# Patient Record
Sex: MALE | NOT HISPANIC OR LATINO | RURAL
[De-identification: names, ages, dates, MRNs, and addresses within clinical notes are randomized per-mention and may not be internally consistent; named-entity substitution may affect disease eponyms.]

---

## 2022-01-01 ENCOUNTER — APPOINTMENT (OUTPATIENT)
Dept: RADIOLOGY | Facility: HOSPITAL | Age: 77
End: 2022-01-01
Payer: MEDICARE

## 2022-01-01 ENCOUNTER — APPOINTMENT (OUTPATIENT)
Dept: RADIOLOGY | Facility: HOSPITAL | Age: 77
End: 2022-01-01
Attending: INTERNAL MEDICINE
Payer: MEDICARE

## 2022-01-01 ENCOUNTER — HOSPITAL ENCOUNTER (INPATIENT)
Facility: HOSPITAL | Age: 77
LOS: 33 days | DRG: 207 | End: 2022-09-25
Attending: INTERNAL MEDICINE | Admitting: INTERNAL MEDICINE
Payer: MEDICARE

## 2022-01-01 ENCOUNTER — APPOINTMENT (OUTPATIENT)
Dept: RADIOLOGY | Facility: HOSPITAL | Age: 77
End: 2022-01-01
Attending: SURGERY
Payer: MEDICARE

## 2022-01-01 VITALS
OXYGEN SATURATION: 79 % | BODY MASS INDEX: 28.69 KG/M2 | HEIGHT: 74 IN | TEMPERATURE: 97 F | SYSTOLIC BLOOD PRESSURE: 47 MMHG | DIASTOLIC BLOOD PRESSURE: 30 MMHG | WEIGHT: 223.56 LBS | HEART RATE: 118 BPM

## 2022-01-01 DIAGNOSIS — S31.109A: ICD-10-CM

## 2022-01-01 DIAGNOSIS — J96.01 ACUTE HYPOXEMIC RESPIRATORY FAILURE: Primary | ICD-10-CM

## 2022-01-01 DIAGNOSIS — T81.30XA WOUND DEHISCENCE: ICD-10-CM

## 2022-01-01 DIAGNOSIS — R13.10 DYSPHAGIA, UNSPECIFIED TYPE: ICD-10-CM

## 2022-01-01 DIAGNOSIS — L89.890: ICD-10-CM

## 2022-01-01 DIAGNOSIS — L89.150 PRESSURE INJURY OF SACRAL REGION, UNSTAGEABLE: ICD-10-CM

## 2022-01-01 DIAGNOSIS — I48.91 A-FIB: ICD-10-CM

## 2022-01-01 DIAGNOSIS — I47.20 V-TACH: ICD-10-CM

## 2022-01-01 LAB
ABO + RH BLD: NORMAL
ABORH RETYPE: NORMAL
ALBUMIN SERPL BCP-MCNC: 1.4 G/DL (ref 3.5–5)
ANION GAP SERPL CALCULATED.3IONS-SCNC: 10 MMOL/L (ref 7–16)
ANION GAP SERPL CALCULATED.3IONS-SCNC: 12 MMOL/L (ref 7–16)
ANION GAP SERPL CALCULATED.3IONS-SCNC: 13 MMOL/L (ref 7–16)
ANION GAP SERPL CALCULATED.3IONS-SCNC: 15 MMOL/L (ref 7–16)
ANION GAP SERPL CALCULATED.3IONS-SCNC: 15 MMOL/L (ref 7–16)
ANION GAP SERPL CALCULATED.3IONS-SCNC: 16 MMOL/L (ref 7–16)
ANISOCYTOSIS BLD QL SMEAR: ABNORMAL
BASOPHILS # BLD AUTO: 0 K/UL (ref 0–0.2)
BASOPHILS # BLD AUTO: 0.01 K/UL (ref 0–0.2)
BASOPHILS # BLD AUTO: 0.03 K/UL (ref 0–0.2)
BASOPHILS # BLD AUTO: 0.03 K/UL (ref 0–0.2)
BASOPHILS # BLD AUTO: 0.04 K/UL (ref 0–0.2)
BASOPHILS # BLD AUTO: 0.05 K/UL (ref 0–0.2)
BASOPHILS # BLD AUTO: 0.06 K/UL (ref 0–0.2)
BASOPHILS # BLD AUTO: 0.08 K/UL (ref 0–0.2)
BASOPHILS # BLD AUTO: 0.08 K/UL (ref 0–0.2)
BASOPHILS # BLD AUTO: 0.09 K/UL (ref 0–0.2)
BASOPHILS # BLD AUTO: 0.15 K/UL (ref 0–0.2)
BASOPHILS NFR BLD AUTO: 0 % (ref 0–1)
BASOPHILS NFR BLD AUTO: 0 % (ref 0–1)
BASOPHILS NFR BLD AUTO: 0.2 % (ref 0–1)
BASOPHILS NFR BLD AUTO: 0.3 % (ref 0–1)
BASOPHILS NFR BLD AUTO: 0.3 % (ref 0–1)
BASOPHILS NFR BLD AUTO: 0.4 % (ref 0–1)
BASOPHILS NFR BLD AUTO: 0.4 % (ref 0–1)
BASOPHILS NFR BLD AUTO: 0.5 % (ref 0–1)
BASOPHILS NFR BLD AUTO: 0.5 % (ref 0–1)
BASOPHILS NFR BLD AUTO: 0.7 % (ref 0–1)
BASOPHILS NFR BLD AUTO: 0.7 % (ref 0–1)
BLD PROD TYP BPU: NORMAL
BLOOD UNIT EXPIRATION DATE: NORMAL
BLOOD UNIT TYPE CODE: 5100
BLOOD UNIT TYPE CODE: 9500
BUN SERPL-MCNC: 61 MG/DL (ref 7–18)
BUN SERPL-MCNC: 68 MG/DL (ref 7–18)
BUN SERPL-MCNC: 72 MG/DL (ref 7–18)
BUN SERPL-MCNC: 72 MG/DL (ref 7–18)
BUN SERPL-MCNC: 76 MG/DL (ref 7–18)
BUN SERPL-MCNC: 79 MG/DL (ref 7–18)
BUN SERPL-MCNC: 81 MG/DL (ref 7–18)
BUN SERPL-MCNC: 81 MG/DL (ref 7–18)
BUN SERPL-MCNC: 83 MG/DL (ref 7–18)
BUN SERPL-MCNC: 98 MG/DL (ref 7–18)
BUN/CREAT SERPL: 19 (ref 6–20)
BUN/CREAT SERPL: 22 (ref 6–20)
BUN/CREAT SERPL: 23 (ref 6–20)
BUN/CREAT SERPL: 23 (ref 6–20)
BUN/CREAT SERPL: 24 (ref 6–20)
BUN/CREAT SERPL: 26 (ref 6–20)
BUN/CREAT SERPL: 30 (ref 6–20)
CALCIUM SERPL-MCNC: 7.5 MG/DL (ref 8.5–10.1)
CALCIUM SERPL-MCNC: 7.6 MG/DL (ref 8.5–10.1)
CALCIUM SERPL-MCNC: 7.8 MG/DL (ref 8.5–10.1)
CALCIUM SERPL-MCNC: 8 MG/DL (ref 8.5–10.1)
CALCIUM SERPL-MCNC: 8 MG/DL (ref 8.5–10.1)
CALCIUM SERPL-MCNC: 8.1 MG/DL (ref 8.5–10.1)
CALCIUM SERPL-MCNC: 8.2 MG/DL (ref 8.5–10.1)
CALCIUM SERPL-MCNC: 8.8 MG/DL (ref 8.5–10.1)
CHLORIDE SERPL-SCNC: 100 MMOL/L (ref 98–107)
CHLORIDE SERPL-SCNC: 102 MMOL/L (ref 98–107)
CHLORIDE SERPL-SCNC: 90 MMOL/L (ref 98–107)
CHLORIDE SERPL-SCNC: 94 MMOL/L (ref 98–107)
CHLORIDE SERPL-SCNC: 96 MMOL/L (ref 98–107)
CHLORIDE SERPL-SCNC: 98 MMOL/L (ref 98–107)
CHLORIDE SERPL-SCNC: 98 MMOL/L (ref 98–107)
CHLORIDE SERPL-SCNC: 99 MMOL/L (ref 98–107)
CO2 SERPL-SCNC: 23 MMOL/L (ref 21–32)
CO2 SERPL-SCNC: 24 MMOL/L (ref 21–32)
CO2 SERPL-SCNC: 24 MMOL/L (ref 21–32)
CO2 SERPL-SCNC: 25 MMOL/L (ref 21–32)
CO2 SERPL-SCNC: 26 MMOL/L (ref 21–32)
CO2 SERPL-SCNC: 27 MMOL/L (ref 21–32)
CO2 SERPL-SCNC: 28 MMOL/L (ref 21–32)
CO2 SERPL-SCNC: 28 MMOL/L (ref 21–32)
CREAT SERPL-MCNC: 2.67 MG/DL (ref 0.7–1.3)
CREAT SERPL-MCNC: 3.05 MG/DL (ref 0.7–1.3)
CREAT SERPL-MCNC: 3.09 MG/DL (ref 0.7–1.3)
CREAT SERPL-MCNC: 3.19 MG/DL (ref 0.7–1.3)
CREAT SERPL-MCNC: 3.25 MG/DL (ref 0.7–1.3)
CREAT SERPL-MCNC: 3.34 MG/DL (ref 0.7–1.3)
CREAT SERPL-MCNC: 3.41 MG/DL (ref 0.7–1.3)
CREAT SERPL-MCNC: 3.45 MG/DL (ref 0.7–1.3)
CREAT SERPL-MCNC: 3.52 MG/DL (ref 0.7–1.3)
CREAT SERPL-MCNC: 4.5 MG/DL (ref 0.7–1.3)
CRENATED CELLS: ABNORMAL
CROSSMATCH INTERPRETATION: NORMAL
CULTURE, LOWER RESPIRATORY: ABNORMAL
DIFFERENTIAL METHOD BLD: ABNORMAL
DISPENSE STATUS: NORMAL
EGFR (NO RACE VARIABLE) (RUSH/TITUS): 13 ML/MIN/1.73M²
EGFR (NO RACE VARIABLE) (RUSH/TITUS): 17 ML/MIN/1.73M²
EGFR (NO RACE VARIABLE) (RUSH/TITUS): 17 ML/MIN/1.73M²
EGFR (NO RACE VARIABLE) (RUSH/TITUS): 18 ML/MIN/1.73M²
EGFR (NO RACE VARIABLE) (RUSH/TITUS): 18 ML/MIN/1.73M²
EGFR (NO RACE VARIABLE) (RUSH/TITUS): 19 ML/MIN/1.73M²
EGFR (NO RACE VARIABLE) (RUSH/TITUS): 19 ML/MIN/1.73M²
EGFR (NO RACE VARIABLE) (RUSH/TITUS): 20 ML/MIN/1.73M²
EGFR (NO RACE VARIABLE) (RUSH/TITUS): 20 ML/MIN/1.73M²
EGFR (NO RACE VARIABLE) (RUSH/TITUS): 24 ML/MIN/1.73M²
EOSINOPHIL # BLD AUTO: 0 K/UL (ref 0–0.5)
EOSINOPHIL # BLD AUTO: 0.01 K/UL (ref 0–0.5)
EOSINOPHIL # BLD AUTO: 0.01 K/UL (ref 0–0.5)
EOSINOPHIL # BLD AUTO: 0.03 K/UL (ref 0–0.5)
EOSINOPHIL # BLD AUTO: 0.07 K/UL (ref 0–0.5)
EOSINOPHIL # BLD AUTO: 0.09 K/UL (ref 0–0.5)
EOSINOPHIL # BLD AUTO: 0.09 K/UL (ref 0–0.5)
EOSINOPHIL # BLD AUTO: 0.1 K/UL (ref 0–0.5)
EOSINOPHIL # BLD AUTO: 0.19 K/UL (ref 0–0.5)
EOSINOPHIL # BLD AUTO: 0.21 K/UL (ref 0–0.5)
EOSINOPHIL # BLD AUTO: 0.21 K/UL (ref 0–0.5)
EOSINOPHIL # BLD AUTO: 0.22 K/UL (ref 0–0.5)
EOSINOPHIL # BLD AUTO: 0.31 K/UL (ref 0–0.5)
EOSINOPHIL NFR BLD AUTO: 0 % (ref 1–4)
EOSINOPHIL NFR BLD AUTO: 0 % (ref 1–4)
EOSINOPHIL NFR BLD AUTO: 0.1 % (ref 1–4)
EOSINOPHIL NFR BLD AUTO: 0.4 % (ref 1–4)
EOSINOPHIL NFR BLD AUTO: 1 % (ref 1–4)
EOSINOPHIL NFR BLD AUTO: 1.1 % (ref 1–4)
EOSINOPHIL NFR BLD AUTO: 1.3 % (ref 1–4)
EOSINOPHIL NFR BLD AUTO: 1.3 % (ref 1–4)
EOSINOPHIL NFR BLD AUTO: 1.5 % (ref 1–4)
EOSINOPHIL NFR BLD AUTO: 2.4 % (ref 1–4)
EOSINOPHIL NFR BLD MANUAL: 1 % (ref 1–4)
EOSINOPHIL NFR BLD MANUAL: 2 % (ref 1–4)
EOSINOPHIL NFR BLD MANUAL: 4 % (ref 1–4)
ERYTHROCYTE [DISTWIDTH] IN BLOOD BY AUTOMATED COUNT: 14.1 % (ref 11.5–14.5)
ERYTHROCYTE [DISTWIDTH] IN BLOOD BY AUTOMATED COUNT: 14.5 % (ref 11.5–14.5)
ERYTHROCYTE [DISTWIDTH] IN BLOOD BY AUTOMATED COUNT: 14.6 % (ref 11.5–14.5)
ERYTHROCYTE [DISTWIDTH] IN BLOOD BY AUTOMATED COUNT: 14.7 % (ref 11.5–14.5)
ERYTHROCYTE [DISTWIDTH] IN BLOOD BY AUTOMATED COUNT: 14.9 % (ref 11.5–14.5)
ERYTHROCYTE [DISTWIDTH] IN BLOOD BY AUTOMATED COUNT: 15.7 % (ref 11.5–14.5)
ERYTHROCYTE [DISTWIDTH] IN BLOOD BY AUTOMATED COUNT: 15.9 % (ref 11.5–14.5)
ERYTHROCYTE [DISTWIDTH] IN BLOOD BY AUTOMATED COUNT: 16 % (ref 11.5–14.5)
ERYTHROCYTE [DISTWIDTH] IN BLOOD BY AUTOMATED COUNT: 16.2 % (ref 11.5–14.5)
ERYTHROCYTE [DISTWIDTH] IN BLOOD BY AUTOMATED COUNT: 16.7 % (ref 11.5–14.5)
EST. AVERAGE GLUCOSE BLD GHB EST-MCNC: 90 MG/DL
FERRITIN SERPL-MCNC: 1197 NG/ML (ref 26–388)
FOLATE SERPL-MCNC: 12 NG/ML (ref 3.1–17.5)
GLUCOSE SERPL-MCNC: 100 MG/DL (ref 74–106)
GLUCOSE SERPL-MCNC: 101 MG/DL (ref 70–105)
GLUCOSE SERPL-MCNC: 102 MG/DL (ref 70–105)
GLUCOSE SERPL-MCNC: 103 MG/DL (ref 70–105)
GLUCOSE SERPL-MCNC: 103 MG/DL (ref 70–105)
GLUCOSE SERPL-MCNC: 104 MG/DL (ref 70–105)
GLUCOSE SERPL-MCNC: 105 MG/DL (ref 70–105)
GLUCOSE SERPL-MCNC: 105 MG/DL (ref 74–106)
GLUCOSE SERPL-MCNC: 106 MG/DL (ref 70–105)
GLUCOSE SERPL-MCNC: 106 MG/DL (ref 70–105)
GLUCOSE SERPL-MCNC: 106 MG/DL (ref 74–106)
GLUCOSE SERPL-MCNC: 107 MG/DL (ref 70–105)
GLUCOSE SERPL-MCNC: 108 MG/DL (ref 70–105)
GLUCOSE SERPL-MCNC: 108 MG/DL (ref 70–105)
GLUCOSE SERPL-MCNC: 109 MG/DL (ref 70–105)
GLUCOSE SERPL-MCNC: 109 MG/DL (ref 74–106)
GLUCOSE SERPL-MCNC: 110 MG/DL (ref 74–106)
GLUCOSE SERPL-MCNC: 111 MG/DL (ref 74–106)
GLUCOSE SERPL-MCNC: 113 MG/DL (ref 70–105)
GLUCOSE SERPL-MCNC: 113 MG/DL (ref 70–105)
GLUCOSE SERPL-MCNC: 114 MG/DL (ref 70–105)
GLUCOSE SERPL-MCNC: 116 MG/DL (ref 70–105)
GLUCOSE SERPL-MCNC: 117 MG/DL (ref 70–105)
GLUCOSE SERPL-MCNC: 117 MG/DL (ref 70–105)
GLUCOSE SERPL-MCNC: 118 MG/DL (ref 70–105)
GLUCOSE SERPL-MCNC: 119 MG/DL (ref 70–105)
GLUCOSE SERPL-MCNC: 120 MG/DL (ref 70–105)
GLUCOSE SERPL-MCNC: 121 MG/DL (ref 70–105)
GLUCOSE SERPL-MCNC: 122 MG/DL (ref 70–105)
GLUCOSE SERPL-MCNC: 122 MG/DL (ref 70–105)
GLUCOSE SERPL-MCNC: 124 MG/DL (ref 70–105)
GLUCOSE SERPL-MCNC: 124 MG/DL (ref 70–105)
GLUCOSE SERPL-MCNC: 125 MG/DL (ref 70–105)
GLUCOSE SERPL-MCNC: 126 MG/DL (ref 70–105)
GLUCOSE SERPL-MCNC: 127 MG/DL (ref 70–105)
GLUCOSE SERPL-MCNC: 128 MG/DL (ref 70–105)
GLUCOSE SERPL-MCNC: 130 MG/DL (ref 70–105)
GLUCOSE SERPL-MCNC: 130 MG/DL (ref 70–105)
GLUCOSE SERPL-MCNC: 131 MG/DL (ref 70–105)
GLUCOSE SERPL-MCNC: 132 MG/DL (ref 70–105)
GLUCOSE SERPL-MCNC: 133 MG/DL (ref 70–105)
GLUCOSE SERPL-MCNC: 135 MG/DL (ref 70–105)
GLUCOSE SERPL-MCNC: 136 MG/DL (ref 70–105)
GLUCOSE SERPL-MCNC: 136 MG/DL (ref 70–105)
GLUCOSE SERPL-MCNC: 136 MG/DL (ref 74–106)
GLUCOSE SERPL-MCNC: 137 MG/DL (ref 70–105)
GLUCOSE SERPL-MCNC: 138 MG/DL (ref 70–105)
GLUCOSE SERPL-MCNC: 139 MG/DL (ref 70–105)
GLUCOSE SERPL-MCNC: 140 MG/DL (ref 70–105)
GLUCOSE SERPL-MCNC: 142 MG/DL (ref 70–105)
GLUCOSE SERPL-MCNC: 143 MG/DL (ref 70–105)
GLUCOSE SERPL-MCNC: 144 MG/DL (ref 70–105)
GLUCOSE SERPL-MCNC: 145 MG/DL (ref 70–105)
GLUCOSE SERPL-MCNC: 146 MG/DL (ref 70–105)
GLUCOSE SERPL-MCNC: 147 MG/DL (ref 70–105)
GLUCOSE SERPL-MCNC: 148 MG/DL (ref 70–105)
GLUCOSE SERPL-MCNC: 150 MG/DL (ref 70–105)
GLUCOSE SERPL-MCNC: 152 MG/DL (ref 74–106)
GLUCOSE SERPL-MCNC: 153 MG/DL (ref 70–105)
GLUCOSE SERPL-MCNC: 154 MG/DL (ref 70–105)
GLUCOSE SERPL-MCNC: 156 MG/DL (ref 70–105)
GLUCOSE SERPL-MCNC: 161 MG/DL (ref 70–105)
GLUCOSE SERPL-MCNC: 162 MG/DL (ref 70–105)
GLUCOSE SERPL-MCNC: 163 MG/DL (ref 70–105)
GLUCOSE SERPL-MCNC: 168 MG/DL (ref 70–105)
GLUCOSE SERPL-MCNC: 168 MG/DL (ref 74–106)
GLUCOSE SERPL-MCNC: 170 MG/DL (ref 70–105)
GLUCOSE SERPL-MCNC: 172 MG/DL (ref 70–105)
GLUCOSE SERPL-MCNC: 173 MG/DL (ref 70–105)
GLUCOSE SERPL-MCNC: 178 MG/DL (ref 70–105)
GLUCOSE SERPL-MCNC: 179 MG/DL (ref 70–105)
GLUCOSE SERPL-MCNC: 179 MG/DL (ref 70–105)
GLUCOSE SERPL-MCNC: 181 MG/DL (ref 70–105)
GLUCOSE SERPL-MCNC: 190 MG/DL (ref 70–105)
GLUCOSE SERPL-MCNC: 192 MG/DL (ref 70–105)
GLUCOSE SERPL-MCNC: 198 MG/DL (ref 70–105)
GLUCOSE SERPL-MCNC: 220 MG/DL (ref 70–105)
GLUCOSE SERPL-MCNC: 223 MG/DL (ref 70–105)
GLUCOSE SERPL-MCNC: 230 MG/DL (ref 70–105)
GLUCOSE SERPL-MCNC: 234 MG/DL (ref 70–105)
GLUCOSE SERPL-MCNC: 244 MG/DL (ref 70–105)
GLUCOSE SERPL-MCNC: 249 MG/DL (ref 70–105)
GLUCOSE SERPL-MCNC: 54 MG/DL (ref 74–106)
GLUCOSE SERPL-MCNC: 60 MG/DL (ref 70–105)
GLUCOSE SERPL-MCNC: 61 MG/DL (ref 70–105)
GLUCOSE SERPL-MCNC: 72 MG/DL (ref 70–105)
GLUCOSE SERPL-MCNC: 72 MG/DL (ref 70–105)
GLUCOSE SERPL-MCNC: 73 MG/DL (ref 70–105)
GLUCOSE SERPL-MCNC: 76 MG/DL (ref 70–105)
GLUCOSE SERPL-MCNC: 77 MG/DL (ref 70–105)
GLUCOSE SERPL-MCNC: 77 MG/DL (ref 70–105)
GLUCOSE SERPL-MCNC: 78 MG/DL (ref 70–105)
GLUCOSE SERPL-MCNC: 80 MG/DL (ref 70–105)
GLUCOSE SERPL-MCNC: 81 MG/DL (ref 70–105)
GLUCOSE SERPL-MCNC: 84 MG/DL (ref 70–105)
GLUCOSE SERPL-MCNC: 88 MG/DL (ref 70–105)
GLUCOSE SERPL-MCNC: 90 MG/DL (ref 70–105)
GLUCOSE SERPL-MCNC: 90 MG/DL (ref 70–105)
GLUCOSE SERPL-MCNC: 92 MG/DL (ref 70–105)
GLUCOSE SERPL-MCNC: 93 MG/DL (ref 70–105)
GLUCOSE SERPL-MCNC: 93 MG/DL (ref 70–105)
GLUCOSE SERPL-MCNC: 95 MG/DL (ref 70–105)
GLUCOSE SERPL-MCNC: 95 MG/DL (ref 70–105)
GLUCOSE SERPL-MCNC: 96 MG/DL (ref 70–105)
GLUCOSE SERPL-MCNC: 96 MG/DL (ref 70–105)
GLUCOSE SERPL-MCNC: 97 MG/DL (ref 70–105)
GLUCOSE SERPL-MCNC: 99 MG/DL (ref 70–105)
GLUCOSE SERPL-MCNC: 99 MG/DL (ref 70–105)
GRAM STN SPEC: ABNORMAL
HAV IGM SER QL: NORMAL
HAV IGM SER QL: NORMAL
HBA1C MFR BLD HPLC: 5.3 % (ref 4.5–6.6)
HBV CORE IGM SER QL: NORMAL
HBV CORE IGM SER QL: NORMAL
HBV SURFACE AG SERPL QL IA: NORMAL
HBV SURFACE AG SERPL QL IA: NORMAL
HCO3 UR-SCNC: 27.8 MMOL/L (ref 21–28)
HCT VFR BLD AUTO: 18.8 % (ref 40–54)
HCT VFR BLD AUTO: 19.8 % (ref 40–54)
HCT VFR BLD AUTO: 21.5 % (ref 40–54)
HCT VFR BLD AUTO: 21.6 % (ref 40–54)
HCT VFR BLD AUTO: 21.7 % (ref 40–54)
HCT VFR BLD AUTO: 21.8 % (ref 40–54)
HCT VFR BLD AUTO: 22.1 % (ref 40–54)
HCT VFR BLD AUTO: 22.8 % (ref 40–54)
HCT VFR BLD AUTO: 22.9 % (ref 40–54)
HCT VFR BLD AUTO: 23 % (ref 40–54)
HCT VFR BLD AUTO: 23.2 % (ref 40–54)
HCT VFR BLD AUTO: 24 % (ref 40–54)
HCT VFR BLD AUTO: 27 % (ref 40–54)
HCV AB SER QL: NORMAL
HCV AB SER QL: NORMAL
HGB BLD-MCNC: 6 G/DL (ref 13.5–18)
HGB BLD-MCNC: 6.4 G/DL (ref 13.5–18)
HGB BLD-MCNC: 6.7 G/DL (ref 13.5–18)
HGB BLD-MCNC: 6.8 G/DL (ref 13.5–18)
HGB BLD-MCNC: 6.9 G/DL (ref 13.5–18)
HGB BLD-MCNC: 7 G/DL (ref 13.5–18)
HGB BLD-MCNC: 7 G/DL (ref 13.5–18)
HGB BLD-MCNC: 7.2 G/DL (ref 13.5–18)
HGB BLD-MCNC: 7.2 G/DL (ref 13.5–18)
HGB BLD-MCNC: 7.3 G/DL (ref 13.5–18)
HGB BLD-MCNC: 7.5 G/DL (ref 13.5–18)
HGB BLD-MCNC: 7.6 G/DL (ref 13.5–18)
HGB BLD-MCNC: 8.5 G/DL (ref 13.5–18)
HYPOCHROMIA BLD QL SMEAR: ABNORMAL
IMM GRANULOCYTES # BLD AUTO: 0.15 K/UL (ref 0–0.04)
IMM GRANULOCYTES # BLD AUTO: 0.46 K/UL (ref 0–0.04)
IMM GRANULOCYTES # BLD AUTO: 0.47 K/UL (ref 0–0.04)
IMM GRANULOCYTES # BLD AUTO: 0.52 K/UL (ref 0–0.04)
IMM GRANULOCYTES # BLD AUTO: 0.54 K/UL (ref 0–0.04)
IMM GRANULOCYTES # BLD AUTO: 0.55 K/UL (ref 0–0.04)
IMM GRANULOCYTES # BLD AUTO: 0.85 K/UL (ref 0–0.04)
IMM GRANULOCYTES # BLD AUTO: 0.86 K/UL (ref 0–0.04)
IMM GRANULOCYTES # BLD AUTO: 1.01 K/UL (ref 0–0.04)
IMM GRANULOCYTES # BLD AUTO: 1.45 K/UL (ref 0–0.04)
IMM GRANULOCYTES # BLD AUTO: 1.89 K/UL (ref 0–0.04)
IMM GRANULOCYTES # BLD AUTO: 2.22 K/UL (ref 0–0.04)
IMM GRANULOCYTES # BLD AUTO: 2.9 K/UL (ref 0–0.04)
IMM GRANULOCYTES NFR BLD: 1 % (ref 0–0.4)
IMM GRANULOCYTES NFR BLD: 10 % (ref 0–0.4)
IMM GRANULOCYTES NFR BLD: 11.7 % (ref 0–0.4)
IMM GRANULOCYTES NFR BLD: 12.5 % (ref 0–0.4)
IMM GRANULOCYTES NFR BLD: 3.1 % (ref 0–0.4)
IMM GRANULOCYTES NFR BLD: 3.4 % (ref 0–0.4)
IMM GRANULOCYTES NFR BLD: 3.4 % (ref 0–0.4)
IMM GRANULOCYTES NFR BLD: 3.7 % (ref 0–0.4)
IMM GRANULOCYTES NFR BLD: 4.8 % (ref 0–0.4)
IMM GRANULOCYTES NFR BLD: 4.9 % (ref 0–0.4)
IMM GRANULOCYTES NFR BLD: 6 % (ref 0–0.4)
IMM GRANULOCYTES NFR BLD: 7.8 % (ref 0–0.4)
IMM GRANULOCYTES NFR BLD: 8.3 % (ref 0–0.4)
INDIRECT COOMBS: NORMAL
IRON SATN MFR SERPL: 18 % (ref 14–50)
IRON SERPL-MCNC: 19 ΜG/DL (ref 65–175)
LYMPHOCYTES # BLD AUTO: 0.19 K/UL (ref 1–4.8)
LYMPHOCYTES # BLD AUTO: 0.26 K/UL (ref 1–4.8)
LYMPHOCYTES # BLD AUTO: 0.32 K/UL (ref 1–4.8)
LYMPHOCYTES # BLD AUTO: 0.36 K/UL (ref 1–4.8)
LYMPHOCYTES # BLD AUTO: 0.37 K/UL (ref 1–4.8)
LYMPHOCYTES # BLD AUTO: 0.39 K/UL (ref 1–4.8)
LYMPHOCYTES # BLD AUTO: 0.48 K/UL (ref 1–4.8)
LYMPHOCYTES # BLD AUTO: 0.51 K/UL (ref 1–4.8)
LYMPHOCYTES # BLD AUTO: 0.53 K/UL (ref 1–4.8)
LYMPHOCYTES # BLD AUTO: 0.53 K/UL (ref 1–4.8)
LYMPHOCYTES # BLD AUTO: 0.55 K/UL (ref 1–4.8)
LYMPHOCYTES # BLD AUTO: 0.57 K/UL (ref 1–4.8)
LYMPHOCYTES # BLD AUTO: 0.61 K/UL (ref 1–4.8)
LYMPHOCYTES NFR BLD AUTO: 1.3 % (ref 27–41)
LYMPHOCYTES NFR BLD AUTO: 1.7 % (ref 27–41)
LYMPHOCYTES NFR BLD AUTO: 2 % (ref 27–41)
LYMPHOCYTES NFR BLD AUTO: 2.1 % (ref 27–41)
LYMPHOCYTES NFR BLD AUTO: 2.6 % (ref 27–41)
LYMPHOCYTES NFR BLD AUTO: 2.7 % (ref 27–41)
LYMPHOCYTES NFR BLD AUTO: 2.8 % (ref 27–41)
LYMPHOCYTES NFR BLD AUTO: 2.8 % (ref 27–41)
LYMPHOCYTES NFR BLD AUTO: 2.9 % (ref 27–41)
LYMPHOCYTES NFR BLD AUTO: 3.2 % (ref 27–41)
LYMPHOCYTES NFR BLD AUTO: 3.6 % (ref 27–41)
LYMPHOCYTES NFR BLD AUTO: 3.6 % (ref 27–41)
LYMPHOCYTES NFR BLD AUTO: 4.8 % (ref 27–41)
LYMPHOCYTES NFR BLD MANUAL: 1 % (ref 27–41)
LYMPHOCYTES NFR BLD MANUAL: 1 % (ref 27–41)
LYMPHOCYTES NFR BLD MANUAL: 2 % (ref 27–41)
LYMPHOCYTES NFR BLD MANUAL: 3 % (ref 27–41)
LYMPHOCYTES NFR BLD MANUAL: 4 % (ref 27–41)
LYMPHOCYTES NFR BLD MANUAL: 5 % (ref 27–41)
LYMPHOCYTES NFR BLD MANUAL: 5 % (ref 27–41)
LYMPHOCYTES NFR BLD MANUAL: 7 % (ref 27–41)
LYMPHOCYTES NFR BLD MANUAL: 8 % (ref 27–41)
MACROCYTES BLD QL SMEAR: ABNORMAL
MCH RBC QN AUTO: 29.6 PG (ref 27–31)
MCH RBC QN AUTO: 29.6 PG (ref 27–31)
MCH RBC QN AUTO: 29.9 PG (ref 27–31)
MCH RBC QN AUTO: 30 PG (ref 27–31)
MCH RBC QN AUTO: 30 PG (ref 27–31)
MCH RBC QN AUTO: 30.1 PG (ref 27–31)
MCH RBC QN AUTO: 30.1 PG (ref 27–31)
MCH RBC QN AUTO: 30.2 PG (ref 27–31)
MCH RBC QN AUTO: 30.2 PG (ref 27–31)
MCH RBC QN AUTO: 30.3 PG (ref 27–31)
MCH RBC QN AUTO: 30.5 PG (ref 27–31)
MCHC RBC AUTO-ENTMCNC: 29.5 G/DL (ref 32–36)
MCHC RBC AUTO-ENTMCNC: 30.4 G/DL (ref 32–36)
MCHC RBC AUTO-ENTMCNC: 31.2 G/DL (ref 32–36)
MCHC RBC AUTO-ENTMCNC: 31.5 G/DL (ref 32–36)
MCHC RBC AUTO-ENTMCNC: 31.5 G/DL (ref 32–36)
MCHC RBC AUTO-ENTMCNC: 31.6 G/DL (ref 32–36)
MCHC RBC AUTO-ENTMCNC: 31.7 G/DL (ref 32–36)
MCHC RBC AUTO-ENTMCNC: 31.9 G/DL (ref 32–36)
MCHC RBC AUTO-ENTMCNC: 32.1 G/DL (ref 32–36)
MCHC RBC AUTO-ENTMCNC: 32.4 G/DL (ref 32–36)
MCHC RBC AUTO-ENTMCNC: 32.6 G/DL (ref 32–36)
MCHC RBC AUTO-ENTMCNC: 32.8 G/DL (ref 32–36)
MCHC RBC AUTO-ENTMCNC: 33.8 G/DL (ref 32–36)
MCV RBC AUTO: 101.4 FL (ref 80–96)
MCV RBC AUTO: 88.8 FL (ref 80–96)
MCV RBC AUTO: 90.5 FL (ref 80–96)
MCV RBC AUTO: 90.9 FL (ref 80–96)
MCV RBC AUTO: 93.1 FL (ref 80–96)
MCV RBC AUTO: 93.5 FL (ref 80–96)
MCV RBC AUTO: 93.9 FL (ref 80–96)
MCV RBC AUTO: 94.5 FL (ref 80–96)
MCV RBC AUTO: 95 FL (ref 80–96)
MCV RBC AUTO: 96.3 FL (ref 80–96)
MCV RBC AUTO: 96.5 FL (ref 80–96)
MCV RBC AUTO: 96.8 FL (ref 80–96)
MCV RBC AUTO: 99.1 FL (ref 80–96)
METAMYELOCYTES NFR BLD MANUAL: 1 %
METAMYELOCYTES NFR BLD MANUAL: 1 %
METAMYELOCYTES NFR BLD MANUAL: 10 %
METAMYELOCYTES NFR BLD MANUAL: 2 %
METAMYELOCYTES NFR BLD MANUAL: 3 %
METAMYELOCYTES NFR BLD MANUAL: 5 %
METAMYELOCYTES NFR BLD MANUAL: 6 %
MONOCYTES # BLD AUTO: 0.15 K/UL (ref 0–0.8)
MONOCYTES # BLD AUTO: 0.2 K/UL (ref 0–0.8)
MONOCYTES # BLD AUTO: 0.31 K/UL (ref 0–0.8)
MONOCYTES # BLD AUTO: 0.65 K/UL (ref 0–0.8)
MONOCYTES # BLD AUTO: 0.78 K/UL (ref 0–0.8)
MONOCYTES # BLD AUTO: 1.03 K/UL (ref 0–0.8)
MONOCYTES # BLD AUTO: 1.1 K/UL (ref 0–0.8)
MONOCYTES # BLD AUTO: 1.1 K/UL (ref 0–0.8)
MONOCYTES # BLD AUTO: 1.13 K/UL (ref 0–0.8)
MONOCYTES # BLD AUTO: 1.16 K/UL (ref 0–0.8)
MONOCYTES # BLD AUTO: 1.16 K/UL (ref 0–0.8)
MONOCYTES # BLD AUTO: 1.22 K/UL (ref 0–0.8)
MONOCYTES # BLD AUTO: 1.33 K/UL (ref 0–0.8)
MONOCYTES NFR BLD AUTO: 1.6 % (ref 2–6)
MONOCYTES NFR BLD AUTO: 1.8 % (ref 2–6)
MONOCYTES NFR BLD AUTO: 2.9 % (ref 2–6)
MONOCYTES NFR BLD AUTO: 2.9 % (ref 2–6)
MONOCYTES NFR BLD AUTO: 3.6 % (ref 2–6)
MONOCYTES NFR BLD AUTO: 5.2 % (ref 2–6)
MONOCYTES NFR BLD AUTO: 5.9 % (ref 2–6)
MONOCYTES NFR BLD AUTO: 6.1 % (ref 2–6)
MONOCYTES NFR BLD AUTO: 6.4 % (ref 2–6)
MONOCYTES NFR BLD AUTO: 7.3 % (ref 2–6)
MONOCYTES NFR BLD AUTO: 7.4 % (ref 2–6)
MONOCYTES NFR BLD AUTO: 7.7 % (ref 2–6)
MONOCYTES NFR BLD AUTO: 9.1 % (ref 2–6)
MONOCYTES NFR BLD MANUAL: 1 % (ref 2–6)
MONOCYTES NFR BLD MANUAL: 1 % (ref 2–6)
MONOCYTES NFR BLD MANUAL: 2 % (ref 2–6)
MONOCYTES NFR BLD MANUAL: 3 % (ref 2–6)
MONOCYTES NFR BLD MANUAL: 4 % (ref 2–6)
MONOCYTES NFR BLD MANUAL: 5 % (ref 2–6)
MONOCYTES NFR BLD MANUAL: 5 % (ref 2–6)
MONOCYTES NFR BLD MANUAL: 6 % (ref 2–6)
MONOCYTES NFR BLD MANUAL: 8 % (ref 2–6)
MPC BLD CALC-MCNC: 10 FL (ref 9.4–12.4)
MPC BLD CALC-MCNC: 10.1 FL (ref 9.4–12.4)
MPC BLD CALC-MCNC: 10.1 FL (ref 9.4–12.4)
MPC BLD CALC-MCNC: 10.2 FL (ref 9.4–12.4)
MPC BLD CALC-MCNC: 10.3 FL (ref 9.4–12.4)
MPC BLD CALC-MCNC: 10.3 FL (ref 9.4–12.4)
MPC BLD CALC-MCNC: 10.5 FL (ref 9.4–12.4)
MPC BLD CALC-MCNC: 10.6 FL (ref 9.4–12.4)
MPC BLD CALC-MCNC: 10.8 FL (ref 9.4–12.4)
MPC BLD CALC-MCNC: 11.4 FL (ref 9.4–12.4)
MPC BLD CALC-MCNC: 12.7 FL (ref 9.4–12.4)
MPC BLD CALC-MCNC: 9.5 FL (ref 9.4–12.4)
MPC BLD CALC-MCNC: 9.8 FL (ref 9.4–12.4)
MYELOCYTES NFR BLD MANUAL: 1 %
MYELOCYTES NFR BLD MANUAL: 3 %
MYELOCYTES NFR BLD MANUAL: 3 %
MYELOCYTES NFR BLD MANUAL: 4 %
NEUTROPHILS # BLD AUTO: 10.15 K/UL (ref 1.8–7.7)
NEUTROPHILS # BLD AUTO: 12.73 K/UL (ref 1.8–7.7)
NEUTROPHILS # BLD AUTO: 12.77 K/UL (ref 1.8–7.7)
NEUTROPHILS # BLD AUTO: 13.28 K/UL (ref 1.8–7.7)
NEUTROPHILS # BLD AUTO: 13.64 K/UL (ref 1.8–7.7)
NEUTROPHILS # BLD AUTO: 14.87 K/UL (ref 1.8–7.7)
NEUTROPHILS # BLD AUTO: 15.22 K/UL (ref 1.8–7.7)
NEUTROPHILS # BLD AUTO: 16 K/UL (ref 1.8–7.7)
NEUTROPHILS # BLD AUTO: 17.64 K/UL (ref 1.8–7.7)
NEUTROPHILS # BLD AUTO: 19.55 K/UL (ref 1.8–7.7)
NEUTROPHILS # BLD AUTO: 24.67 K/UL (ref 1.8–7.7)
NEUTROPHILS # BLD AUTO: 5.55 K/UL (ref 1.8–7.7)
NEUTROPHILS # BLD AUTO: 8.1 K/UL (ref 1.8–7.7)
NEUTROPHILS NFR BLD AUTO: 78.7 % (ref 53–65)
NEUTROPHILS NFR BLD AUTO: 79.5 % (ref 53–65)
NEUTROPHILS NFR BLD AUTO: 80.7 % (ref 53–65)
NEUTROPHILS NFR BLD AUTO: 81.9 % (ref 53–65)
NEUTROPHILS NFR BLD AUTO: 83.7 % (ref 53–65)
NEUTROPHILS NFR BLD AUTO: 84.1 % (ref 53–65)
NEUTROPHILS NFR BLD AUTO: 84.6 % (ref 53–65)
NEUTROPHILS NFR BLD AUTO: 85.1 % (ref 53–65)
NEUTROPHILS NFR BLD AUTO: 85.5 % (ref 53–65)
NEUTROPHILS NFR BLD AUTO: 86 % (ref 53–65)
NEUTROPHILS NFR BLD AUTO: 88.6 % (ref 53–65)
NEUTROPHILS NFR BLD AUTO: 90.9 % (ref 53–65)
NEUTROPHILS NFR BLD AUTO: 91.4 % (ref 53–65)
NEUTS BAND NFR BLD MANUAL: 1 % (ref 1–5)
NEUTS BAND NFR BLD MANUAL: 13 % (ref 1–5)
NEUTS BAND NFR BLD MANUAL: 16 % (ref 1–5)
NEUTS BAND NFR BLD MANUAL: 2 % (ref 1–5)
NEUTS BAND NFR BLD MANUAL: 3 % (ref 1–5)
NEUTS BAND NFR BLD MANUAL: 3 % (ref 1–5)
NEUTS BAND NFR BLD MANUAL: 34 % (ref 1–5)
NEUTS BAND NFR BLD MANUAL: 4 % (ref 1–5)
NEUTS BAND NFR BLD MANUAL: 5 % (ref 1–5)
NEUTS BAND NFR BLD MANUAL: 58 % (ref 1–5)
NEUTS BAND NFR BLD MANUAL: 6 % (ref 1–5)
NEUTS BAND NFR BLD MANUAL: 62 % (ref 1–5)
NEUTS SEG NFR BLD MANUAL: 24 % (ref 50–62)
NEUTS SEG NFR BLD MANUAL: 28 % (ref 50–62)
NEUTS SEG NFR BLD MANUAL: 52 % (ref 50–62)
NEUTS SEG NFR BLD MANUAL: 69 % (ref 50–62)
NEUTS SEG NFR BLD MANUAL: 79 % (ref 50–62)
NEUTS SEG NFR BLD MANUAL: 81 % (ref 50–62)
NEUTS SEG NFR BLD MANUAL: 82 % (ref 50–62)
NEUTS SEG NFR BLD MANUAL: 87 % (ref 50–62)
NEUTS SEG NFR BLD MANUAL: 87 % (ref 50–62)
NEUTS SEG NFR BLD MANUAL: 88 % (ref 50–62)
NEUTS SEG NFR BLD MANUAL: 89 % (ref 50–62)
NEUTS SEG NFR BLD MANUAL: 90 % (ref 50–62)
NEUTS SEG NFR BLD MANUAL: 90 % (ref 50–62)
NRBC # BLD AUTO: 0 X10E3/UL
NRBC # BLD AUTO: 0.02 X10E3/UL
NRBC # BLD AUTO: 0.03 X10E3/UL
NRBC # BLD AUTO: 0.09 X10E3/UL
NRBC # BLD AUTO: 0.13 X10E3/UL
NRBC # BLD AUTO: 0.13 X10E3/UL
NRBC BLD MANUAL-RTO: 1 /100 WBC
NRBC, AUTO (.00): 0 %
NRBC, AUTO (.00): 0.3 %
NRBC, AUTO (.00): 0.3 %
NRBC, AUTO (.00): 0.4 %
NRBC, AUTO (.00): 0.5 %
NRBC, AUTO (.00): 0.6 %
OVALOCYTES BLD QL SMEAR: ABNORMAL
PCO2 BLDA: 40 MMHG (ref 35–48)
PH SMN: 7.45 [PH] (ref 7.35–7.45)
PLATELET # BLD AUTO: 103 K/UL (ref 150–400)
PLATELET # BLD AUTO: 148 K/UL (ref 150–400)
PLATELET # BLD AUTO: 175 K/UL (ref 150–400)
PLATELET # BLD AUTO: 210 K/UL (ref 150–400)
PLATELET # BLD AUTO: 227 K/UL (ref 150–400)
PLATELET # BLD AUTO: 229 K/UL (ref 150–400)
PLATELET # BLD AUTO: 254 K/UL (ref 150–400)
PLATELET # BLD AUTO: 257 K/UL (ref 150–400)
PLATELET # BLD AUTO: 267 K/UL (ref 150–400)
PLATELET # BLD AUTO: 314 K/UL (ref 150–400)
PLATELET # BLD AUTO: 349 K/UL (ref 150–400)
PLATELET # BLD AUTO: 373 K/UL (ref 150–400)
PLATELET # BLD AUTO: 69 K/UL (ref 150–400)
PLATELET MORPHOLOGY: ABNORMAL
PLATELET MORPHOLOGY: NORMAL
PO2 BLDA: 139 MMHG (ref 83–108)
POC BASE EXCESS: 3.5 MMOL/L (ref -2–3)
POC SATURATED O2: 99 %
POLYCHROMASIA BLD QL SMEAR: ABNORMAL
POTASSIUM SERPL-SCNC: 3.6 MMOL/L (ref 3.5–5.1)
POTASSIUM SERPL-SCNC: 3.7 MMOL/L (ref 3.5–5.1)
POTASSIUM SERPL-SCNC: 4.2 MMOL/L (ref 3.5–5.1)
POTASSIUM SERPL-SCNC: 4.2 MMOL/L (ref 3.5–5.1)
POTASSIUM SERPL-SCNC: 4.4 MMOL/L (ref 3.5–5.1)
POTASSIUM SERPL-SCNC: 4.4 MMOL/L (ref 3.5–5.1)
POTASSIUM SERPL-SCNC: 4.5 MMOL/L (ref 3.5–5.1)
POTASSIUM SERPL-SCNC: 4.6 MMOL/L (ref 3.5–5.1)
POTASSIUM SERPL-SCNC: 4.9 MMOL/L (ref 3.5–5.1)
POTASSIUM SERPL-SCNC: 6 MMOL/L (ref 3.5–5.1)
RBC # BLD AUTO: 2.01 M/UL (ref 4.6–6.2)
RBC # BLD AUTO: 2.14 M/UL (ref 4.6–6.2)
RBC # BLD AUTO: 2.23 M/UL (ref 4.6–6.2)
RBC # BLD AUTO: 2.26 M/UL (ref 4.6–6.2)
RBC # BLD AUTO: 2.29 M/UL (ref 4.6–6.2)
RBC # BLD AUTO: 2.32 M/UL (ref 4.6–6.2)
RBC # BLD AUTO: 2.32 M/UL (ref 4.6–6.2)
RBC # BLD AUTO: 2.4 M/UL (ref 4.6–6.2)
RBC # BLD AUTO: 2.41 M/UL (ref 4.6–6.2)
RBC # BLD AUTO: 2.43 M/UL (ref 4.6–6.2)
RBC # BLD AUTO: 2.53 M/UL (ref 4.6–6.2)
RBC # BLD AUTO: 2.54 M/UL (ref 4.6–6.2)
RBC # BLD AUTO: 2.79 M/UL (ref 4.6–6.2)
RBC MORPH BLD: NORMAL
RH BLD: NORMAL
SODIUM SERPL-SCNC: 124 MMOL/L (ref 136–145)
SODIUM SERPL-SCNC: 129 MMOL/L (ref 136–145)
SODIUM SERPL-SCNC: 130 MMOL/L (ref 136–145)
SODIUM SERPL-SCNC: 131 MMOL/L (ref 136–145)
SODIUM SERPL-SCNC: 132 MMOL/L (ref 136–145)
SODIUM SERPL-SCNC: 132 MMOL/L (ref 136–145)
SODIUM SERPL-SCNC: 133 MMOL/L (ref 136–145)
SODIUM SERPL-SCNC: 136 MMOL/L (ref 136–145)
SODIUM SERPL-SCNC: 137 MMOL/L (ref 136–145)
SODIUM SERPL-SCNC: 138 MMOL/L (ref 136–145)
TIBC SERPL-MCNC: 108 ΜG/DL (ref 250–450)
TOXIC GRANULES BLD QL SMEAR: ABNORMAL
TOXIC GRANULES BLD QL SMEAR: ABNORMAL
UNIT NUMBER: NORMAL
VIT B12 SERPL-MCNC: 1031 PG/ML (ref 193–986)
WBC # BLD AUTO: 12.76 K/UL (ref 4.5–11)
WBC # BLD AUTO: 14.93 K/UL (ref 4.5–11)
WBC # BLD AUTO: 15.03 K/UL (ref 4.5–11)
WBC # BLD AUTO: 15.19 K/UL (ref 4.5–11)
WBC # BLD AUTO: 15.87 K/UL (ref 4.5–11)
WBC # BLD AUTO: 17.61 K/UL (ref 4.5–11)
WBC # BLD AUTO: 18.59 K/UL (ref 4.5–11)
WBC # BLD AUTO: 18.91 K/UL (ref 4.5–11)
WBC # BLD AUTO: 20.65 K/UL (ref 4.5–11)
WBC # BLD AUTO: 22.73 K/UL (ref 4.5–11)
WBC # BLD AUTO: 28.99 K/UL (ref 4.5–11)
WBC # BLD AUTO: 6.88 K/UL (ref 4.5–11)
WBC # BLD AUTO: 9.15 K/UL (ref 4.5–11)
WBC TOXIC VACUOLES BLD QL SMEAR: ABNORMAL

## 2022-01-01 PROCEDURE — 25000003 PHARM REV CODE 250: Performed by: INTERNAL MEDICINE

## 2022-01-01 PROCEDURE — 27000221 HC OXYGEN, UP TO 24 HOURS

## 2022-01-01 PROCEDURE — 25000242 PHARM REV CODE 250 ALT 637 W/ HCPCS: Performed by: INTERNAL MEDICINE

## 2022-01-01 PROCEDURE — 99233 PR SUBSEQUENT HOSPITAL CARE,LEVL III: ICD-10-PCS | Mod: ,,, | Performed by: INTERNAL MEDICINE

## 2022-01-01 PROCEDURE — 94640 AIRWAY INHALATION TREATMENT: CPT

## 2022-01-01 PROCEDURE — 99900035 HC TECH TIME PER 15 MIN (STAT)

## 2022-01-01 PROCEDURE — 97110 THERAPEUTIC EXERCISES: CPT

## 2022-01-01 PROCEDURE — 25000003 PHARM REV CODE 250: Performed by: NURSE PRACTITIONER

## 2022-01-01 PROCEDURE — 63600175 PHARM REV CODE 636 W HCPCS: Performed by: INTERNAL MEDICINE

## 2022-01-01 PROCEDURE — 94761 N-INVAS EAR/PLS OXIMETRY MLT: CPT

## 2022-01-01 PROCEDURE — 74018 RADEX ABDOMEN 1 VIEW: CPT

## 2022-01-01 PROCEDURE — 71045 X-RAY EXAM CHEST 1 VIEW: CPT | Mod: TC

## 2022-01-01 PROCEDURE — 99233 SBSQ HOSP IP/OBS HIGH 50: CPT | Mod: ,,, | Performed by: NURSE PRACTITIONER

## 2022-01-01 PROCEDURE — P9047 ALBUMIN (HUMAN), 25%, 50ML: HCPCS | Performed by: INTERNAL MEDICINE

## 2022-01-01 PROCEDURE — 27200966 HC CLOSED SUCTION SYSTEM

## 2022-01-01 PROCEDURE — 99223 1ST HOSP IP/OBS HIGH 75: CPT | Mod: AI,,, | Performed by: INTERNAL MEDICINE

## 2022-01-01 PROCEDURE — 82962 GLUCOSE BLOOD TEST: CPT

## 2022-01-01 PROCEDURE — 11000008

## 2022-01-01 PROCEDURE — 99233 SBSQ HOSP IP/OBS HIGH 50: CPT | Mod: ,,, | Performed by: INTERNAL MEDICINE

## 2022-01-01 PROCEDURE — 99233 PR SUBSEQUENT HOSPITAL CARE,LEVL III: ICD-10-PCS | Mod: ,,, | Performed by: NURSE PRACTITIONER

## 2022-01-01 PROCEDURE — 99223 PR INITIAL HOSPITAL CARE,LEVL III: ICD-10-PCS | Mod: ,,, | Performed by: SURGERY

## 2022-01-01 PROCEDURE — 80100014 HC HEMODIALYSIS 1:1

## 2022-01-01 PROCEDURE — 94003 VENT MGMT INPAT SUBQ DAY: CPT

## 2022-01-01 PROCEDURE — 85025 COMPLETE CBC W/AUTO DIFF WBC: CPT | Performed by: NURSE PRACTITIONER

## 2022-01-01 PROCEDURE — 97110 THERAPEUTIC EXERCISES: CPT | Mod: CQ

## 2022-01-01 PROCEDURE — 25000242 PHARM REV CODE 250 ALT 637 W/ HCPCS: Performed by: NURSE PRACTITIONER

## 2022-01-01 PROCEDURE — 99291 PR CRITICAL CARE, E/M 30-74 MINUTES: ICD-10-PCS | Mod: ,,, | Performed by: NURSE PRACTITIONER

## 2022-01-01 PROCEDURE — 99291 CRITICAL CARE FIRST HOUR: CPT | Mod: 25,,, | Performed by: NURSE PRACTITIONER

## 2022-01-01 PROCEDURE — 97530 THERAPEUTIC ACTIVITIES: CPT | Mod: CQ

## 2022-01-01 PROCEDURE — 36415 COLL VENOUS BLD VENIPUNCTURE: CPT | Performed by: NURSE PRACTITIONER

## 2022-01-01 PROCEDURE — 99900026 HC AIRWAY MAINTENANCE (STAT)

## 2022-01-01 PROCEDURE — 99233 SBSQ HOSP IP/OBS HIGH 50: CPT | Mod: ,,, | Performed by: SURGERY

## 2022-01-01 PROCEDURE — 99233 PR SUBSEQUENT HOSPITAL CARE,LEVL III: ICD-10-PCS | Mod: ,,, | Performed by: SURGERY

## 2022-01-01 PROCEDURE — 97530 THERAPEUTIC ACTIVITIES: CPT

## 2022-01-01 PROCEDURE — 94002 VENT MGMT INPAT INIT DAY: CPT

## 2022-01-01 PROCEDURE — 83540 ASSAY OF IRON: CPT | Performed by: NURSE PRACTITIONER

## 2022-01-01 PROCEDURE — 97167 OT EVAL HIGH COMPLEX 60 MIN: CPT

## 2022-01-01 PROCEDURE — 85025 COMPLETE CBC W/AUTO DIFF WBC: CPT | Performed by: INTERNAL MEDICINE

## 2022-01-01 PROCEDURE — 80048 BASIC METABOLIC PNL TOTAL CA: CPT | Performed by: NURSE PRACTITIONER

## 2022-01-01 PROCEDURE — 86901 BLOOD TYPING SEROLOGIC RH(D): CPT | Performed by: INTERNAL MEDICINE

## 2022-01-01 PROCEDURE — 99291 PR CRITICAL CARE, E/M 30-74 MINUTES: ICD-10-PCS | Mod: ,,, | Performed by: INTERNAL MEDICINE

## 2022-01-01 PROCEDURE — 63600175 PHARM REV CODE 636 W HCPCS: Performed by: HOSPITALIST

## 2022-01-01 PROCEDURE — 27000716 HC OXISENSOR PROBE, ANY SIZE

## 2022-01-01 PROCEDURE — 63600175 PHARM REV CODE 636 W HCPCS: Performed by: NURSE PRACTITIONER

## 2022-01-01 PROCEDURE — 36415 COLL VENOUS BLD VENIPUNCTURE: CPT | Performed by: INTERNAL MEDICINE

## 2022-01-01 PROCEDURE — 86850 RBC ANTIBODY SCREEN: CPT

## 2022-01-01 PROCEDURE — 86923 COMPATIBILITY TEST ELECTRIC: CPT | Performed by: INTERNAL MEDICINE

## 2022-01-01 PROCEDURE — 86923 COMPATIBILITY TEST ELECTRIC: CPT | Performed by: NURSE PRACTITIONER

## 2022-01-01 PROCEDURE — 71045 XR CHEST 1 VIEW: ICD-10-PCS | Mod: 26,,, | Performed by: RADIOLOGY

## 2022-01-01 PROCEDURE — 97163 PT EVAL HIGH COMPLEX 45 MIN: CPT

## 2022-01-01 PROCEDURE — P9016 RBC LEUKOCYTES REDUCED: HCPCS | Performed by: NURSE PRACTITIONER

## 2022-01-01 PROCEDURE — 71045 X-RAY EXAM CHEST 1 VIEW: CPT | Mod: 26,,, | Performed by: RADIOLOGY

## 2022-01-01 PROCEDURE — 99291 CRITICAL CARE FIRST HOUR: CPT | Mod: ,,, | Performed by: INTERNAL MEDICINE

## 2022-01-01 PROCEDURE — 74018 RADEX ABDOMEN 1 VIEW: CPT | Mod: 26,,, | Performed by: RADIOLOGY

## 2022-01-01 PROCEDURE — 86901 BLOOD TYPING SEROLOGIC RH(D): CPT

## 2022-01-01 PROCEDURE — 74018 RADEX ABDOMEN 1 VIEW: CPT | Mod: 26,,, | Performed by: STUDENT IN AN ORGANIZED HEALTH CARE EDUCATION/TRAINING PROGRAM

## 2022-01-01 PROCEDURE — 74018 XR KUB: ICD-10-PCS | Mod: 26,,, | Performed by: STUDENT IN AN ORGANIZED HEALTH CARE EDUCATION/TRAINING PROGRAM

## 2022-01-01 PROCEDURE — 99291 PR CRITICAL CARE, E/M 30-74 MINUTES: ICD-10-PCS | Mod: 25,,, | Performed by: NURSE PRACTITIONER

## 2022-01-01 PROCEDURE — 74018 XR NON-RADIOLOGIST PERFORMED NG/GASTRIC TUBE CHECK: ICD-10-PCS | Mod: 26,,, | Performed by: RADIOLOGY

## 2022-01-01 PROCEDURE — 82728 ASSAY OF FERRITIN: CPT | Performed by: NURSE PRACTITIONER

## 2022-01-01 PROCEDURE — 83036 HEMOGLOBIN GLYCOSYLATED A1C: CPT | Performed by: NURSE PRACTITIONER

## 2022-01-01 PROCEDURE — P9016 RBC LEUKOCYTES REDUCED: HCPCS | Performed by: INTERNAL MEDICINE

## 2022-01-01 PROCEDURE — 25500020 PHARM REV CODE 255: Performed by: INTERNAL MEDICINE

## 2022-01-01 PROCEDURE — 71045 X-RAY EXAM CHEST 1 VIEW: CPT

## 2022-01-01 PROCEDURE — 94760 N-INVAS EAR/PLS OXIMETRY 1: CPT

## 2022-01-01 PROCEDURE — 99232 PR SUBSEQUENT HOSPITAL CARE,LEVL II: ICD-10-PCS | Mod: ,,, | Performed by: NURSE PRACTITIONER

## 2022-01-01 PROCEDURE — C9113 INJ PANTOPRAZOLE SODIUM, VIA: HCPCS | Performed by: NURSE PRACTITIONER

## 2022-01-01 PROCEDURE — 99291 CRITICAL CARE FIRST HOUR: CPT | Mod: ,,, | Performed by: NURSE PRACTITIONER

## 2022-01-01 PROCEDURE — P9016 RBC LEUKOCYTES REDUCED: HCPCS

## 2022-01-01 PROCEDURE — 74018 RADEX ABDOMEN 1 VIEW: CPT | Mod: TC

## 2022-01-01 PROCEDURE — 80074 ACUTE HEPATITIS PANEL: CPT | Performed by: INTERNAL MEDICINE

## 2022-01-01 PROCEDURE — 86850 RBC ANTIBODY SCREEN: CPT | Performed by: NURSE PRACTITIONER

## 2022-01-01 PROCEDURE — 27202728 HC CATH, DWELLING, EXT

## 2022-01-01 PROCEDURE — 74176 CT ABD & PELVIS W/O CONTRAST: CPT

## 2022-01-01 PROCEDURE — 93005 ELECTROCARDIOGRAM TRACING: CPT

## 2022-01-01 PROCEDURE — 86923 COMPATIBILITY TEST ELECTRIC: CPT

## 2022-01-01 PROCEDURE — 31502 CHANGE OF WINDPIPE AIRWAY: CPT | Mod: ,,, | Performed by: NURSE PRACTITIONER

## 2022-01-01 PROCEDURE — 97530 THERAPEUTIC ACTIVITIES: CPT | Mod: CO

## 2022-01-01 PROCEDURE — 86900 BLOOD TYPING SEROLOGIC ABO: CPT

## 2022-01-01 PROCEDURE — 99223 1ST HOSP IP/OBS HIGH 75: CPT | Mod: ,,, | Performed by: SURGERY

## 2022-01-01 PROCEDURE — 87186 SC STD MICRODIL/AGAR DIL: CPT | Performed by: INTERNAL MEDICINE

## 2022-01-01 PROCEDURE — 86850 RBC ANTIBODY SCREEN: CPT | Performed by: INTERNAL MEDICINE

## 2022-01-01 PROCEDURE — 99232 SBSQ HOSP IP/OBS MODERATE 35: CPT | Mod: ,,, | Performed by: NURSE PRACTITIONER

## 2022-01-01 PROCEDURE — 82040 ASSAY OF SERUM ALBUMIN: CPT | Performed by: INTERNAL MEDICINE

## 2022-01-01 PROCEDURE — 25500020 PHARM REV CODE 255

## 2022-01-01 PROCEDURE — 93010 EKG 12-LEAD: ICD-10-PCS | Mod: ,,, | Performed by: INTERNAL MEDICINE

## 2022-01-01 PROCEDURE — 93010 ELECTROCARDIOGRAM REPORT: CPT | Mod: ,,, | Performed by: INTERNAL MEDICINE

## 2022-01-01 PROCEDURE — 36430 TRANSFUSION BLD/BLD COMPNT: CPT

## 2022-01-01 PROCEDURE — 99223 PR INITIAL HOSPITAL CARE,LEVL III: ICD-10-PCS | Mod: AI,,, | Performed by: INTERNAL MEDICINE

## 2022-01-01 PROCEDURE — 31502: ICD-10-PCS | Mod: ,,, | Performed by: NURSE PRACTITIONER

## 2022-01-01 PROCEDURE — 94644 CONT INHLJ TX 1ST HOUR: CPT

## 2022-01-01 PROCEDURE — 87070 CULTURE OTHR SPECIMN AEROBIC: CPT | Performed by: INTERNAL MEDICINE

## 2022-01-01 PROCEDURE — 82746 ASSAY OF FOLIC ACID SERUM: CPT | Performed by: NURSE PRACTITIONER

## 2022-01-01 PROCEDURE — 82803 BLOOD GASES ANY COMBINATION: CPT

## 2022-01-01 RX ORDER — CLINDAMYCIN PHOSPHATE 600 MG/50ML
600 INJECTION, SOLUTION INTRAVENOUS
Status: DISCONTINUED | OUTPATIENT
Start: 2022-01-01 | End: 2022-01-01

## 2022-01-01 RX ORDER — DONEPEZIL HYDROCHLORIDE 10 MG/1
10 TABLET, FILM COATED ORAL DAILY
COMMUNITY
End: 2022-09-27

## 2022-01-01 RX ORDER — MIDODRINE HYDROCHLORIDE 5 MG/1
5 TABLET ORAL 2 TIMES DAILY WITH MEALS
Status: DISCONTINUED | OUTPATIENT
Start: 2022-01-01 | End: 2022-01-01

## 2022-01-01 RX ORDER — SODIUM CHLORIDE 9 MG/ML
INJECTION, SOLUTION INTRAVENOUS
Status: CANCELLED | OUTPATIENT
Start: 2022-01-01

## 2022-01-01 RX ORDER — AMOXICILLIN 250 MG
1 CAPSULE ORAL DAILY PRN
Status: DISCONTINUED | OUTPATIENT
Start: 2022-01-01 | End: 2022-01-01

## 2022-01-01 RX ORDER — HYDROCODONE BITARTRATE AND ACETAMINOPHEN 500; 5 MG/1; MG/1
TABLET ORAL
Status: DISCONTINUED | OUTPATIENT
Start: 2022-01-01 | End: 2022-01-01

## 2022-01-01 RX ORDER — SODIUM CHLORIDE 9 MG/ML
INJECTION, SOLUTION INTRAVENOUS
Status: DISCONTINUED | OUTPATIENT
Start: 2022-01-01 | End: 2022-01-01

## 2022-01-01 RX ORDER — CARVEDILOL 3.12 MG/1
3.12 TABLET ORAL 2 TIMES DAILY
Status: DISCONTINUED | OUTPATIENT
Start: 2022-01-01 | End: 2022-01-01

## 2022-01-01 RX ORDER — MORPHINE SULFATE 4 MG/ML
4 INJECTION, SOLUTION INTRAMUSCULAR; INTRAVENOUS
Status: DISCONTINUED | OUTPATIENT
Start: 2022-01-01 | End: 2022-01-01 | Stop reason: HOSPADM

## 2022-01-01 RX ORDER — CARVEDILOL 25 MG/1
25 TABLET ORAL 2 TIMES DAILY
Status: DISCONTINUED | OUTPATIENT
Start: 2022-01-01 | End: 2022-01-01

## 2022-01-01 RX ORDER — GLUCAGON 1 MG
1 KIT INJECTION
Status: DISCONTINUED | OUTPATIENT
Start: 2022-01-01 | End: 2022-01-01

## 2022-01-01 RX ORDER — HEPARIN SODIUM 1000 [USP'U]/ML
4000 INJECTION, SOLUTION INTRAVENOUS; SUBCUTANEOUS
Status: DISCONTINUED | OUTPATIENT
Start: 2022-01-01 | End: 2022-01-01

## 2022-01-01 RX ORDER — PANTOPRAZOLE SODIUM 40 MG/1
40 FOR SUSPENSION ORAL DAILY
Status: DISCONTINUED | OUTPATIENT
Start: 2022-01-01 | End: 2022-01-01

## 2022-01-01 RX ORDER — MIDODRINE HYDROCHLORIDE 2.5 MG/1
2.5 TABLET ORAL 2 TIMES DAILY WITH MEALS
Status: DISCONTINUED | OUTPATIENT
Start: 2022-01-01 | End: 2022-01-01

## 2022-01-01 RX ORDER — LANOLIN ALCOHOL/MO/W.PET/CERES
1 CREAM (GRAM) TOPICAL DAILY
Status: DISCONTINUED | OUTPATIENT
Start: 2022-01-01 | End: 2022-01-01

## 2022-01-01 RX ORDER — ALBUMIN HUMAN 250 G/1000ML
12.5 SOLUTION INTRAVENOUS ONCE
Status: COMPLETED | OUTPATIENT
Start: 2022-01-01 | End: 2022-01-01

## 2022-01-01 RX ORDER — IPRATROPIUM BROMIDE AND ALBUTEROL SULFATE 2.5; .5 MG/3ML; MG/3ML
3 SOLUTION RESPIRATORY (INHALATION) EVERY 6 HOURS
Status: DISCONTINUED | OUTPATIENT
Start: 2022-01-01 | End: 2022-01-01

## 2022-01-01 RX ORDER — SODIUM CHLORIDE 9 MG/ML
INJECTION, SOLUTION INTRAVENOUS ONCE
Status: CANCELLED | OUTPATIENT
Start: 2022-01-01 | End: 2022-01-01

## 2022-01-01 RX ORDER — VENLAFAXINE 37.5 MG/1
37.5 TABLET ORAL 2 TIMES DAILY
Status: DISCONTINUED | OUTPATIENT
Start: 2022-01-01 | End: 2022-01-01

## 2022-01-01 RX ORDER — NAPROXEN SODIUM 220 MG/1
81 TABLET, FILM COATED ORAL DAILY
Status: DISCONTINUED | OUTPATIENT
Start: 2022-01-01 | End: 2022-01-01

## 2022-01-01 RX ORDER — DOXEPIN HYDROCHLORIDE 50 MG/1
50 CAPSULE ORAL NIGHTLY
COMMUNITY
End: 2022-09-27

## 2022-01-01 RX ORDER — DOXEPIN HYDROCHLORIDE 25 MG/1
25 CAPSULE ORAL NIGHTLY
Status: DISCONTINUED | OUTPATIENT
Start: 2022-01-01 | End: 2022-01-01

## 2022-01-01 RX ORDER — GUAIFENESIN/DEXTROMETHORPHAN 100-10MG/5
10 SYRUP ORAL EVERY 6 HOURS PRN
Status: DISCONTINUED | OUTPATIENT
Start: 2022-01-01 | End: 2022-01-01

## 2022-01-01 RX ORDER — POLYETHYLENE GLYCOL 3350 17 G/17G
17 POWDER, FOR SOLUTION ORAL DAILY
Status: DISCONTINUED | OUTPATIENT
Start: 2022-01-01 | End: 2022-01-01

## 2022-01-01 RX ORDER — VENLAFAXINE HYDROCHLORIDE 75 MG/1
75 CAPSULE, EXTENDED RELEASE ORAL DAILY
COMMUNITY
End: 2022-09-27

## 2022-01-01 RX ORDER — MICONAZOLE NITRATE 2 %
POWDER (GRAM) TOPICAL 2 TIMES DAILY
Status: DISCONTINUED | OUTPATIENT
Start: 2022-01-01 | End: 2022-01-01

## 2022-01-01 RX ORDER — ATORVASTATIN CALCIUM 40 MG/1
40 TABLET, FILM COATED ORAL NIGHTLY
Status: DISCONTINUED | OUTPATIENT
Start: 2022-01-01 | End: 2022-01-01

## 2022-01-01 RX ORDER — SODIUM CHLORIDE, SODIUM LACTATE, POTASSIUM CHLORIDE, CALCIUM CHLORIDE 600; 310; 30; 20 MG/100ML; MG/100ML; MG/100ML; MG/100ML
INJECTION, SOLUTION INTRAVENOUS CONTINUOUS
Status: DISCONTINUED | OUTPATIENT
Start: 2022-01-01 | End: 2022-01-01

## 2022-01-01 RX ORDER — MIDODRINE HYDROCHLORIDE 5 MG/1
10 TABLET ORAL
Status: DISCONTINUED | OUTPATIENT
Start: 2022-01-01 | End: 2022-01-01

## 2022-01-01 RX ORDER — METHYLPREDNISOLONE SOD SUCC 125 MG
40 VIAL (EA) INJECTION
Status: DISCONTINUED | OUTPATIENT
Start: 2022-01-01 | End: 2022-01-01

## 2022-01-01 RX ORDER — CARVEDILOL 6.25 MG/1
6.25 TABLET ORAL 2 TIMES DAILY
Status: DISCONTINUED | OUTPATIENT
Start: 2022-01-01 | End: 2022-01-01

## 2022-01-01 RX ORDER — ONDANSETRON 2 MG/ML
8 INJECTION INTRAMUSCULAR; INTRAVENOUS EVERY 6 HOURS PRN
Status: DISCONTINUED | OUTPATIENT
Start: 2022-01-01 | End: 2022-01-01

## 2022-01-01 RX ORDER — ACETAMINOPHEN 325 MG/1
650 TABLET ORAL EVERY 6 HOURS PRN
Status: DISCONTINUED | OUTPATIENT
Start: 2022-01-01 | End: 2022-01-01

## 2022-01-01 RX ORDER — LORAZEPAM 2 MG/ML
2 INJECTION INTRAMUSCULAR
Status: DISCONTINUED | OUTPATIENT
Start: 2022-01-01 | End: 2022-01-01 | Stop reason: HOSPADM

## 2022-01-01 RX ORDER — MUPIROCIN 20 MG/G
OINTMENT TOPICAL 2 TIMES DAILY
Status: COMPLETED | OUTPATIENT
Start: 2022-01-01 | End: 2022-01-01

## 2022-01-01 RX ORDER — POLYETHYLENE GLYCOL 3350 17 G/17G
17 POWDER, FOR SOLUTION ORAL DAILY PRN
Status: DISCONTINUED | OUTPATIENT
Start: 2022-01-01 | End: 2022-01-01

## 2022-01-01 RX ORDER — INSULIN ASPART 100 [IU]/ML
1-10 INJECTION, SOLUTION INTRAVENOUS; SUBCUTANEOUS
Status: DISCONTINUED | OUTPATIENT
Start: 2022-01-01 | End: 2022-01-01

## 2022-01-01 RX ORDER — IPRATROPIUM BROMIDE AND ALBUTEROL SULFATE 2.5; .5 MG/3ML; MG/3ML
3 SOLUTION RESPIRATORY (INHALATION) EVERY 12 HOURS
Status: DISCONTINUED | OUTPATIENT
Start: 2022-01-01 | End: 2022-01-01

## 2022-01-01 RX ORDER — PANTOPRAZOLE SODIUM 40 MG/10ML
40 INJECTION, POWDER, LYOPHILIZED, FOR SOLUTION INTRAVENOUS DAILY
Status: DISCONTINUED | OUTPATIENT
Start: 2022-01-01 | End: 2022-01-01

## 2022-01-01 RX ADMIN — NOREPINEPHRINE BITARTRATE 0.1 MCG/KG/MIN: 1 INJECTION, SOLUTION, CONCENTRATE INTRAVENOUS at 12:09

## 2022-01-01 RX ADMIN — CLINDAMYCIN IN 5 PERCENT DEXTROSE 600 MG: 12 INJECTION, SOLUTION INTRAVENOUS at 05:09

## 2022-01-01 RX ADMIN — MICONAZOLE NITRATE: 20 POWDER TOPICAL at 09:09

## 2022-01-01 RX ADMIN — PANTOPRAZOLE SODIUM 40 MG: 40 GRANULE, DELAYED RELEASE ORAL at 08:08

## 2022-01-01 RX ADMIN — METHYLPREDNISOLONE SODIUM SUCCINATE 20 MG: 40 INJECTION, POWDER, FOR SOLUTION INTRAMUSCULAR; INTRAVENOUS at 12:09

## 2022-01-01 RX ADMIN — IPRATROPIUM BROMIDE AND ALBUTEROL SULFATE 3 ML: 2.5; .5 SOLUTION RESPIRATORY (INHALATION) at 07:09

## 2022-01-01 RX ADMIN — DOXEPIN HYDROCHLORIDE 25 MG: 25 CAPSULE ORAL at 08:09

## 2022-01-01 RX ADMIN — IPRATROPIUM BROMIDE AND ALBUTEROL SULFATE 3 ML: 2.5; .5 SOLUTION RESPIRATORY (INHALATION) at 01:09

## 2022-01-01 RX ADMIN — IPRATROPIUM BROMIDE AND ALBUTEROL SULFATE 3 ML: 2.5; .5 SOLUTION RESPIRATORY (INHALATION) at 07:08

## 2022-01-01 RX ADMIN — ASPIRIN 81 MG CHEWABLE TABLET 81 MG: 81 TABLET CHEWABLE at 08:09

## 2022-01-01 RX ADMIN — IPRATROPIUM BROMIDE AND ALBUTEROL SULFATE 3 ML: 2.5; .5 SOLUTION RESPIRATORY (INHALATION) at 12:09

## 2022-01-01 RX ADMIN — APIXABAN 2.5 MG: 2.5 TABLET, FILM COATED ORAL at 08:09

## 2022-01-01 RX ADMIN — FERROUS SULFATE TAB 325 MG (65 MG ELEMENTAL FE) 1 EACH: 325 (65 FE) TAB at 08:08

## 2022-01-01 RX ADMIN — METHYLPREDNISOLONE SODIUM SUCCINATE 40 MG: 125 INJECTION, POWDER, FOR SOLUTION INTRAMUSCULAR; INTRAVENOUS at 02:09

## 2022-01-01 RX ADMIN — PANTOPRAZOLE SODIUM 40 MG: 40 GRANULE, DELAYED RELEASE ORAL at 08:09

## 2022-01-01 RX ADMIN — FERROUS SULFATE TAB 325 MG (65 MG ELEMENTAL FE) 1 EACH: 325 (65 FE) TAB at 09:09

## 2022-01-01 RX ADMIN — APIXABAN 2.5 MG: 2.5 TABLET, FILM COATED ORAL at 09:09

## 2022-01-01 RX ADMIN — NOREPINEPHRINE BITARTRATE 0.12 MCG/KG/MIN: 1 SOLUTION INTRAVENOUS at 09:09

## 2022-01-01 RX ADMIN — ATORVASTATIN CALCIUM 40 MG: 40 TABLET, FILM COATED ORAL at 08:09

## 2022-01-01 RX ADMIN — VENLAFAXINE 37.5 MG: 37.5 TABLET ORAL at 08:08

## 2022-01-01 RX ADMIN — INSULIN ASPART 4 UNITS: 100 INJECTION, SOLUTION INTRAVENOUS; SUBCUTANEOUS at 06:09

## 2022-01-01 RX ADMIN — CARVEDILOL 3.12 MG: 3.12 TABLET, FILM COATED ORAL at 08:09

## 2022-01-01 RX ADMIN — CLINDAMYCIN PHOSPHATE 600 MG: 600 INJECTION, SOLUTION INTRAVENOUS at 06:09

## 2022-01-01 RX ADMIN — VENLAFAXINE 37.5 MG: 37.5 TABLET ORAL at 09:08

## 2022-01-01 RX ADMIN — DOXEPIN HYDROCHLORIDE 25 MG: 25 CAPSULE ORAL at 08:08

## 2022-01-01 RX ADMIN — MICONAZOLE NITRATE: 20 POWDER TOPICAL at 08:09

## 2022-01-01 RX ADMIN — ATORVASTATIN CALCIUM 40 MG: 40 TABLET, FILM COATED ORAL at 09:08

## 2022-01-01 RX ADMIN — CARVEDILOL 25 MG: 25 TABLET, FILM COATED ORAL at 08:08

## 2022-01-01 RX ADMIN — COLLAGENASE SANTYL: 250 OINTMENT TOPICAL at 05:09

## 2022-01-01 RX ADMIN — APIXABAN 2.5 MG: 2.5 TABLET, FILM COATED ORAL at 08:08

## 2022-01-01 RX ADMIN — CEFEPIME 1 G: 1 INJECTION, POWDER, FOR SOLUTION INTRAMUSCULAR; INTRAVENOUS at 05:09

## 2022-01-01 RX ADMIN — NOREPINEPHRINE BITARTRATE 0.1 MCG/KG/MIN: 1 INJECTION, SOLUTION, CONCENTRATE INTRAVENOUS at 09:09

## 2022-01-01 RX ADMIN — CLINDAMYCIN IN 5 PERCENT DEXTROSE 600 MG: 12 INJECTION, SOLUTION INTRAVENOUS at 02:09

## 2022-01-01 RX ADMIN — DOXEPIN HYDROCHLORIDE 25 MG: 25 CAPSULE ORAL at 09:09

## 2022-01-01 RX ADMIN — SODIUM CHLORIDE, POTASSIUM CHLORIDE, SODIUM LACTATE AND CALCIUM CHLORIDE: 600; 310; 30; 20 INJECTION, SOLUTION INTRAVENOUS at 06:09

## 2022-01-01 RX ADMIN — MIDODRINE HYDROCHLORIDE 10 MG: 5 TABLET ORAL at 05:09

## 2022-01-01 RX ADMIN — FERROUS SULFATE TAB 325 MG (65 MG ELEMENTAL FE) 1 EACH: 325 (65 FE) TAB at 09:08

## 2022-01-01 RX ADMIN — CLINDAMYCIN PHOSPHATE 600 MG: 600 INJECTION, SOLUTION INTRAVENOUS at 02:09

## 2022-01-01 RX ADMIN — ATORVASTATIN CALCIUM 40 MG: 40 TABLET, FILM COATED ORAL at 08:08

## 2022-01-01 RX ADMIN — HEPARIN SODIUM 4000 UNITS: 1000 INJECTION INTRAVENOUS; SUBCUTANEOUS at 10:09

## 2022-01-01 RX ADMIN — NOREPINEPHRINE BITARTRATE 0.2 MCG/KG/MIN: 1 SOLUTION INTRAVENOUS at 04:09

## 2022-01-01 RX ADMIN — SODIUM CHLORIDE: 9 INJECTION, SOLUTION INTRAVENOUS at 02:09

## 2022-01-01 RX ADMIN — VENLAFAXINE 37.5 MG: 37.5 TABLET ORAL at 09:09

## 2022-01-01 RX ADMIN — COLLAGENASE SANTYL: 250 OINTMENT TOPICAL at 11:09

## 2022-01-01 RX ADMIN — CARVEDILOL 25 MG: 25 TABLET, FILM COATED ORAL at 09:09

## 2022-01-01 RX ADMIN — HEPARIN SODIUM 4000 UNITS: 1000 INJECTION INTRAVENOUS; SUBCUTANEOUS at 03:08

## 2022-01-01 RX ADMIN — FERROUS SULFATE TAB 325 MG (65 MG ELEMENTAL FE) 1 EACH: 325 (65 FE) TAB at 08:09

## 2022-01-01 RX ADMIN — NOREPINEPHRINE BITARTRATE 0.1 MCG/KG/MIN: 1 INJECTION, SOLUTION, CONCENTRATE INTRAVENOUS at 08:09

## 2022-01-01 RX ADMIN — CEFEPIME 1 G: 1 INJECTION, POWDER, FOR SOLUTION INTRAMUSCULAR; INTRAVENOUS at 06:09

## 2022-01-01 RX ADMIN — METHYLPREDNISOLONE SODIUM SUCCINATE 20 MG: 40 INJECTION, POWDER, FOR SOLUTION INTRAMUSCULAR; INTRAVENOUS at 11:09

## 2022-01-01 RX ADMIN — MICONAZOLE NITRATE: 20 POWDER TOPICAL at 11:09

## 2022-01-01 RX ADMIN — ASPIRIN 81 MG CHEWABLE TABLET 81 MG: 81 TABLET CHEWABLE at 08:08

## 2022-01-01 RX ADMIN — IPRATROPIUM BROMIDE AND ALBUTEROL SULFATE 3 ML: 2.5; .5 SOLUTION RESPIRATORY (INHALATION) at 08:08

## 2022-01-01 RX ADMIN — NOREPINEPHRINE BITARTRATE 0.1 MCG/KG/MIN: 1 SOLUTION INTRAVENOUS at 06:09

## 2022-01-01 RX ADMIN — CEFEPIME 1 G: 1 INJECTION, POWDER, FOR SOLUTION INTRAMUSCULAR; INTRAVENOUS at 01:09

## 2022-01-01 RX ADMIN — ATORVASTATIN CALCIUM 40 MG: 40 TABLET, FILM COATED ORAL at 09:09

## 2022-01-01 RX ADMIN — APIXABAN 2.5 MG: 2.5 TABLET, FILM COATED ORAL at 09:08

## 2022-01-01 RX ADMIN — MICONAZOLE NITRATE: 20 POWDER TOPICAL at 04:09

## 2022-01-01 RX ADMIN — CARVEDILOL 25 MG: 25 TABLET, FILM COATED ORAL at 09:08

## 2022-01-01 RX ADMIN — VENLAFAXINE 37.5 MG: 37.5 TABLET ORAL at 08:09

## 2022-01-01 RX ADMIN — MIDODRINE HYDROCHLORIDE 2.5 MG: 2.5 TABLET ORAL at 07:09

## 2022-01-01 RX ADMIN — METHYLPREDNISOLONE SODIUM SUCCINATE 40 MG: 40 INJECTION, POWDER, FOR SOLUTION INTRAMUSCULAR; INTRAVENOUS at 12:09

## 2022-01-01 RX ADMIN — CARVEDILOL 25 MG: 25 TABLET, FILM COATED ORAL at 08:09

## 2022-01-01 RX ADMIN — COLLAGENASE SANTYL: 250 OINTMENT TOPICAL at 06:09

## 2022-01-01 RX ADMIN — DOXEPIN HYDROCHLORIDE 25 MG: 25 CAPSULE ORAL at 09:08

## 2022-01-01 RX ADMIN — MUPIROCIN: 20 OINTMENT TOPICAL at 08:08

## 2022-01-01 RX ADMIN — NOREPINEPHRINE BITARTRATE 0.25 MCG/KG/MIN: 1 SOLUTION INTRAVENOUS at 11:09

## 2022-01-01 RX ADMIN — CLINDAMYCIN IN 5 PERCENT DEXTROSE 600 MG: 12 INJECTION, SOLUTION INTRAVENOUS at 06:09

## 2022-01-01 RX ADMIN — METHYLPREDNISOLONE SODIUM SUCCINATE 20 MG: 40 INJECTION, POWDER, FOR SOLUTION INTRAMUSCULAR; INTRAVENOUS at 08:09

## 2022-01-01 RX ADMIN — NOREPINEPHRINE BITARTRATE 0.25 MCG/KG/MIN: 1 SOLUTION INTRAVENOUS at 06:09

## 2022-01-01 RX ADMIN — IPRATROPIUM BROMIDE AND ALBUTEROL SULFATE 3 ML: 2.5; .5 SOLUTION RESPIRATORY (INHALATION) at 08:09

## 2022-01-01 RX ADMIN — POLYETHYLENE GLYCOL 3350 17 G: 17 POWDER, FOR SOLUTION ORAL at 04:08

## 2022-01-01 RX ADMIN — MIDODRINE HYDROCHLORIDE 2.5 MG: 2.5 TABLET ORAL at 05:09

## 2022-01-01 RX ADMIN — CLINDAMYCIN IN 5 PERCENT DEXTROSE 600 MG: 12 INJECTION, SOLUTION INTRAVENOUS at 09:09

## 2022-01-01 RX ADMIN — SODIUM CHLORIDE: 9 INJECTION, SOLUTION INTRAVENOUS at 11:09

## 2022-01-01 RX ADMIN — SODIUM CHLORIDE, POTASSIUM CHLORIDE, SODIUM LACTATE AND CALCIUM CHLORIDE: 600; 310; 30; 20 INJECTION, SOLUTION INTRAVENOUS at 05:09

## 2022-01-01 RX ADMIN — PANTOPRAZOLE SODIUM 40 MG: 40 GRANULE, DELAYED RELEASE ORAL at 09:09

## 2022-01-01 RX ADMIN — CARVEDILOL 3.12 MG: 3.12 TABLET, FILM COATED ORAL at 09:09

## 2022-01-01 RX ADMIN — MIDODRINE HYDROCHLORIDE 2.5 MG: 2.5 TABLET ORAL at 06:09

## 2022-01-01 RX ADMIN — MIDODRINE HYDROCHLORIDE 5 MG: 5 TABLET ORAL at 08:09

## 2022-01-01 RX ADMIN — METHYLPREDNISOLONE SODIUM SUCCINATE 40 MG: 125 INJECTION, POWDER, FOR SOLUTION INTRAMUSCULAR; INTRAVENOUS at 12:09

## 2022-01-01 RX ADMIN — MICONAZOLE NITRATE: 20 POWDER TOPICAL at 09:08

## 2022-01-01 RX ADMIN — PANTOPRAZOLE SODIUM 40 MG: 40 GRANULE, DELAYED RELEASE ORAL at 09:08

## 2022-01-01 RX ADMIN — NOREPINEPHRINE BITARTRATE 0.1 MCG/KG/MIN: 1 SOLUTION INTRAVENOUS at 02:09

## 2022-01-01 RX ADMIN — ASPIRIN 81 MG CHEWABLE TABLET 81 MG: 81 TABLET CHEWABLE at 04:09

## 2022-01-01 RX ADMIN — HEPARIN SODIUM 4000 UNITS: 1000 INJECTION INTRAVENOUS; SUBCUTANEOUS at 02:08

## 2022-01-01 RX ADMIN — SODIUM CHLORIDE: 9 INJECTION, SOLUTION INTRAVENOUS at 09:09

## 2022-01-01 RX ADMIN — IPRATROPIUM BROMIDE AND ALBUTEROL SULFATE 3 ML: 2.5; .5 SOLUTION RESPIRATORY (INHALATION) at 09:09

## 2022-01-01 RX ADMIN — ONDANSETRON 8 MG: 2 INJECTION INTRAMUSCULAR; INTRAVENOUS at 05:09

## 2022-01-01 RX ADMIN — ALBUMIN (HUMAN) 12.5 G: 12.5 SOLUTION INTRAVENOUS at 01:09

## 2022-01-01 RX ADMIN — SODIUM CHLORIDE, POTASSIUM CHLORIDE, SODIUM LACTATE AND CALCIUM CHLORIDE 500 ML: 600; 310; 30; 20 INJECTION, SOLUTION INTRAVENOUS at 12:09

## 2022-01-01 RX ADMIN — HEPARIN SODIUM 4000 UNITS: 1000 INJECTION INTRAVENOUS; SUBCUTANEOUS at 01:09

## 2022-01-01 RX ADMIN — APIXABAN 2.5 MG: 2.5 TABLET, FILM COATED ORAL at 11:09

## 2022-01-01 RX ADMIN — PANTOPRAZOLE SODIUM 40 MG: 40 GRANULE, DELAYED RELEASE ORAL at 11:09

## 2022-01-01 RX ADMIN — POLYETHYLENE GLYCOL 3350 17 G: 17 POWDER, FOR SOLUTION ORAL at 09:08

## 2022-01-01 RX ADMIN — MICONAZOLE NITRATE: 20 POWDER TOPICAL at 10:09

## 2022-01-01 RX ADMIN — MIDODRINE HYDROCHLORIDE 2.5 MG: 2.5 TABLET ORAL at 08:09

## 2022-01-01 RX ADMIN — CARVEDILOL 25 MG: 25 TABLET, FILM COATED ORAL at 11:09

## 2022-01-01 RX ADMIN — MIDODRINE HYDROCHLORIDE 10 MG: 5 TABLET ORAL at 01:09

## 2022-01-01 RX ADMIN — MIDODRINE HYDROCHLORIDE 10 MG: 5 TABLET ORAL at 12:09

## 2022-01-01 RX ADMIN — INSULIN ASPART 1 UNITS: 100 INJECTION, SOLUTION INTRAVENOUS; SUBCUTANEOUS at 12:09

## 2022-01-01 RX ADMIN — MIDODRINE HYDROCHLORIDE 2.5 MG: 2.5 TABLET ORAL at 09:09

## 2022-01-01 RX ADMIN — SODIUM CHLORIDE 2000 ML: 9 INJECTION, SOLUTION INTRAVENOUS at 01:09

## 2022-01-01 RX ADMIN — ASPIRIN 81 MG CHEWABLE TABLET 81 MG: 81 TABLET CHEWABLE at 09:08

## 2022-01-01 RX ADMIN — NOREPINEPHRINE BITARTRATE 0.1 MCG/KG/MIN: 1 INJECTION, SOLUTION, CONCENTRATE INTRAVENOUS at 06:09

## 2022-01-01 RX ADMIN — INSULIN ASPART 2 UNITS: 100 INJECTION, SOLUTION INTRAVENOUS; SUBCUTANEOUS at 04:09

## 2022-01-01 RX ADMIN — CLINDAMYCIN IN 5 PERCENT DEXTROSE 600 MG: 12 INJECTION, SOLUTION INTRAVENOUS at 01:09

## 2022-01-01 RX ADMIN — SODIUM CHLORIDE, POTASSIUM CHLORIDE, SODIUM LACTATE AND CALCIUM CHLORIDE: 600; 310; 30; 20 INJECTION, SOLUTION INTRAVENOUS at 04:09

## 2022-01-01 RX ADMIN — ASPIRIN 81 MG CHEWABLE TABLET 81 MG: 81 TABLET CHEWABLE at 09:09

## 2022-01-01 RX ADMIN — NOREPINEPHRINE BITARTRATE 0.1 MCG/KG/MIN: 1 SOLUTION INTRAVENOUS at 07:09

## 2022-01-01 RX ADMIN — CLINDAMYCIN PHOSPHATE 600 MG: 600 INJECTION, SOLUTION INTRAVENOUS at 03:09

## 2022-01-01 RX ADMIN — CLINDAMYCIN IN 5 PERCENT DEXTROSE 600 MG: 12 INJECTION, SOLUTION INTRAVENOUS at 10:09

## 2022-01-01 RX ADMIN — SODIUM CHLORIDE 200 ML: 9 INJECTION, SOLUTION INTRAVENOUS at 01:09

## 2022-01-01 RX ADMIN — NOREPINEPHRINE BITARTRATE 0.25 MCG/KG/MIN: 1 SOLUTION INTRAVENOUS at 02:09

## 2022-01-01 RX ADMIN — COLLAGENASE SANTYL: 250 OINTMENT TOPICAL at 11:08

## 2022-01-01 RX ADMIN — POLYETHYLENE GLYCOL 3350 17 G: 17 POWDER, FOR SOLUTION ORAL at 08:08

## 2022-01-01 RX ADMIN — METHYLPREDNISOLONE SODIUM SUCCINATE 40 MG: 40 INJECTION, POWDER, FOR SOLUTION INTRAMUSCULAR; INTRAVENOUS at 11:09

## 2022-01-01 RX ADMIN — SODIUM CHLORIDE: 9 INJECTION, SOLUTION INTRAVENOUS at 03:08

## 2022-01-01 RX ADMIN — INSULIN ASPART 2 UNITS: 100 INJECTION, SOLUTION INTRAVENOUS; SUBCUTANEOUS at 05:09

## 2022-01-01 RX ADMIN — DIATRIZOATE MEGLUMINE AND DIATRIZOATE SODIUM 30 ML: 660; 100 LIQUID ORAL; RECTAL at 10:09

## 2022-01-01 RX ADMIN — PANTOPRAZOLE SODIUM 40 MG: 40 GRANULE, DELAYED RELEASE ORAL at 04:09

## 2022-01-01 RX ADMIN — PANTOPRAZOLE SODIUM 40 MG: 40 GRANULE, DELAYED RELEASE ORAL at 10:08

## 2022-01-01 RX ADMIN — PANTOPRAZOLE SODIUM 40 MG: 40 INJECTION, POWDER, FOR SOLUTION INTRAVENOUS at 08:09

## 2022-01-01 RX ADMIN — CLINDAMYCIN PHOSPHATE 600 MG: 600 INJECTION, SOLUTION INTRAVENOUS at 01:09

## 2022-01-01 RX ADMIN — NOREPINEPHRINE BITARTRATE 0.2 MCG/KG/MIN: 1 INJECTION, SOLUTION, CONCENTRATE INTRAVENOUS at 08:09

## 2022-01-01 RX ADMIN — NOREPINEPHRINE BITARTRATE 0 MCG/KG/MIN: 1 SOLUTION INTRAVENOUS at 06:09

## 2022-01-01 RX ADMIN — NOREPINEPHRINE BITARTRATE 0.2 MCG/KG/MIN: 1 INJECTION, SOLUTION, CONCENTRATE INTRAVENOUS at 02:09

## 2022-01-01 RX ADMIN — SODIUM CHLORIDE: 9 INJECTION, SOLUTION INTRAVENOUS at 01:09

## 2022-01-01 RX ADMIN — IPRATROPIUM BROMIDE AND ALBUTEROL SULFATE 3 ML: 2.5; .5 SOLUTION RESPIRATORY (INHALATION) at 09:08

## 2022-01-01 RX ADMIN — CLINDAMYCIN PHOSPHATE 600 MG: 600 INJECTION, SOLUTION INTRAVENOUS at 09:09

## 2022-01-01 RX ADMIN — VENLAFAXINE 37.5 MG: 37.5 TABLET ORAL at 11:09

## 2022-01-01 RX ADMIN — MIDODRINE HYDROCHLORIDE 10 MG: 5 TABLET ORAL at 09:09

## 2022-01-01 RX ADMIN — ASPIRIN 81 MG CHEWABLE TABLET 81 MG: 81 TABLET CHEWABLE at 10:08

## 2022-01-01 RX ADMIN — MUPIROCIN: 20 OINTMENT TOPICAL at 09:08

## 2022-01-01 RX ADMIN — PANTOPRAZOLE SODIUM 40 MG: 40 INJECTION, POWDER, FOR SOLUTION INTRAVENOUS at 09:09

## 2022-01-01 RX ADMIN — SODIUM CHLORIDE, POTASSIUM CHLORIDE, SODIUM LACTATE AND CALCIUM CHLORIDE 250 ML: 600; 310; 30; 20 INJECTION, SOLUTION INTRAVENOUS at 10:09

## 2022-01-01 RX ADMIN — HEPARIN SODIUM 4000 UNITS: 1000 INJECTION INTRAVENOUS; SUBCUTANEOUS at 04:09

## 2022-01-01 RX ADMIN — METHYLPREDNISOLONE SODIUM SUCCINATE 40 MG: 125 INJECTION, POWDER, FOR SOLUTION INTRAMUSCULAR; INTRAVENOUS at 01:09

## 2022-01-01 RX ADMIN — ALBUMIN (HUMAN) 12.5 G: 12.5 SOLUTION INTRAVENOUS at 02:09

## 2022-01-01 RX ADMIN — MICONAZOLE NITRATE: 20 POWDER TOPICAL at 08:08

## 2022-01-01 RX ADMIN — MIDODRINE HYDROCHLORIDE 10 MG: 5 TABLET ORAL at 07:09

## 2022-01-01 RX ADMIN — NOREPINEPHRINE BITARTRATE 0.1 MCG/KG/MIN: 1 SOLUTION INTRAVENOUS at 11:09

## 2022-01-01 RX ADMIN — HEPARIN SODIUM 4000 UNITS: 1000 INJECTION INTRAVENOUS; SUBCUTANEOUS at 02:09

## 2022-01-01 RX ADMIN — ASPIRIN 81 MG CHEWABLE TABLET 81 MG: 81 TABLET CHEWABLE at 11:09

## 2022-01-01 RX ADMIN — DEXTROSE MONOHYDRATE 150 MG: 50 INJECTION, SOLUTION INTRAVENOUS at 03:09

## 2022-01-01 RX ADMIN — SODIUM CHLORIDE 200 ML: 9 INJECTION, SOLUTION INTRAVENOUS at 04:09

## 2022-01-01 RX ADMIN — CARVEDILOL 25 MG: 25 TABLET, FILM COATED ORAL at 10:08

## 2022-01-01 RX ADMIN — COLLAGENASE SANTYL: 250 OINTMENT TOPICAL at 04:08

## 2022-01-01 RX ADMIN — DEXTROSE MONOHYDRATE 12.5 G: 25 INJECTION, SOLUTION INTRAVENOUS at 06:09

## 2022-01-01 RX ADMIN — NOREPINEPHRINE BITARTRATE 0.2 MCG/KG/MIN: 1 INJECTION, SOLUTION, CONCENTRATE INTRAVENOUS at 11:09

## 2022-01-01 RX ADMIN — COLLAGENASE SANTYL: 250 OINTMENT TOPICAL at 05:08

## 2022-01-01 RX ADMIN — SODIUM CHLORIDE, POTASSIUM CHLORIDE, SODIUM LACTATE AND CALCIUM CHLORIDE 250 ML: 600; 310; 30; 20 INJECTION, SOLUTION INTRAVENOUS at 03:09

## 2022-01-01 RX ADMIN — NOREPINEPHRINE BITARTRATE 0.2 MCG/KG/MIN: 1 INJECTION, SOLUTION, CONCENTRATE INTRAVENOUS at 05:09

## 2022-01-01 RX ADMIN — INSULIN ASPART 1 UNITS: 100 INJECTION, SOLUTION INTRAVENOUS; SUBCUTANEOUS at 11:09

## 2022-01-01 RX ADMIN — FERROUS SULFATE TAB 325 MG (65 MG ELEMENTAL FE) 1 EACH: 325 (65 FE) TAB at 04:09

## 2022-01-01 RX ADMIN — HEPARIN SODIUM 4000 UNITS: 1000 INJECTION INTRAVENOUS; SUBCUTANEOUS at 12:09

## 2022-01-01 RX ADMIN — SODIUM CHLORIDE: 9 INJECTION, SOLUTION INTRAVENOUS at 10:09

## 2022-01-01 RX ADMIN — SODIUM CHLORIDE, POTASSIUM CHLORIDE, SODIUM LACTATE AND CALCIUM CHLORIDE 500 ML: 600; 310; 30; 20 INJECTION, SOLUTION INTRAVENOUS at 02:09

## 2022-01-01 RX ADMIN — CARVEDILOL 6.25 MG: 6.25 TABLET, FILM COATED ORAL at 08:09

## 2022-01-01 RX ADMIN — NOREPINEPHRINE BITARTRATE 0.1 MCG/KG/MIN: 1 INJECTION, SOLUTION, CONCENTRATE INTRAVENOUS at 05:09

## 2022-01-01 RX ADMIN — MORPHINE SULFATE 4 MG: 4 INJECTION, SOLUTION INTRAMUSCULAR; INTRAVENOUS at 04:09

## 2022-01-01 RX ADMIN — FERROUS SULFATE TAB 325 MG (65 MG ELEMENTAL FE) 1 EACH: 325 (65 FE) TAB at 11:09

## 2022-01-01 RX ADMIN — HEPARIN SODIUM 4000 UNITS: 1000 INJECTION INTRAVENOUS; SUBCUTANEOUS at 05:08

## 2022-01-01 RX ADMIN — NOREPINEPHRINE BITARTRATE 0.1 MCG/KG/MIN: 1 INJECTION, SOLUTION, CONCENTRATE INTRAVENOUS at 04:09

## 2022-01-01 RX ADMIN — MIDODRINE HYDROCHLORIDE 10 MG: 5 TABLET ORAL at 08:09

## 2022-01-01 RX ADMIN — MIDODRINE HYDROCHLORIDE 5 MG: 5 TABLET ORAL at 05:09

## 2022-01-01 RX ADMIN — FERROUS SULFATE TAB 325 MG (65 MG ELEMENTAL FE) 1 EACH: 325 (65 FE) TAB at 10:08

## 2022-01-01 RX ADMIN — NOREPINEPHRINE BITARTRATE 0.1 MCG/KG/MIN: 1 SOLUTION INTRAVENOUS at 04:09

## 2022-01-01 RX ADMIN — VENLAFAXINE 37.5 MG: 37.5 TABLET ORAL at 10:08

## 2022-01-01 RX ADMIN — CLINDAMYCIN IN 5 PERCENT DEXTROSE 600 MG: 12 INJECTION, SOLUTION INTRAVENOUS at 03:09

## 2022-01-01 RX ADMIN — ACETAMINOPHEN 650 MG: 325 TABLET, FILM COATED ORAL at 12:08

## 2022-01-01 RX ADMIN — APIXABAN 2.5 MG: 2.5 TABLET, FILM COATED ORAL at 10:08

## 2022-01-01 RX ADMIN — NOREPINEPHRINE BITARTRATE 0.25 MCG/KG/MIN: 1 SOLUTION INTRAVENOUS at 08:09

## 2022-01-01 RX ADMIN — NOREPINEPHRINE BITARTRATE 0.1 MCG/KG/MIN: 1 INJECTION, SOLUTION, CONCENTRATE INTRAVENOUS at 03:09

## 2022-01-01 RX ADMIN — INSULIN ASPART 2 UNITS: 100 INJECTION, SOLUTION INTRAVENOUS; SUBCUTANEOUS at 01:09

## 2022-08-23 PROBLEM — I50.22 CHRONIC SYSTOLIC HEART FAILURE: Status: ACTIVE | Noted: 2022-01-01

## 2022-08-23 PROBLEM — I25.10 CAD (CORONARY ARTERY DISEASE): Status: ACTIVE | Noted: 2022-01-01

## 2022-08-23 PROBLEM — S52.92XA LEFT RADIAL FRACTURE: Status: ACTIVE | Noted: 2022-01-01

## 2022-08-23 PROBLEM — R63.8 ALTERATION IN NUTRITION: Status: ACTIVE | Noted: 2022-01-01

## 2022-08-23 PROBLEM — I48.91 A-FIB: Status: ACTIVE | Noted: 2022-01-01

## 2022-08-23 PROBLEM — E11.9 DM (DIABETES MELLITUS): Status: ACTIVE | Noted: 2022-01-01

## 2022-08-23 PROBLEM — I10 HTN (HYPERTENSION): Status: ACTIVE | Noted: 2022-01-01

## 2022-08-23 PROBLEM — J96.01 ACUTE HYPOXEMIC RESPIRATORY FAILURE: Status: ACTIVE | Noted: 2022-01-01

## 2022-08-23 PROBLEM — N17.9 AKI (ACUTE KIDNEY INJURY): Status: ACTIVE | Noted: 2022-01-01

## 2022-08-23 PROBLEM — D64.9 ANEMIA: Status: ACTIVE | Noted: 2022-01-01

## 2022-08-23 PROBLEM — E78.5 HLD (HYPERLIPIDEMIA): Status: ACTIVE | Noted: 2022-01-01

## 2022-08-23 PROBLEM — R53.1 WEAKNESS ACQUIRED IN ICU: Status: ACTIVE | Noted: 2022-01-01

## 2022-08-23 NOTE — CONSULTS
Full consult to follow.    This patient with multiple medical problems that include CAD, CKD with progressive renal failure, respiratory failure and debility.  He has been transferred from the [facility] for further management.    CV:RR  LUNGS: Clear anterior  AB: soft, positive bowel sounds  EXT: 2 plus edema      1.  ESRD  2.  CAD  3. Respiratory failure  4.  Debility  Continue with dialysis on MWF schedule.

## 2022-08-23 NOTE — ASSESSMENT & PLAN NOTE
Cr 0.9 March 2022   - was on CCRT post CABG -- transitioned to HD M/W/F  - tunneled HD cath in place   - consult Nephrology -- did not come with porter- unsure if he was making any urine at OSH

## 2022-08-23 NOTE — ASSESSMENT & PLAN NOTE
No history documented prior to CABG - was on Lovenox at OSH   - resume medications once placed in computer

## 2022-08-23 NOTE — SUBJECTIVE & OBJECTIVE
Past Medical History:   Diagnosis Date    Anticoagulant long-term use     Cancer     CHF (congestive heart failure)     Coronary artery disease     Diabetes mellitus     Hypertension     Stroke        Past Surgical History:   Procedure Laterality Date    CORONARY ARTERY BYPASS GRAFT         Review of patient's allergies indicates:  Not on File  Current Facility-Administered Medications   Medication Frequency    albuterol-ipratropium 2.5 mg-0.5 mg/3 mL nebulizer solution 3 mL Q12H    mupirocin 2 % ointment BID     Family History    None       Tobacco Use    Smoking status: Not on file    Smokeless tobacco: Not on file   Substance and Sexual Activity    Alcohol use: Not on file    Drug use: Not on file    Sexual activity: Not on file     Review of Systems   All other systems reviewed and are negative.  Objective:     Vital Signs (Most Recent):    Vital Signs (24h Range):           There is no height or weight on file to calculate BMI.  There is no height or weight on file to calculate BSA.    No intake/output data recorded.    Physical Exam  Vitals reviewed.   Constitutional:       General: He is not in acute distress.  HENT:      Head: Normocephalic and atraumatic.      Comments: Tracheostomy     Mouth/Throat:      Mouth: Mucous membranes are moist.   Cardiovascular:      Rate and Rhythm: Regular rhythm.   Pulmonary:      Effort: Pulmonary effort is normal.      Comments: ventilated  Abdominal:      Palpations: Abdomen is soft.   Musculoskeletal:      Right lower leg: Edema present.      Left lower leg: Edema present.   Neurological:      Mental Status: He is alert.       Significant Labs:  BMP: No results for input(s): GLU, NA, K, CL, CO2, BUN, CREATININE, CALCIUM, MG in the last 168 hours.  CBC: No results for input(s): WBC, RBC, HGB, HCT, PLT, MCV, MCH, MCHC in the last 168 hours.    Significant Imaging:  Labs: Reviewed

## 2022-08-23 NOTE — HPI
This gentleman with history of CAD, CVA and renal failure.  The patient transferred from the [facility] for ventilator management and debility.  He has a history of CKD which progressed to ESRD after a CABG.  The patient has been on dialysis for about 3 weeks.  He continues to have diffuse edema.  His last dialysis session was on yesterday.  His family is at the bedside to answer questions.  Nephrology has been consulted for renal issues.

## 2022-08-23 NOTE — H&P
"[facility]  Pulmonology  H&P    Patient Name: Gregory Stuart  MRN: [id number]  Admission Date: 8/23/2022  Code Status: No Order  Primary Care Provider: Primary Doctor No   Principal Problem: Acute hypoxemic respiratory failure    Subjective:     HPI:  77-year-old male who was transferred from [facility] in [place], [place] for continued ventilator weaning.  History obtained from medical records and family.  Patient was admitted on 7/12/22 for syncopal spell that resulted in a left radial fracture.  Workup revealed three-vessel CAD and patient underwent a CABG on 07/18.   Patient failed extubation and underwent a trach and PEG placement.  During this admission patient also developed acute kidney injury it was started on hemodialysis..  Baseline creatinine was  0.9 in March of this year.  He also developed atrial fibrillation and was placed on weight based Lovenox.  Past medical history of CVA "years ago" with no residual deficits according to family, hypertension, systolic heart failure, diabetes mellitus, anemia, hyperlipidemia and bladder cancer (2011).  He has required a thoracentesis in the past due to his heart failure.   On exam patient is very edematous.  He will open his eyes and denies having any pain.  He has not really following commands with his upper and lower extremities.  This may be secondary to his edema and the fact that he has been in the bed for over a month.   Family denies him having any history of smoking. States he was able to care for himself prior to this hospitalization.       Past Medical History:   Diagnosis Date    Anticoagulant long-term use     Cancer     CHF (congestive heart failure)     Coronary artery disease     Diabetes mellitus     Hypertension     Stroke        Past Surgical History:   Procedure Laterality Date    CORONARY ARTERY BYPASS GRAFT         Review of patient's allergies indicates:  Not on File    Family " History    None       Tobacco Use    Smoking status: Not on file    Smokeless tobacco: Not on file   Substance and Sexual Activity    Alcohol use: Not on file    Drug use: Not on file    Sexual activity: Not on file         Review of Systems   Unable to perform ROS: Acuity of condition   Objective:     Vital Signs (Most Recent):    Vital Signs (24h Range):           There is no height or weight on file to calculate BMI.    No intake or output data in the 24 hours ending 08/23/22 1322    Physical Exam  Vitals reviewed.   Constitutional:       General: He is not in acute distress.     Appearance: He is ill-appearing.   HENT:      Right Ear: External ear normal.      Left Ear: External ear normal.      Mouth/Throat:      Mouth: Mucous membranes are moist.   Eyes:      Extraocular Movements: Extraocular movements intact.      Conjunctiva/sclera: Conjunctivae normal.   Cardiovascular:      Rate and Rhythm: Tachycardia present.      Pulses: Normal pulses.   Abdominal:      General: Bowel sounds are normal.      Palpations: Abdomen is soft.   Musculoskeletal:         General: Swelling present.      Right lower leg: Edema present.      Left lower leg: Edema present.      Comments: Edema to upper and lower ext. Worse in left arm than right -Hx radial fx; family states cast was removed due to edema    Skin:     General: Skin is warm and dry.   Neurological:      Mental Status: He is alert.       Vents:  Oxygen Concentration (%): 40 (08/23/22 1030)    Lines/Drains/Airways       Central Venous Catheter Line  Duration                  Hemodialysis Catheter right subclavian -- days                    Significant Labs:    CBC/Anemia Profile:  No results for input(s): WBC, HGB, HCT, PLT, MCV, RDW, IRON, FERRITIN, RETIC, FOLATE, [id number], OCCULTBLOOD in the last 48 hours.     Chemistries:  No results for input(s): NA, K, CL, CO2, BUN, CREATININE, CALCIUM, ALBUMIN, PROT, BILITOT, ALKPHOS, ALT, AST, GLUCOSE, MG, PHOS in the  last 48 hours.    All pertinent labs within the past 24 hours have been reviewed.    Significant Imaging:   I have reviewed all pertinent imaging results/findings within the past 24 hours.    Assessment/Plan:     * Acute hypoxemic respiratory failure  Failed extubated post CABG (7/18)  Now with trach and peg   Will rest today and start PSV trails tomorrow   AC , RR12, peep 5, fio2 40% - abgs in AM   Chest xray today       HTN (hypertension)  Resume home medications     HLD (hyperlipidemia)  Resume home medications     Alteration in nutrition  S/p peg tube placement   Resume tube feeding    Weakness acquired in ICU  PT/OT     Left radial fracture  7/12/22--- per family he did not require surgical repair - he did have a split/cast in place but this was removed due to edema     DM (diabetes mellitus)  accuchecks and Sliding scale insulin     Anemia  Likely due to critical illness   Most recent H/H 8.8/27   - repeat labs Monday and fridays     JUVENTINO (acute kidney injury)  Cr 0.9 March 2022   - was on CCRT post CABG -- transitioned to HD M/W/F  - tunneled HD cath in place   - consult Nephrology -- did not come with porter- unsure if he was making any urine at OSH     CAD (coronary artery disease)  S/p CABG on 07/18        Chronic systolic heart failure  EF 30-35% per Echo 07/2022  Daily wts/ I/Os     A-fib  No history documented prior to CABG - was on Lovenox at OSH   - resume medications once placed in computer            ADRI Azul-ACNP  Pulmonology  [facility]

## 2022-08-23 NOTE — ASSESSMENT & PLAN NOTE
7/12/22--- per family he did not require surgical repair - he did have a split/cast in place but this was removed due to edema

## 2022-08-23 NOTE — ASSESSMENT & PLAN NOTE
Failed extubated post CABG (7/18)  Now with trach and peg   Will rest today and start PSV trails tomorrow   AC , RR12, peep 5, fio2 40% - abgs in AM   Chest xray today

## 2022-08-23 NOTE — CONSULTS
[facility]  Adult Nutrition  Consult Note         Reason for Assessment  Reason For Assessment: consult /PEG feedings  Nutrition Risk Screen: tube feeding or parenteral nutrition  Skin Integrity  Donnie Risk Assessment  Sensory Perception: 2-->very limited  Moisture: 3-->occasionally moist  Activity: 1-->bedfast  Mobility: 1-->completely immobile  Nutrition: 2-->probably inadequate  Friction and Shear: 1-->problem  Donnie Score: 10  Comments on skin integrity: edematous  Nutrition Diagnosis  Increased protein Needs   related to Diabetes Mellitus, Renal dysfunction and Wound healing as evidenced by ESRD and on dialysis, wounds and elevated glucose    Nutrition Diagnosis Status: Chronic/ continues        Nutrition Risk  Level of Risk/Frequency of Follow-up: high  Comments on nutrition risk: following labs  Recent Labs   Lab 08/23/22  1438   POCGLU 113*     Comments on Glucose: dx of diabetes  Nutrition Prescription / Recommendations  Recommendation/Intervention: Recommend Glucerna 1.5@ 55ml/h with 20ml flush every hour with Sarthak BID. RD will follow up with labs tomorow when available and make adjustments as needed.  Goals: Tolerance to TF, weight loss as edema resolves  Nutrition Goal Status: new  Current Diet Order: Recommend Glucerna 1.5@ 55ml/h with 20ml flush q 2 hours  Current Nutrition Support Formula Ordered: Glucerna 1.5  Current Nutrition Support Rate Ordered: 55 (ml)  Current Nutrition Support Frequency Ordered: hourly  Oral Nutrition Supplement: Sarthak  Chewing or Swallowing Difficulty?: No Chewing or swallowing difficulty  Recommended Diet: Enteral Nutrition  Recommended Oral Supplement: Sarthak [90 kcals, 2.5g Protein, 10g Carbs(3g Sugar), 7g L-Arginine, 7g L-Glutamine, Vitamin C 300mg, 9.5mg Zinc] twice daily  Is Nutrition Support Recommended: Yes   Needs Calculated  Energy Need Method: Kcal/kg Energy Calorie Requirements (kcal): 5228-1115 (25-30cals/kg)  Protein  Requirements: 112-140g/d  Enteral Nutrition   Enteral Nutrition Formula Provides:  1980 kcals Propofol Rate: No  109 g Protein  1002 ml Fluid without Flush    480 ml Fluid by flush   1482 ml Total Fluid  Enteral Nutrition Recommended Order:  Tube feeding via PEG/ Gastrostomy  Tube feeding formula: Glucerna 1.5 Continuous 55 ml/h  Free Water Flush: 20 ml every 2 hours  Modular Supplements:No Modular Supplements needed and Sarthak 2 times a day  Enteral Nutrition meets needs?: yes  Enteral Nutrition Status: New Order    Is Education Recommended: No  Monitor and Evaluation  % current Intake: New Enteral Nutrition Order with no documentation   % intake to meet estimated needs: Enteral Nutrition   Food and Nutrient Intake: enteral nutrition intake  Food and Nutrient Adminstration: enteral and parenteral nutrition administration  Anthropometric Measurements: weight, weight change, body mass index  Biochemical Data, Medical Tests and Procedures: electrolyte and renal panel, glucose/endocrine profile, lipid profile, gastrointestinal profile, inflammatory profile (RD to follow up as labs become available due to recent admit)  Enteral Calories (kcal): 1980  Enteral Protein (gm): 109  Enteral (Free Water) Fluid (mL): 1002  Free Water Flush Fluid (mL): 480  % Kcal Needs: 100  % Protein Needs: 100 (with Sarthak added)  Current Medical Diagnosis and Past Medical History  Diagnosis: cardiac disease, pulmonary disease, renal disease, diabetes diagnosis/complications  Past Medical History:   Diagnosis Date    Anticoagulant long-term use     Cancer     CHF (congestive heart failure)     Coronary artery disease     Diabetes mellitus     Hypertension     Stroke      Nutrition/Diet History  Food Allergies: NKFA  Factors Affecting Nutritional Intake: None identified at this time  Lab/Procedures/Meds  No results for input(s): NA, K, BUN, CREATININE, CALCIUM, ALBUMIN, CL, ALT, AST, PHOS in the last 72 hours.  Last A1c: No results found  "for: HGBA1C  No results found for: RBC, HGB, HCT, MCV, MCH, MCHC, TIBC  Pertinent Labs Reviewed: reviewed  Pertinent Labs Comments: Glucose 113 (only lab available)  Pertinent Medications Reviewed: reviewed  Pertinent Medications Comments: albuterol  Anthropometrics  Height: 6' 2" (188 cm)  Height (inches): 74 in  Weight: 93.7 kg (206 lb 9.1 oz)  Weight (lb): 206.57 lb  Ideal Body Weight (IBW), Male: 190 lb  % Ideal Body Weight, Male (lb): 108.72 %  BMI (Calculated): 26.5  BMI Grade: 25 - 29.9 - overweight     Estimated/Assessed Needs        Weight Used For Calorie Calculations: 93.7 kg (206 lb 9.1 oz)   Energy Need Method: Kcal/kg Energy Calorie Requirements (kcal): 2158-3807 (25-30cals/kg)  Weight Used For Protein Calculations: 93.7 kg (206 lb 9.1 oz)  Protein Requirements: 112-140g/d  Estimated Fluid Requirement Method: RDA Method    RDA Method (mL): 2342     Nutrition by Nursing  Diet/Nutrition Received: tube feeding     Diet/Feeding Assistance: total feed     Last Bowel Movement: 08/23/22              Nutrition Follow-Up  RD Follow-up?: Yes    "

## 2022-08-23 NOTE — PLAN OF CARE
Problem: Adult Inpatient Plan of Care  Goal: Plan of Care Review  Outcome: Ongoing, Progressing  Goal: Patient-Specific Goal (Individualized)  Outcome: Ongoing, Progressing  Goal: Absence of Hospital-Acquired Illness or Injury  Outcome: Ongoing, Progressing  Goal: Optimal Comfort and Wellbeing  Outcome: Ongoing, Progressing  Goal: Readiness for Transition of Care  Outcome: Ongoing, Progressing     Problem: Fall Injury Risk  Goal: Absence of Fall and Fall-Related Injury  Outcome: Ongoing, Progressing     Problem: Skin Injury Risk Increased  Goal: Skin Health and Integrity  Outcome: Ongoing, Progressing     Problem: Infection  Goal: Absence of Infection Signs and Symptoms  Outcome: Ongoing, Progressing     Problem: Diabetes Comorbidity  Goal: Blood Glucose Level Within Targeted Range  Outcome: Ongoing, Progressing     Problem: Fluid and Electrolyte Imbalance (Acute Kidney Injury/Impairment)  Goal: Fluid and Electrolyte Balance  Outcome: Ongoing, Progressing     Problem: Oral Intake Inadequate (Acute Kidney Injury/Impairment)  Goal: Optimal Nutrition Intake  Outcome: Ongoing, Progressing     Problem: Renal Function Impairment (Acute Kidney Injury/Impairment)  Goal: Effective Renal Function  Outcome: Ongoing, Progressing     Problem: Impaired Wound Healing  Goal: Optimal Wound Healing  Outcome: Ongoing, Progressing

## 2022-08-23 NOTE — HPI
"77-year-old male who was transferred from [facility] in [place], [place] for continued ventilator weaning.  History obtained from medical records and family.  Patient was admitted on 7/12/22 for syncopal spell that resulted in a left radial fracture.  Workup revealed three-vessel CAD and patient underwent a CABG on 07/18.   Patient failed extubation and underwent a trach and PEG placement.  During this admission patient also developed acute kidney injury it was started on hemodialysis..  Baseline creatinine was  0.9 in March of this year.  He also developed atrial fibrillation and was placed on weight based Lovenox.  Past medical history of CVA "years ago" with no residual deficits according to family, hypertension, systolic heart failure, diabetes mellitus, anemia, hyperlipidemia and bladder cancer (2011).  He has required a thoracentesis in the past due to his heart failure.   On exam patient is very edematous.  He will open his eyes and denies having any pain.  He has not really following commands with his upper and lower extremities.  This may be secondary to his edema and the fact that he has been in the bed for over a month.   Family denies him having any history of smoking. States he was able to care for himself prior to this hospitalization.   "

## 2022-08-23 NOTE — SUBJECTIVE & OBJECTIVE
Past Medical History:   Diagnosis Date    Anticoagulant long-term use     Cancer     CHF (congestive heart failure)     Coronary artery disease     Diabetes mellitus     Hypertension     Stroke        Past Surgical History:   Procedure Laterality Date    CORONARY ARTERY BYPASS GRAFT         Review of patient's allergies indicates:  Not on File    Family History    None       Tobacco Use    Smoking status: Not on file    Smokeless tobacco: Not on file   Substance and Sexual Activity    Alcohol use: Not on file    Drug use: Not on file    Sexual activity: Not on file         Review of Systems   Unable to perform ROS: Acuity of condition   Objective:     Vital Signs (Most Recent):    Vital Signs (24h Range):           There is no height or weight on file to calculate BMI.    No intake or output data in the 24 hours ending 08/23/22 1322    Physical Exam  Vitals reviewed.   Constitutional:       General: He is not in acute distress.     Appearance: He is ill-appearing.   HENT:      Right Ear: External ear normal.      Left Ear: External ear normal.      Mouth/Throat:      Mouth: Mucous membranes are moist.   Eyes:      Extraocular Movements: Extraocular movements intact.      Conjunctiva/sclera: Conjunctivae normal.   Cardiovascular:      Rate and Rhythm: Tachycardia present.      Pulses: Normal pulses.   Abdominal:      General: Bowel sounds are normal.      Palpations: Abdomen is soft.   Musculoskeletal:         General: Swelling present.      Right lower leg: Edema present.      Left lower leg: Edema present.      Comments: Edema to upper and lower ext. Worse in left arm than right -Hx radial fx; family states cast was removed due to edema    Skin:     General: Skin is warm and dry.   Neurological:      Mental Status: He is alert.       Vents:  Oxygen Concentration (%): 40 (08/23/22 1030)    Lines/Drains/Airways       Central Venous Catheter Line  Duration                  Hemodialysis Catheter right subclavian --  days                    Significant Labs:    CBC/Anemia Profile:  No results for input(s): WBC, HGB, HCT, PLT, MCV, RDW, IRON, FERRITIN, RETIC, FOLATE, [id number], OCCULTBLOOD in the last 48 hours.     Chemistries:  No results for input(s): NA, K, CL, CO2, BUN, CREATININE, CALCIUM, ALBUMIN, PROT, BILITOT, ALKPHOS, ALT, AST, GLUCOSE, MG, PHOS in the last 48 hours.    All pertinent labs within the past 24 hours have been reviewed.    Significant Imaging:   I have reviewed all pertinent imaging results/findings within the past 24 hours.

## 2022-08-24 NOTE — PT/OT/SLP EVAL
Physical Therapy Evaluation    Patient Name:  Gregory Stuart   MRN:  [id number]    Recommendations:     Discharge Recommendations:  rehabilitation facility, nursing facility, skilled, intermediate care facility/nursing home   Discharge Equipment Recommendations: other (see comments) (to be determined)   Barriers to discharge: ongoing medical treatment; vent weaning    Assessment:     Gregory Stuart is a 77 y.o. male admitted with a medical diagnosis of Acute hypoxemic respiratory failure; radius fracture 7/12; CABG 7/18  He presents with the following impairments/functional limitations:  weakness, impaired endurance, impaired self care skills, impaired functional mobility, impaired balance, impaired cognition, decreased upper extremity function, decreased lower extremity function, decreased safety awareness, pain, decreased ROM, impaired coordination, impaired skin, edema, impaired cardiopulmonary response to activity .    Patient demonstrates very limited functional mobility and delayed processing of commands/formulating responses to questions. PT will initiate a trial and continue to monitor response to treatment and evaluate rehab potential. Patient will require swing bed once medically stable to leave LTACH.      Rehab Prognosis: Fair; patient would benefit from acute skilled PT services to address these deficits and reach maximum level of function.    Recent Surgery: * No surgery found *      Plan:     During this hospitalization, patient to be seen 5 x/week to address the identified rehab impairments via gait training, therapeutic activities, therapeutic exercises, neuromuscular re-education and progress toward the following goals:    · Plan of Care Expires:  09/24/22    Subjective     Chief Complaint: resp failure post CABG  Patient/Family Comments/goals: Patient slow to nod yes/no to questions and only attempted to answer occasionally. Unsure of pt comprehension.  Pain/Comfort:  · Pain Rating 1: 0/10 (up to 5/10  Gibran Borden with UE/LE ROM)  · Pain Rating Post-Intervention 1: 0/10    Patients cultural, spiritual, Druze conflicts given the current situation: no    Living Environment:  RN reports pt lives with his spouse. Home set up unknown  Prior to admission, patients level of function is not clearly known. H+P suggested pt was ambulatory prior to syncope/fall/admit but pt unable to comment.  Equipment used at home:  (unclear).  DME owned (not currently used): unknown.  Upon discharge, patient will have assistance from swing bed staff.    Objective:     Communicated with Africa Bautista RN prior to session.  Patient found supine with PEG Tube, Tracheostomy, CPAP, ventilator, peripheral IV, blood pressure cuff, telemetry, pulse ox (continuous) (dialysis port)  upon PT entry to room.    General Precautions: Standard, fall, respiratory   Orthopedic Precautions: (no restriction on chart but history of left radius fracture 7/12/2022- no cast or brace)   Braces: N/A  Respiratory Status: CPAP via vent/trach    Exams:  · Cognitive Exam:  Patient opens eyes and visually tracks intermittently. Delayed processing with attempts to elicit info from patient using head nods. At times pt just looks past therapist  · Sensation:    · -       Impaired  unable to perform selective testing. patient demonstrated facial grimacing during UE/LE ROM  · Skin Integrity/Edema:      · -       Skin integrity: healing LE wound from vein harvest  · -       Edema: Moderate global edema  · RLE ROM: Deficits: hip/knee to 90/90 in sitting- stiffness/resistance to ROM; ankle WFL  · RLE Strength: Deficits: pt unable to follow commands for MMT  · LLE ROM: Deficits: as per right  · LLE Strength: Deficits: as per right    Functional Mobility:  · Bed Mobility:     · Rolling Left:  dependence  · Rolling Right: dependence  · Supine to Sit: dependence  · Sit to Supine: dependence  · Transfers:     · Sit to Stand:  unable.    Therapeutic Activities and  Exercises:   POC discussed with RN Africa Bautista and patient. Unsure of patient understanding.    AM-PAC 6 CLICK MOBILITY  Total Score:8     Patient left HOB elevated with all lines intact, call button in reach and Africa Bautista, MIKEY notified.    GOALS:   Multidisciplinary Problems     Physical Therapy Goals        Problem: Physical Therapy    Goal Priority Disciplines Outcome Goal Variances Interventions   Physical Therapy Goal     PT, PT/OT Ongoing, Progressing     Description: Short Term Goals to be met by 9/6/2022    Patient will increase functional independence and safety with mobility by performing    1.   Rolling right Moderate Assist; roll left Moderate Assist  2.   Supine to sit Moderate Assist  3.   Sitting balance edge of the bed x 15 minutes Minimal Assist  4.   Sit to stand Maximal Assist  using manual assistance with gait belt  5.   Bed to chair Maximal Assist using manual assistance with gait belt  6.   Lower extremity exercises x 30 reps with assist as necessary and no rest breaks      Long Term Goals to be met by 10/24/2022    Patient to require less than or equal to Minimal Assist for functional mobility to ease caregiver burden                    History:     Past Medical History:   Diagnosis Date    Anticoagulant long-term use     Cancer     CHF (congestive heart failure)     Coronary artery disease     Diabetes mellitus     Hypertension     Stroke        Past Surgical History:   Procedure Laterality Date    CORONARY ARTERY BYPASS GRAFT         Time Tracking:     PT Received On: 08/24/22  PT Start Time: 0816     PT Stop Time: 0838  PT Total Time (min): 22 min     Billable Minutes: Evaluation high complexity      08/24/2022

## 2022-08-24 NOTE — PROGRESS NOTES
[facility]  Pulmonology  Progress Note    Patient Name: Gregory Stuart  MRN: [id number]  Admission Date: 8/23/2022  Hospital Length of Stay: 1 days  Code Status: No Order  Attending Provider: Ham Sanchez DO  Primary Care Provider: Primary Doctor No   Principal Problem: Acute hypoxemic respiratory failure    Subjective:     Interval History: No acute events overnight. The patient is currently resting comfortably. He remains on the ventilator this am. He is afebrile and vital signs are stable.    Objective:     Vital Signs (Most Recent):  Temp: 97.7 °F (36.5 °C) (08/24/22 0400)  Pulse: 104 (08/23/22 2008)  Resp: 15 (08/23/22 2008)  BP: (!) 120/54 (08/23/22 2130)  SpO2: 98 % (08/23/22 2008)   Vital Signs (24h Range):  Temp:  [97.4 °F (36.3 °C)-99.1 °F (37.3 °C)] 97.7 °F (36.5 °C)  Pulse:  [] 104  Resp:  [11-25] 15  SpO2:  [86 %-100 %] 98 %  BP: ()/(41-55) 120/54     Weight: 97.4 kg (214 lb 11.7 oz)  Body mass index is 27.57 kg/m².      Intake/Output Summary (Last 24 hours) at 8/24/2022 0745  Last data filed at 8/24/2022 0600  Gross per 24 hour   Intake 680 ml   Output 1 ml   Net 679 ml       Physical Exam  Vitals reviewed.   Constitutional:       General: He is not in acute distress.     Appearance: He is ill-appearing.   HENT:      Head: Normocephalic and atraumatic.      Right Ear: External ear normal.      Left Ear: External ear normal.      Mouth/Throat:      Mouth: Mucous membranes are moist.   Eyes:      Extraocular Movements: Extraocular movements intact.      Conjunctiva/sclera: Conjunctivae normal.   Neck:      Comments: Trach in place with thick secretions  Cardiovascular:      Rate and Rhythm: Tachycardia present. Rhythm irregular.      Pulses: Normal pulses.   Pulmonary:      Breath sounds: Rhonchi present. No wheezing or rales.   Abdominal:      General: Bowel sounds are normal.      Palpations: Abdomen is soft.   Musculoskeletal:         General:  Swelling present.      Right lower leg: Edema present.      Left lower leg: Edema present.      Comments: Edema to upper and lower ext. Worse in left arm than right -Hx radial fx; family states cast was removed due to edema    Skin:     General: Skin is warm and dry.   Neurological:      Mental Status: He is alert.       Vents:  Vent Mode: A/C (22)  Ventilator Initiated: Yes (22 1024)  Set Rate: 14 BPM (22)  Vt Set: 550 mL (22)  Pressure Support: 10 cmH20 (22)  PEEP/CPAP: 5 cmH20 (22)  Oxygen Concentration (%): 40 (22)  Peak Airway Pressure: 35 cmH2O (22)  Total Ve: 6.4 mL (22)  F/VT Ratio<105 (RSBI): (!) 20.58 (22)    Lines/Drains/Airways       Central Venous Catheter Line  Duration                  Hemodialysis Catheter right subclavian -- days              Drain  Duration                  Gastrostomy/Enterostomy 22 1030 LUQ <1 day              Peripheral Intravenous Line  Duration                  Peripheral IV - Single Lumen 22 1422 20 G Anterior;Right Forearm <1 day                    Significant Labs:    CBC/Anemia Profile:  Recent Labs   Lab 22  1429   WBC 18.91*   HGB 8.5*   HCT 27.0*      MCV 96.8*   RDW 14.6*        Chemistries:  Recent Labs   Lab 22  1428      K 3.6      CO2 27   BUN 61*   CREATININE 3.19*   CALCIUM 7.8*       All pertinent labs within the past 24 hours have been reviewed.    Significant Imaging:  I have reviewed all pertinent imaging results/findings within the past 24 hours.    Assessment/Plan:     * Acute hypoxemic respiratory failure  Failed extubated post CABG ()  Now with trach and peg   Will rest today and start PSV trails tomorrow   AC , RR12, peep 5, fio2 40% - abgs in AM   Chest xray today ---reviewed---left pleural effusion  AB.45/40/139  Plan for some PSV/CPAP today. Will try 1 hour tid        HTN  (hypertension)  Resumed home medications     HLD (hyperlipidemia)  Resumed home medications     Alteration in nutrition  S/p peg tube placement   Resume tube feeding    Weakness acquired in ICU  PT/OT     Left radial fracture  7/12/22--- per family he did not require surgical repair - he did have a split/cast in place but this was removed due to edema     DM (diabetes mellitus)  accuchecks and Sliding scale insulin   A1C 5.3%  Currently on sliding scale    Anemia  Likely due to critical illness   Most recent H/H 8.5/27   - repeat labs Monday and fridays     JUVENTINO (acute kidney injury)  Cr 0.9 March 2022   - was on CCRT post CABG -- transitioned to HD M/W/F  - tunneled HD cath in place   - consult Nephrology -- did not come with porter- unsure if he was making any urine at OSH   Nephrology note reviewed and appreciated  Plan to continued MWF dialysis    CAD (coronary artery disease)  S/p CABG on 07/18    He is on aspirin, statin, beta blocker      Chronic systolic heart failure  EF 30-35% per Echo 07/2022  Daily wts/ I/Os     A-fib  No history documented prior to CABG - was on Lovenox at OSH   - resume medications once placed in computer   - irregular on telemetry--frequent pvc's  - he is on carvediolol, eliquis  - HR is currently adequate                 Ham Sanchez DO  Pulmonology  [facility]

## 2022-08-24 NOTE — PLAN OF CARE
Problem: Occupational Therapy  Goal: Occupational Therapy Goal  Description: ST. Pt will follow simple one step command  2.Pt will perform grooming with mod a  3. Pt will sit EOB x 10 min with mod a  4. Pt will andre gown with mod a  5. Pt will t/f bed/chair with mod a x 2  6. Pt will tolerate 15 min of tx      LTG: Restore to Max I with self care and mobility.    Outcome: Ongoing, Progressing

## 2022-08-24 NOTE — PROGRESS NOTES
[facility]  Wound Care  Progress Note    Patient Name: Gregory Stuart  MRN: [id number]  Admission Date: 8/23/2022  Attending Physician: Ham Sanhcez DO    Past Medical History:   Diagnosis Date    Anticoagulant long-term use     Cancer     CHF (congestive heart failure)     Coronary artery disease     Diabetes mellitus     Hypertension     Stroke         Subjective:     HPI:  Gregory Stuart is a 76 yo male with sacral, abdomen, left arm, and left leg. Wounds are covered in adherent slough. He has surgical incision to left leg from CABG in July. He was transferred from [facility] for continued ventilator weaning. He has trach and PEG in place. He had been on dialysis for for about 3 weeks.He continues to have edema. He also developed atrial fibrillation and was placed on weight based Lovenox. Significant past medical history of CVA, hypertension, CHF, diabetes mellitus, anemia, hyperlipidemia and bladder cancer (2011). Wound healing is complicated by hypoperfusion, respiratory failure, ESRD requiring dialysis, anemia, immobility, and diffuse edema.        Review of Systems   Unable to perform ROS: Intubated   Objective:     Vital Signs (Most Recent):  Temp: 98.5 °F (36.9 °C) (08/24/22 0715)  Pulse: 81 (08/24/22 0800)  Resp: (!) 26 (08/24/22 0800)  BP: (!) 101/45 (08/24/22 0919)  SpO2: 100 % (08/24/22 0800) Vital Signs (24h Range):  Temp:  [97.6 °F (36.4 °C)-99.1 °F (37.3 °C)] 98.5 °F (36.9 °C)  Pulse:  [] 81  Resp:  [11-26] 26  SpO2:  [86 %-100 %] 100 %  BP: ()/(41-55) 101/45     Weight: 97.4 kg (214 lb 11.7 oz)  Body mass index is 27.57 kg/m².  No data recorded    Physical Exam  Vitals reviewed.   Constitutional:       Appearance: He is ill-appearing.      Comments: Chronically ill   HENT:      Head: Normocephalic.   Cardiovascular:      Rate and Rhythm: Normal rate. Rhythm irregular.   Pulmonary:      Comments: On ventilator   Musculoskeletal:         General: Swelling  present.      Right lower leg: Edema present.      Left lower leg: Edema present.   Skin:     Findings: Bruising and erythema present.      Comments: Wound, see LDA for photos/measurements   Neurological:      Mental Status: He is alert. Mental status is at baseline.      Cranial Nerves: Cranial nerve deficit present.      Motor: Weakness present.      Gait: Gait abnormal.             Altered Skin Integrity Left anterior;lower;proximal;posterior Arm Abrasion(s) (Active)       Altered Skin Integrity Present on Admission:    Side: Left   Orientation: anterior;lower;proximal;posterior   Location: Arm   Wound Number:    Is this injury device related?:    Primary Wound Type: Abrasion(s)   Description of Altered Skin Integrity:    Ankle-Brachial Index:    Pulses:    Removal Indication and Assessment:    Wound Outcome:    (Retired) Wound Length (cm):    (Retired) Wound Width (cm):    (Retired) Depth (cm):    Wound Description (Comments):    Removal Indications:    Dressing Appearance Open to air 08/27/22 0730   Drainage Amount Scant 08/24/22 0705   Appearance Purple;[place] 08/25/22 0700   Care Cleansed with:;Soap and water 08/29/22 1120   Number of days:             Altered Skin Integrity 08/23/22 1030 Right medial Heel Intact skin with non-blanchable redness of localized area (Active)   08/23/22 1030   Altered Skin Integrity Present on Admission: yes   Side: Right   Orientation: medial   Location: Heel   Wound Number:    Is this injury device related?: No   Primary Wound Type:    Description of Altered Skin Integrity: Intact skin with non-blanchable redness of localized area   Ankle-Brachial Index:    Pulses:    Removal Indication and Assessment:    Wound Outcome:    (Retired) Wound Length (cm):    (Retired) Wound Width (cm):    (Retired) Depth (cm):    Wound Description (Comments):    Removal Indications:    Wound Image    08/24/22 1030   Description of Altered Skin Integrity Intact skin with non-blanchable redness of  localized area 08/25/22 1157   Dressing Appearance Open to air 08/28/22 0720   Drainage Amount None 08/25/22 0700   Appearance Pink;Dry 08/25/22 1157   Tissue loss description Not applicable 08/25/22 1157   Periwound Area Dry 08/25/22 1157   Wound Edges Defined 08/25/22 1157   Care Cleansed with:;Soap and water 08/25/22 1157   Number of days: 7            Altered Skin Integrity 08/23/22 1030 Left posterior Thigh Contusion (Active)   08/23/22 1030   Altered Skin Integrity Present on Admission:    Side: Left   Orientation: posterior   Location: Thigh   Wound Number:    Is this injury device related?:    Primary Wound Type: Contusion   Description of Altered Skin Integrity:    Ankle-Brachial Index:    Pulses:    Removal Indication and Assessment:    Wound Outcome:    (Retired) Wound Length (cm):    (Retired) Wound Width (cm):    (Retired) Depth (cm):    Wound Description (Comments):    Removal Indications:    Dressing Appearance Open to air 08/25/22 0700   Drainage Amount None 08/25/22 0700   Appearance Purple;Other (see comments) 08/25/22 0700   Tissue loss description Not applicable 08/24/22 1030   Periwound Area Dry 08/25/22 1157   Wound Edges Undefined 08/24/22 1030   Care Cleansed with:;Soap and water 08/24/22 1030   Number of days: 7            Altered Skin Integrity 08/23/22 1030 Left upper;posterior Arm Ulceration (Active)   08/23/22 1030   Altered Skin Integrity Present on Admission:    Side: Left   Orientation: upper;posterior   Location: Arm   Wound Number:    Is this injury device related?:    Primary Wound Type: Ulceration   Description of Altered Skin Integrity:    Ankle-Brachial Index:    Pulses:    Removal Indication and Assessment:    Wound Outcome:    (Retired) Wound Length (cm):    (Retired) Wound Width (cm):    (Retired) Depth (cm):    Wound Description (Comments):    Removal Indications:    Wound Image    08/24/22 1030   Description of Altered Skin Integrity Full thickness tissue loss. Base is  covered by slough and/or eschar in the wound bed 08/24/22 1030   Dressing Appearance Dried drainage 08/26/22 1115   Drainage Amount Scant 08/26/22 1115   Drainage Characteristics/Odor Serous;Tan;No odor 08/26/22 1115   Appearance Dressing in place, unable to visualize 08/28/22 0720   Tissue loss description Not applicable 08/26/22 1115   Black (%), Wound Tissue Color 70 % 08/24/22 1030   Red (%), Wound Tissue Color 0 % 08/24/22 1030   Yellow (%), Wound Tissue Color 30 % 08/24/22 1030   Periwound Area Moist 08/26/22 1115   Wound Edges Defined;Open 08/26/22 1115   Wound Length (cm) 1 cm 08/24/22 1030   Wound Width (cm) 6 cm 08/24/22 1030   Wound Depth (cm) 0 cm 08/24/22 1030   Wound Volume (cm^3) 0 cm^3 08/24/22 1030   Wound Surface Area (cm^2) 6 cm^2 08/24/22 1030   Care Cleansed with:;Soap and water;Wound cleanser;Applied:;Removed:;Skin Barrier 08/26/22 1115   Dressing Removed;Applied;Changed;Hydrocolloid 08/26/22 1115   Dressing Change Due 08/29/22 08/26/22 1115   Number of days: 7            Altered Skin Integrity 08/23/22 1030 Left anterior Thigh Other (comment) (Active)   08/23/22 1030   Altered Skin Integrity Present on Admission:    Side: Left   Orientation: anterior   Location: Thigh   Wound Number:    Is this injury device related?:    Primary Wound Type: Other   Description of Altered Skin Integrity:    Ankle-Brachial Index:    Pulses:    Removal Indication and Assessment:    Wound Outcome:    (Retired) Wound Length (cm):    (Retired) Wound Width (cm):    (Retired) Depth (cm):    Wound Description (Comments):    Removal Indications:    Wound Image    08/24/22 1030   Dressing Appearance Open to air 08/28/22 0720   Drainage Amount None 08/25/22 1157   Drainage Characteristics/Odor Serous;Other (see comments) 08/24/22 0705   Appearance Dry 08/25/22 1157   Tissue loss description Not applicable 08/25/22 1157   Periwound Area Dry 08/25/22 1157   Wound Edges Undefined 08/24/22 1030   Care Cleansed with:;Soap and  water 08/25/22 1157   Number of days: 7            Altered Skin Integrity 08/23/22 1030 Left anterior;lower Leg Other (comment) (Active)   08/23/22 1030   Altered Skin Integrity Present on Admission:    Side: Left   Orientation: anterior;lower   Location: Leg   Wound Number:    Is this injury device related?:    Primary Wound Type: Other   Description of Altered Skin Integrity:    Ankle-Brachial Index:    Pulses:    Removal Indication and Assessment:    Wound Outcome:    (Retired) Wound Length (cm):    (Retired) Wound Width (cm):    (Retired) Depth (cm):    Wound Description (Comments):    Removal Indications:    Wound Image    08/24/22 1030   Dressing Appearance Open to air 08/28/22 0720   Drainage Amount None 08/25/22 0700   Appearance Pink;Red 08/25/22 0700   Tissue loss description Not applicable 08/24/22 1030   Wound Edges Undefined 08/24/22 1030   Care Cleansed with:;Soap and water 08/24/22 1030   Number of days: 7            Altered Skin Integrity 08/23/22 1030 Right lateral Upper quadrant Puncture (Active)   08/23/22 1030   Altered Skin Integrity Present on Admission:    Side: Right   Orientation: lateral   Location: Upper quadrant   Wound Number:    Is this injury device related?:    Primary Wound Type: Puncture   Description of Altered Skin Integrity:    Ankle-Brachial Index:    Pulses:    Removal Indication and Assessment:    Wound Outcome:    (Retired) Wound Length (cm):    (Retired) Wound Width (cm):    (Retired) Depth (cm):    Wound Description (Comments):    Removal Indications:    Wound Image    08/24/22 1030   Description of Altered Skin Integrity Full thickness tissue loss. Base is covered by slough and/or eschar in the wound bed 08/24/22 1030   Dressing Appearance Dry 08/25/22 1157   Drainage Amount None 08/25/22 1157   Appearance Dressing in place, unable to visualize 08/28/22 0720   Tissue loss description Not applicable 08/25/22 1157   Black (%), Wound Tissue Color 100 % 08/24/22 1030   Red  (%), Wound Tissue Color 0 % 08/24/22 1030   Yellow (%), Wound Tissue Color 0 % 08/24/22 1030   Periwound Area Dry 08/25/22 1157   Wound Edges Defined 08/25/22 1157   Wound Length (cm) 1 cm 08/24/22 1030   Wound Width (cm) 1 cm 08/24/22 1030   Wound Depth (cm) 0 cm 08/24/22 1030   Wound Volume (cm^3) 0 cm^3 08/24/22 1030   Wound Surface Area (cm^2) 1 cm^2 08/24/22 1030   Care Cleansed with:;Soap and water;Applied:;Skin Barrier 08/25/22 1157   Dressing Hydrocolloid 08/26/22 1115   Dressing Change Due 08/29/22 08/26/22 1115   Number of days: 7            Altered Skin Integrity 08/23/22 1030 Left lateral Upper quadrant Abrasion(s) (Active)   08/23/22 1030   Altered Skin Integrity Present on Admission:    Side: Left   Orientation: lateral   Location: Upper quadrant   Wound Number:    Is this injury device related?:    Primary Wound Type: Abrasion(s)   Description of Altered Skin Integrity:    Ankle-Brachial Index:    Pulses:    Removal Indication and Assessment:    Wound Outcome:    (Retired) Wound Length (cm):    (Retired) Wound Width (cm):    (Retired) Depth (cm):    Wound Description (Comments):    Removal Indications:    Wound Image    08/24/22 1030   Description of Altered Skin Integrity Full thickness tissue loss. Base is covered by slough and/or eschar in the wound bed 08/25/22 1157   Dressing Appearance Dry 08/26/22 1115   Drainage Amount None 08/25/22 1157   Appearance Black 08/25/22 1157   Tissue loss description Not applicable 08/25/22 1157   Black (%), Wound Tissue Color 100 % 08/24/22 1030   Red (%), Wound Tissue Color 0 % 08/24/22 1030   Yellow (%), Wound Tissue Color 0 % 08/24/22 1030   Periwound Area Dry 08/25/22 1157   Wound Edges Defined 08/25/22 1157   Wound Length (cm) 1 cm 08/24/22 1030   Wound Width (cm) 1 cm 08/24/22 1030   Wound Depth (cm) 0 cm 08/24/22 1030   Wound Volume (cm^3) 0 cm^3 08/24/22 1030   Wound Surface Area (cm^2) 1 cm^2 08/24/22 1030   Care Cleansed with:;Soap and water;Wound  cleanser;Applied:;Removed:;Skin Barrier 08/25/22 1157   Dressing Changed 08/29/22 1120   Dressing Change Due 08/29/22 08/26/22 1115   Number of days: 7            Altered Skin Integrity 08/23/22 1030 midline;upper Sternal (Active)   08/23/22 1030   Altered Skin Integrity Present on Admission:    Side:    Orientation: midline;upper   Location: Sternal   Wound Number:    Is this injury device related?:    Primary Wound Type:    Description of Altered Skin Integrity:    Ankle-Brachial Index:    Pulses:    Removal Indication and Assessment:    Wound Outcome:    (Retired) Wound Length (cm):    (Retired) Wound Width (cm):    (Retired) Depth (cm):    Wound Description (Comments):    Removal Indications:    Wound Image    08/24/22 1030   Dressing Appearance Intact;Dry 08/25/22 0700   Drainage Amount None 08/25/22 0700   Appearance Dressing in place, unable to visualize 08/28/22 0720   Tissue loss description Not applicable 08/24/22 1030   Black (%), Wound Tissue Color 0 % 08/24/22 1030   Red (%), Wound Tissue Color 0 % 08/24/22 1030   Yellow (%), Wound Tissue Color 0 % 08/24/22 1030   Periwound Area Dry 08/24/22 1030   Wound Edges Defined 08/24/22 1030   Care Other (see comments) 08/24/22 0705   Dressing Other (comment);Silver;Foam 08/25/22 1157   Number of days: 7            Altered Skin Integrity 08/23/22 1030 Right anterior other (see comments) Other (comment) (Active)   08/23/22 1030   Altered Skin Integrity Present on Admission:    Side: Right   Orientation: anterior   Location: other (see comments)   Wound Number:    Is this injury device related?:    Primary Wound Type: Other   Description of Altered Skin Integrity:    Ankle-Brachial Index:    Pulses:    Removal Indication and Assessment:    Wound Outcome:    (Retired) Wound Length (cm):    (Retired) Wound Width (cm):    (Retired) Depth (cm):    Wound Description (Comments):    Removal Indications:    Number of days: 7            Altered Skin Integrity 08/24/22 1030  Right anterior Ear Purple or maroon localized area of discolored intact skin or non-intact skin or a blood-filled blister. (Active)   08/24/22 1030   Altered Skin Integrity Present on Admission: yes   Side: Right   Orientation: anterior   Location: Ear   Wound Number:    Is this injury device related?:    Primary Wound Type:    Description of Altered Skin Integrity: Purple or maroon localized area of discolored intact skin or non-intact skin or a blood-filled blister.   Ankle-Brachial Index:    Pulses:    Removal Indication and Assessment:    Wound Outcome:    (Retired) Wound Length (cm):    (Retired) Wound Width (cm):    (Retired) Depth (cm):    Wound Description (Comments):    Removal Indications:    Wound Image    08/24/22 1030   Description of Altered Skin Integrity Purple or maroon localized area of discolored intact skin or non-intact skin or a blood-filled blister. 08/25/22 0700   Dressing Appearance Open to air 08/28/22 0720   Drainage Amount None 08/24/22 1030   Appearance Purple 08/24/22 1030   Tissue loss description Not applicable 08/24/22 1030   Periwound Area Dry 08/24/22 1030   Wound Edges Undefined 08/24/22 1030   Care Cleansed with:;Soap and water 08/26/22 0715   Number of days: 6            Altered Skin Integrity 08/24/22 1030 midline Lower quadrant Ulceration (Active)   08/24/22 1030   Altered Skin Integrity Present on Admission:    Side:    Orientation: midline   Location: Lower quadrant   Wound Number:    Is this injury device related?:    Primary Wound Type: Ulceration   Description of Altered Skin Integrity:    Ankle-Brachial Index:    Pulses:    Removal Indication and Assessment:    Wound Outcome:    (Retired) Wound Length (cm):    (Retired) Wound Width (cm):    (Retired) Depth (cm):    Wound Description (Comments):    Removal Indications:    Wound Image    08/24/22 1030   Dressing Appearance Moist drainage 08/26/22 1115   Drainage Amount Scant 08/26/22 1115   Drainage Characteristics/Odor No  odor;Serous;Tan 08/26/22 1115   Appearance Frank;Slough 08/26/22 1115   Tissue loss description Not applicable 08/26/22 1115   Black (%), Wound Tissue Color 0 % 08/24/22 1030   Red (%), Wound Tissue Color 50 % 08/24/22 1030   Yellow (%), Wound Tissue Color 50 % 08/24/22 1030   Periwound Area Moist 08/26/22 1115   Wound Edges Undefined 08/26/22 1115   Wound Length (cm) 1 cm 08/24/22 1030   Wound Width (cm) 2 cm 08/24/22 1030   Wound Depth (cm) 0.3 cm 08/24/22 1030   Wound Volume (cm^3) 0.6 cm^3 08/24/22 1030   Wound Surface Area (cm^2) 2 cm^2 08/24/22 1030   Care Cleansed with:;Soap and water;Wound cleanser;Applied:;Removed:;Skin Barrier;Moisturizing agent 08/26/22 1115   Dressing Changed 08/29/22 1120   Periwound Care Moisture barrier applied;Skin barrier film applied 08/26/22 1115   Dressing Change Due 08/27/22 08/26/22 1115   Number of days: 6            Altered Skin Integrity 08/24/22 1030 Left anterior Upper quadrant Full thickness tissue loss. Base is covered by slough and/or eschar in the wound bed (Active)   08/24/22 1030   Altered Skin Integrity Present on Admission: yes   Side: Left   Orientation: anterior   Location: Upper quadrant   Wound Number:    Is this injury device related?:    Primary Wound Type:    Description of Altered Skin Integrity: Full thickness tissue loss. Base is covered by slough and/or eschar in the wound bed   Ankle-Brachial Index:    Pulses:    Removal Indication and Assessment:    Wound Outcome:    (Retired) Wound Length (cm):    (Retired) Wound Width (cm):    (Retired) Depth (cm):    Wound Description (Comments):    Removal Indications:    Wound Image    08/24/22 1030   Description of Altered Skin Integrity Full thickness tissue loss. Base is covered by slough and/or eschar in the wound bed 08/25/22 1157   Dressing Appearance Moist drainage 08/26/22 1115   Drainage Amount Scant 08/26/22 1115   Drainage Characteristics/Odor Frank;Serous;No odor 08/26/22 1115   Appearance Moist;Tan  08/26/22 1115   Tissue loss description Not applicable 08/26/22 1115   Black (%), Wound Tissue Color 100 % 08/24/22 1030   Periwound Area Moist 08/26/22 1115   Wound Edges Defined 08/26/22 1115   Wound Length (cm) 1.5 cm 08/24/22 1030   Wound Width (cm) 1.5 cm 08/24/22 1030   Wound Depth (cm) 0 cm 08/24/22 1030   Wound Volume (cm^3) 0 cm^3 08/24/22 1030   Wound Surface Area (cm^2) 2.25 cm^2 08/24/22 1030   Care Cleansed with:;Soap and water;Wound cleanser;Applied:;Removed:;Skin Barrier;Moisturizing agent 08/26/22 1115   Dressing Changed 08/29/22 1120   Periwound Care Moisture barrier applied;Skin barrier film applied 08/26/22 1115   Dressing Change Due 08/27/22 08/26/22 1115   Number of days: 6            Altered Skin Integrity 08/24/22 1030 Left anterior;lower Arm Contusion (Active)   08/24/22 1030   Altered Skin Integrity Present on Admission:    Side: Left   Orientation: anterior;lower   Location: Arm   Wound Number:    Is this injury device related?: No   Primary Wound Type: Contusion   Description of Altered Skin Integrity:    Ankle-Brachial Index:    Pulses:    Removal Indication and Assessment:    Wound Outcome:    (Retired) Wound Length (cm):    (Retired) Wound Width (cm):    (Retired) Depth (cm):    Wound Description (Comments):    Removal Indications:    Wound Image    08/24/22 1030   Dressing Appearance Open to air;Dry 08/25/22 1157   Drainage Amount None 08/25/22 0700   Appearance Purple 08/25/22 1157   Tissue loss description Not applicable 08/25/22 1157   Periwound Area Dry 08/25/22 1157   Wound Edges Undefined 08/25/22 1157   Care Cleansed with:;Soap and water 08/26/22 0715   Number of days: 6            Altered Skin Integrity 08/24/22 1030 Left posterior Calf Full thickness tissue loss. Subcutaneous fat may be visible but bone, tendon or muscle are not exposed (Active)   08/24/22 1030   Altered Skin Integrity Present on Admission: yes   Side: Left   Orientation: posterior   Location: Calf   Wound  Number:    Is this injury device related?:    Primary Wound Type:    Description of Altered Skin Integrity: Full thickness tissue loss. Subcutaneous fat may be visible but bone, tendon or muscle are not exposed   Ankle-Brachial Index:    Pulses:    Removal Indication and Assessment:    Wound Outcome:    (Retired) Wound Length (cm):    (Retired) Wound Width (cm):    (Retired) Depth (cm):    Wound Description (Comments):    Removal Indications:    Wound Image    08/24/22 1030   Description of Altered Skin Integrity Full thickness tissue loss. Subcutaneous fat may be visible but bone, tendon or muscle are not exposed 08/25/22 1157   Dressing Appearance Moist drainage 08/26/22 1115   Drainage Amount Scant 08/26/22 1115   Drainage Characteristics/Odor Serosanguineous;No odor 08/26/22 1115   Appearance Red;Tan;Moist;Slough 08/26/22 1115   Periwound Area Moist 08/26/22 1115   Wound Edges Defined;Open 08/26/22 1115   Wound Length (cm) 4 cm 08/24/22 1030   Wound Width (cm) 2 cm 08/24/22 1030   Wound Depth (cm) 0.1 cm 08/24/22 1030   Wound Volume (cm^3) 0.8 cm^3 08/24/22 1030   Wound Surface Area (cm^2) 8 cm^2 08/24/22 1030   Care Cleansed with:;Soap and water;Wound cleanser;Applied:;Removed:;Skin Barrier;Moisturizing agent 08/26/22 1115   Dressing Changed 08/29/22 1120   Periwound Care Moisture barrier applied;Skin barrier film applied 08/26/22 1115   Dressing Change Due 08/27/22 08/26/22 1115   Number of days: 6            Wound 08/23/22 1030 Ulceration posterior Sacral Spine (Active)   08/23/22 1030    Pre-existing:    Primary Wound Type: Ulceration   Side:    Orientation: posterior   Location: Sacral Spine   Wound Number:    Ankle-Brachial Index:    Pulses:    Removal Indication and Assessment:    Wound Outcome:    (Retired) Wound Type:    (Retired) Wound Length (cm):    (Retired) Wound Width (cm):    (Retired) Depth (cm):    Wound Description (Comments):    Removal Indications:    Wound Image    08/24/22 1030   Wound WDL  WDL 08/26/22 1115   Dressing Appearance Moist drainage 08/26/22 1115   Drainage Amount Scant 08/26/22 1115   Drainage Characteristics/Odor Serous;Tan;No odor 08/26/22 1115   Appearance Dressing in place, unable to visualize 08/30/22 0725   Tissue loss description Not applicable 08/26/22 1115   Black (%), Wound Tissue Color 95 % 08/24/22 1030   Red (%), Wound Tissue Color 0 % 08/24/22 1030   Yellow (%), Wound Tissue Color 5 % 08/24/22 1030   Periwound Area Moist;Redness 08/26/22 1115   Wound Edges Defined;Open 08/26/22 1115   Wound Length (cm) 3 cm 08/24/22 1030   Wound Width (cm) 2 cm 08/24/22 1030   Wound Depth (cm) 0 cm 08/24/22 1030   Wound Volume (cm^3) 0 cm^3 08/24/22 1030   Wound Surface Area (cm^2) 6 cm^2 08/24/22 1030   Care Cleansed with:;Soap and water;Wound cleanser;Applied:;Removed:;Skin Barrier;Moisturizing agent 08/26/22 1115   Dressing Changed 08/29/22 1120   Periwound Care Skin barrier film applied 08/26/22 1115   Off Loading Other (see comments) 08/24/22 0705   Dressing Change Due 08/29/22 08/26/22 1115   Number of days: 7         Assessment and Plan      Pressure injury of left leg, unstageable  Clean wound with vashe  Apply sensicare around wound  Apply santyl phillip thick to wound bed, cover with vashe/saline moistened 4x4  Cover and secure with 4x4s and paper tape  Change daily    Open wound of anterior abdominal wall without complication  Clean wound with vashe  Apply sensicare around wound  Apply santyl phillip thick to wound bed, cover with vashe/saline moistened 4x4  Cover and secure with 4x4s and paper tape  Change daily      Pressure injury of sacral region, unstageable  Clean with vashe  Apply duoderm to wound bed  Cover and secure with abd pad and paper tape  Change M, W, F  Donnie scale and skin assessment q shift-Nursing  Wound care to assess with dressing changes  Turn every 2 hours  TAP system  Low air loss mattress      Signature:  SOFIA Tyler  Wound Care    Date of  encounter: 08/24/2022

## 2022-08-24 NOTE — ASSESSMENT & PLAN NOTE
Cr 0.9 March 2022   - was on CCRT post CABG -- transitioned to HD M/W/F  - tunneled HD cath in place   - consult Nephrology -- did not come with porter- unsure if he was making any urine at OSH   Nephrology note reviewed and appreciated  Plan to continued MWF dialysis

## 2022-08-24 NOTE — ASSESSMENT & PLAN NOTE
Failed extubated post CABG ()  Now with trach and peg   Will rest today and start PSV trails tomorrow   AC , RR12, peep 5, fio2 40% - abgs in AM   Chest xray today ---reviewed---left pleural effusion  AB.45/40/139  Plan for some PSV/CPAP today. Will try 1 hour tid

## 2022-08-24 NOTE — SUBJECTIVE & OBJECTIVE
Interval History: No acute events overnight. The patient is currently resting comfortably. He remains on the ventilator this am. He is afebrile and vital signs are stable.    Objective:     Vital Signs (Most Recent):  Temp: 97.7 °F (36.5 °C) (08/24/22 0400)  Pulse: 104 (08/23/22 2008)  Resp: 15 (08/23/22 2008)  BP: (!) 120/54 (08/23/22 2130)  SpO2: 98 % (08/23/22 2008)   Vital Signs (24h Range):  Temp:  [97.4 °F (36.3 °C)-99.1 °F (37.3 °C)] 97.7 °F (36.5 °C)  Pulse:  [] 104  Resp:  [11-25] 15  SpO2:  [86 %-100 %] 98 %  BP: ()/(41-55) 120/54     Weight: 97.4 kg (214 lb 11.7 oz)  Body mass index is 27.57 kg/m².      Intake/Output Summary (Last 24 hours) at 8/24/2022 0745  Last data filed at 8/24/2022 0600  Gross per 24 hour   Intake 680 ml   Output 1 ml   Net 679 ml       Physical Exam  Vitals reviewed.   Constitutional:       General: He is not in acute distress.     Appearance: He is ill-appearing.   HENT:      Head: Normocephalic and atraumatic.      Right Ear: External ear normal.      Left Ear: External ear normal.      Mouth/Throat:      Mouth: Mucous membranes are moist.   Eyes:      Extraocular Movements: Extraocular movements intact.      Conjunctiva/sclera: Conjunctivae normal.   Neck:      Comments: Trach in place with thick secretions  Cardiovascular:      Rate and Rhythm: Tachycardia present. Rhythm irregular.      Pulses: Normal pulses.   Pulmonary:      Breath sounds: Rhonchi present. No wheezing or rales.   Abdominal:      General: Bowel sounds are normal.      Palpations: Abdomen is soft.   Musculoskeletal:         General: Swelling present.      Right lower leg: Edema present.      Left lower leg: Edema present.      Comments: Edema to upper and lower ext. Worse in left arm than right -Hx radial fx; family states cast was removed due to edema    Skin:     General: Skin is warm and dry.   Neurological:      Mental Status: He is alert.       Vents:  Vent Mode: A/C (08/24/22  0501)  Ventilator Initiated: Yes (08/23/22 1024)  Set Rate: 14 BPM (08/24/22 0501)  Vt Set: 550 mL (08/24/22 0501)  Pressure Support: 10 cmH20 (08/23/22 1024)  PEEP/CPAP: 5 cmH20 (08/24/22 0501)  Oxygen Concentration (%): 40 (08/23/22 2200)  Peak Airway Pressure: 35 cmH2O (08/24/22 0501)  Total Ve: 6.4 mL (08/24/22 0501)  F/VT Ratio<105 (RSBI): (!) 20.58 (08/23/22 2008)    Lines/Drains/Airways       Central Venous Catheter Line  Duration                  Hemodialysis Catheter right subclavian -- days              Drain  Duration                  Gastrostomy/Enterostomy 08/23/22 1030 LUQ <1 day              Peripheral Intravenous Line  Duration                  Peripheral IV - Single Lumen 08/23/22 1422 20 G Anterior;Right Forearm <1 day                    Significant Labs:    CBC/Anemia Profile:  Recent Labs   Lab 08/23/22  1429   WBC 18.91*   HGB 8.5*   HCT 27.0*      MCV 96.8*   RDW 14.6*        Chemistries:  Recent Labs   Lab 08/23/22  1428      K 3.6      CO2 27   BUN 61*   CREATININE 3.19*   CALCIUM 7.8*       All pertinent labs within the past 24 hours have been reviewed.    Significant Imaging:  I have reviewed all pertinent imaging results/findings within the past 24 hours.

## 2022-08-24 NOTE — PLAN OF CARE
[facility]  Initial Discharge Assessment       Primary Care Provider: Primary Doctor No    Admission Diagnosis: Acute hypoxemic respiratory failure [J96.01]    Admission Date: 8/23/2022  Expected Discharge Date:     Discharge Barriers Identified: DIalysis placement issues, Nursing Home rejection    Payor: MEDICARE / Plan: MEDICARE PART A & B / Product Type: Government /     Extended Emergency Contact Information  Primary Emergency Contact: TonyMicki  Address: [address] [place] [address] [place] [address] [facility]  Home Phone: [phone]  Relation: Other  Preferred language: English   needed? No    Discharge Plan A: Skilled Nursing Facility  Discharge Plan B: Skilled Nursing Facility    No Pharmacies Listed    Initial Assessment (most recent)     Adult Discharge Assessment - 08/24/22 6926        Discharge Assessment    Assessment Type Discharge Planning Assessment     Source of Information family     Communicated LALY with patient/caregiver Date not available/Unable to determine     Lives With spouse     Facility Arrived From: [facility]     Do you expect to return to your current living situation? No     Do you have help at home or someone to help you manage your care at home? Yes     Who are your caregiver(s) and their phone number(s)? Janet- wife [phone]     Walking or Climbing Stairs Difficulty ambulation difficulty, requires equipment     Mobility Management rollator     Home Accessibility wheelchair accessible     Home Layout Able to live on 1st floor     Equipment Currently Used at Home cane, straight;rollator;shower chair     Patient currently being followed by outpatient case management? No     Do you currently have service(s) that help you manage your care at home? Yes     Name and Contact number of agency [facility]     Is the pt/caregiver preference to resume services with current agency Yes     Do you take prescription  medications? Yes     Do you have prescription coverage? Yes     Do you have any problems affording any of your prescribed medications? No     Is the patient taking medications as prescribed? no     How do you get to doctors appointments? family or friend will provide     Are you on dialysis? --   newly started    Discharge Plan A Skilled Nursing Facility     Discharge Plan B Skilled Nursing Facility     DME Needed Upon Discharge  --   unsure    Discharge Plan discussed with: Spouse/sig other     Discharge Barriers Identified DIalysis placement issues;Nursing Home rejection        Relationship/Environment    Name(s) of Who Lives With Patient Debra                 SS spoke with pt's wife, debra. Pt lives was at home with wife and had [facility]. DME includes a rollator and a shower chair. Discharge plan I s to go to [facility]. SS will follow. Informed of IDT meeting.

## 2022-08-24 NOTE — CONSULTS
[facility]  Adult Nutrition  Consult Note         Reason for Assessment    RD follow up note to review labs due to unavailable during time of consult note. Recommend continuing with current TF order and flush.    Latest Reference Range & Units 08/23/22 14:28 08/23/22 14:29   Sodium 136 - 145 mmol/L 137    Potassium 3.5 - 5.1 mmol/L 3.6    Chloride 98 - 107 mmol/L 102    CO2 21 - 32 mmol/L 27    Anion Gap 7 - 16 mmol/L 12    BUN 7 - 18 mg/dL 61 (H)    Creatinine 0.70 - 1.30 mg/dL 3.19 (H)    BUN/CREAT RATIO 6 - 20  19    eGFR >=60 mL/min/1.73m² 19 (L)    Glucose 74 - 106 mg/dL 110 (H)    Calcium 8.5 - 10.1 mg/dL 7.8 (L)    Hemoglobin A1C External 4.5 - 6.6 %  5.3   Estimated Avg Glucose mg/dL  90   (H): Data is abnormally high  (L): Data is abnormally low  Glucose elevated due to dx of diabetes. Recent A1c 5.3 and WNL. BUN, Crea elevated and GFR low R/T ESRD with dialysis. NA, K, CL all WNL.   Skin Integrity  Donnie Risk Assessment  Sensory Perception: 2-->very limited  Moisture: 3-->occasionally moist  Activity: 1-->bedfast  Mobility: 1-->completely immobile  Nutrition: 2-->probably inadequate  Friction and Shear: 1-->problem  Donnie Score: 10  Comments on skin integrity: edematous  Nutrition Diagnosis  Increased protein Needs   related to Diabetes Mellitus, Renal dysfunction and Wound healing as evidenced by ESRD and on dialysis, wounds and elevated glucose    Nutrition Diagnosis Status: Chronic/ continues        Nutrition Risk  Level of Risk/Frequency of Follow-up: high  Comments on nutrition risk: following labs  Recent Labs   Lab 08/23/22  1428 08/23/22  1438 08/24/22  0514   *  --   --    POCGLU  --    < > 105    < > = values in this interval not displayed.     Comments on Glucose: dx of diabetes  Nutrition Prescription / Recommendations  Recommendation/Intervention: Recommend Glucerna 1.5@ 55ml/h with 20ml flush every hour with Sarthak BID. RD will follow up with labs  rozina when available and make adjustments as needed.  Goals: Tolerance to TF, weight loss as edema resolves  Nutrition Goal Status: new  Current Diet Order: Recommend Glucerna 1.5@ 55ml/h with 20ml flush q 2 hours  Current Nutrition Support Formula Ordered: Glucerna 1.5  Current Nutrition Support Rate Ordered: 55 (ml)  Current Nutrition Support Frequency Ordered: hourly  Oral Nutrition Supplement: Sarthak  Chewing or Swallowing Difficulty?: No Chewing or swallowing difficulty  Recommended Diet: Enteral Nutrition  Recommended Oral Supplement: Sarthak [90 kcals, 2.5g Protein, 10g Carbs(3g Sugar), 7g L-Arginine, 7g L-Glutamine, Vitamin C 300mg, 9.5mg Zinc] twice daily  Is Nutrition Support Recommended: Yes   Needs Calculated  Energy Need Method: Kcal/kg Energy Calorie Requirements (kcal): 7551-2971 (25-30cals/kg)  Protein Requirements: 112-140g/d  Enteral Nutrition   Enteral Nutrition Formula Provides:  1980 kcals Propofol Rate: No  109 g Protein  1002 ml Fluid without Flush    480 ml Fluid by flush   1482 ml Total Fluid  Enteral Nutrition Recommended Order:  Tube feeding via PEG/ Gastrostomy  Tube feeding formula: Glucerna 1.5 Continuous 55 ml/h  Free Water Flush: 20 ml every 2 hours  Modular Supplements:No Modular Supplements needed and Sarthak 2 times a day  Enteral Nutrition meets needs?: yes  Enteral Nutrition Status: New Order    Is Education Recommended: No  Monitor and Evaluation  % current Intake: New Enteral Nutrition Order with no documentation   % intake to meet estimated needs: Enteral Nutrition   Food and Nutrient Intake: enteral nutrition intake  Food and Nutrient Adminstration: enteral and parenteral nutrition administration  Anthropometric Measurements: weight, weight change, body mass index  Biochemical Data, Medical Tests and Procedures: electrolyte and renal panel, glucose/endocrine profile, lipid profile, gastrointestinal profile, inflammatory profile (RD to follow up as labs become available due to  "recent admit)  Enteral Calories (kcal): 1980  Enteral Protein (gm): 109  Enteral (Free Water) Fluid (mL): 1002  Free Water Flush Fluid (mL): 480  % Kcal Needs: 100  % Protein Needs: 100 (with Sarthak added)  Current Medical Diagnosis and Past Medical History  Diagnosis: cardiac disease, pulmonary disease, renal disease, diabetes diagnosis/complications  Past Medical History:   Diagnosis Date    Anticoagulant long-term use     Cancer     CHF (congestive heart failure)     Coronary artery disease     Diabetes mellitus     Hypertension     Stroke      Nutrition/Diet History  Food Allergies: NKFA  Factors Affecting Nutritional Intake: None identified at this time  Lab/Procedures/Meds  Recent Labs   Lab 08/23/22  1428      K 3.6   BUN 61*   CREATININE 3.19*   CALCIUM 7.8*        Last A1c:   Lab Results   Component Value Date    HGBA1C 5.3 08/23/2022     Lab Results   Component Value Date    RBC 2.79 (L) 08/23/2022    HGB 8.5 (L) 08/23/2022    HCT 27.0 (L) 08/23/2022    MCV 96.8 (H) 08/23/2022    MCH 30.5 08/23/2022    MCHC 31.5 (L) 08/23/2022     Pertinent Labs Reviewed: reviewed  Pertinent Labs Comments: Glucose 113 (only lab available)  Pertinent Medications Reviewed: reviewed  Pertinent Medications Comments: albuterol  Anthropometrics  Temp: 98.5 °F (36.9 °C)  Height: 6' 2" (188 cm)  Height (inches): 74 in  Weight Method: Bed Scale  Weight: 97.4 kg (214 lb 11.7 oz)  Weight (lb): 214.73 lb  Ideal Body Weight (IBW), Male: 190 lb  % Ideal Body Weight, Male (lb): 108.72 %  BMI (Calculated): 27.6  BMI Grade: 25 - 29.9 - overweight     Estimated/Assessed Needs  Total Ve: 10.1 mL Temp: 98.5 °F (36.9 °C)Axillary  Weight Used For Calorie Calculations: 93.7 kg (206 lb 9.1 oz)   Energy Need Method: Kcal/kg Energy Calorie Requirements (kcal): 5508-1780 (25-30cals/kg)  Weight Used For Protein Calculations: 93.7 kg (206 lb 9.1 oz)  Protein Requirements: 112-140g/d  Estimated Fluid Requirement Method: RDA Method  "   RDA Method (mL): 2342     Nutrition by Nursing  Diet/Nutrition Received: tube feeding     Diet/Feeding Assistance: total feed  Diet/Feeding Tolerance: other (see comments) (tolerated tf well)  Last Bowel Movement: 08/23/22              Nutrition Follow-Up  RD Follow-up?: Yes

## 2022-08-24 NOTE — PLAN OF CARE
SS attempted to contact Micki Jimenez at number in computer. [phone]. Says call is not able to be completed. Pt not able to answer questions at this time.

## 2022-08-24 NOTE — PLAN OF CARE
Problem: Adult Inpatient Plan of Care  Goal: Plan of Care Review  Outcome: Ongoing, Progressing  Goal: Patient-Specific Goal (Individualized)  Outcome: Ongoing, Progressing  Goal: Absence of Hospital-Acquired Illness or Injury  Outcome: Ongoing, Progressing  Goal: Optimal Comfort and Wellbeing  Outcome: Ongoing, Progressing  Goal: Readiness for Transition of Care  Outcome: Ongoing, Progressing     Problem: Fall Injury Risk  Goal: Absence of Fall and Fall-Related Injury  Outcome: Ongoing, Progressing     Problem: Skin Injury Risk Increased  Goal: Skin Health and Integrity  Outcome: Ongoing, Progressing     Problem: Infection  Goal: Absence of Infection Signs and Symptoms  Outcome: Ongoing, Progressing     Problem: Diabetes Comorbidity  Goal: Blood Glucose Level Within Targeted Range  Outcome: Ongoing, Progressing     Problem: Fluid and Electrolyte Imbalance (Acute Kidney Injury/Impairment)  Goal: Fluid and Electrolyte Balance  Outcome: Ongoing, Progressing     Problem: Oral Intake Inadequate (Acute Kidney Injury/Impairment)  Goal: Optimal Nutrition Intake  Outcome: Ongoing, Progressing     Problem: Renal Function Impairment (Acute Kidney Injury/Impairment)  Goal: Effective Renal Function  Outcome: Ongoing, Progressing     Problem: Impaired Wound Healing  Goal: Optimal Wound Healing  Outcome: Ongoing, Progressing     Problem: Communication Impairment (Mechanical Ventilation, Invasive)  Goal: Effective Communication  Outcome: Ongoing, Progressing     Problem: Device-Related Complication Risk (Mechanical Ventilation, Invasive)  Goal: Optimal Device Function  Outcome: Ongoing, Progressing     Problem: Inability to Wean (Mechanical Ventilation, Invasive)  Goal: Mechanical Ventilation Liberation  Outcome: Ongoing, Progressing     Problem: Nutrition Impairment (Mechanical Ventilation, Invasive)  Goal: Optimal Nutrition Delivery  Outcome: Ongoing, Progressing     Problem: Skin and Tissue Injury (Mechanical Ventilation,  Invasive)  Goal: Absence of Device-Related Skin and Tissue Injury  Outcome: Ongoing, Progressing     Problem: Ventilator-Induced Lung Injury (Mechanical Ventilation, Invasive)  Goal: Absence of Ventilator-Induced Lung Injury  Outcome: Ongoing, Progressing     Problem: Communication Impairment (Artificial Airway)  Goal: Effective Communication  Outcome: Ongoing, Progressing     Problem: Device-Related Complication Risk (Artificial Airway)  Goal: Optimal Device Function  Outcome: Ongoing, Progressing     Problem: Skin and Tissue Injury (Artificial Airway)  Goal: Absence of Device-Related Skin or Tissue Injury  Outcome: Ongoing, Progressing     Problem: Noninvasive Ventilation Acute  Goal: Effective Unassisted Ventilation and Oxygenation  Outcome: Ongoing, Progressing

## 2022-08-24 NOTE — HOSPITAL COURSE
8/24 The patient is resting comfortably this am. He is afebrile this am and oxygenating adequately on 40%. I placed him on CPAP during my exam and he is doing ok. Will plan for 1 hour tid today.  8/25- Did well with PSV trials yesterday. Will plan to increase to 3 hours tid  8/26- increased to 4 hr TID today   8/29- Patient is doing ok with trials. He remains extremely weak.   8/31- He is improving some with his ability to assist with therapy. He continues to do well with his breathing trials.  9/1- Continues to do well with 12 hours continuous with his CPAP/PSV  9/2- continue with 12hrs CPAP during the day and rest on vent overnight; plans for HD today   9/3- HD yesterday with 3L removed; rec'd 1u PRBCS- will receive another unit today; continue current vent settings   9/4- resting on CPAP; will increase to 16hrs daily; labs in AM; scheduled dialysis tomorrow   9/5- CPAP 16hrs and rest on vent overnight; H/H stable; plans for HD today; continue current tx- PT/OT to  tomorrow after holiday   9/7- continues to do well with 16 hours of cpap. He remains weak but strength is improving.  9/8- Plan to increase cpap to continuous as tolerated today  9/9- Doing well with continuous cpap.   9/12- Trach changed out Saturday to XLT size 6. Likely had some aspiration. Increased FiO2 requirements.  Chest xray has been reviewed. FiO2 down to 60% and peep is at 10.  9/13- Fio2 down to 50% and peep is now at 8.   9/14- HD today. FiO2 down to 40%. He is having some leaking from around peg tube site  9/15- BP has been on lower side. Also with increasing secretions with odor. He is currently afebrile. Oxygenating adequately.  9/16 Now on pressors and oxygenation requirements have increased. Copious amounts of thick secretions.  9/17- Patient remains on pressors. Continues to require high FiO2. Respiratory culture growing Klebsiella. Patient started on cefepime renally dosed.  9/18- Patient remains critically ill. He remains on  pressor. He is oxygenating adequately this am. He is on 60% and peep is down to 10  - FiO2 down to 50%. Remains on pressor. He is now DNR. Plans are to go to OR to replace peg tube  - Too sick to go to OR yesterday. He remains critically ill. Still on pressors. Currently afebrile and oxygenating adequately  - FiO2 down to 40% and peep remains at 10. Remains on a pressor.   - The patient continued to decline and family decided to withdraw care. This was done and the patient  at 1720.

## 2022-08-24 NOTE — PLAN OF CARE
Problem: Adult Inpatient Plan of Care  Goal: Absence of Hospital-Acquired Illness or Injury  Outcome: Ongoing, Progressing  Goal: Optimal Comfort and Wellbeing  Outcome: Ongoing, Progressing     Problem: Infection  Goal: Absence of Infection Signs and Symptoms  Outcome: Ongoing, Progressing     Problem: Diabetes Comorbidity  Goal: Blood Glucose Level Within Targeted Range  Outcome: Ongoing, Progressing     Problem: Oral Intake Inadequate (Acute Kidney Injury/Impairment)  Goal: Optimal Nutrition Intake  Outcome: Ongoing, Progressing     Problem: Communication Impairment (Mechanical Ventilation, Invasive)  Goal: Effective Communication  Outcome: Ongoing, Progressing     Problem: Device-Related Complication Risk (Mechanical Ventilation, Invasive)  Goal: Optimal Device Function  Outcome: Ongoing, Progressing     Problem: Inability to Wean (Mechanical Ventilation, Invasive)  Goal: Mechanical Ventilation Liberation  Outcome: Ongoing, Progressing     Problem: Nutrition Impairment (Mechanical Ventilation, Invasive)  Goal: Optimal Nutrition Delivery  Outcome: Ongoing, Progressing     Problem: Skin and Tissue Injury (Mechanical Ventilation, Invasive)  Goal: Absence of Device-Related Skin and Tissue Injury  Outcome: Ongoing, Progressing     Problem: Ventilator-Induced Lung Injury (Mechanical Ventilation, Invasive)  Goal: Absence of Ventilator-Induced Lung Injury  Outcome: Ongoing, Progressing     Problem: Communication Impairment (Artificial Airway)  Goal: Effective Communication  Outcome: Ongoing, Progressing     Problem: Device-Related Complication Risk (Artificial Airway)  Goal: Optimal Device Function  Outcome: Ongoing, Progressing     Problem: Device-Related Complication Risk (Hemodialysis)  Goal: Safe, Effective Therapy Delivery  Outcome: Ongoing, Progressing     Problem: Hemodynamic Instability (Hemodialysis)  Goal: Effective Tissue Perfusion  Outcome: Ongoing, Progressing     Problem: Infection (Hemodialysis)  Goal:  Absence of Infection Signs and Symptoms  Outcome: Ongoing, Progressing     Problem: Adult Inpatient Plan of Care  Goal: Plan of Care Review  Outcome: Ongoing, Not Progressing  Goal: Patient-Specific Goal (Individualized)  Outcome: Ongoing, Not Progressing  Goal: Readiness for Transition of Care  Outcome: Ongoing, Not Progressing     Problem: Fall Injury Risk  Goal: Absence of Fall and Fall-Related Injury  Outcome: Ongoing, Not Progressing     Problem: Skin Injury Risk Increased  Goal: Skin Health and Integrity  Outcome: Ongoing, Not Progressing     Problem: Fluid and Electrolyte Imbalance (Acute Kidney Injury/Impairment)  Goal: Fluid and Electrolyte Balance  Outcome: Ongoing, Not Progressing     Problem: Renal Function Impairment (Acute Kidney Injury/Impairment)  Goal: Effective Renal Function  Outcome: Ongoing, Not Progressing     Problem: Impaired Wound Healing  Goal: Optimal Wound Healing  Outcome: Ongoing, Not Progressing

## 2022-08-24 NOTE — PLAN OF CARE
Problem: Adult Inpatient Plan of Care  Goal: Plan of Care Review  Outcome: Ongoing, Progressing  Goal: Patient-Specific Goal (Individualized)  Outcome: Ongoing, Progressing  Goal: Absence of Hospital-Acquired Illness or Injury  Outcome: Ongoing, Progressing  Goal: Optimal Comfort and Wellbeing  Outcome: Ongoing, Progressing     Problem: Skin Injury Risk Increased  Goal: Skin Health and Integrity  Outcome: Ongoing, Progressing     Problem: Communication Impairment (Mechanical Ventilation, Invasive)  Goal: Effective Communication  Outcome: Ongoing, Progressing     Problem: Nutrition Impairment (Mechanical Ventilation, Invasive)  Goal: Optimal Nutrition Delivery  Outcome: Ongoing, Progressing     Problem: Skin and Tissue Injury (Mechanical Ventilation, Invasive)  Goal: Absence of Device-Related Skin and Tissue Injury  Outcome: Ongoing, Progressing

## 2022-08-24 NOTE — PT/OT/SLP EVAL
Occupational Therapy   Evaluation    Name: Gregory Stuart  MRN: [id number]  Admitting Diagnosis:  Acute hypoxemic respiratory failure  Recent Surgery: * No surgery found *      Recommendations:     Discharge Recommendations: intermediate care facility/nursing home, nursing facility, skilled  Discharge Equipment Recommendations:   (to be determined)  Barriers to discharge:  None    Assessment:     Gregory Stuart is a 77 y.o. male with a medical diagnosis of Acute hypoxemic respiratory failure.  He presents with decline in functional status.Pt unable to state prior level. Tx focused on OT evaluation. Performance deficits affecting function: weakness, impaired endurance, impaired self care skills, impaired functional mobility, impaired balance, decreased upper extremity function, decreased lower extremity function, decreased safety awareness, decreased ROM, impaired cardiopulmonary response to activity.      Rehab Prognosis: Good and for goals; patient would benefit from acute skilled OT services to address these deficits and reach maximum level of function.       Plan:     Patient to be seen 5 x/week (1 week trial) to address the above listed problems via self-care/home management, therapeutic activities, therapeutic exercises  · Plan of Care Expires:    · Plan of Care Reviewed with: patient    Subjective     Chief Complaint: Acute Hypoxmic Respiratory Failure  Patient/Family Comments/goals: None stated    Occupational Profile:  Living Environment: Pt lives with wife.   Previous level of function: Unknown  Roles and Routines: Unknown  Equipment Used at Home:  cane, straight, rollator, shower chair  Assistance upon Discharge: Wife    Pain/Comfort:  · Pain Rating 1: 5/10 (Leary Borden with movement)  · Pain Rating Post-Intervention 1: 0/10    Patients cultural, spiritual, Orthodoxy conflicts given the current situation: no    Objective:     Communicated with: MIKEY Bautista prior to session.  Patient found HOB elevated  with blood pressure cuff, PEG Tube, peripheral IV, pulse ox (continuous), telemetry, Tracheostomy upon OT entry to room.    General Precautions: Standard, fall, respiratory, aspiration   Orthopedic Precautions:N/A   Braces: N/A  Respiratory Status: vent CPAP    Occupational Performance:    Bed Mobility:    · Patient completed Rolling/Turning to Left with  dependent  · Patient completed Rolling/Turning to Right with dependent  · Patient completed Supine to Sit with dependent  · Patient completed Sit to Supine with dependent    Functional Mobility/Transfers:  · Pt sat EOB requiring total assistance to maintain balance  · Functional Mobility:     Activities of Daily Living:  · Upper Body Dressing: dependence .  · Lower Body Dressing: dependence .  · Toileting: dependence .    Cognitive/Visual Perceptual:  Cognitive/Psychosocial Skills:     -       Oriented to: Person   -       Follows Commands/attention:did not follow commands  -       Communication: pt nods head at times  Visual/Perceptual:      -appears wfl (unknown if pt wears  glasses or hearing aid)    Physical Exam:  Balance:    -       Dependent with EOB sitting.  Skin integrity: Visible skin intact  Edema:  Moderate (L) UE  Sensation:    -       Intact react to pain  Motor Planning:    -       unable  Dominant hand:    -       unknown  Upper Extremity Range of Motion:     -       Right Upper Extremity: PROM wfl ,but limited due to edema  -       Left Upper Extremity: PROM wfl but limited due to edema Radial Fracture unsure if healed.  Upper Extremity Strength:    -       Right Upper Extremity: unable to assess  -       Left Upper Extremity: unable to assess   Strength:    -       Right Upper Extremity: NT  -       Left Upper Extremity: NT    AMPAC 6 Click ADL:  AMPAC Total Score: 6    Treatment & Education:  OT evaluation completed. See eval for details. Pt will be placed on a 1 week trial to assess progress. Pt lived at home with wife prior and received  Home health  · Pt educated on OT role/POC.   · Importance of OOB activity with staff assistance.  · Importance of sitting up in the chair throughout the day as tolerated, especially for meals   · Safety during functional t/f and mobility with use of AD as needed  · Importance of assisting with self-care activities   · All questions/concerns answered within OT scope of practice    Education:    Patient left HOB elevated with all lines intact, call button in reach and nurse notified    GOALS:   Multidisciplinary Problems     Occupational Therapy Goals        Problem: Occupational Therapy    Goal Priority Disciplines Outcome Interventions   Occupational Therapy Goal     OT, PT/OT Ongoing, Progressing    Description: ST. Pt will follow simple one step command  2.Pt will perform grooming with mod a  3. Pt will sit EOB x 10 min with mod a  4. Pt will andre gown with mod a  5. Pt will t/f bed/chair with mod a x 2  6. Pt will tolerate 15 min of tx      LTG: Restore to Max I with self care and mobility.                     History:     Past Medical History:   Diagnosis Date    Anticoagulant long-term use     Cancer     CHF (congestive heart failure)     Coronary artery disease     Diabetes mellitus     Hypertension     Stroke        Past Surgical History:   Procedure Laterality Date    CORONARY ARTERY BYPASS GRAFT         Time Tracking:     OT Date of Treatment: 22  OT Start Time: 816  OT Stop Time: 839  OT Total Time (min): 23 min    Billable Minutes:Evaluation 2022

## 2022-08-24 NOTE — ASSESSMENT & PLAN NOTE
No history documented prior to CABG - was on Lovenox at OSH   - resume medications once placed in computer   - irregular on telemetry--frequent pvc's  - he is on carvediolol, eliquis  - HR is currently adequate

## 2022-08-24 NOTE — PLAN OF CARE
Problem: Physical Therapy  Goal: Physical Therapy Goal  Description: Short Term Goals to be met by 9/6/2022    Patient will increase functional independence and safety with mobility by performing    1.   Rolling right Moderate Assist; roll left Moderate Assist  2.   Supine to sit Moderate Assist  3.   Sitting balance edge of the bed x 15 minutes Minimal Assist  4.   Sit to stand Maximal Assist  using manual assistance with gait belt  5.   Bed to chair Maximal Assist using manual assistance with gait belt  6.   Lower extremity exercises x 30 reps with assist as necessary and no rest breaks      Long Term Goals to be met by 10/24/2022    Patient to require less than or equal to Minimal Assist for functional mobility to ease caregiver burden   Outcome: Ongoing, Progressing       Patient demonstrates very limited functional mobility and delayed processing of commands/formulating responses to questions. PT will initiate a trial and continue to monitor response to treatment and evaluate rehab potential. Patient will require swing bed once medically stable to leave LTACH.

## 2022-08-25 NOTE — ASSESSMENT & PLAN NOTE
Failed extubated post CABG ()  Now with trach and peg   Will rest today and start PSV trails tomorrow   AC , RR12, peep 5, fio2 40% - abgs in AM   Chest xray today ---reviewed---left pleural effusion  AB.45/40/139  Plan for some PSV/CPAP today. Will try 1 hour tid  - did well yesterday. Will increase to 3 hours tid

## 2022-08-25 NOTE — PROGRESS NOTES
[facility] \[facility]\""  Pulmonology  Progress Note    Patient Name: Gregory Stuart  MRN: [id number]  Admission Date: 8/23/2022  Hospital Length of Stay: 2 days  Code Status: No Order  Attending Provider: Ham Sanchez DO  Primary Care Provider: Primary Doctor No   Principal Problem: Acute hypoxemic respiratory failure    Subjective:     Interval History: No acute events overnight. The patient is currently resting comfortably. He remains on the ventilator this am. He is afebrile and vital signs are stable.    Objective:     Vital Signs (Most Recent):  Temp: 98.7 °F (37.1 °C) (08/25/22 0300)  Pulse: 87 (08/25/22 0600)  Resp: 12 (08/25/22 0600)  BP: (!) 95/42 (08/25/22 0600)  SpO2: 99 % (08/25/22 0600)   Vital Signs (24h Range):  Temp:  [97.3 °F (36.3 °C)-99.3 °F (37.4 °C)] 98.7 °F (37.1 °C)  Pulse:  [66-93] 87  Resp:  [12-26] 12  SpO2:  [72 %-100 %] 99 %  BP: ()/(33-54) 95/42     Weight: 93.2 kg (205 lb 7.5 oz)  Body mass index is 26.38 kg/m².      Intake/Output Summary (Last 24 hours) at 8/25/2022 0744  Last data filed at 8/25/2022 0500  Gross per 24 hour   Intake 2355 ml   Output 0 ml   Net 2355 ml         Physical Exam  Vitals reviewed.   Constitutional:       General: He is not in acute distress.     Appearance: He is ill-appearing.   HENT:      Head: Normocephalic and atraumatic.      Right Ear: External ear normal.      Left Ear: External ear normal.      Mouth/Throat:      Mouth: Mucous membranes are moist.   Eyes:      Extraocular Movements: Extraocular movements intact.      Conjunctiva/sclera: Conjunctivae normal.   Neck:      Comments: Trach in place with thick secretions  Cardiovascular:      Rate and Rhythm: Tachycardia present. Rhythm irregular.      Pulses: Normal pulses.   Pulmonary:      Breath sounds: Rhonchi present. No wheezing or rales.   Abdominal:      General: Bowel sounds are normal.      Palpations: Abdomen is soft.   Musculoskeletal:         General: Swelling  present.      Right lower leg: Edema present.      Left lower leg: Edema present.      Comments: Edema to upper and lower ext. Worse in left arm than right -Hx radial fx; family states cast was removed due to edema    Skin:     General: Skin is warm and dry.   Neurological:      Mental Status: He is alert.       Vents:  Vent Mode: A/C (22 023)  Ventilator Initiated: Yes (22 1024)  Set Rate: 12 BPM (220)  Vt Set: 550 mL (22)  Pressure Support: 12 cmH20 (225)  PEEP/CPAP: 5 cmH20 (22)  Oxygen Concentration (%): 40 (22 0400)  Peak Airway Pressure: 26 cmH2O (22)  Total Ve: 7.2 mL (22)  F/VT Ratio<105 (RSBI): (!) 43.9 (22)    Lines/Drains/Airways       Central Venous Catheter Line  Duration                  Hemodialysis Catheter right subclavian -- days              Drain  Duration                  Gastrostomy/Enterostomy 22 1030 LUQ 1 day              Airway  Duration                  Surgical Airway Shiley Cuffed -- days              Peripheral Intravenous Line  Duration                  Peripheral IV - Single Lumen 22 1422 20 G Anterior;Right Forearm 1 day                    Significant Labs:    CBC/Anemia Profile:  Recent Labs   Lab 22  1429   WBC 18.91*   HGB 8.5*   HCT 27.0*      MCV 96.8*   RDW 14.6*          Chemistries:  Recent Labs   Lab 22  1428      K 3.6      CO2 27   BUN 61*   CREATININE 3.19*   CALCIUM 7.8*         All pertinent labs within the past 24 hours have been reviewed.    Significant Imaging:  I have reviewed all pertinent imaging results/findings within the past 24 hours.    Assessment/Plan:     * Acute hypoxemic respiratory failure  Failed extubated post CABG ()  Now with trach and peg   Will rest today and start PSV trails tomorrow   AC , RR12, peep 5, fio2 40% - abgs in AM   Chest xray today ---reviewed---left pleural effusion  AB.45/40/139  Plan  for some PSV/CPAP today. Will try 1 hour tid  8/25- did well yesterday. Will increase to 3 hours tid         HTN (hypertension)  Resumed home medications     HLD (hyperlipidemia)  Resumed home medications     Alteration in nutrition  S/p peg tube placement   Resume tube feeding    Weakness acquired in ICU  PT/OT     Left radial fracture  7/12/22--- per family he did not require surgical repair - he did have a split/cast in place but this was removed due to edema     DM (diabetes mellitus)  accuchecks and Sliding scale insulin   A1C 5.3%  Currently on sliding scale  Blood sugar looks good    Anemia  Likely due to critical illness   Most recent H/H 8.5/27   - repeat labs Monday and fridays     JUVENTINO (acute kidney injury)  Cr 0.9 March 2022   - was on CCRT post CABG -- transitioned to HD M/W/F  - tunneled HD cath in place   - consult Nephrology -- did not come with porter- unsure if he was making any urine at OSH   Nephrology note reviewed and appreciated  Plan to continued MWF dialysis    CAD (coronary artery disease)  S/p CABG on 07/18    He is on aspirin, statin, beta blocker      Chronic systolic heart failure  EF 30-35% per Echo 07/2022  Daily wts/ I/Os     A-fib  No history documented prior to CABG - was on Lovenox at OSH   - resume medications once placed in computer   - irregular on telemetry--frequent pvc's  - he is on carvediolol, eliquis  - HR is currently adequate                 Ham Sanchez,   Pulmonology  [facility]

## 2022-08-25 NOTE — PLAN OF CARE
Problem: Adult Inpatient Plan of Care  Goal: Plan of Care Review  8/25/2022 0033 by Graciela Jennings RN  Outcome: Ongoing, Progressing  8/25/2022 0031 by Graciela Jennings RN  Outcome: Ongoing, Progressing  Goal: Patient-Specific Goal (Individualized)  8/25/2022 0033 by Graciela Jennings RN  Outcome: Ongoing, Progressing  8/25/2022 0031 by Graciela Jennings RN  Outcome: Ongoing, Progressing  Goal: Absence of Hospital-Acquired Illness or Injury  8/25/2022 0033 by Graciela Jennings RN  Outcome: Ongoing, Progressing  8/25/2022 0031 by Graciela Jennings RN  Outcome: Ongoing, Progressing  Goal: Optimal Comfort and Wellbeing  8/25/2022 0033 by Graciela Jennings RN  Outcome: Ongoing, Progressing  8/25/2022 0031 by Graciela Jennings RN  Outcome: Ongoing, Progressing  Goal: Readiness for Transition of Care  8/25/2022 0033 by Graciela Jennings RN  Outcome: Ongoing, Progressing  8/25/2022 0031 by Graciela Jennings RN  Outcome: Ongoing, Progressing     Problem: Fall Injury Risk  Goal: Absence of Fall and Fall-Related Injury  Outcome: Ongoing, Progressing     Problem: Skin Injury Risk Increased  Goal: Skin Health and Integrity  8/25/2022 0033 by Graciela Jennings RN  Outcome: Ongoing, Progressing  8/25/2022 0031 by Graciela Jennings RN  Outcome: Ongoing, Progressing     Problem: Infection  Goal: Absence of Infection Signs and Symptoms  8/25/2022 0033 by Graciela Jennings RN  Outcome: Ongoing, Progressing  8/25/2022 0031 by Graciela Jennings RN  Outcome: Ongoing, Progressing     Problem: Diabetes Comorbidity  Goal: Blood Glucose Level Within Targeted Range  8/25/2022 0033 by Graciela Jennings RN  Outcome: Ongoing, Progressing  8/25/2022 0031 by Graciela Jennings RN  Outcome: Ongoing, Progressing     Problem: Fluid and Electrolyte Imbalance (Acute Kidney Injury/Impairment)  Goal: Fluid and Electrolyte Balance  8/25/2022 0033 by Graciela Jennings RN  Outcome: Ongoing, Progressing  8/25/2022 0031 by Graciela Jennings RN  Outcome: Ongoing,  Progressing     Problem: Oral Intake Inadequate (Acute Kidney Injury/Impairment)  Goal: Optimal Nutrition Intake  Outcome: Ongoing, Progressing     Problem: Renal Function Impairment (Acute Kidney Injury/Impairment)  Goal: Effective Renal Function  Outcome: Ongoing, Progressing     Problem: Impaired Wound Healing  Goal: Optimal Wound Healing  8/25/2022 0033 by Graciela Jennings RN  Outcome: Ongoing, Progressing  8/25/2022 0031 by Graciela Jennings RN  Outcome: Ongoing, Progressing     Problem: Communication Impairment (Mechanical Ventilation, Invasive)  Goal: Effective Communication  8/25/2022 0033 by Graciela Jennings RN  Outcome: Ongoing, Progressing  8/25/2022 0031 by Graciela Jennings RN  Outcome: Ongoing, Progressing     Problem: Device-Related Complication Risk (Mechanical Ventilation, Invasive)  Goal: Optimal Device Function  Outcome: Ongoing, Progressing     Problem: Inability to Wean (Mechanical Ventilation, Invasive)  Goal: Mechanical Ventilation Liberation  Outcome: Ongoing, Progressing     Problem: Nutrition Impairment (Mechanical Ventilation, Invasive)  Goal: Optimal Nutrition Delivery  Outcome: Ongoing, Progressing     Problem: Skin and Tissue Injury (Mechanical Ventilation, Invasive)  Goal: Absence of Device-Related Skin and Tissue Injury  Outcome: Ongoing, Progressing     Problem: Ventilator-Induced Lung Injury (Mechanical Ventilation, Invasive)  Goal: Absence of Ventilator-Induced Lung Injury  Outcome: Ongoing, Progressing     Problem: Communication Impairment (Artificial Airway)  Goal: Effective Communication  Outcome: Ongoing, Progressing     Problem: Device-Related Complication Risk (Artificial Airway)  Goal: Optimal Device Function  Outcome: Ongoing, Progressing     Problem: Skin and Tissue Injury (Artificial Airway)  Goal: Absence of Device-Related Skin or Tissue Injury  Outcome: Ongoing, Progressing     Problem: Noninvasive Ventilation Acute  Goal: Effective Unassisted Ventilation and  Oxygenation  Outcome: Ongoing, Progressing     Problem: Device-Related Complication Risk (Hemodialysis)  Goal: Safe, Effective Therapy Delivery  Outcome: Ongoing, Progressing     Problem: Hemodynamic Instability (Hemodialysis)  Goal: Effective Tissue Perfusion  Outcome: Ongoing, Progressing     Problem: Infection (Hemodialysis)  Goal: Absence of Infection Signs and Symptoms  Outcome: Ongoing, Progressing

## 2022-08-25 NOTE — PLAN OF CARE
Problem: Adult Inpatient Plan of Care  Goal: Plan of Care Review  Outcome: Ongoing, Progressing  Goal: Patient-Specific Goal (Individualized)  Outcome: Ongoing, Progressing  Goal: Absence of Hospital-Acquired Illness or Injury  Outcome: Ongoing, Progressing  Goal: Optimal Comfort and Wellbeing  Outcome: Ongoing, Progressing     Problem: Skin Injury Risk Increased  Goal: Skin Health and Integrity  Outcome: Ongoing, Progressing     Problem: Communication Impairment (Mechanical Ventilation, Invasive)  Goal: Effective Communication  Outcome: Ongoing, Progressing     Problem: Device-Related Complication Risk (Mechanical Ventilation, Invasive)  Goal: Optimal Device Function  Outcome: Ongoing, Progressing     Problem: Inability to Wean (Mechanical Ventilation, Invasive)  Goal: Mechanical Ventilation Liberation  Outcome: Ongoing, Progressing     Problem: Nutrition Impairment (Mechanical Ventilation, Invasive)  Goal: Optimal Nutrition Delivery  Outcome: Ongoing, Progressing     Problem: Skin and Tissue Injury (Mechanical Ventilation, Invasive)  Goal: Absence of Device-Related Skin and Tissue Injury  Outcome: Ongoing, Progressing     Problem: Ventilator-Induced Lung Injury (Mechanical Ventilation, Invasive)  Goal: Absence of Ventilator-Induced Lung Injury  Outcome: Ongoing, Progressing     Problem: Device-Related Complication Risk (Artificial Airway)  Goal: Optimal Device Function  Outcome: Ongoing, Progressing     Problem: Skin and Tissue Injury (Artificial Airway)  Goal: Absence of Device-Related Skin or Tissue Injury  Outcome: Ongoing, Progressing     Problem: Noninvasive Ventilation Acute  Goal: Effective Unassisted Ventilation and Oxygenation  Outcome: Ongoing, Progressing

## 2022-08-25 NOTE — SUBJECTIVE & OBJECTIVE
Interval History: The patient is resting.  Tolerated dialysis today.    Review of patient's allergies indicates:  Not on File  Current Facility-Administered Medications   Medication Frequency    albuterol-ipratropium 2.5 mg-0.5 mg/3 mL nebulizer solution 3 mL Q12H    apixaban tablet 2.5 mg BID    aspirin chewable tablet 81 mg Daily    atorvastatin tablet 40 mg QHS    carvediloL tablet 25 mg BID    [START ON 8/25/2022] collagenase ointment Daily PRN    dextrose 50% injection 12.5 g PRN    doxepin capsule 25 mg QHS    ferrous sulfate tablet 1 each Daily    glucagon (human recombinant) injection 1 mg PRN    heparin (porcine) injection 4,000 Units PRN    insulin aspart U-100 injection 1-10 Units PRN    mupirocin 2 % ointment BID    pantoprazole suspension 40 mg Daily    polyethylene glycol packet 17 g Daily    senna-docusate 8.6-50 mg per tablet 1 tablet Daily PRN    venlafaxine tablet 37.5 mg BID       Objective:     Vital Signs (Most Recent):  Temp: 97.4 °F (36.3 °C) (08/24/22 1900)  Pulse: 93 (08/24/22 2100)  Resp: 16 (08/24/22 2100)  BP: (!) 110/42 (08/24/22 2100)  SpO2: 100 % (08/24/22 2100)  O2 Device (Oxygen Therapy): ventilator (08/24/22 1952)   Vital Signs (24h Range):  Temp:  [97.3 °F (36.3 °C)-98.5 °F (36.9 °C)] 97.4 °F (36.3 °C)  Pulse:  [66-93] 93  Resp:  [12-26] 16  SpO2:  [72 %-100 %] 100 %  BP: ()/(42-54) 110/42     Weight: 97.4 kg (214 lb 11.7 oz) (08/24/22 0600)  Body mass index is 27.57 kg/m².  Body surface area is 2.26 meters squared.    I/O last 3 completed shifts:  In: 2305 [P.O.:240; I.V.:20; NG/GT:2045]  Out: 1 [Stool:1]    Physical Exam  Vitals reviewed.   HENT:      Head: Normocephalic and atraumatic.   Cardiovascular:      Rate and Rhythm: Rhythm irregular.   Pulmonary:      Effort: Pulmonary effort is normal.      Comments: The patient is ventilated  Abdominal:      Palpations: Abdomen is soft.   Musculoskeletal:      Right lower leg: Edema present.      Left lower leg: Edema present.        Significant Labs:  BMP:   Recent Labs   Lab 08/23/22  1428   *      K 3.6      CO2 27   BUN 61*   CREATININE 3.19*   CALCIUM 7.8*     CBC:   Recent Labs   Lab 08/23/22  1429   WBC 18.91*   RBC 2.79*   HGB 8.5*   HCT 27.0*      MCV 96.8*   MCH 30.5   MCHC 31.5*        Significant Imaging:  Labs: Reviewed

## 2022-08-25 NOTE — SUBJECTIVE & OBJECTIVE
Interval History: No acute events overnight. The patient is currently resting comfortably. He remains on the ventilator this am. He is afebrile and vital signs are stable.    Objective:     Vital Signs (Most Recent):  Temp: 98.7 °F (37.1 °C) (08/25/22 0300)  Pulse: 87 (08/25/22 0600)  Resp: 12 (08/25/22 0600)  BP: (!) 95/42 (08/25/22 0600)  SpO2: 99 % (08/25/22 0600)   Vital Signs (24h Range):  Temp:  [97.3 °F (36.3 °C)-99.3 °F (37.4 °C)] 98.7 °F (37.1 °C)  Pulse:  [66-93] 87  Resp:  [12-26] 12  SpO2:  [72 %-100 %] 99 %  BP: ()/(33-54) 95/42     Weight: 93.2 kg (205 lb 7.5 oz)  Body mass index is 26.38 kg/m².      Intake/Output Summary (Last 24 hours) at 8/25/2022 0744  Last data filed at 8/25/2022 0500  Gross per 24 hour   Intake 2355 ml   Output 0 ml   Net 2355 ml         Physical Exam  Vitals reviewed.   Constitutional:       General: He is not in acute distress.     Appearance: He is ill-appearing.   HENT:      Head: Normocephalic and atraumatic.      Right Ear: External ear normal.      Left Ear: External ear normal.      Mouth/Throat:      Mouth: Mucous membranes are moist.   Eyes:      Extraocular Movements: Extraocular movements intact.      Conjunctiva/sclera: Conjunctivae normal.   Neck:      Comments: Trach in place with thick secretions  Cardiovascular:      Rate and Rhythm: Tachycardia present. Rhythm irregular.      Pulses: Normal pulses.   Pulmonary:      Breath sounds: Rhonchi present. No wheezing or rales.   Abdominal:      General: Bowel sounds are normal.      Palpations: Abdomen is soft.   Musculoskeletal:         General: Swelling present.      Right lower leg: Edema present.      Left lower leg: Edema present.      Comments: Edema to upper and lower ext. Worse in left arm than right -Hx radial fx; family states cast was removed due to edema    Skin:     General: Skin is warm and dry.   Neurological:      Mental Status: He is alert.       Vents:  Vent Mode: A/C (08/25/22 0230)  Ventilator  Initiated: Yes (08/23/22 1024)  Set Rate: 12 BPM (08/25/22 0230)  Vt Set: 550 mL (08/25/22 0230)  Pressure Support: 12 cmH20 (08/24/22 2225)  PEEP/CPAP: 5 cmH20 (08/25/22 0230)  Oxygen Concentration (%): 40 (08/25/22 0400)  Peak Airway Pressure: 26 cmH2O (08/25/22 0230)  Total Ve: 7.2 mL (08/25/22 0230)  F/VT Ratio<105 (RSBI): (!) 43.9 (08/25/22 0230)    Lines/Drains/Airways       Central Venous Catheter Line  Duration                  Hemodialysis Catheter right subclavian -- days              Drain  Duration                  Gastrostomy/Enterostomy 08/23/22 1030 LUQ 1 day              Airway  Duration                  Surgical Airway Shiley Cuffed -- days              Peripheral Intravenous Line  Duration                  Peripheral IV - Single Lumen 08/23/22 1422 20 G Anterior;Right Forearm 1 day                    Significant Labs:    CBC/Anemia Profile:  Recent Labs   Lab 08/23/22  1429   WBC 18.91*   HGB 8.5*   HCT 27.0*      MCV 96.8*   RDW 14.6*          Chemistries:  Recent Labs   Lab 08/23/22  1428      K 3.6      CO2 27   BUN 61*   CREATININE 3.19*   CALCIUM 7.8*         All pertinent labs within the past 24 hours have been reviewed.    Significant Imaging:  I have reviewed all pertinent imaging results/findings within the past 24 hours.

## 2022-08-25 NOTE — PROGRESS NOTES
Wound care note;  Patient skin was evaluated today  Patient in bed, eyes open. On ventilator at present.  Hodan Elise FNP assessed skin with wounds today.  Has multiple bruising to bilateral arms, purple discolored frail skin.  Bilateral lower legs with pink discolored skin  Incontinent of bowel and bladder , Receives dialysis.  Sacral, Left posterior arm with dark tan/black tissue noted.   Lower mid abdomen old wire site from CABG with area of tan firm slough noted   Underneath peg bumper with dark black soft necrotic tissue noted.   Left lateral/posterior lower leg wound with tan to pink tissue , edges moist.  Left upper and lower leg incision dry crusty brown scab noted , open to air  Right medial heel with pink tissue, slow blanching noted   Sternum incision dry with brown scab formation noted. Has silver Foam border in use  Bilateral old chest tube site with black soft eschar noted. Open to air today.  Orders written to start santly ointment to lower abdomen, Left posterior arm, around peg site and LLE. Daily   Cont turn protocol  Waffle boots  On low air loss mattress  Wound care team to follow.

## 2022-08-25 NOTE — PT/OT/SLP PROGRESS
Occupational Therapy   Treatment    Name: Gregory Stuart  MRN: [id number]  Admitting Diagnosis:  Acute hypoxemic respiratory failure       Recommendations:     Discharge Recommendations: nursing facility, skilled, rehabilitation facility, intermediate care facility/nursing home  Discharge Equipment Recommendations:   (to be determined)  Barriers to discharge:       Assessment:     Gregory Stuart is a 77 y.o. male with a medical diagnosis of Acute hypoxemic respiratory failure.  . Performance deficits affecting function are weakness, impaired endurance, impaired self care skills, impaired cardiopulmonary response to activity.     Rehab Prognosis:  Fair; patient would benefit from acute skilled OT services to address these deficits and reach maximum level of function.       Plan:     Patient to be seen 5 x/week (1 week trial) to address the above listed problems via self-care/home management, therapeutic activities, therapeutic exercises  · Plan of Care Expires:    · Plan of Care Reviewed with: patient    Subjective     Pain/Comfort:  · Pain Rating 1: 0/10    Objective:     Communicated with: MIKEY Bautista prior to session.  Patient found HOB elevated with blood pressure cuff, peripheral IV, pulse ox (continuous), telemetry, Tracheostomy upon OT entry to room.    General Precautions: Standard, fall, respiratory, aspiration   Orthopedic Precautions:N/A   Braces: N/A  Respiratory Status: vent     Occupational Performance:     Bed Mobility:    ·      Functional Mobility/Transfers:  ·   Functional Mobility:  Activities of Daily Living:  · dep to wash his face        AMPAC 6 Click ADL:      Treatment & Education:  PROM 10 reps x 2 B shld flex,abd/add,elbow flex/ext, finger flex/ext, thumb abd/add   Pt not following commands therefore no charges billed today for OT.    Patient left HOB elevated with all lines intactEducation:      GOALS:   Multidisciplinary Problems     Occupational Therapy Goals        Problem: Occupational  Therapy    Goal Priority Disciplines Outcome Interventions   Occupational Therapy Goal     OT, PT/OT Ongoing, Progressing    Description: ST. Pt will follow simple one step command  2.Pt will perform grooming with mod a  3. Pt will sit EOB x 10 min with mod a  4. Pt will andre gown with mod a  5. Pt will t/f bed/chair with mod a x 2  6. Pt will tolerate 15 min of tx      LTG: Restore to Max I with self care and mobility.                     Time Tracking:     OT Date of Treatment: 22  OT Start Time: 936  OT Stop Time: 956  OT Total Time (min): 20 min    Billable Minutes:No charges billed today secondary to pt not actively participate with OT.    OT/ABIGAIL: ABIGAIL          2022

## 2022-08-25 NOTE — PLAN OF CARE
Problem: Adult Inpatient Plan of Care  Goal: Plan of Care Review  Outcome: Ongoing, Progressing  Goal: Patient-Specific Goal (Individualized)  Outcome: Ongoing, Progressing  Goal: Absence of Hospital-Acquired Illness or Injury  Outcome: Ongoing, Progressing  Goal: Optimal Comfort and Wellbeing  Outcome: Ongoing, Progressing     Problem: Fall Injury Risk  Goal: Absence of Fall and Fall-Related Injury  Outcome: Ongoing, Progressing     Problem: Diabetes Comorbidity  Goal: Blood Glucose Level Within Targeted Range  Outcome: Ongoing, Progressing     Problem: Oral Intake Inadequate (Acute Kidney Injury/Impairment)  Goal: Optimal Nutrition Intake  Outcome: Ongoing, Progressing     Problem: Device-Related Complication Risk (Mechanical Ventilation, Invasive)  Goal: Optimal Device Function  Outcome: Ongoing, Progressing     Problem: Inability to Wean (Mechanical Ventilation, Invasive)  Goal: Mechanical Ventilation Liberation  Outcome: Ongoing, Progressing     Problem: Nutrition Impairment (Mechanical Ventilation, Invasive)  Goal: Optimal Nutrition Delivery  Outcome: Ongoing, Progressing     Problem: Communication Impairment (Artificial Airway)  Goal: Effective Communication  Outcome: Ongoing, Progressing     Problem: Device-Related Complication Risk (Artificial Airway)  Goal: Optimal Device Function  Outcome: Ongoing, Progressing     Problem: Skin and Tissue Injury (Artificial Airway)  Goal: Absence of Device-Related Skin or Tissue Injury  Outcome: Ongoing, Progressing     Problem: Device-Related Complication Risk (Hemodialysis)  Goal: Safe, Effective Therapy Delivery  Outcome: Ongoing, Progressing     Problem: Hemodynamic Instability (Hemodialysis)  Goal: Effective Tissue Perfusion  Outcome: Ongoing, Progressing     Problem: Infection (Hemodialysis)  Goal: Absence of Infection Signs and Symptoms  Outcome: Ongoing, Progressing     Problem: Adult Inpatient Plan of Care  Goal: Readiness for Transition of Care  Outcome:  Ongoing, Not Progressing     Problem: Skin Injury Risk Increased  Goal: Skin Health and Integrity  Outcome: Ongoing, Not Progressing     Problem: Infection  Goal: Absence of Infection Signs and Symptoms  Outcome: Ongoing, Not Progressing     Problem: Fluid and Electrolyte Imbalance (Acute Kidney Injury/Impairment)  Goal: Fluid and Electrolyte Balance  Outcome: Ongoing, Not Progressing     Problem: Renal Function Impairment (Acute Kidney Injury/Impairment)  Goal: Effective Renal Function  Outcome: Ongoing, Not Progressing     Problem: Impaired Wound Healing  Goal: Optimal Wound Healing  Outcome: Ongoing, Not Progressing     Problem: Communication Impairment (Mechanical Ventilation, Invasive)  Goal: Effective Communication  Outcome: Ongoing, Not Progressing     Problem: Skin and Tissue Injury (Mechanical Ventilation, Invasive)  Goal: Absence of Device-Related Skin and Tissue Injury  Outcome: Ongoing, Not Progressing

## 2022-08-25 NOTE — ASSESSMENT & PLAN NOTE
accuchecks and Sliding scale insulin   A1C 5.3%  Currently on sliding scale  Blood sugar looks good

## 2022-08-25 NOTE — PT/OT/SLP PROGRESS
Physical Therapy Treatment    Patient Name:  Gregory Stuart   MRN:  [id number]    Recommendations:     Discharge Recommendations:  rehabilitation facility, nursing facility, skilled, intermediate care facility/nursing home   Discharge Equipment Recommendations: other (see comments) (to be determined)   Barriers to discharge: ongoing medical treatment    Assessment:     Gregory Stuart is a 77 y.o. male admitted with a medical diagnosis of Acute hypoxemic respiratory failure.  He presents with the following impairments/functional limitations:  weakness, impaired functional mobility, impaired endurance, impaired self care skills, decreased lower extremity function, impaired skin, edema, impaired cardiopulmonary response to activity.    Pt with eyes opened this AM, requiring total assist with bed mobs, sitting balance and unable to assist with B LE range of motion.    PT POC discussed with Danielle Wells DPT    Rehab Prognosis: Poor; patient would benefit from acute skilled PT services to address these deficits and reach maximum level of function.    Recent Surgery: * No surgery found *      Plan:     During this hospitalization, patient to be seen 5 x/week to address the identified rehab impairments via gait training, therapeutic activities, therapeutic exercises and progress toward the following goals:    · Plan of Care Expires:  09/24/22    Subjective     Chief Complaint: resp failure  Patient/Family Comments/goals: pt with eyes opened this AM  Pain/Comfort:         Objective:     Communicated with Felicita Bautista RN prior to session.  Patient found HOB elevated with blood pressure cuff, ventilator, peripheral IV, Tracheostomy, telemetry, pulse ox (continuous) upon PT entry to room.     General Precautions: Standard, aspiration, fall   Orthopedic Precautions: (left radius fracture)   Braces:    Respiratory Status: vent     Functional Mobility:  · Bed Mobility:     · Rolling Left:  dependence and of 2 persons for  hygiene  · Rolling Right: dependence and of 2 persons  · Supine to Sit: dependence and of 2 persons  · Sit to Supine: dependence and of 2 persons      AM-PAC 6 CLICK MOBILITY  Turning over in bed (including adjusting bedclothes, sheets and blankets)?: 2  Sitting down on and standing up from a chair with arms (e.g., wheelchair, bedside commode, etc.): 1  Moving from lying on back to sitting on the side of the bed?: 2  Moving to and from a bed to a chair (including a wheelchair)?: 1  Need to walk in hospital room?: 1  Climbing 3-5 steps with a railing?: 1  Basic Mobility Total Score: 8       Therapeutic Activities and Exercises:   dependent sitting EOB x 12 minutes focusing on balance/midline positioning    Patient left HOB elevated with all lines intact and call button in reach..    GOALS:   Multidisciplinary Problems     Physical Therapy Goals        Problem: Physical Therapy    Goal Priority Disciplines Outcome Goal Variances Interventions   Physical Therapy Goal     PT, PT/OT Ongoing, Progressing     Description: Short Term Goals to be met by 9/6/2022    Patient will increase functional independence and safety with mobility by performing    1.   Rolling right Moderate Assist; roll left Moderate Assist  2.   Supine to sit Moderate Assist  3.   Sitting balance edge of the bed x 15 minutes Minimal Assist  4.   Sit to stand Maximal Assist  using manual assistance with gait belt  5.   Bed to chair Maximal Assist using manual assistance with gait belt  6.   Lower extremity exercises x 30 reps with assist as necessary and no rest breaks      Long Term Goals to be met by 10/24/2022    Patient to require less than or equal to Minimal Assist for functional mobility to ease caregiver burden                    Time Tracking:     PT Received On:    PT Start Time: 0807     PT Stop Time: 0840  PT Total Time (min): 33 min     Billable Minutes: Therapeutic Activity 18    Treatment Type: Treatment  PT/PTA: PTA     PTA Visit Number:  1     08/25/2022

## 2022-08-25 NOTE — CONSULTS
[facility]  Nephrology  Consult Note    Patient Name: Gregory Stuart  MRN: [id number]  Admission Date: 8/23/2022  Hospital Length of Stay: 1 days  Attending Provider: Ham Sanchez DO   Primary Care Physician: Primary Doctor No  Principal Problem:Acute hypoxemic respiratory failure    Consults  Subjective:     HPI: This gentleman with history of CAD, CVA and renal failure.  The patient transferred from the [facility] for ventilator management and debility.  He has a history of CKD which progressed to ESRD after a CABG.  The patient has been on dialysis for about 3 weeks.  He continues to have diffuse edema.  His last dialysis session was on yesterday.  His family is at the bedside to answer questions.  Nephrology has been consulted for renal issues.      Past Medical History:   Diagnosis Date    Anticoagulant long-term use     Cancer     CHF (congestive heart failure)     Coronary artery disease     Diabetes mellitus     Hypertension     Stroke        Past Surgical History:   Procedure Laterality Date    CORONARY ARTERY BYPASS GRAFT         Review of patient's allergies indicates:  Not on File  Current Facility-Administered Medications   Medication Frequency    albuterol-ipratropium 2.5 mg-0.5 mg/3 mL nebulizer solution 3 mL Q12H    mupirocin 2 % ointment BID     Family History    None       Tobacco Use    Smoking status: Not on file    Smokeless tobacco: Not on file   Substance and Sexual Activity    Alcohol use: Not on file    Drug use: Not on file    Sexual activity: Not on file     Review of Systems   All other systems reviewed and are negative.  Objective:     Vital Signs (Most Recent):    Vital Signs (24h Range):           There is no height or weight on file to calculate BMI.  There is no height or weight on file to calculate BSA.    No intake/output data recorded.    Physical Exam  Vitals reviewed.   Constitutional:       General: He is not in acute  distress.  HENT:      Head: Normocephalic and atraumatic.      Comments: Tracheostomy     Mouth/Throat:      Mouth: Mucous membranes are moist.   Cardiovascular:      Rate and Rhythm: Regular rhythm.   Pulmonary:      Effort: Pulmonary effort is normal.      Comments: ventilated  Abdominal:      Palpations: Abdomen is soft.   Musculoskeletal:      Right lower leg: Edema present.      Left lower leg: Edema present.   Neurological:      Mental Status: He is alert.       Significant Labs:  BMP: No results for input(s): GLU, NA, K, CL, CO2, BUN, CREATININE, CALCIUM, MG in the last 168 hours.  CBC: No results for input(s): WBC, RBC, HGB, HCT, PLT, MCV, MCH, MCHC in the last 168 hours.    Significant Imaging:  Labs: Reviewed    Assessment/Plan:     JUVENTINO (acute kidney injury)  Renal failure.  The patient continues to require dialysis support.    A-fib  Chronic condition        Thank you for your consult. I will follow-up with patient. Please contact us if you have any additional questions.    Andres Lino Jr, MD  Nephrology  [facility]

## 2022-08-25 NOTE — PROGRESS NOTES
[facility]  Nephrology  Progress Note    Patient Name: Gregory Stuart  MRN: [id number]  Admission Date: 8/23/2022  Hospital Length of Stay: 1 days  Attending Provider: Ham Sanchez DO   Primary Care Physician: Primary Doctor No  Principal Problem:Acute hypoxemic respiratory failure    Subjective:     HPI: This gentleman with history of CAD, CVA and renal failure.  The patient transferred from the [facility] for ventilator management and debility.  He has a history of CKD which progressed to ESRD after a CABG.  The patient has been on dialysis for about 3 weeks.  He continues to have diffuse edema.  His last dialysis session was on yesterday.  His family is at the bedside to answer questions.  Nephrology has been consulted for renal issues.      Interval History: The patient is resting.  Tolerated dialysis today.    Review of patient's allergies indicates:  Not on File  Current Facility-Administered Medications   Medication Frequency    albuterol-ipratropium 2.5 mg-0.5 mg/3 mL nebulizer solution 3 mL Q12H    apixaban tablet 2.5 mg BID    aspirin chewable tablet 81 mg Daily    atorvastatin tablet 40 mg QHS    carvediloL tablet 25 mg BID    [START ON 8/25/2022] collagenase ointment Daily PRN    dextrose 50% injection 12.5 g PRN    doxepin capsule 25 mg QHS    ferrous sulfate tablet 1 each Daily    glucagon (human recombinant) injection 1 mg PRN    heparin (porcine) injection 4,000 Units PRN    insulin aspart U-100 injection 1-10 Units PRN    mupirocin 2 % ointment BID    pantoprazole suspension 40 mg Daily    polyethylene glycol packet 17 g Daily    senna-docusate 8.6-50 mg per tablet 1 tablet Daily PRN    venlafaxine tablet 37.5 mg BID       Objective:     Vital Signs (Most Recent):  Temp: 97.4 °F (36.3 °C) (08/24/22 1900)  Pulse: 93 (08/24/22 2100)  Resp: 16 (08/24/22 2100)  BP: (!) 110/42 (08/24/22 2100)  SpO2: 100 % (08/24/22 2100)  O2 Device (Oxygen  Therapy): ventilator (08/24/22 1952)   Vital Signs (24h Range):  Temp:  [97.3 °F (36.3 °C)-98.5 °F (36.9 °C)] 97.4 °F (36.3 °C)  Pulse:  [66-93] 93  Resp:  [12-26] 16  SpO2:  [72 %-100 %] 100 %  BP: ()/(42-54) 110/42     Weight: 97.4 kg (214 lb 11.7 oz) (08/24/22 0600)  Body mass index is 27.57 kg/m².  Body surface area is 2.26 meters squared.    I/O last 3 completed shifts:  In: 2305 [P.O.:240; I.V.:20; NG/GT:2045]  Out: 1 [Stool:1]    Physical Exam  Vitals reviewed.   HENT:      Head: Normocephalic and atraumatic.   Cardiovascular:      Rate and Rhythm: Rhythm irregular.   Pulmonary:      Effort: Pulmonary effort is normal.      Comments: The patient is ventilated  Abdominal:      Palpations: Abdomen is soft.   Musculoskeletal:      Right lower leg: Edema present.      Left lower leg: Edema present.       Significant Labs:  BMP:   Recent Labs   Lab 08/23/22  1428   *      K 3.6      CO2 27   BUN 61*   CREATININE 3.19*   CALCIUM 7.8*     CBC:   Recent Labs   Lab 08/23/22  1429   WBC 18.91*   RBC 2.79*   HGB 8.5*   HCT 27.0*      MCV 96.8*   MCH 30.5   MCHC 31.5*        Significant Imaging:  Labs: Reviewed    Assessment/Plan:     DM (diabetes mellitus)  Chronic condition    ESRD,  Respiratory failure  Debility    Thank you for your consult. I will follow-up with patient. Please contact us if you have any additional questions.    Andres Lino Jr, MD  Nephrology  [facility]

## 2022-08-25 NOTE — PROGRESS NOTES
Placed patient back on the vent via ACV with previous settings for rest.  Patient did 1 hour of CPAP 5, PSV 12, FIO2 40% and did fine.  Heart rate 74, Respiratory rate 20, Sats 100, Blood Pressure 106/34.  Patient did not encounter any problems during his trial.

## 2022-08-25 NOTE — PROGRESS NOTES
Wound care note;  Started santly ointment to lower abdomen, Left posterior arm and LLE today.  Applied duoderm wafer to old chest tube sites.   Talked with spouse (Janet) today and explained wound care POC.. Explained about using santly to wounds. And FNP  to follow wounds. Voiced ok  Wound care team to follow

## 2022-08-26 NOTE — PROGRESS NOTES
[facility]  Adult Nutrition  Follow-up Note         Reason for Assessment  Reason For Assessment: RD follow-up   Nutrition Risk Screen: tube feeding or parenteral nutrition, large or nonhealing wound, burn or pressure injury     Assessment and Plan  Pt seen today for nutrition follow up. Pt continues on mechanical ventilation via trach. Tube feedings via PEG. Nephrology following and pt continues on HD. WOCN following.     Pt continues to received nutrition support via PEG tube. Tolerating Glucerna 1.5 @ 55 ml/hr with Free Water flush of 20 ml/hr + Sarthak BID(providing additional 180 kcal, 5 g PRO, and 16 g CHO/day). No changes recommended at this time. Labs not indicating need for renal specific formula at this time. Will continue to monitor lab trends and adjust tube feeding regimen as needed.     Nutrition Diagnosis  Increased protein Needs   related to Diabetes Mellitus, Renal dysfunction and Wound healing as evidenced by ESRD and on dialysis, wounds and elevated glucose     Nutrition Diagnosis Status: Chronic/ continues     Nutrition Risk  Level of Risk/Frequency of Follow-up: high     Recent Labs   Lab 08/26/22  0319 08/26/22  0548   *  --    POCGLU  --  135*     Comments on Glucose: GLU elevated r/t DM   Nutrition Prescription / Recommendations  Recommendation/Intervention: Continue Glucerna 1.5@ 55ml/h with 20ml flush every hour with Sarthak BID. RD will follow up with labs tomorow when available and make adjustments as needed.  Goals: Tolerance to TF, weight loss as edema resolves  Nutrition Goal Status: new  Current Diet Order: Recommend Glucerna 1.5@ 55ml/h with 20ml flush q 2 hours  Current Nutrition Support Formula Ordered: Glucerna 1.5  Current Nutrition Support Rate Ordered: 55 (ml)  Current Nutrition Support Frequency Ordered: hourly  Oral Nutrition Supplement: Sarthak    Recommended Diet: Enteral Nutrition  Recommended Oral Supplement: Sarthak [90 kcals, 2.5g  Protein, 10g Carbs(3g Sugar), 7g L-Arginine, 7g L-Glutamine, Vitamin C 300mg, 9.5mg Zinc] twice daily  Is Nutrition Support Recommended: Yes   Needs Calculated  Energy Need Method: Kcal/kg Energy Calorie Requirements (kcal): 8016-7991 (25-30cals/kg)  Protein Requirements: 112-140g/d  Enteral Nutrition   Enteral Nutrition Formula Provides:  1980 kcals Propofol Rate: No+ Sarthak 180 kcal-->total kcal  2160  109 g Protein + Sarthak 5 g PRO-->total 114 g PRO  175 g Carbohydrates + Sarthak 16 g CHO--> total 191 g CHO  99 g Fat Propofol Rate: No  933 ml Fluid without Flush    480 ml Fluid by flush   1413 ml Total Fluid  Enteral Nutrition Recommended Order:  Tube feeding via PEG/ Gastrostomy  Tube feeding formula: Glucerna 1.5 Continuous 55 ml/h  Free Water Flush: 20 ml hourly  Modular Supplements:No Modular Supplements needed and Sarthak 2 times a day  Enteral Nutrition meets needs?: yes  Enteral Nutrition Status: Continue Enteral Nutrition    Is Education Recommended: No  Monitor and Evaluation  % current Intake: Enteral Nutrition at goal  % intake to meet estimated needs: Enteral Nutrition   Food and Nutrient Intake: enteral nutrition intake  Food and Nutrient Adminstration: enteral and parenteral nutrition administration  Anthropometric Measurements: weight, weight change, body mass index  Biochemical Data, Medical Tests and Procedures: electrolyte and renal panel, glucose/endocrine profile, lipid profile, gastrointestinal profile, inflammatory profile (RD to follow up as labs become available due to recent admit)  Enteral Calories (kcal): 1980  Enteral Protein (gm): 109  Enteral (Free Water) Fluid (mL): 1002  Free Water Flush Fluid (mL): 480  % Kcal Needs: 100  % Protein Needs: 100 (with Sarthak added)  Current Medical Diagnosis and Past Medical History  Diagnosis: cardiac disease, pulmonary disease, renal disease, diabetes diagnosis/complications  Past Medical History:   Diagnosis Date    Anticoagulant long-term use      "Cancer     CHF (congestive heart failure)     Coronary artery disease     Diabetes mellitus     Hypertension     Stroke      Nutrition/Diet History  Spiritual, Cultural Beliefs, Mosque Practices, Values that Affect Care: no  Food Allergies: NKFA  Factors Affecting Nutritional Intake: None identified at this time  Lab/Procedures/Meds  Recent Labs   Lab 08/26/22  0319      K 4.4   BUN 76*   CREATININE 3.41*   CALCIUM 7.5*        Last A1c:   Lab Results   Component Value Date    HGBA1C 5.3 08/23/2022     Lab Results   Component Value Date    RBC 2.54 (L) 08/26/2022    HGB 7.6 (L) 08/26/2022    HCT 24.0 (L) 08/26/2022    MCV 94.5 08/26/2022    MCH 29.9 08/26/2022    MCHC 31.7 (L) 08/26/2022     Pertinent Labs Reviewed: reviewed  Pertinent Labs Comments: BUN 76(H), Creat 3.41(H), GFR 18(L), BUN/Creat 22(H)-all r/t JUVENTINO continuing to require HD, (H)-r/t DM, Ca 7.5(L)-likely r/t JUVENTINO on HD  Pertinent Medications Reviewed: reviewed  Pertinent Medications Comments: albuterol, apixiban, aspirin, carvedilol, doxepin, ferrous sulfate, pantoprazole, polyehtylene glycol, venlafaxine    Anthropometrics  Temp: 97.3 °F (36.3 °C)  Height: 6' 2" (188 cm)  Height (inches): 74 in  Weight Method: Bed Scale  Weight: 94.9 kg (209 lb 3.5 oz)  Weight (lb): 209.22 lb  Ideal Body Weight (IBW), Male: 190 lb  % Ideal Body Weight, Male (lb): 108.72 %  BMI (Calculated): 26.9  BMI Grade: 25 - 29.9 - overweight     Estimated/Assessed Needs  Weight Used For Calorie Calculations: 93.7 kg (206 lb 9.1 oz)   Energy Need Method: Kcal/kg Energy Calorie Requirements (kcal): 8342-5634 (25-30cals/kg)  Weight Used For Protein Calculations: 93.7 kg (206 lb 9.1 oz)  Protein Requirements: 112-140g/d  Estimated Fluid Requirement Method: RDA Method    RDA Method (mL): 2342        Nutrition by Nursing  Diet/Nutrition Received: tube feeding  Intake (%): other (see comments) (via peg)  Diet/Feeding Assistance: total feed  Diet/Feeding " Tolerance: good  Last Bowel Movement: 08/26/22       Gastrostomy/Enterostomy 08/23/22 1030 LUQ-Feeding Type: continuous, by pump       Gastrostomy/Enterostomy 08/23/22 1030 LUQ-Current Rate (mL/hr): 55 mL/hr       Gastrostomy/Enterostomy 08/23/22 1030 LUQ-Goal Rate (mL/hr): 55 mL/hr       Gastrostomy/Enterostomy 08/23/22 1030 LUQ-Formula Name: allison 1.5  Nutrition Follow-Up  RD Follow-up?: Yes

## 2022-08-26 NOTE — PROGRESS NOTES
[facility]  Nephrology  Progress Note    Patient Name: Gregory Stuart  MRN: [id number]  Admission Date: 8/23/2022  Hospital Length of Stay: 3 days  Attending Provider: Ham Sanchez DO   Primary Care Physician: Primary Doctor No  Principal Problem:Acute hypoxemic respiratory failure    Subjective:     HPI: This gentleman with history of CAD, CVA and renal failure.  The patient transferred from the [facility] for ventilator management and debility.  He has a history of CKD which progressed to ESRD after a CABG.  The patient has been on dialysis for about 3 weeks.  He continues to have diffuse edema.  His last dialysis session was on yesterday.  His family is at the bedside to answer questions.  Nephrology has been consulted for renal issues.      Interval History: The patient appears to be a little stronger today.  He is tolerating his weaning trials.  Slowly removing volume with dialysis.    Review of patient's allergies indicates:  Not on File  Current Facility-Administered Medications   Medication Frequency    0.9%  NaCl infusion PRN    0.9%  NaCl infusion PRN    acetaminophen tablet 650 mg Q6H PRN    albuterol-ipratropium 2.5 mg-0.5 mg/3 mL nebulizer solution 3 mL Q12H    apixaban tablet 2.5 mg BID    [START ON 8/27/2022] aspirin chewable tablet 81 mg Daily    atorvastatin tablet 40 mg QHS    carvediloL tablet 25 mg BID    collagenase ointment Daily PRN    dextrose 50% injection 12.5 g PRN    doxepin capsule 25 mg QHS    [START ON 8/27/2022] ferrous sulfate tablet 1 each Daily    glucagon (human recombinant) injection 1 mg PRN    heparin (porcine) injection 4,000 Units PRN    insulin aspart U-100 injection 1-10 Units PRN    mupirocin 2 % ointment BID    [START ON 8/27/2022] pantoprazole suspension 40 mg Daily    [START ON 8/27/2022] polyethylene glycol packet 17 g Daily    senna-docusate 8.6-50 mg per tablet 1 tablet Daily PRN    venlafaxine tablet  37.5 mg BID       Objective:     Vital Signs (Most Recent):  Temp: 97.4 °F (36.3 °C) (08/26/22 1500)  Pulse: 96 (08/26/22 1600)  Resp: 20 (08/26/22 1600)  BP: (!) 107/45 (08/26/22 1600)  SpO2: 99 % (08/26/22 1715)  O2 Device (Oxygen Therapy): ventilator (08/26/22 1715)   Vital Signs (24h Range):  Temp:  [97.3 °F (36.3 °C)-98.8 °F (37.1 °C)] 97.4 °F (36.3 °C)  Pulse:  [] 96  Resp:  [12-25] 20  SpO2:  [91 %-100 %] 99 %  BP: ()/(37-72) 107/45     Weight: 94.9 kg (209 lb 3.5 oz) (08/26/22 0500)  Body mass index is 26.86 kg/m².  Body surface area is 2.23 meters squared.    I/O last 3 completed shifts:  In: 3360 [I.V.:20; NG/GT:3340]  Out: 0     Physical Exam  Vitals reviewed.   HENT:      Head: Atraumatic.      Mouth/Throat:      Mouth: Mucous membranes are moist.   Cardiovascular:      Rate and Rhythm: Rhythm irregular.   Pulmonary:      Effort: Pulmonary effort is normal.      Comments: Trach collar  Abdominal:      Palpations: Abdomen is soft.   Neurological:      Mental Status: He is alert.       Significant Labs:  BMP:   Recent Labs   Lab 08/26/22  0319   *      K 4.4      CO2 26   BUN 76*   CREATININE 3.41*   CALCIUM 7.5*     CBC:   Recent Labs   Lab 08/26/22  0320   WBC 18.59*   RBC 2.54*   HGB 7.6*   HCT 24.0*      MCV 94.5   MCH 29.9   MCHC 31.7*        Significant Imaging:  Labs: Reviewed    Assessment/Plan:     JUVENTINO (acute kidney injury)  Renal failure.  The patient continues to require dialysis support.  8/25/2022  Continue with dialysis support for this patient.  8/26/2022.  Continue with dialysis    CAD (coronary artery disease)  Chronic condition    A-fib  Chronic condition        Thank you for your consult. I will follow-up with patient. Please contact us if you have any additional questions.    Andres Lino Jr, MD  Nephrology  [facility]

## 2022-08-26 NOTE — PT/OT/SLP PROGRESS
Physical Therapy Treatment    Patient Name:  Gregory Stuart   MRN:  [id number]    Recommendations:     Discharge Recommendations:  rehabilitation facility, nursing facility, skilled, intermediate care facility/nursing home   Discharge Equipment Recommendations: other (see comments) (to be determined)   Barriers to discharge: ongoing medical treatment    Assessment:     Gregory Stuart is a 77 y.o. male admitted with a medical diagnosis of Acute hypoxemic respiratory failure.  He presents with the following impairments/functional limitations:  weakness, impaired functional mobility, impaired endurance, impaired self care skills, decreased lower extremity function, impaired skin, edema, impaired cardiopulmonary response to activity.    Pt more alert and attempting to communicate, however, cont to require near total assist for bed mobs and to maintain balance sitting EOB    Rehab Prognosis: Fair and Poor; patient would benefit from acute skilled PT services to address these deficits and reach maximum level of function.    Recent Surgery: * No surgery found *      Plan:     During this hospitalization, patient to be seen 5 x/week to address the identified rehab impairments via gait training, therapeutic activities, therapeutic exercises and progress toward the following goals:    · Plan of Care Expires:  09/24/22    Subjective     Chief Complaint: resp failure  Patient/Family Comments/goals: pt more alert this AM  Pain/Comfort:         Objective:     Communicated with Shanell Hill RN prior to session.  Patient found HOB elevated with blood pressure cuff, ventilator, peripheral IV, Tracheostomy, telemetry, pulse ox (continuous) upon PT entry to room.     General Precautions: Standard, aspiration, fall   Orthopedic Precautions: (left radius fracture)   Braces:    Respiratory Status: vent     Functional Mobility:  · Bed Mobility:     · Rolling Left:  dependence and of 2 persons  · Rolling Right: dependence and of 2  persons  · Supine to Sit: dependence and of 2 persons  · Sit to Supine: dependence and of 2 persons      AM-PAC 6 CLICK MOBILITY  Turning over in bed (including adjusting bedclothes, sheets and blankets)?: 2  Sitting down on and standing up from a chair with arms (e.g., wheelchair, bedside commode, etc.): 1  Moving from lying on back to sitting on the side of the bed?: 2  Moving to and from a bed to a chair (including a wheelchair)?: 1  Need to walk in hospital room?: 1  Climbing 3-5 steps with a railing?: 1  Basic Mobility Total Score: 8       Therapeutic Activities and Exercises:   dependent sitting EOB x 15 minutes focusing on maintain balance/mideline positioning    PROM  B LE to maintain joint integrity     Patient left HOB elevated with all lines intact, call button in reach and SONYA Pearl present..    GOALS:   Multidisciplinary Problems     Physical Therapy Goals        Problem: Physical Therapy    Goal Priority Disciplines Outcome Goal Variances Interventions   Physical Therapy Goal     PT, PT/OT Ongoing, Progressing     Description: Short Term Goals to be met by 9/6/2022    Patient will increase functional independence and safety with mobility by performing    1.   Rolling right Moderate Assist; roll left Moderate Assist  2.   Supine to sit Moderate Assist  3.   Sitting balance edge of the bed x 15 minutes Minimal Assist  4.   Sit to stand Maximal Assist  using manual assistance with gait belt  5.   Bed to chair Maximal Assist using manual assistance with gait belt  6.   Lower extremity exercises x 30 reps with assist as necessary and no rest breaks      Long Term Goals to be met by 10/24/2022    Patient to require less than or equal to Minimal Assist for functional mobility to ease caregiver burden                    Time Tracking:     PT Received On: 08/26/22  PT Start Time: 0814     PT Stop Time: 0842  PT Total Time (min): 28 min     Billable Minutes: Therapeutic Activity 20    Treatment Type:  Treatment  PT/PTA: PTA     PTA Visit Number: 2     08/26/2022

## 2022-08-26 NOTE — PROGRESS NOTES
[facility]  Pulmonology  Progress Note    Patient Name: Gregory Stuart  MRN: [id number]  Admission Date: 8/23/2022  Hospital Length of Stay: 3 days  Code Status: No Order  Attending Provider: Ham Sanchez DO  Primary Care Provider: Primary Doctor No   Principal Problem: Acute hypoxemic respiratory failure    Subjective:     Interval History:  Tolerating PSV. Awake on exam     Objective:     Vital Signs (Most Recent):  Temp: 97.3 °F (36.3 °C) (08/26/22 0700)  Pulse: 79 (08/26/22 1210)  Resp: (!) 21 (08/26/22 1210)  BP: 113/72 (08/26/22 0900)  SpO2: 99 % (08/26/22 1210)   Vital Signs (24h Range):  Temp:  [97 °F (36.1 °C)-98.8 °F (37.1 °C)] 97.3 °F (36.3 °C)  Pulse:  [] 79  Resp:  [12-26] 21  SpO2:  [91 %-100 %] 99 %  BP: ()/() 113/72     Weight: 94.9 kg (209 lb 3.5 oz)  Body mass index is 26.86 kg/m².      Intake/Output Summary (Last 24 hours) at 8/26/2022 1214  Last data filed at 8/26/2022 0900  Gross per 24 hour   Intake 2330 ml   Output --   Net 2330 ml       Physical Exam  Vitals reviewed.   Constitutional:       General: He is not in acute distress.  HENT:      Head: Normocephalic and atraumatic.      Comments: 8.5 trach      Right Ear: External ear normal.      Left Ear: External ear normal.      Mouth/Throat:      Mouth: Mucous membranes are moist.   Eyes:      Extraocular Movements: Extraocular movements intact.      Conjunctiva/sclera: Conjunctivae normal.   Neck:      Comments: Trach in place with thick secretions  Cardiovascular:      Rate and Rhythm: Tachycardia present. Rhythm irregular.      Pulses: Normal pulses.   Pulmonary:      Effort: Pulmonary effort is normal.      Breath sounds: Normal breath sounds.   Abdominal:      General: Bowel sounds are normal.      Palpations: Abdomen is soft.   Musculoskeletal:         General: Swelling present.      Right lower leg: Edema present.      Left lower leg: Edema present.      Comments: Edema to upper  and lower ext. Worse in left arm than right -Hx radial fx; family states cast was removed due to edema    Skin:     General: Skin is warm and dry.   Neurological:      Mental Status: He is alert.   Psychiatric:         Mood and Affect: Mood normal.       Vents:  Vent Mode: A/C (08/26/22 0340)  Ventilator Initiated: Yes (08/23/22 1024)  Set Rate: 12 BPM (08/26/22 0340)  Vt Set: 550 mL (08/26/22 0340)  Pressure Support: 12 cmH20 (08/25/22 1938)  PEEP/CPAP: 5 cmH20 (08/26/22 0340)  Oxygen Concentration (%): 40 (08/26/22 0827)  Peak Airway Pressure: 30 cmH2O (08/26/22 0340)  Total Ve: 6.6 mL (08/26/22 0340)  F/VT Ratio<105 (RSBI): (!) 32.56 (08/26/22 0340)    Lines/Drains/Airways       Central Venous Catheter Line  Duration                  Hemodialysis Catheter right subclavian -- days              Drain  Duration                  Gastrostomy/Enterostomy 08/23/22 1030 LUQ 3 days              Airway  Duration                  Surgical Airway Shiley Cuffed -- days              Peripheral Intravenous Line  Duration                  Peripheral IV - Single Lumen 08/23/22 1422 20 G Anterior;Right Forearm 2 days                    Significant Labs:    CBC/Anemia Profile:  Recent Labs   Lab 08/26/22  0320   WBC 18.59*   HGB 7.6*   HCT 24.0*      MCV 94.5   RDW 14.6*        Chemistries:  Recent Labs   Lab 08/26/22  0319      K 4.4      CO2 26   BUN 76*   CREATININE 3.41*   CALCIUM 7.5*       All pertinent labs within the past 24 hours have been reviewed.    Significant Imaging:  I have reviewed all pertinent imaging results/findings within the past 24 hours.    Assessment/Plan:     * Acute hypoxemic respiratory failure  Failed extubated post CABG (7/18)  Now with trach and peg   Will rest today and start PSV trails tomorrow   AC , RR12, peep 5, fio2 40% - abgs in AM   Chest xray today ---reviewed---left pleural effusion    8/26- did well yesterday. Will increase to 4 hours tid  - plan to downsize trach  to 6.0 in next day or two         HTN (hypertension)  Resumed home medications     HLD (hyperlipidemia)  Resumed home medications     Alteration in nutrition  S/p peg tube placement   Resume tube feeding    Weakness acquired in ICU  PT/OT     Left radial fracture  7/12/22--- per family he did not require surgical repair - he did have a split/cast in place but this was removed due to edema     DM (diabetes mellitus)  accuchecks and Sliding scale insulin   A1C 5.3%  Currently on sliding scale  Glucose<180    Anemia  Likely due to critical illness   Most recent H/H 8.5/27   - repeat labs Monday and fridays     JUVENTINO (acute kidney injury)  Cr 0.9 March 2022   - was on CCRT post CABG -- transitioned to HD M/W/F  - tunneled HD cath in place   - consult Nephrology -- did not come with porter- unsure if he was making any urine at OSH   Nephrology note reviewed and appreciated  Plan to continued MWF dialysis    CAD (coronary artery disease)  S/p CABG on 07/18    He is on aspirin, statin, beta blocker      Chronic systolic heart failure  EF 30-35% per Echo 07/2022  Daily wts/ I/Os     A-fib  No history documented prior to CABG - was on Lovenox at OSH   - resume medications once placed in computer   - irregular on telemetry--frequent pvc's  - he is on carvediolol, eliquis  - HR is currently adequate                 ADRI Azul-ACNP  Pulmonology  [facility]

## 2022-08-26 NOTE — PROGRESS NOTES
Placed patient back on the vent via ACV with previous settings for rest.  Patient did 3 hours of CPAP 5, PSV 12, FIO2 40% and did fine.  Heart rate 74, Sats 98%, Respiratory rate 20.  Patient tolerated CPAP trial well.

## 2022-08-26 NOTE — SUBJECTIVE & OBJECTIVE
Interval History:      Objective:     Vital Signs (Most Recent):  Temp: 97.3 °F (36.3 °C) (08/26/22 0700)  Pulse: 79 (08/26/22 1210)  Resp: (!) 21 (08/26/22 1210)  BP: 113/72 (08/26/22 0900)  SpO2: 99 % (08/26/22 1210)   Vital Signs (24h Range):  Temp:  [97 °F (36.1 °C)-98.8 °F (37.1 °C)] 97.3 °F (36.3 °C)  Pulse:  [] 79  Resp:  [12-26] 21  SpO2:  [91 %-100 %] 99 %  BP: ()/() 113/72     Weight: 94.9 kg (209 lb 3.5 oz)  Body mass index is 26.86 kg/m².      Intake/Output Summary (Last 24 hours) at 8/26/2022 1214  Last data filed at 8/26/2022 0900  Gross per 24 hour   Intake 2330 ml   Output --   Net 2330 ml       Physical Exam  Vitals reviewed.   Constitutional:       General: He is not in acute distress.  HENT:      Head: Normocephalic and atraumatic.      Comments: 8.5 trach      Right Ear: External ear normal.      Left Ear: External ear normal.      Mouth/Throat:      Mouth: Mucous membranes are moist.   Eyes:      Extraocular Movements: Extraocular movements intact.      Conjunctiva/sclera: Conjunctivae normal.   Neck:      Comments: Trach in place with thick secretions  Cardiovascular:      Rate and Rhythm: Tachycardia present. Rhythm irregular.      Pulses: Normal pulses.   Pulmonary:      Effort: Pulmonary effort is normal.      Breath sounds: Normal breath sounds.   Abdominal:      General: Bowel sounds are normal.      Palpations: Abdomen is soft.   Musculoskeletal:         General: Swelling present.      Right lower leg: Edema present.      Left lower leg: Edema present.      Comments: Edema to upper and lower ext. Worse in left arm than right -Hx radial fx; family states cast was removed due to edema    Skin:     General: Skin is warm and dry.   Neurological:      Mental Status: He is alert.   Psychiatric:         Mood and Affect: Mood normal.       Vents:  Vent Mode: A/C (08/26/22 0340)  Ventilator Initiated: Yes (08/23/22 1024)  Set Rate: 12 BPM (08/26/22 0340)  Vt Set: 550 mL (08/26/22  0340)  Pressure Support: 12 cmH20 (08/25/22 1938)  PEEP/CPAP: 5 cmH20 (08/26/22 0340)  Oxygen Concentration (%): 40 (08/26/22 0827)  Peak Airway Pressure: 30 cmH2O (08/26/22 0340)  Total Ve: 6.6 mL (08/26/22 0340)  F/VT Ratio<105 (RSBI): (!) 32.56 (08/26/22 0340)    Lines/Drains/Airways       Central Venous Catheter Line  Duration                  Hemodialysis Catheter right subclavian -- days              Drain  Duration                  Gastrostomy/Enterostomy 08/23/22 1030 LUQ 3 days              Airway  Duration                  Surgical Airway Shiley Cuffed -- days              Peripheral Intravenous Line  Duration                  Peripheral IV - Single Lumen 08/23/22 1422 20 G Anterior;Right Forearm 2 days                    Significant Labs:    CBC/Anemia Profile:  Recent Labs   Lab 08/26/22  0320   WBC 18.59*   HGB 7.6*   HCT 24.0*      MCV 94.5   RDW 14.6*        Chemistries:  Recent Labs   Lab 08/26/22  0319      K 4.4      CO2 26   BUN 76*   CREATININE 3.41*   CALCIUM 7.5*       All pertinent labs within the past 24 hours have been reviewed.    Significant Imaging:  I have reviewed all pertinent imaging results/findings within the past 24 hours.

## 2022-08-26 NOTE — ASSESSMENT & PLAN NOTE
Failed extubated post CABG (7/18)  Now with trach and peg   Will rest today and start PSV trails tomorrow   AC , RR12, peep 5, fio2 40% - abgs in AM   Chest xray today ---reviewed---left pleural effusion    8/26- did well yesterday. Will increase to 4 hours tid  - plan to downsize trach to 6.0 in next day or two

## 2022-08-26 NOTE — PROGRESS NOTES
[facility]  Nephrology  Progress Note    Patient Name: Gregory Stuart  MRN: [id number]  Admission Date: 8/23/2022  Hospital Length of Stay: 2 days  Attending Provider: Ham Sanchez DO   Primary Care Physician: Primary Doctor No  Principal Problem:Acute hypoxemic respiratory failure    Subjective:     HPI: This gentleman with history of CAD, CVA and renal failure.  The patient transferred from the [facility] for ventilator management and debility.  He has a history of CKD which progressed to ESRD after a CABG.  The patient has been on dialysis for about 3 weeks.  He continues to have diffuse edema.  His last dialysis session was on yesterday.  His family is at the bedside to answer questions.  Nephrology has been consulted for renal issues.      Interval History: The patient is resting.  No acute changes.    Review of patient's allergies indicates:  Not on File  Current Facility-Administered Medications   Medication Frequency    acetaminophen tablet 650 mg Q6H PRN    albuterol-ipratropium 2.5 mg-0.5 mg/3 mL nebulizer solution 3 mL Q12H    apixaban tablet 2.5 mg BID    aspirin chewable tablet 81 mg Daily    atorvastatin tablet 40 mg QHS    carvediloL tablet 25 mg BID    collagenase ointment Daily PRN    dextrose 50% injection 12.5 g PRN    doxepin capsule 25 mg QHS    ferrous sulfate tablet 1 each Daily    glucagon (human recombinant) injection 1 mg PRN    heparin (porcine) injection 4,000 Units PRN    insulin aspart U-100 injection 1-10 Units PRN    mupirocin 2 % ointment BID    pantoprazole suspension 40 mg Daily    polyethylene glycol packet 17 g Daily    senna-docusate 8.6-50 mg per tablet 1 tablet Daily PRN    venlafaxine tablet 37.5 mg BID       Objective:     Vital Signs (Most Recent):  Temp: 98.8 °F (37.1 °C) (08/25/22 1900)  Pulse: 74 (08/25/22 2000)  Resp: 18 (08/25/22 2000)  BP: (!) 116/56 (08/25/22 2000)  SpO2: 99 % (08/25/22 2000)  O2 Device  (Oxygen Therapy): ventilator (08/25/22 1947)   Vital Signs (24h Range):  Temp:  [97 °F (36.1 °C)-99.3 °F (37.4 °C)] 98.8 °F (37.1 °C)  Pulse:  [68-93] 74  Resp:  [11-26] 18  SpO2:  [96 %-100 %] 99 %  BP: ()/() 116/56     Weight: 93.2 kg (205 lb 7.5 oz) (08/25/22 0500)  Body mass index is 26.38 kg/m².  Body surface area is 2.21 meters squared.    I/O last 3 completed shifts:  In: 3885 [P.O.:240; I.V.:40; NG/GT:3605]  Out: 0     Physical Exam  Vitals reviewed.   HENT:      Head: Atraumatic.      Mouth/Throat:      Mouth: Mucous membranes are moist.   Cardiovascular:      Rate and Rhythm: Rhythm irregular.   Pulmonary:      Effort: Pulmonary effort is normal.      Comments: The patient is on trach  Abdominal:      Palpations: Abdomen is soft.   Musculoskeletal:      Right lower leg: Edema present.      Left lower leg: Edema present.   Neurological:      Mental Status: He is alert.       Significant Labs:  BMP:   Recent Labs   Lab 08/23/22  1428   *      K 3.6      CO2 27   BUN 61*   CREATININE 3.19*   CALCIUM 7.8*     CBC:   Recent Labs   Lab 08/23/22  1429   WBC 18.91*   RBC 2.79*   HGB 8.5*   HCT 27.0*      MCV 96.8*   MCH 30.5   MCHC 31.5*        Significant Imaging:  Labs: Reviewed    Assessment/Plan:     JUVENTINO (acute kidney injury)  Renal failure.  The patient continues to require dialysis support.  8/25/2022  Continue with dialysis support for this patient.    CAD (coronary artery disease)  Chronic condition    A-fib  Chronic condition      Respiratory failure  Thank you for your consult. I will follow-up with patient. Please contact us if you have any additional questions.    Andres Lino Jr, MD  Nephrology  [facility]

## 2022-08-26 NOTE — ASSESSMENT & PLAN NOTE
Renal failure.  The patient continues to require dialysis support.  8/25/2022  Continue with dialysis support for this patient.

## 2022-08-26 NOTE — PT/OT/SLP PROGRESS
Occupational Therapy   Treatment    Name: Gregory Stuart  MRN: [id number]  Admitting Diagnosis:  Acute hypoxemic respiratory failure       Recommendations:     Discharge Recommendations: nursing facility, skilled, rehabilitation facility, intermediate care facility/nursing home  Discharge Equipment Recommendations:   (to be determined)  Barriers to discharge:       Assessment:     Gregory Stuart is a 77 y.o. male with a medical diagnosis of Acute hypoxemic respiratory failure.  Performance deficits affecting function are weakness, impaired endurance, impaired self care skills, impaired cardiopulmonary response to activity.     Rehab Prognosis:  Fair and Poor; patient would benefit from acute skilled OT services to address these deficits and reach maximum level of function.       Plan:     Patient to be seen 5 x/week to address the above listed problems via self-care/home management, therapeutic activities, therapeutic exercises  · Plan of Care Expires:    · Plan of Care Reviewed with: patient    Subjective     Pain/Comfort:  · Pain Rating 1: 0/10    Objective:     Communicated with: MIKEY Hill prior to session.  Patient found being positioned on the eob   with blood pressure cuff, telemetry, pulse ox (continuous), Tracheostomy, ventilator, peripheral IV, PEG Tube upon OT entry to room.    General Precautions: Standard, fall, respiratory, aspiration   Orthopedic Precautions:N/A   Braces: N/A  Respiratory Status: vent     Occupational Performance:     Bed Mobility:    · Rolling dep x 2  · Sit to supine dep x 2 ( pt not assisting with any bed mobs @ this time)  · Functional Mobility/Transfers:  ·   Functional Mobility:   Activities of Daily Living:  Pt dep  X 2 to sit eob 15 mins .  Dep to correct position  Dep to have his buttocks cleaned    AMPAC 6 Click ADL:      Treatment & Education:  PROM to b ue 15 reps x 2 B shld flex,abd/add,elbow flex/ext, finger flex/ext.. Pt not flollowing commands @ this time.  Patient  left HOB elevated with all lines intactEducation:      GOALS:   Multidisciplinary Problems     Occupational Therapy Goals        Problem: Occupational Therapy    Goal Priority Disciplines Outcome Interventions   Occupational Therapy Goal     OT, PT/OT Ongoing, Progressing    Description: ST. Pt will follow simple one step command  2.Pt will perform grooming with mod a  3. Pt will sit EOB x 10 min with mod a  4. Pt will andre gown with mod a  5. Pt will t/f bed/chair with mod a x 2  6. Pt will tolerate 15 min of tx      LTG: Restore to Max I with self care and mobility.                     Time Tracking:     OT Date of Treatment: 22  OT Start Time: 816  OT Stop Time: 850  OT Total Time (min): 34 min    Billable Minutes:Therapeutic Activity 20    OT/ABIGAIL: ABIGAIL          2022

## 2022-08-26 NOTE — SUBJECTIVE & OBJECTIVE
Interval History: The patient appears to be a little stronger today.  He is tolerating his weaning trials.  Slowly removing volume with dialysis.    Review of patient's allergies indicates:  Not on File  Current Facility-Administered Medications   Medication Frequency    0.9%  NaCl infusion PRN    0.9%  NaCl infusion PRN    acetaminophen tablet 650 mg Q6H PRN    albuterol-ipratropium 2.5 mg-0.5 mg/3 mL nebulizer solution 3 mL Q12H    apixaban tablet 2.5 mg BID    [START ON 8/27/2022] aspirin chewable tablet 81 mg Daily    atorvastatin tablet 40 mg QHS    carvediloL tablet 25 mg BID    collagenase ointment Daily PRN    dextrose 50% injection 12.5 g PRN    doxepin capsule 25 mg QHS    [START ON 8/27/2022] ferrous sulfate tablet 1 each Daily    glucagon (human recombinant) injection 1 mg PRN    heparin (porcine) injection 4,000 Units PRN    insulin aspart U-100 injection 1-10 Units PRN    mupirocin 2 % ointment BID    [START ON 8/27/2022] pantoprazole suspension 40 mg Daily    [START ON 8/27/2022] polyethylene glycol packet 17 g Daily    senna-docusate 8.6-50 mg per tablet 1 tablet Daily PRN    venlafaxine tablet 37.5 mg BID       Objective:     Vital Signs (Most Recent):  Temp: 97.4 °F (36.3 °C) (08/26/22 1500)  Pulse: 96 (08/26/22 1600)  Resp: 20 (08/26/22 1600)  BP: (!) 107/45 (08/26/22 1600)  SpO2: 99 % (08/26/22 1715)  O2 Device (Oxygen Therapy): ventilator (08/26/22 1715)   Vital Signs (24h Range):  Temp:  [97.3 °F (36.3 °C)-98.8 °F (37.1 °C)] 97.4 °F (36.3 °C)  Pulse:  [] 96  Resp:  [12-25] 20  SpO2:  [91 %-100 %] 99 %  BP: ()/(37-72) 107/45     Weight: 94.9 kg (209 lb 3.5 oz) (08/26/22 0500)  Body mass index is 26.86 kg/m².  Body surface area is 2.23 meters squared.    I/O last 3 completed shifts:  In: 3360 [I.V.:20; NG/GT:3340]  Out: 0     Physical Exam  Vitals reviewed.   HENT:      Head: Atraumatic.      Mouth/Throat:      Mouth: Mucous membranes are moist.   Cardiovascular:      Rate and Rhythm:  Rhythm irregular.   Pulmonary:      Effort: Pulmonary effort is normal.      Comments: Trach collar  Abdominal:      Palpations: Abdomen is soft.   Neurological:      Mental Status: He is alert.       Significant Labs:  BMP:   Recent Labs   Lab 08/26/22  0319   *      K 4.4      CO2 26   BUN 76*   CREATININE 3.41*   CALCIUM 7.5*     CBC:   Recent Labs   Lab 08/26/22  0320   WBC 18.59*   RBC 2.54*   HGB 7.6*   HCT 24.0*      MCV 94.5   MCH 29.9   MCHC 31.7*        Significant Imaging:  Labs: Reviewed

## 2022-08-26 NOTE — SUBJECTIVE & OBJECTIVE
Interval History: The patient is resting.  No acute changes.    Review of patient's allergies indicates:  Not on File  Current Facility-Administered Medications   Medication Frequency    acetaminophen tablet 650 mg Q6H PRN    albuterol-ipratropium 2.5 mg-0.5 mg/3 mL nebulizer solution 3 mL Q12H    apixaban tablet 2.5 mg BID    aspirin chewable tablet 81 mg Daily    atorvastatin tablet 40 mg QHS    carvediloL tablet 25 mg BID    collagenase ointment Daily PRN    dextrose 50% injection 12.5 g PRN    doxepin capsule 25 mg QHS    ferrous sulfate tablet 1 each Daily    glucagon (human recombinant) injection 1 mg PRN    heparin (porcine) injection 4,000 Units PRN    insulin aspart U-100 injection 1-10 Units PRN    mupirocin 2 % ointment BID    pantoprazole suspension 40 mg Daily    polyethylene glycol packet 17 g Daily    senna-docusate 8.6-50 mg per tablet 1 tablet Daily PRN    venlafaxine tablet 37.5 mg BID       Objective:     Vital Signs (Most Recent):  Temp: 98.8 °F (37.1 °C) (08/25/22 1900)  Pulse: 74 (08/25/22 2000)  Resp: 18 (08/25/22 2000)  BP: (!) 116/56 (08/25/22 2000)  SpO2: 99 % (08/25/22 2000)  O2 Device (Oxygen Therapy): ventilator (08/25/22 1947)   Vital Signs (24h Range):  Temp:  [97 °F (36.1 °C)-99.3 °F (37.4 °C)] 98.8 °F (37.1 °C)  Pulse:  [68-93] 74  Resp:  [11-26] 18  SpO2:  [96 %-100 %] 99 %  BP: ()/() 116/56     Weight: 93.2 kg (205 lb 7.5 oz) (08/25/22 0500)  Body mass index is 26.38 kg/m².  Body surface area is 2.21 meters squared.    I/O last 3 completed shifts:  In: 3885 [P.O.:240; I.V.:40; NG/GT:3605]  Out: 0     Physical Exam  Vitals reviewed.   HENT:      Head: Atraumatic.      Mouth/Throat:      Mouth: Mucous membranes are moist.   Cardiovascular:      Rate and Rhythm: Rhythm irregular.   Pulmonary:      Effort: Pulmonary effort is normal.      Comments: The patient is on trach  Abdominal:      Palpations: Abdomen is soft.   Musculoskeletal:      Right lower leg: Edema present.       Left lower leg: Edema present.   Neurological:      Mental Status: He is alert.       Significant Labs:  BMP:   Recent Labs   Lab 08/23/22  1428   *      K 3.6      CO2 27   BUN 61*   CREATININE 3.19*   CALCIUM 7.8*     CBC:   Recent Labs   Lab 08/23/22  1429   WBC 18.91*   RBC 2.79*   HGB 8.5*   HCT 27.0*      MCV 96.8*   MCH 30.5   MCHC 31.5*        Significant Imaging:  Labs: Reviewed

## 2022-08-26 NOTE — PLAN OF CARE
Problem: Adult Inpatient Plan of Care  Goal: Plan of Care Review  Outcome: Ongoing, Progressing  Goal: Patient-Specific Goal (Individualized)  Outcome: Ongoing, Progressing  Goal: Absence of Hospital-Acquired Illness or Injury  Outcome: Ongoing, Progressing  Goal: Optimal Comfort and Wellbeing  Outcome: Ongoing, Progressing  Goal: Readiness for Transition of Care  Outcome: Ongoing, Progressing     Problem: Fall Injury Risk  Goal: Absence of Fall and Fall-Related Injury  Outcome: Ongoing, Progressing     Problem: Skin Injury Risk Increased  Goal: Skin Health and Integrity  Outcome: Ongoing, Progressing     Problem: Infection  Goal: Absence of Infection Signs and Symptoms  Outcome: Ongoing, Progressing     Problem: Diabetes Comorbidity  Goal: Blood Glucose Level Within Targeted Range  Outcome: Ongoing, Progressing     Problem: Fluid and Electrolyte Imbalance (Acute Kidney Injury/Impairment)  Goal: Fluid and Electrolyte Balance  Outcome: Ongoing, Progressing     Problem: Oral Intake Inadequate (Acute Kidney Injury/Impairment)  Goal: Optimal Nutrition Intake  Outcome: Ongoing, Progressing     Problem: Renal Function Impairment (Acute Kidney Injury/Impairment)  Goal: Effective Renal Function  Outcome: Ongoing, Progressing     Problem: Impaired Wound Healing  Goal: Optimal Wound Healing  Outcome: Ongoing, Progressing     Problem: Communication Impairment (Mechanical Ventilation, Invasive)  Goal: Effective Communication  Outcome: Ongoing, Progressing     Problem: Device-Related Complication Risk (Mechanical Ventilation, Invasive)  Goal: Optimal Device Function  Outcome: Ongoing, Progressing     Problem: Inability to Wean (Mechanical Ventilation, Invasive)  Goal: Mechanical Ventilation Liberation  Outcome: Ongoing, Progressing     Problem: Nutrition Impairment (Mechanical Ventilation, Invasive)  Goal: Optimal Nutrition Delivery  Outcome: Ongoing, Progressing     Problem: Skin and Tissue Injury (Mechanical Ventilation,  Invasive)  Goal: Absence of Device-Related Skin and Tissue Injury  Outcome: Ongoing, Progressing     Problem: Ventilator-Induced Lung Injury (Mechanical Ventilation, Invasive)  Goal: Absence of Ventilator-Induced Lung Injury  Outcome: Ongoing, Progressing     Problem: Communication Impairment (Artificial Airway)  Goal: Effective Communication  Outcome: Ongoing, Progressing     Problem: Device-Related Complication Risk (Artificial Airway)  Goal: Optimal Device Function  Outcome: Ongoing, Progressing     Problem: Skin and Tissue Injury (Artificial Airway)  Goal: Absence of Device-Related Skin or Tissue Injury  Outcome: Ongoing, Progressing     Problem: Noninvasive Ventilation Acute  Goal: Effective Unassisted Ventilation and Oxygenation  Outcome: Ongoing, Progressing     Problem: Device-Related Complication Risk (Hemodialysis)  Goal: Safe, Effective Therapy Delivery  Outcome: Ongoing, Progressing     Problem: Hemodynamic Instability (Hemodialysis)  Goal: Effective Tissue Perfusion  Outcome: Ongoing, Progressing     Problem: Infection (Hemodialysis)  Goal: Absence of Infection Signs and Symptoms  Outcome: Ongoing, Progressing

## 2022-08-26 NOTE — ASSESSMENT & PLAN NOTE
Renal failure.  The patient continues to require dialysis support.  8/25/2022  Continue with dialysis support for this patient.  8/26/2022.  Continue with dialysis

## 2022-08-27 NOTE — PROGRESS NOTES
[facility]  Pulmonology  Progress Note    Patient Name: Gregory Stuart  MRN: [id number]  Admission Date: 8/23/2022  Hospital Length of Stay: 4 days  Code Status: No Order  Attending Provider: Ham Sanchez DO  Primary Care Provider: Primary Doctor No   Principal Problem: Acute hypoxemic respiratory failure    Subjective:     Interval History:  tolerating PSV -- increase to 6 hrs BID     Objective:     Vital Signs (Most Recent):  Temp: 98.3 °F (36.8 °C) (08/27/22 1500)  Pulse: 84 (08/27/22 1600)  Resp: (!) 21 (08/27/22 1600)  BP: (!) 95/33 (08/27/22 1600)  SpO2: 100 % (08/27/22 1600)   Vital Signs (24h Range):  Temp:  [97.1 °F (36.2 °C)-98.5 °F (36.9 °C)] 98.3 °F (36.8 °C)  Pulse:  [65-95] 84  Resp:  [10-99] 21  SpO2:  [25 %-100 %] 100 %  BP: ()/(33-52) 95/33     Weight: 94.5 kg (208 lb 5.4 oz)  Body mass index is 26.75 kg/m².      Intake/Output Summary (Last 24 hours) at 8/27/2022 1747  Last data filed at 8/27/2022 0800  Gross per 24 hour   Intake 1920 ml   Output --   Net 1920 ml       Physical Exam  Vitals reviewed.   Constitutional:       General: He is not in acute distress.  HENT:      Head: Normocephalic and atraumatic.      Comments: 8.5 trach      Right Ear: External ear normal.      Left Ear: External ear normal.      Mouth/Throat:      Mouth: Mucous membranes are moist.   Eyes:      Extraocular Movements: Extraocular movements intact.      Conjunctiva/sclera: Conjunctivae normal.   Neck:      Comments: Trach in place with thick secretions  Cardiovascular:      Rate and Rhythm: Tachycardia present. Rhythm irregular.      Pulses: Normal pulses.   Pulmonary:      Effort: Pulmonary effort is normal.      Breath sounds: Normal breath sounds.   Abdominal:      General: Bowel sounds are normal.      Palpations: Abdomen is soft.   Musculoskeletal:         General: Swelling present.      Right lower leg: Edema present.      Left lower leg: Edema present.      Comments:  Edema to upper and lower ext. Worse in left arm than right -Hx radial fx; family states cast was removed due to edema    Skin:     General: Skin is warm and dry.   Neurological:      Mental Status: He is alert.   Psychiatric:         Mood and Affect: Mood normal.       Vents:  Vent Mode: CPAP / PSV (08/27/22 1630)  Ventilator Initiated: Yes (08/23/22 1024)  Set Rate: 12 BPM (08/27/22 0012)  Vt Set: 550 mL (08/27/22 0012)  Pressure Support: 12 cmH20 (08/27/22 1630)  PEEP/CPAP: 5 cmH20 (08/27/22 1630)  Oxygen Concentration (%): 40 (08/27/22 1630)  Peak Airway Pressure: 19 cmH2O (08/27/22 1630)  Total Ve: 8.3 mL (08/27/22 1630)  F/VT Ratio<105 (RSBI): (!) 38.65 (08/27/22 0012)    Lines/Drains/Airways       Central Venous Catheter Line  Duration                  Hemodialysis Catheter right subclavian -- days              Drain  Duration                  Gastrostomy/Enterostomy 08/23/22 1030 LUQ 4 days              Airway  Duration                  Surgical Airway Shiley Cuffed -- days              Peripheral Intravenous Line  Duration                  Peripheral IV - Single Lumen 08/23/22 1422 20 G Anterior;Right Forearm 4 days                    Significant Labs:    CBC/Anemia Profile:  Recent Labs   Lab 08/26/22  0320   WBC 18.59*   HGB 7.6*   HCT 24.0*      MCV 94.5   RDW 14.6*        Chemistries:  Recent Labs   Lab 08/26/22  0319      K 4.4      CO2 26   BUN 76*   CREATININE 3.41*   CALCIUM 7.5*       All pertinent labs within the past 24 hours have been reviewed.    Significant Imaging:  I have reviewed all pertinent imaging results/findings within the past 24 hours.    Assessment/Plan:     * Acute hypoxemic respiratory failure  Failed extubated post CABG (7/18)  Now with trach and peg   Will rest today and start PSV trails tomorrow   AC , RR12, peep 5, fio2 40% - abgs in AM   Chest xray today ---reviewed---left pleural effusion    8/26- did well yesterday. Will increase to 6 hours bid          HTN (hypertension)  Resumed home medications     HLD (hyperlipidemia)  Resumed home medications     Weakness acquired in ICU  PT/OT     Left radial fracture  7/12/22--- per family he did not require surgical repair - he did have a split/cast in place but this was removed due to edema     Anemia  Likely due to critical illness   Most recent H/H 8.5/27   - repeat labs Monday and fridays     JUVENTINO (acute kidney injury)  Cr 0.9 March 2022   - was on CCRT post CABG -- transitioned to HD M/W/F  - tunneled HD cath in place   - consult Nephrology -- did not come with porter- unsure if he was making any urine at OSH   Nephrology note reviewed and appreciated  Plan to continued MWF dialysis    Chronic systolic heart failure  EF 30-35% per Echo 07/2022  Daily wts/ I/Os     A-fib  No history documented prior to CABG - was on Lovenox at OSH   - resume medications once placed in computer   - irregular on telemetry--frequent pvc's  - he is on carvediolol, eliquis  - HR is currently adequate                 ADRI Azul-ACNP  Pulmonology  [facility]

## 2022-08-27 NOTE — SUBJECTIVE & OBJECTIVE
Interval History:  tolerating PSV -- increase to 6 hrs BID     Objective:     Vital Signs (Most Recent):  Temp: 98.3 °F (36.8 °C) (08/27/22 1500)  Pulse: 84 (08/27/22 1600)  Resp: (!) 21 (08/27/22 1600)  BP: (!) 95/33 (08/27/22 1600)  SpO2: 100 % (08/27/22 1600)   Vital Signs (24h Range):  Temp:  [97.1 °F (36.2 °C)-98.5 °F (36.9 °C)] 98.3 °F (36.8 °C)  Pulse:  [65-95] 84  Resp:  [10-99] 21  SpO2:  [25 %-100 %] 100 %  BP: ()/(33-52) 95/33     Weight: 94.5 kg (208 lb 5.4 oz)  Body mass index is 26.75 kg/m².      Intake/Output Summary (Last 24 hours) at 8/27/2022 1747  Last data filed at 8/27/2022 0800  Gross per 24 hour   Intake 1920 ml   Output --   Net 1920 ml       Physical Exam  Vitals reviewed.   Constitutional:       General: He is not in acute distress.  HENT:      Head: Normocephalic and atraumatic.      Comments: 8.5 trach      Right Ear: External ear normal.      Left Ear: External ear normal.      Mouth/Throat:      Mouth: Mucous membranes are moist.   Eyes:      Extraocular Movements: Extraocular movements intact.      Conjunctiva/sclera: Conjunctivae normal.   Neck:      Comments: Trach in place with thick secretions  Cardiovascular:      Rate and Rhythm: Tachycardia present. Rhythm irregular.      Pulses: Normal pulses.   Pulmonary:      Effort: Pulmonary effort is normal.      Breath sounds: Normal breath sounds.   Abdominal:      General: Bowel sounds are normal.      Palpations: Abdomen is soft.   Musculoskeletal:         General: Swelling present.      Right lower leg: Edema present.      Left lower leg: Edema present.      Comments: Edema to upper and lower ext. Worse in left arm than right -Hx radial fx; family states cast was removed due to edema    Skin:     General: Skin is warm and dry.   Neurological:      Mental Status: He is alert.   Psychiatric:         Mood and Affect: Mood normal.       Vents:  Vent Mode: CPAP / PSV (08/27/22 1630)  Ventilator Initiated: Yes (08/23/22 1024)  Set  Rate: 12 BPM (08/27/22 0012)  Vt Set: 550 mL (08/27/22 0012)  Pressure Support: 12 cmH20 (08/27/22 1630)  PEEP/CPAP: 5 cmH20 (08/27/22 1630)  Oxygen Concentration (%): 40 (08/27/22 1630)  Peak Airway Pressure: 19 cmH2O (08/27/22 1630)  Total Ve: 8.3 mL (08/27/22 1630)  F/VT Ratio<105 (RSBI): (!) 38.65 (08/27/22 0012)    Lines/Drains/Airways       Central Venous Catheter Line  Duration                  Hemodialysis Catheter right subclavian -- days              Drain  Duration                  Gastrostomy/Enterostomy 08/23/22 1030 LUQ 4 days              Airway  Duration                  Surgical Airway Shiley Cuffed -- days              Peripheral Intravenous Line  Duration                  Peripheral IV - Single Lumen 08/23/22 1422 20 G Anterior;Right Forearm 4 days                    Significant Labs:    CBC/Anemia Profile:  Recent Labs   Lab 08/26/22  0320   WBC 18.59*   HGB 7.6*   HCT 24.0*      MCV 94.5   RDW 14.6*        Chemistries:  Recent Labs   Lab 08/26/22  0319      K 4.4      CO2 26   BUN 76*   CREATININE 3.41*   CALCIUM 7.5*       All pertinent labs within the past 24 hours have been reviewed.    Significant Imaging:  I have reviewed all pertinent imaging results/findings within the past 24 hours.

## 2022-08-27 NOTE — ASSESSMENT & PLAN NOTE
Failed extubated post CABG (7/18)  Now with trach and peg   Will rest today and start PSV trails tomorrow   AC , RR12, peep 5, fio2 40% - abgs in AM   Chest xray today ---reviewed---left pleural effusion    8/26- did well yesterday. Will increase to 6 hours bid

## 2022-08-28 NOTE — PROGRESS NOTES
[facility]  Nephrology  Progress Note    Patient Name: Gregory Stuart  MRN: [id number]  Admission Date: 8/23/2022  Hospital Length of Stay: 4 days  Attending Provider: Ham Sanchez DO   Primary Care Physician: Primary Doctor No  Principal Problem:Acute hypoxemic respiratory failure    Subjective:     HPI: This gentleman with history of CAD, CVA and renal failure.  The patient transferred from the [facility] for ventilator management and debility.  He has a history of CKD which progressed to ESRD after a CABG.  The patient has been on dialysis for about 3 weeks.  He continues to have diffuse edema.  His last dialysis session was on yesterday.  His family is at the bedside to answer questions.  Nephrology has been consulted for renal issues.      Interval History:  He is nonverbal.  Tracheostomy present.  He is on no pressors.    Review of patient's allergies indicates:  Not on File  Current Facility-Administered Medications   Medication Frequency    0.9%  NaCl infusion PRN    0.9%  NaCl infusion PRN    acetaminophen tablet 650 mg Q6H PRN    albuterol-ipratropium 2.5 mg-0.5 mg/3 mL nebulizer solution 3 mL Q12H    apixaban tablet 2.5 mg BID    aspirin chewable tablet 81 mg Daily    atorvastatin tablet 40 mg QHS    carvediloL tablet 25 mg BID    collagenase ointment Daily PRN    dextrose 50% injection 12.5 g PRN    doxepin capsule 25 mg QHS    ferrous sulfate tablet 1 each Daily    glucagon (human recombinant) injection 1 mg PRN    heparin (porcine) injection 4,000 Units PRN    insulin aspart U-100 injection 1-10 Units PRN    mupirocin 2 % ointment BID    pantoprazole suspension 40 mg Daily    polyethylene glycol packet 17 g Daily PRN    senna-docusate 8.6-50 mg per tablet 1 tablet Daily PRN    venlafaxine tablet 37.5 mg BID       Objective:     Vital Signs (Most Recent):  Temp: 98.3 °F (36.8 °C) (08/27/22 1500)  Pulse: 66 (08/27/22 2002)  Resp: (!) 23  (08/27/22 2002)  BP: (!) 104/48 (08/27/22 2028)  SpO2: 99 % (08/27/22 2002)  O2 Device (Oxygen Therapy): ventilator (08/27/22 2002)   Vital Signs (24h Range):  Temp:  [97.1 °F (36.2 °C)-98.5 °F (36.9 °C)] 98.3 °F (36.8 °C)  Pulse:  [65-86] 66  Resp:  [10-99] 23  SpO2:  [25 %-100 %] 99 %  BP: ()/(33-52) 104/48     Weight: 94.5 kg (208 lb 5.4 oz) (08/27/22 0400)  Body mass index is 26.75 kg/m².  Body surface area is 2.22 meters squared.    I/O last 3 completed shifts:  In: 2040 [NG/GT:2040]  Out: 2000 [Other:2000]    Physical Exam  Constitutional:       Appearance: He is ill-appearing.   Neck:      Comments: Tracheostomy present  Cardiovascular:      Rate and Rhythm: Normal rate and regular rhythm.   Pulmonary:      Breath sounds: No wheezing or rales.   Abdominal:      Tenderness: There is no abdominal tenderness. There is no guarding.   Musculoskeletal:      Right lower leg: Edema present.      Left lower leg: Edema present.       Significant Labs:  BMP:   Recent Labs   Lab 08/26/22  0319   *      K 4.4      CO2 26   BUN 76*   CREATININE 3.41*   CALCIUM 7.5*        Significant Imaging:      Assessment/Plan:     * Acute hypoxemic respiratory failure  On ventilator via tracheostomy    DM (diabetes mellitus)  Underlying condition    JUVENTINO (acute kidney injury)  He was dialyzed yesterday    CAD (coronary artery disease)  Chronic condition        Thank you for your consult.     South Lim MD  Nephrology  [facility]

## 2022-08-28 NOTE — PROGRESS NOTES
[facility]  Pulmonology  Progress Note    Patient Name: Gregory Stuart  MRN: [id number]  Admission Date: 8/23/2022  Hospital Length of Stay: 5 days  Code Status: No Order  Attending Provider: Ham Sanchez DO  Primary Care Provider: Primary Doctor No   Principal Problem: Acute hypoxemic respiratory failure    Subjective:     Interval History: tolerating PSV. No complaints     Objective:     Vital Signs (Most Recent):  Temp: 97.2 °F (36.2 °C) (08/28/22 1100)  Pulse: 60 (08/28/22 1200)  Resp: 16 (08/28/22 1200)  BP: (!) 96/44 (08/28/22 1200)  SpO2: 100 % (08/28/22 1200)   Vital Signs (24h Range):  Temp:  [97.2 °F (36.2 °C)-99 °F (37.2 °C)] 97.2 °F (36.2 °C)  Pulse:  [56-84] 60  Resp:  [13-25] 16  SpO2:  [98 %-100 %] 100 %  BP: ()/(33-60) 96/44     Weight: 94.5 kg (208 lb 5.4 oz)  Body mass index is 26.75 kg/m².      Intake/Output Summary (Last 24 hours) at 8/28/2022 1337  Last data filed at 8/28/2022 0800  Gross per 24 hour   Intake 90 ml   Output --   Net 90 ml       Physical Exam  Vitals reviewed.   Constitutional:       General: He is not in acute distress.  HENT:      Head: Normocephalic and atraumatic.      Comments: 8.5 trach      Right Ear: External ear normal.      Left Ear: External ear normal.      Mouth/Throat:      Mouth: Mucous membranes are moist.   Eyes:      Extraocular Movements: Extraocular movements intact.      Conjunctiva/sclera: Conjunctivae normal.   Neck:      Comments: Trach in place with thick secretions  Cardiovascular:      Rate and Rhythm: Tachycardia present. Rhythm irregular.      Pulses: Normal pulses.   Pulmonary:      Effort: Pulmonary effort is normal.      Breath sounds: Normal breath sounds.   Abdominal:      General: Bowel sounds are normal.      Palpations: Abdomen is soft.   Musculoskeletal:         General: Swelling present.      Right lower leg: Edema present.      Left lower leg: Edema present.      Comments: Edema to upper and  lower ext. Worse in left arm than right -Hx radial fx; family states cast was removed due to edema    Skin:     General: Skin is warm and dry.   Neurological:      Mental Status: He is alert.   Psychiatric:         Mood and Affect: Mood normal.       Vents:  Vent Mode: CPAP / PSV (08/28/22 1200)  Ventilator Initiated: Yes (08/23/22 1024)  Set Rate: 12 BPM (08/27/22 2323)  Vt Set: 550 mL (08/27/22 2323)  Pressure Support: 12 cmH20 (08/28/22 1200)  PEEP/CPAP: 5 cmH20 (08/28/22 1200)  Oxygen Concentration (%): 40 (08/28/22 1120)  Peak Airway Pressure: 17 cmH2O (08/28/22 0840)  Total Ve: 9 mL (08/28/22 1200)  F/VT Ratio<105 (RSBI): (!) 43.72 (08/28/22 1200)    Lines/Drains/Airways       Central Venous Catheter Line  Duration                  Hemodialysis Catheter right subclavian -- days              Drain  Duration                  Gastrostomy/Enterostomy 08/23/22 1030 LUQ 5 days              Airway  Duration                  Surgical Airway Shiley Cuffed -- days              Peripheral Intravenous Line  Duration                  Peripheral IV - Single Lumen 08/23/22 1422 20 G Anterior;Right Forearm 4 days                    Significant Labs:    CBC/Anemia Profile:  No results for input(s): WBC, HGB, HCT, PLT, MCV, RDW, IRON, FERRITIN, RETIC, FOLATE, [id number], OCCULTBLOOD in the last 48 hours.     Chemistries:  No results for input(s): NA, K, CL, CO2, BUN, CREATININE, CALCIUM, ALBUMIN, PROT, BILITOT, ALKPHOS, ALT, AST, GLUCOSE, MG, PHOS in the last 48 hours.    All pertinent labs within the past 24 hours have been reviewed.    Significant Imaging:  I have reviewed all pertinent imaging results/findings within the past 24 hours.    Assessment/Plan:     * Acute hypoxemic respiratory failure  Failed extubated post CABG (7/18)  Now with trach and peg     8/28- increase to PSV 12 hours - change trach tomorrow       HLD (hyperlipidemia)  Resumed home medications     Weakness acquired in ICU  Extreme generalized weakness -  continue PT/OT     Left radial fracture  7/12/22--- per family he did not require surgical repair - he did have a split/cast in place but this was removed due to edema     DM (diabetes mellitus)  accuchecks and Sliding scale insulin   A1C 5.3%  Currently on sliding scale  Glucose<180    JUVENTINO (acute kidney injury)  Cr 0.9 March 2022   - was on CCRT post CABG -- transitioned to HD M/W/F  - tunneled HD cath in place   - consult Nephrology -- did not come with porter- unsure if he was making any urine at OSH   Nephrology note reviewed and appreciated  Plan to continued MWF dialysis    CAD (coronary artery disease)  S/p CABG on 07/18    He is on aspirin, statin, beta blocker      Chronic systolic heart failure  EF 30-35% per Echo 07/2022  Daily wts/ I/Os     A-fib  No history documented prior to CABG - was on Lovenox at OSH   - resume medications once placed in computer   - irregular on telemetry--frequent pvc's  - he is on carvediolol, eliquis  - HR is currently adequate                 ADRI Azul-ACNP  Pulmonology  [facility]

## 2022-08-28 NOTE — SUBJECTIVE & OBJECTIVE
Interval History: tolerating PSV. No complaints     Objective:     Vital Signs (Most Recent):  Temp: 97.2 °F (36.2 °C) (08/28/22 1100)  Pulse: 60 (08/28/22 1200)  Resp: 16 (08/28/22 1200)  BP: (!) 96/44 (08/28/22 1200)  SpO2: 100 % (08/28/22 1200)   Vital Signs (24h Range):  Temp:  [97.2 °F (36.2 °C)-99 °F (37.2 °C)] 97.2 °F (36.2 °C)  Pulse:  [56-84] 60  Resp:  [13-25] 16  SpO2:  [98 %-100 %] 100 %  BP: ()/(33-60) 96/44     Weight: 94.5 kg (208 lb 5.4 oz)  Body mass index is 26.75 kg/m².      Intake/Output Summary (Last 24 hours) at 8/28/2022 1337  Last data filed at 8/28/2022 0800  Gross per 24 hour   Intake 90 ml   Output --   Net 90 ml       Physical Exam  Vitals reviewed.   Constitutional:       General: He is not in acute distress.  HENT:      Head: Normocephalic and atraumatic.      Comments: 8.5 trach      Right Ear: External ear normal.      Left Ear: External ear normal.      Mouth/Throat:      Mouth: Mucous membranes are moist.   Eyes:      Extraocular Movements: Extraocular movements intact.      Conjunctiva/sclera: Conjunctivae normal.   Neck:      Comments: Trach in place with thick secretions  Cardiovascular:      Rate and Rhythm: Tachycardia present. Rhythm irregular.      Pulses: Normal pulses.   Pulmonary:      Effort: Pulmonary effort is normal.      Breath sounds: Normal breath sounds.   Abdominal:      General: Bowel sounds are normal.      Palpations: Abdomen is soft.   Musculoskeletal:         General: Swelling present.      Right lower leg: Edema present.      Left lower leg: Edema present.      Comments: Edema to upper and lower ext. Worse in left arm than right -Hx radial fx; family states cast was removed due to edema    Skin:     General: Skin is warm and dry.   Neurological:      Mental Status: He is alert.   Psychiatric:         Mood and Affect: Mood normal.       Vents:  Vent Mode: CPAP / PSV (08/28/22 1200)  Ventilator Initiated: Yes (08/23/22 1024)  Set Rate: 12 BPM (08/27/22  2323)  Vt Set: 550 mL (08/27/22 2323)  Pressure Support: 12 cmH20 (08/28/22 1200)  PEEP/CPAP: 5 cmH20 (08/28/22 1200)  Oxygen Concentration (%): 40 (08/28/22 1120)  Peak Airway Pressure: 17 cmH2O (08/28/22 0840)  Total Ve: 9 mL (08/28/22 1200)  F/VT Ratio<105 (RSBI): (!) 43.72 (08/28/22 1200)    Lines/Drains/Airways       Central Venous Catheter Line  Duration                  Hemodialysis Catheter right subclavian -- days              Drain  Duration                  Gastrostomy/Enterostomy 08/23/22 1030 LUQ 5 days              Airway  Duration                  Surgical Airway Shiley Cuffed -- days              Peripheral Intravenous Line  Duration                  Peripheral IV - Single Lumen 08/23/22 1422 20 G Anterior;Right Forearm 4 days                    Significant Labs:    CBC/Anemia Profile:  No results for input(s): WBC, HGB, HCT, PLT, MCV, RDW, IRON, FERRITIN, RETIC, FOLATE, [id number], OCCULTBLOOD in the last 48 hours.     Chemistries:  No results for input(s): NA, K, CL, CO2, BUN, CREATININE, CALCIUM, ALBUMIN, PROT, BILITOT, ALKPHOS, ALT, AST, GLUCOSE, MG, PHOS in the last 48 hours.    All pertinent labs within the past 24 hours have been reviewed.    Significant Imaging:  I have reviewed all pertinent imaging results/findings within the past 24 hours.

## 2022-08-28 NOTE — PT/OT/SLP PROGRESS
Physical Therapy Treatment    Patient Name:  Gregory Stuart   MRN:  [id number]    Recommendations:     Discharge Recommendations:  intermediate care facility/nursing home, rehabilitation facility, nursing facility, skilled   Discharge Equipment Recommendations: other (see comments) (to be determined)   Barriers to discharge:  vent weaning; total care required    Assessment:     Gregory Stuart is a 77 y.o. male admitted with a medical diagnosis of Acute hypoxemic respiratory failure.  He presents with the following impairments/functional limitations:  weakness, impaired endurance, impaired self care skills, impaired functional mobility, impaired balance, impaired cognition, decreased upper extremity function, decreased lower extremity function, decreased safety awareness, pain, impaired cardiopulmonary response to activity, edema, impaired skin .    Patient more alert than the last time this therapist saw patient. Patient attempting to use R UE to help stabilize sitting EOB but poor trunk activation. Limited ability to follow commands. Patient will require 24/7 care when ready to d/c from [facility].    Rehab Prognosis: Fair; patient would benefit from acute skilled PT services to address these deficits and reach maximum level of function.    Recent Surgery: * No surgery found *      Plan:     During this hospitalization, patient to be seen 5 x/week to address the identified rehab impairments via gait training, therapeutic activities, therapeutic exercises, neuromuscular re-education and progress toward the following goals:    Plan of Care Expires:  09/24/22    Subjective     Chief Complaint: resp failure; recent CABG/left radial fracture  Patient/Family Comments/goals: Patient unable to state. Inconsistent head nods.  Pain/Comfort:  Pain Rating 1: 0/10 (up to 6/10 Gibran Borden with ROM/mobility)  Location - Orientation 1: generalized  Pain Addressed 1: Reposition, Distraction, Cessation of Activity  Pain Rating Post-Intervention  1: 0/10      Objective:     Communicated with Shanell Hill RN prior to session.  Patient found supine with blood pressure cuff, Tracheostomy, ventilator, CPAP, PEG Tube, peripheral IV upon PT entry to room.     General Precautions: Standard, fall, aspiration   Orthopedic Precautions: (recent left radial fracture)   Braces: N/A  Respiratory Status:  CPAP via vent/trach     Functional Mobility:  Bed Mobility:     Rolling Left:  dependence  Rolling Right: dependence  Supine to Sit: dependence  Sit to Supine: dependence  Transfers:     Sit to Stand:  unable      AM-PAC 6 CLICK MOBILITY  Turning over in bed (including adjusting bedclothes, sheets and blankets)?: 2  Sitting down on and standing up from a chair with arms (e.g., wheelchair, bedside commode, etc.): 1  Moving from lying on back to sitting on the side of the bed?: 2  Moving to and from a bed to a chair (including a wheelchair)?: 1  Need to walk in hospital room?: 1  Climbing 3-5 steps with a railing?: 1  Basic Mobility Total Score: 8       Therapeutic Activities and Exercises:   Multiple rolls for hygiene and linen change- dependent  Sitting EOB x 15 minutes with displacement from and return to midline. Patient spontaneously attempting to stabilize self with R UE but poor trunk activation and inability to control trunk. Post/left LOB. Reaching/elbow support and back to midline/trunk rotation activities    Knee flexion stretch in sitting  PROM B LE to maintain joint integrity. Patient much more limited with ROM on R LE than L LE    Patient left HOB elevated with all lines intact and Shanell Hill RN present..    GOALS:   Multidisciplinary Problems       Physical Therapy Goals          Problem: Physical Therapy    Goal Priority Disciplines Outcome Goal Variances Interventions   Physical Therapy Goal     PT, PT/OT Ongoing, Progressing     Description: Short Term Goals to be met by 9/6/2022    Patient will increase functional independence and safety with  mobility by performing    1.   Rolling right Moderate Assist; roll left Moderate Assist  2.   Supine to sit Moderate Assist  3.   Sitting balance edge of the bed x 15 minutes Minimal Assist  4.   Sit to stand Maximal Assist  using manual assistance with gait belt  5.   Bed to chair Maximal Assist using manual assistance with gait belt  6.   Lower extremity exercises x 30 reps with assist as necessary and no rest breaks      Long Term Goals to be met by 10/24/2022    Patient to require less than or equal to Minimal Assist for functional mobility to ease caregiver burden                        Time Tracking:     PT Received On: 08/28/22  PT Start Time: 0926     PT Stop Time: 1008  PT Total Time (min): 42 min     Billable Minutes: Therapeutic Activity 30 minutes    Treatment Type: Treatment  PT/PTA: PT     PTA Visit Number: 0     08/28/2022

## 2022-08-28 NOTE — PLAN OF CARE
Problem: Adult Inpatient Plan of Care  Goal: Plan of Care Review  8/28/2022 1717 by Shanell Hill RN  Outcome: Ongoing, Progressing  8/28/2022 1039 by Shanell Hill RN  Outcome: Ongoing, Progressing  Goal: Patient-Specific Goal (Individualized)  8/28/2022 1717 by Shanell Hill RN  Outcome: Ongoing, Progressing  8/28/2022 1039 by Shanell Hill RN  Outcome: Ongoing, Progressing  Goal: Absence of Hospital-Acquired Illness or Injury  8/28/2022 1717 by Shanell Hill RN  Outcome: Ongoing, Progressing  8/28/2022 1039 by Shanell Hill RN  Outcome: Ongoing, Progressing  Goal: Optimal Comfort and Wellbeing  8/28/2022 1717 by Shanell Hill RN  Outcome: Ongoing, Progressing  8/28/2022 1039 by Shanell Hill RN  Outcome: Ongoing, Progressing  Goal: Readiness for Transition of Care  8/28/2022 1717 by Shanell Hill RN  Outcome: Ongoing, Progressing  8/28/2022 1039 by Shanell Hill RN  Outcome: Ongoing, Progressing     Problem: Fall Injury Risk  Goal: Absence of Fall and Fall-Related Injury  8/28/2022 1717 by Shanell Hill RN  Outcome: Ongoing, Progressing  8/28/2022 1039 by Shanell Hill RN  Outcome: Ongoing, Progressing     Problem: Skin Injury Risk Increased  Goal: Skin Health and Integrity  8/28/2022 1717 by Shanell Hill RN  Outcome: Ongoing, Progressing  8/28/2022 1039 by Shanell Hill RN  Outcome: Ongoing, Progressing     Problem: Infection  Goal: Absence of Infection Signs and Symptoms  8/28/2022 1717 by Shanell Hill RN  Outcome: Ongoing, Progressing  8/28/2022 1039 by Shanell Hill RN  Outcome: Ongoing, Progressing     Problem: Diabetes Comorbidity  Goal: Blood Glucose Level Within Targeted Range  8/28/2022 1717 by Shanell Hill RN  Outcome: Ongoing, Progressing  8/28/2022 1039 by Shanell Hill RN  Outcome: Ongoing, Progressing     Problem: Fluid and Electrolyte Imbalance (Acute Kidney Injury/Impairment)  Goal: Fluid and Electrolyte Balance  8/28/2022 1717 by Shanell Hill RN  Outcome: Ongoing,  Progressing  8/28/2022 1039 by Shanell Hill RN  Outcome: Ongoing, Progressing     Problem: Oral Intake Inadequate (Acute Kidney Injury/Impairment)  Goal: Optimal Nutrition Intake  8/28/2022 1717 by Shanell Hill RN  Outcome: Ongoing, Progressing  8/28/2022 1039 by Shanell Hill RN  Outcome: Ongoing, Progressing     Problem: Renal Function Impairment (Acute Kidney Injury/Impairment)  Goal: Effective Renal Function  8/28/2022 1717 by Shanell Hill RN  Outcome: Ongoing, Progressing  8/28/2022 1039 by Shanell Hill RN  Outcome: Ongoing, Progressing     Problem: Impaired Wound Healing  Goal: Optimal Wound Healing  8/28/2022 1717 by Shanell Hill RN  Outcome: Ongoing, Progressing  8/28/2022 1039 by Shanell Hill RN  Outcome: Ongoing, Progressing     Problem: Communication Impairment (Mechanical Ventilation, Invasive)  Goal: Effective Communication  8/28/2022 1717 by Shanell Hill RN  Outcome: Ongoing, Progressing  8/28/2022 1039 by Shanell Hill RN  Outcome: Ongoing, Progressing     Problem: Device-Related Complication Risk (Mechanical Ventilation, Invasive)  Goal: Optimal Device Function  8/28/2022 1717 by Shanell Hill RN  Outcome: Ongoing, Progressing  8/28/2022 1039 by Shanell Hill RN  Outcome: Ongoing, Progressing     Problem: Inability to Wean (Mechanical Ventilation, Invasive)  Goal: Mechanical Ventilation Liberation  8/28/2022 1717 by Shanell Hill RN  Outcome: Ongoing, Progressing  8/28/2022 1039 by Shanell Hill RN  Outcome: Ongoing, Progressing     Problem: Nutrition Impairment (Mechanical Ventilation, Invasive)  Goal: Optimal Nutrition Delivery  8/28/2022 1717 by Shanell Hill RN  Outcome: Ongoing, Progressing  8/28/2022 1039 by Shanell Hill RN  Outcome: Ongoing, Progressing     Problem: Skin and Tissue Injury (Mechanical Ventilation, Invasive)  Goal: Absence of Device-Related Skin and Tissue Injury  8/28/2022 1717 by Shanell Hill RN  Outcome: Ongoing, Progressing  8/28/2022 1039 by Shanell ROPER  MIKEY Hill  Outcome: Ongoing, Progressing     Problem: Ventilator-Induced Lung Injury (Mechanical Ventilation, Invasive)  Goal: Absence of Ventilator-Induced Lung Injury  8/28/2022 1717 by Shanell Hill RN  Outcome: Ongoing, Progressing  8/28/2022 1039 by Shanell Hill RN  Outcome: Ongoing, Progressing     Problem: Communication Impairment (Artificial Airway)  Goal: Effective Communication  8/28/2022 1717 by Shanell Hill RN  Outcome: Ongoing, Progressing  8/28/2022 1039 by Shanell Hill RN  Outcome: Ongoing, Progressing     Problem: Device-Related Complication Risk (Artificial Airway)  Goal: Optimal Device Function  8/28/2022 1717 by Shanell Hill RN  Outcome: Ongoing, Progressing  8/28/2022 1039 by Shanell Hill RN  Outcome: Ongoing, Progressing     Problem: Skin and Tissue Injury (Artificial Airway)  Goal: Absence of Device-Related Skin or Tissue Injury  8/28/2022 1717 by Shanell Hill RN  Outcome: Ongoing, Progressing  8/28/2022 1039 by Shanell Hill RN  Outcome: Ongoing, Progressing     Problem: Noninvasive Ventilation Acute  Goal: Effective Unassisted Ventilation and Oxygenation  8/28/2022 1717 by Shanell Hill RN  Outcome: Ongoing, Progressing  8/28/2022 1039 by Shanell Hill RN  Outcome: Ongoing, Progressing     Problem: Device-Related Complication Risk (Hemodialysis)  Goal: Safe, Effective Therapy Delivery  8/28/2022 1717 by Shanell Hill RN  Outcome: Ongoing, Progressing  8/28/2022 1039 by Shanell Hill RN  Outcome: Ongoing, Progressing     Problem: Hemodynamic Instability (Hemodialysis)  Goal: Effective Tissue Perfusion  8/28/2022 1717 by Shanell Hill RN  Outcome: Ongoing, Progressing  8/28/2022 1039 by Shanell Hill RN  Outcome: Ongoing, Progressing     Problem: Infection (Hemodialysis)  Goal: Absence of Infection Signs and Symptoms  8/28/2022 1717 by Shanell Hill RN  Outcome: Ongoing, Progressing  8/28/2022 1039 by Shanell Hill RN  Outcome: Ongoing, Progressing

## 2022-08-28 NOTE — SUBJECTIVE & OBJECTIVE
Interval History:  He is nonverbal.  Tracheostomy present.  He is on no pressors.    Review of patient's allergies indicates:  Not on File  Current Facility-Administered Medications   Medication Frequency    0.9%  NaCl infusion PRN    0.9%  NaCl infusion PRN    acetaminophen tablet 650 mg Q6H PRN    albuterol-ipratropium 2.5 mg-0.5 mg/3 mL nebulizer solution 3 mL Q12H    apixaban tablet 2.5 mg BID    aspirin chewable tablet 81 mg Daily    atorvastatin tablet 40 mg QHS    carvediloL tablet 25 mg BID    collagenase ointment Daily PRN    dextrose 50% injection 12.5 g PRN    doxepin capsule 25 mg QHS    ferrous sulfate tablet 1 each Daily    glucagon (human recombinant) injection 1 mg PRN    heparin (porcine) injection 4,000 Units PRN    insulin aspart U-100 injection 1-10 Units PRN    mupirocin 2 % ointment BID    pantoprazole suspension 40 mg Daily    polyethylene glycol packet 17 g Daily PRN    senna-docusate 8.6-50 mg per tablet 1 tablet Daily PRN    venlafaxine tablet 37.5 mg BID       Objective:     Vital Signs (Most Recent):  Temp: 98.3 °F (36.8 °C) (08/27/22 1500)  Pulse: 66 (08/27/22 2002)  Resp: (!) 23 (08/27/22 2002)  BP: (!) 104/48 (08/27/22 2028)  SpO2: 99 % (08/27/22 2002)  O2 Device (Oxygen Therapy): ventilator (08/27/22 2002)   Vital Signs (24h Range):  Temp:  [97.1 °F (36.2 °C)-98.5 °F (36.9 °C)] 98.3 °F (36.8 °C)  Pulse:  [65-86] 66  Resp:  [10-99] 23  SpO2:  [25 %-100 %] 99 %  BP: ()/(33-52) 104/48     Weight: 94.5 kg (208 lb 5.4 oz) (08/27/22 0400)  Body mass index is 26.75 kg/m².  Body surface area is 2.22 meters squared.    I/O last 3 completed shifts:  In: 2040 [NG/GT:2040]  Out: 2000 [Other:2000]    Physical Exam  Constitutional:       Appearance: He is ill-appearing.   Neck:      Comments: Tracheostomy present  Cardiovascular:      Rate and Rhythm: Normal rate and regular rhythm.   Pulmonary:      Breath sounds: No wheezing or rales.   Abdominal:      Tenderness: There is no abdominal  tenderness. There is no guarding.   Musculoskeletal:      Right lower leg: Edema present.      Left lower leg: Edema present.       Significant Labs:  BMP:   Recent Labs   Lab 08/26/22  0319   *      K 4.4      CO2 26   BUN 76*   CREATININE 3.41*   CALCIUM 7.5*        Significant Imaging:

## 2022-08-28 NOTE — ASSESSMENT & PLAN NOTE
Failed extubated post CABG (7/18)  Now with trach and peg     8/28- increase to PSV 12 hours - change trach tomorrow

## 2022-08-28 NOTE — PT/OT/SLP PROGRESS
Occupational Therapy   Treatment    Name: Gregory Stuart  MRN: [id number]  Admitting Diagnosis:  Acute hypoxemic respiratory failure       Recommendations:     Discharge Recommendations: intermediate care facility/nursing home, nursing facility, skilled, rehabilitation facility  Discharge Equipment Recommendations:   (to be determined)  Barriers to discharge:  None    Assessment:     Gregory Stuart is a 77 y.o. male with a medical diagnosis of Acute hypoxemic respiratory failure.  He presents with alert, but very stiff and difficulty moving his limbs. Performance deficits affecting function are weakness, decreased safety awareness, decreased upper extremity function, decreased lower extremity function, decreased ROM, pain, impaired balance, impaired cardiopulmonary response to activity, impaired endurance, impaired functional mobility, impaired self care skills, impaired coordination, impaired fine motor.     Rehab Prognosis:  Good and Fair; patient would benefit from acute skilled OT services to address these deficits and reach maximum level of function.       Plan:     Patient to be seen 5 x/week to address the above listed problems via self-care/home management, therapeutic activities, therapeutic exercises  Plan of Care Expires: 09/27/22  Plan of Care Reviewed with: patient    Subjective     Pain/Comfort:  Pain Rating 1: 0/10 (at rest but up to 6/10 with mobility and sitting up EOB)  Location - Orientation 1: generalized  Pain Addressed 1: Reposition, Distraction, Cessation of Activity  Pain Rating Post-Intervention 1: 0/10    Objective:     Communicated with: MIKEY Hill prior to session.  Patient found HOB elevated with blood pressure cuff, Tracheostomy, ventilator, CPAP, PEG Tube, peripheral IV, SCD upon OT entry to room.    General Precautions: Standard, fall, respiratory, aspiration   Orthopedic Precautions: (recent (L) radial fx)   Braces: N/A  Respiratory Status:  on CPAP settings on vent via  tracheostomy     Occupational Performance:     Bed Mobility:    Patient completed Supine to Sit with maximal assistance and 2 persons  Patient completed Sit to Supine with maximal assistance and 2 persons     Functional Mobility/Transfers:  NT    Activities of Daily Living:  NT- pt unable      [facility] 6 Click ADL: 6    Treatment & Education:  Pt sat EOB x 15 min with mod to max(A) for static sitting, heavy cues to lean forward. Pt performed elbow down WB to each side with max(A)  to increase trunk movement and flexiblity and enable pt to learn how to make corrections when beginning to lean. Pt also performed forward rocking and leaning back to engage abdomen and back for improved sitting. Pt attempted trunk trist, but had much difficulty with his as he is very stiff.     Pt performed AAROM with (R) UE inconsistently. Pt occasionally resisted AAROM. Each movememnt was performed for 10 to 15 reps:  Shoulder flexion  Elbow flexion/extension  Low level abd/adduction  Elbow flexion/extension  Supination/pronation  Hand flexion/extension    (B) UE were supported on pillows with fingers extended.    Patient left HOB elevated with all lines intact, call button in reach, bed alarm on, and RN Shanell Hill notified    GOALS:   Multidisciplinary Problems       Occupational Therapy Goals          Problem: Occupational Therapy    Goal Priority Disciplines Outcome Interventions   Occupational Therapy Goal     OT, PT/OT Ongoing, Progressing    Description: ST. Pt will follow simple one step command  2.Pt will perform grooming with mod a  3. Pt will sit EOB x 10 min with mod a  4. Pt will andre gown with mod a  5. Pt will t/f bed/chair with mod a x 2  6. Pt will tolerate 15 min of tx      LTG: Restore to Max I with self care and mobility.                         Time Tracking:     OT Date of Treatment: 22  OT Start Time: 933  OT Stop Time:   OT Total Time (min): 36 min    Billable Minutes:Therapeutic Activity 15  min    OT/ABIGAIL: OT          8/28/2022

## 2022-08-29 PROBLEM — L89.150 PRESSURE INJURY OF SACRAL REGION, UNSTAGEABLE: Status: ACTIVE | Noted: 2022-01-01

## 2022-08-29 NOTE — PROGRESS NOTES
[facility]  Adult Nutrition  Follow-up Note         Reason for Assessment  Reason For Assessment: RD follow-up  for tube feeding review  Nutrition Risk Screen: tube feeding or parenteral nutrition, large or nonhealing wound, burn or pressure injury     Patient seen today for tube feeding review. Potassium elevated at 6.0. Patient with dx of ESRD with dialysis. Recommend changing tube feeding to a more specific formula to meet his needs for renal dysfunction. Recommend Nepro @46ml with 25ml free water flush and continue with darwin.     Malnutrition  Is Patient Malnourished: No  Skin Integrity  Donnie Risk Assessment  Sensory Perception: 3-->slightly limited  Moisture: 3-->occasionally moist  Activity: 1-->bedfast  Mobility: 1-->completely immobile  Nutrition: 3-->adequate  Friction and Shear: 1-->problem  Donnie Score: 12    Nutrition Diagnosis    Altered Nutrition Related Lab Values   related to Renal dysfunction as evidenced by ESRD    Nutrition Diagnosis Status: Chronic/ continues      Comments: Recommend changing tube feeding to renal formula  Nutrition Risk  Level of Risk/Frequency of Follow-up: moderate - high  Comments on nutrition risk: elevated potassium and recommended tube feeding change  Recent Labs   Lab 08/29/22  0621 08/29/22  0624 08/29/22  1210   *  --   --    POCGLU  --    < > 143*    < > = values in this interval not displayed.     Comments on Glucose: elevated due to diabetes  Nutrition Prescription / Recommendations  Recommendation/Intervention: Recommend changing tube feeding to a renal/ dialysis formula due to renal labs.  Nepro @ 46ml/h with 25ml free water flush every hour.  Goals: labs WNL, weight miantenance, tolerance to TF  Nutrition Goal Status: new  Communication of RD Recs: reviewed with physician (recommendations sent to MD via secure chat)  Current Diet Order: Recommend Nepro @ 46ml/h with 25ml free water flush every hour  Current Nutrition  Support Formula Ordered: Nepro  Current Nutrition Support Rate Ordered: 46 (ml)  Current Nutrition Support Frequency Ordered: hourly  Oral Nutrition Supplement: Sarthak  Chewing or Swallowing Difficulty?: Swallowing difficulty  Recommended Diet: Renal (High Protein)  Recommended Oral Supplement: No Oral Supplements  Is Nutrition Support Recommended: No  Is Education Recommended: No  Monitor and Evaluation  % current Intake: Other: suggested new enteral order  % intake to meet estimated needs: Enteral Nutrition   Food and Nutrient Intake: enteral nutrition intake  Food and Nutrient Adminstration: enteral and parenteral nutrition administration  Anthropometric Measurements: weight, weight change, body mass index  Biochemical Data, Medical Tests and Procedures: glucose/endocrine profile, gastrointestinal profile, lipid profile, inflammatory profile, electrolyte and renal panel  Enteral Calories (kcal): 1987  Enteral Protein (gm): 89  Enteral (Free Water) Fluid (mL): 803  Free Water Flush Fluid (mL): 600  % Kcal Needs: 100  % Protein Needs: 100 (with Sarthak added)  Total Fluid Intake (mL): 1403  Protein Required:  (94g pro with Sarthak)  Current Medical Diagnosis and Past Medical History  Diagnosis: cardiac disease, pulmonary disease, renal disease, diabetes diagnosis/complications  Past Medical History:   Diagnosis Date    Anticoagulant long-term use     Cancer     CHF (congestive heart failure)     Coronary artery disease     Diabetes mellitus     Hypertension     Stroke      Nutrition/Diet History  Spiritual, Cultural Beliefs, Gnosticist Practices, Values that Affect Care: no  Food Allergies: NKFA  Factors Affecting Nutritional Intake: None identified at this time  Lab/Procedures/Meds  Recent Labs   Lab 08/29/22  0621   *   K 6.0*   BUN 98*   CREATININE 4.50*   CALCIUM 8.1*   CL 98     Last A1c:   Lab Results   Component Value Date    HGBA1C 5.3 08/23/2022     Lab Results   Component Value Date    RBC 2.32 (L)  "08/29/2022    HGB 7.0 (L) 08/29/2022    HCT 23.0 (L) 08/29/2022    MCV 99.1 (H) 08/29/2022    MCH 30.2 08/29/2022    MCHC 30.4 (L) 08/29/2022     Pertinent Labs Reviewed: reviewed  Pertinent Labs Comments: Glucose 113 (only lab available)  Pertinent Medications Reviewed: reviewed  Pertinent Medications Comments: albuterol  Anthropometrics  Temp: 98.6 °F (37 °C)  Height: 6' 2" (188 cm)  Height (inches): 74 in  Weight Method: Bed Scale  Weight: 94.5 kg (208 lb 5.4 oz)  Weight (lb): 208.34 lb  Ideal Body Weight (IBW), Male: 190 lb  % Ideal Body Weight, Male (lb): 108.72 %  BMI (Calculated): 26.7  BMI Grade: 25 - 29.9 - overweight     Estimated/Assessed Needs  Total Ve: 10 mL Temp: 98.6 °F (37 °C)Oral  Weight Used For Calorie Calculations: 93.7 kg (206 lb 9.1 oz)   Energy Need Method: Kcal/kg Energy Calorie Requirements (kcal): 3990-1084 (25-30cals/kg)  Weight Used For Protein Calculations: 93.7 kg (206 lb 9.1 oz)  Protein Requirements: 112-140g/d  Estimated Fluid Requirement Method: RDA Method    RDA Method (mL): 2342     Nutrition by Nursing  Diet/Nutrition Received: tube feeding  Intake (%): other (see comments) (via peg)  Diet/Feeding Assistance: other (see comments)  Diet/Feeding Tolerance: good  Last Bowel Movement: 08/28/22       Gastrostomy/Enterostomy 08/23/22 1030 LUQ-Feeding Type: continuous, by pump       Gastrostomy/Enterostomy 08/23/22 1030 LUQ-Current Rate (mL/hr): 55 mL/hr       Gastrostomy/Enterostomy 08/23/22 1030 LUQ-Goal Rate (mL/hr): 55 mL/hr       Gastrostomy/Enterostomy 08/23/22 1030 LUQ-Formula Name: glucerna 1.5  Nutrition Follow-Up  RD Follow-up?: Yes     "

## 2022-08-29 NOTE — SUBJECTIVE & OBJECTIVE
Interval History: No acute events overnight. The patient is currently resting comfortably. He remains on the ventilator this am. He is doing well with his weaning trials. He is afebrile and vital signs are stable.    Objective:     Vital Signs (Most Recent):  Temp: 97.6 °F (36.4 °C) (08/29/22 0736)  Pulse: 67 (08/29/22 0815)  Resp: 18 (08/29/22 0815)  BP: (!) 100/52 (08/29/22 0826)  SpO2: 98 % (08/29/22 0815)   Vital Signs (24h Range):  Temp:  [97.6 °F (36.4 °C)-98.5 °F (36.9 °C)] 97.6 °F (36.4 °C)  Pulse:  [57-72] 67  Resp:  [13-23] 18  SpO2:  [98 %-100 %] 98 %  BP: ()/(35-78) 100/52     Weight: 94.5 kg (208 lb 5.4 oz)  Body mass index is 26.75 kg/m².    No intake or output data in the 24 hours ending 08/29/22 1121      Physical Exam  Vitals reviewed.   Constitutional:       General: He is not in acute distress.     Appearance: He is ill-appearing.   HENT:      Head: Normocephalic and atraumatic.      Right Ear: External ear normal.      Left Ear: External ear normal.      Mouth/Throat:      Mouth: Mucous membranes are moist.   Eyes:      Extraocular Movements: Extraocular movements intact.      Conjunctiva/sclera: Conjunctivae normal.   Neck:      Comments: Trach in place with thick secretions  Cardiovascular:      Rate and Rhythm: Normal rate. Rhythm irregular.      Pulses: Normal pulses.   Pulmonary:      Breath sounds: Rhonchi present. No wheezing or rales.   Abdominal:      General: Bowel sounds are normal.      Palpations: Abdomen is soft.   Musculoskeletal:         General: Swelling present.      Right lower leg: Edema present.      Left lower leg: Edema present.      Comments: Edema to upper and lower ext. Worse in left arm than right -Hx radial fx; family states cast was removed due to edema    Skin:     General: Skin is warm and dry.   Neurological:      Mental Status: He is alert.       Vents:  Vent Mode: CPAP PSV (08/29/22 0550)  Ventilator Initiated: Yes (08/23/22 1024)  Set Rate: 12 BPM  (08/29/22 0338)  Vt Set: 550 mL (08/29/22 0338)  Pressure Support: 10 cmH20 (08/29/22 0550)  PEEP/CPAP: 5 cmH20 (08/29/22 0550)  Oxygen Concentration (%): 40 (08/29/22 0550)  Peak Airway Pressure: 26 cmH2O (08/29/22 0550)  Total Ve: 10 mL (08/29/22 0550)  F/VT Ratio<105 (RSBI): (!) 40.51 (08/29/22 0338)    Lines/Drains/Airways       Central Venous Catheter Line  Duration                  Hemodialysis Catheter right subclavian -- days              Drain  Duration                  Gastrostomy/Enterostomy 08/23/22 1030 LUQ 6 days              Airway  Duration                  Surgical Airway Shiley Cuffed -- days              Peripheral Intravenous Line  Duration                  Peripheral IV - Single Lumen 08/23/22 1422 20 G Anterior;Right Forearm 5 days                    Significant Labs:    CBC/Anemia Profile:  Recent Labs   Lab 08/29/22  0621   WBC 20.65*   HGB 7.0*   HCT 23.0*      MCV 99.1*   RDW 14.5          Chemistries:  Recent Labs   Lab 08/29/22  0621   *   K 6.0*   CL 98   CO2 28   BUN 98*   CREATININE 4.50*   CALCIUM 8.1*         All pertinent labs within the past 24 hours have been reviewed.    Significant Imaging:  I have reviewed all pertinent imaging results/findings within the past 24 hours.

## 2022-08-29 NOTE — PT/OT/SLP PROGRESS
Physical Therapy Treatment    Patient Name:  Gregory Stuart   MRN:  [id number]    Recommendations:     Discharge Recommendations:  intermediate care facility/nursing home, nursing facility, skilled, rehabilitation facility   Discharge Equipment Recommendations: other (see comments) (to be determined)   Barriers to discharge:  vent weaning, ongoing medical treatment; decreased functional mobility    Assessment:     Gregory Stuart is a 77 y.o. male admitted with a medical diagnosis of Acute hypoxemic respiratory failure.  He presents with the following impairments/functional limitations:  weakness, impaired endurance, impaired self care skills, impaired functional mobility, impaired balance, impaired cognition, decreased upper extremity function, decreased lower extremity function, decreased safety awareness, pain, impaired cardiopulmonary response to activity, edema, impaired skin .    Patient able to briefly stabilize self sitting EOB before tendency to trunk extension caused pt to overbalance posteriorly. Poor trunk activation to prevent LOB. 24/7 care will be required when pt ready to d/c from LTACH.    Rehab Prognosis: Fair; patient would benefit from acute skilled PT services to address these deficits and reach maximum level of function.    Recent Surgery: * No surgery found *      Plan:     During this hospitalization, patient to be seen 5 x/week to address the identified rehab impairments via gait training, therapeutic activities, therapeutic exercises, neuromuscular re-education and progress toward the following goals:    Plan of Care Expires:  09/24/22    Subjective     Chief Complaint: resp failure; L radial fracture, CBG- poor vent weaning  Patient/Family Comments/goals: Pt alert and able to visually track intermittently. Poor command following.  Pain/Comfort:  Pain Rating 1: 0/10 (at rest but up to 6/10 Gibran Borden during  LE ROM and certain movements)  Location - Orientation 1: generalized  Pain Addressed 1:  Reposition, Distraction, Cessation of Activity  Pain Rating Post-Intervention 1: 0/10      Objective:     Communicated with Shanell Hill RN prior to session.  Patient found supine with blood pressure cuff, pulse ox (continuous), telemetry, PEG Tube, Tracheostomy, ventilator, CPAP upon PT entry to room.     General Precautions: Standard, fall, respiratory, aspiration   Orthopedic Precautions: (recent L radial fracture)   Braces: N/A  Respiratory Status:  CPAP via trach/vent     Functional Mobility:  Bed Mobility:     Rolling Left:  dependence  Rolling Right: dependence  Supine to Sit: dependence  Sit to Supine: dependence  Transfers:     Sit to Stand:  maximal assistance and of 2 persons with manual assist via gait belt; pt activating extensor tone more so than selective LE activation.      AM-PAC 6 CLICK MOBILITY  Turning over in bed (including adjusting bedclothes, sheets and blankets)?: 2  Sitting down on and standing up from a chair with arms (e.g., wheelchair, bedside commode, etc.): 2  Moving from lying on back to sitting on the side of the bed?: 2  Moving to and from a bed to a chair (including a wheelchair)?: 1  Need to walk in hospital room?: 1  Climbing 3-5 steps with a railing?: 1  Basic Mobility Total Score: 9       Therapeutic Activities and Exercises:   Sitting balance activities x 25 minutes with focus on midline stability, trunk stretching. Patient demonstrated improving midline stability as trial progressed with patient able to sustain midline with SBA x 10-15 seconds prior to extensor tone causing LOB  Sit to stand x 1 trial max A x 2  LE ROM with attempts to increase knee flexion bilaterally    Rolling for hygiene and linen change- dependent    Patient left HOB elevated with all lines intact and Shanell Hill RN/ Cassie England LPN present..    GOALS:   Multidisciplinary Problems       Physical Therapy Goals          Problem: Physical Therapy    Goal Priority Disciplines Outcome Goal Variances  Interventions   Physical Therapy Goal     PT, PT/OT Ongoing, Progressing     Description: Short Term Goals to be met by 9/6/2022    Patient will increase functional independence and safety with mobility by performing    1.   Rolling right Moderate Assist; roll left Moderate Assist  2.   Supine to sit Moderate Assist  3.   Sitting balance edge of the bed x 15 minutes Minimal Assist  4.   Sit to stand Maximal Assist  using manual assistance with gait belt  5.   Bed to chair Maximal Assist using manual assistance with gait belt  6.   Lower extremity exercises x 30 reps with assist as necessary and no rest breaks      Long Term Goals to be met by 10/24/2022    Patient to require less than or equal to Minimal Assist for functional mobility to ease caregiver burden                        Time Tracking:     PT Received On: 08/29/22  PT Start Time: 0812     PT Stop Time: 0850  PT Total Time (min): 38 min     Billable Minutes: Therapeutic Activity 30 minutes    Treatment Type: Treatment  PT/PTA: PT     PTA Visit Number: 0     08/29/2022

## 2022-08-29 NOTE — ASSESSMENT & PLAN NOTE
Failed extubated post CABG (7/18)  Now with trach and peg     8/28- increase to PSV 12 hours -   8/29- Will plan to change trach out in the next day or two--continue to increase weaning as tolerated

## 2022-08-29 NOTE — PROGRESS NOTES
[facility]  Nephrology  Progress Note    Patient Name: Gregory Stuart  MRN: [id number]  Admission Date: 8/23/2022  Hospital Length of Stay: 5 days  Attending Provider: Ham Sanchez DO   Primary Care Physician: Primary Doctor No  Principal Problem:Acute hypoxemic respiratory failure    Subjective:     HPI: This gentleman with history of CAD, CVA and renal failure.  The patient transferred from the [facility] for ventilator management and debility.  He has a history of CKD which progressed to ESRD after a CABG.  The patient has been on dialysis for about 3 weeks.  He continues to have diffuse edema.  His last dialysis session was on yesterday.  His family is at the bedside to answer questions.  Nephrology has been consulted for renal issues.      Interval History: No change in neuro status    Review of patient's allergies indicates:  Not on File  Current Facility-Administered Medications   Medication Frequency    0.9%  NaCl infusion PRN    0.9%  NaCl infusion PRN    acetaminophen tablet 650 mg Q6H PRN    albuterol-ipratropium 2.5 mg-0.5 mg/3 mL nebulizer solution 3 mL Q12H    apixaban tablet 2.5 mg BID    aspirin chewable tablet 81 mg Daily    atorvastatin tablet 40 mg QHS    carvediloL tablet 25 mg BID    collagenase ointment Daily PRN    dextrose 50% injection 12.5 g PRN    doxepin capsule 25 mg QHS    ferrous sulfate tablet 1 each Daily    glucagon (human recombinant) injection 1 mg PRN    heparin (porcine) injection 4,000 Units PRN    insulin aspart U-100 injection 1-10 Units PRN    pantoprazole suspension 40 mg Daily    polyethylene glycol packet 17 g Daily PRN    senna-docusate 8.6-50 mg per tablet 1 tablet Daily PRN    venlafaxine tablet 37.5 mg BID       Objective:     Vital Signs (Most Recent):  Temp: 97.9 °F (36.6 °C) (08/28/22 1500)  Pulse: (!) 57 (08/28/22 1950)  Resp: 16 (08/28/22 1950)  BP: (!) 106/48 (08/28/22 2021)  SpO2: 100 % (08/28/22  1950)  O2 Device (Oxygen Therapy): ventilator (08/28/22 1950)   Vital Signs (24h Range):  Temp:  [97.2 °F (36.2 °C)-99 °F (37.2 °C)] 97.9 °F (36.6 °C)  Pulse:  [57-72] 57  Resp:  [13-22] 16  SpO2:  [98 %-100 %] 100 %  BP: ()/(35-60) 106/48     Weight: 94.5 kg (208 lb 5.4 oz) (08/27/22 0400)  Body mass index is 26.75 kg/m².  Body surface area is 2.22 meters squared.    I/O last 3 completed shifts:  In: 210 [NG/GT:210]  Out: -     Physical Exam  Constitutional:       Appearance: He is ill-appearing.   Neck:      Comments: Tracheostomy present  Cardiovascular:      Rate and Rhythm: Normal rate and regular rhythm.   Pulmonary:      Breath sounds: No wheezing or rales.   Abdominal:      Tenderness: There is no abdominal tenderness. There is no guarding.   Musculoskeletal:      Right lower leg: Edema present.      Left lower leg: Edema present.       Significant Labs:  BMP:   Recent Labs   Lab 08/26/22  0319   *      K 4.4      CO2 26   BUN 76*   CREATININE 3.41*   CALCIUM 7.5*     CBC:   Recent Labs   Lab 08/26/22  0320   WBC 18.59*   RBC 2.54*   HGB 7.6*   HCT 24.0*      MCV 94.5   MCH 29.9   MCHC 31.7*        Significant Imaging:      Assessment/Plan:     * Acute hypoxemic respiratory failure  On ventilator via tracheostomy    DM (diabetes mellitus)  Underlying condition    JUVENTINO (acute kidney injury)  He was dialyzed Friday    CAD (coronary artery disease)  Chronic condition        Thank you for your consult.     South Lim MD  Nephrology  [facility]

## 2022-08-29 NOTE — PLAN OF CARE
Problem: Communication Impairment (Mechanical Ventilation, Invasive)  Goal: Effective Communication  Outcome: Ongoing, Progressing     Problem: Device-Related Complication Risk (Mechanical Ventilation, Invasive)  Goal: Optimal Device Function  Outcome: Ongoing, Progressing     Problem: Inability to Wean (Mechanical Ventilation, Invasive)  Goal: Mechanical Ventilation Liberation  Outcome: Ongoing, Progressing     Problem: Skin and Tissue Injury (Mechanical Ventilation, Invasive)  Goal: Absence of Device-Related Skin and Tissue Injury  Outcome: Ongoing, Progressing     Problem: Ventilator-Induced Lung Injury (Mechanical Ventilation, Invasive)  Goal: Absence of Ventilator-Induced Lung Injury  Outcome: Ongoing, Progressing     Problem: Communication Impairment (Artificial Airway)  Goal: Effective Communication  Outcome: Ongoing, Progressing     Problem: Device-Related Complication Risk (Artificial Airway)  Goal: Optimal Device Function  Outcome: Ongoing, Progressing     Problem: Skin and Tissue Injury (Artificial Airway)  Goal: Absence of Device-Related Skin or Tissue Injury  Outcome: Ongoing, Progressing     Problem: Noninvasive Ventilation Acute  Goal: Effective Unassisted Ventilation and Oxygenation  Outcome: Ongoing, Progressing     Problem: Device-Related Complication Risk (Hemodialysis)  Goal: Safe, Effective Therapy Delivery  Outcome: Ongoing, Progressing

## 2022-08-29 NOTE — PROGRESS NOTES
[facility] [place]  Pulmonology  Progress Note    Patient Name: Gregory Stuart  MRN: [id number]  Admission Date: 8/23/2022  Hospital Length of Stay: 6 days  Code Status: No Order  Attending Provider: Ham Sanchez DO  Primary Care Provider: Primary Doctor No   Principal Problem: Acute hypoxemic respiratory failure    Subjective:     Interval History: No acute events overnight. The patient is currently resting comfortably. He remains on the ventilator this am. He is doing well with his weaning trials. He is afebrile and vital signs are stable.    Objective:     Vital Signs (Most Recent):  Temp: 97.6 °F (36.4 °C) (08/29/22 0736)  Pulse: 67 (08/29/22 0815)  Resp: 18 (08/29/22 0815)  BP: (!) 100/52 (08/29/22 0826)  SpO2: 98 % (08/29/22 0815)   Vital Signs (24h Range):  Temp:  [97.6 °F (36.4 °C)-98.5 °F (36.9 °C)] 97.6 °F (36.4 °C)  Pulse:  [57-72] 67  Resp:  [13-23] 18  SpO2:  [98 %-100 %] 98 %  BP: ()/(35-78) 100/52     Weight: 94.5 kg (208 lb 5.4 oz)  Body mass index is 26.75 kg/m².    No intake or output data in the 24 hours ending 08/29/22 1121      Physical Exam  Vitals reviewed.   Constitutional:       General: He is not in acute distress.     Appearance: He is ill-appearing.   HENT:      Head: Normocephalic and atraumatic.      Right Ear: External ear normal.      Left Ear: External ear normal.      Mouth/Throat:      Mouth: Mucous membranes are moist.   Eyes:      Extraocular Movements: Extraocular movements intact.      Conjunctiva/sclera: Conjunctivae normal.   Neck:      Comments: Trach in place with thick secretions  Cardiovascular:      Rate and Rhythm: Normal rate. Rhythm irregular.      Pulses: Normal pulses.   Pulmonary:      Breath sounds: Rhonchi present. No wheezing or rales.   Abdominal:      General: Bowel sounds are normal.      Palpations: Abdomen is soft.   Musculoskeletal:         General: Swelling present.      Right lower leg: Edema present.      Left lower  leg: Edema present.      Comments: Edema to upper and lower ext. Worse in left arm than right -Hx radial fx; family states cast was removed due to edema    Skin:     General: Skin is warm and dry.   Neurological:      Mental Status: He is alert.       Vents:  Vent Mode: CPAP PSV (08/29/22 0550)  Ventilator Initiated: Yes (08/23/22 1024)  Set Rate: 12 BPM (08/29/22 0338)  Vt Set: 550 mL (08/29/22 0338)  Pressure Support: 10 cmH20 (08/29/22 0550)  PEEP/CPAP: 5 cmH20 (08/29/22 0550)  Oxygen Concentration (%): 40 (08/29/22 0550)  Peak Airway Pressure: 26 cmH2O (08/29/22 0550)  Total Ve: 10 mL (08/29/22 0550)  F/VT Ratio<105 (RSBI): (!) 40.51 (08/29/22 0338)    Lines/Drains/Airways       Central Venous Catheter Line  Duration                  Hemodialysis Catheter right subclavian -- days              Drain  Duration                  Gastrostomy/Enterostomy 08/23/22 1030 LUQ 6 days              Airway  Duration                  Surgical Airway Shiley Cuffed -- days              Peripheral Intravenous Line  Duration                  Peripheral IV - Single Lumen 08/23/22 1422 20 G Anterior;Right Forearm 5 days                    Significant Labs:    CBC/Anemia Profile:  Recent Labs   Lab 08/29/22  0621   WBC 20.65*   HGB 7.0*   HCT 23.0*      MCV 99.1*   RDW 14.5          Chemistries:  Recent Labs   Lab 08/29/22  0621   *   K 6.0*   CL 98   CO2 28   BUN 98*   CREATININE 4.50*   CALCIUM 8.1*         All pertinent labs within the past 24 hours have been reviewed.    Significant Imaging:  I have reviewed all pertinent imaging results/findings within the past 24 hours.    Assessment/Plan:     * Acute hypoxemic respiratory failure  Failed extubated post CABG (7/18)  Now with trach and peg     8/28- increase to PSV 12 hours -   8/29- Will plan to change trach out in the next day or two--continue to increase weaning as tolerated      Alteration in nutrition  S/p peg tube placement   Resume tube feeding    Weakness  acquired in ICU  Extreme generalized weakness - continue PT/OT as tolerated      Left radial fracture  7/12/22--- per family he did not require surgical repair - he did have a split/cast in place but this was removed due to edema     DM (diabetes mellitus)  accuchecks and Sliding scale insulin   A1C 5.3%  Currently on sliding scale  Glucose<180    Anemia  Likely due to critical illness   Most recent H/H 7/23  - repeat labs Monday and fridays     JUVENTINO (acute kidney injury)  Cr 0.9 March 2022   - was on CCRT post CABG -- transitioned to HD M/W/F  - tunneled HD cath in place   - consult Nephrology -- did not come with porter- unsure if he was making any urine at OSH   Nephrology note reviewed and appreciated  Plan to continued MWF dialysis    CAD (coronary artery disease)  S/p CABG on 07/18    He is on aspirin, statin, beta blocker      Chronic systolic heart failure  EF 30-35% per Echo 07/2022  Daily wts/ I/Os     A-fib  No history documented prior to CABG - was on Lovenox at OSH   - resume medications once placed in computer   - irregular on telemetry--frequent pvc's  - he is on carvediolol, eliquis  - HR is currently adequate                 Ham Sanchez DO  Pulmonology  [facility]

## 2022-08-29 NOTE — PT/OT/SLP PROGRESS
Occupational Therapy   Treatment    Name: Gregory Stuart  MRN: [id number]  Admitting Diagnosis:  Acute hypoxemic respiratory failure       Recommendations:     Discharge Recommendations: intermediate care facility/nursing home, nursing facility, skilled, rehabilitation facility  Discharge Equipment Recommendations:   (to be determined)  Barriers to discharge:  None    Assessment:     Gregory Stuart is a 77 y.o. male with a medical diagnosis of Acute hypoxemic respiratory failure.  He presents with alert and agreeable to treatment. Performance deficits affecting function are decreased safety awareness, decreased upper extremity function, decreased ROM, decreased lower extremity function, pain, impaired cardiopulmonary response to activity, impaired cognition, impaired balance, impaired endurance, impaired fine motor, weakness.     Rehab Prognosis:  Good; patient would benefit from acute skilled OT services to address these deficits and reach maximum level of function.       Plan:     Patient to be seen 5 x/week to address the above listed problems via self-care/home management, therapeutic activities, therapeutic exercises  Plan of Care Expires: 08/30/22  Plan of Care Reviewed with: patient    Subjective     Pain/Comfort:  Pain Rating 1: 0/10 (up to 6/10 with sitting up EOB)  Location - Orientation 1: generalized  Pain Addressed 1: Reposition, Distraction, Cessation of Activity  Pain Rating Post-Intervention 1: 0/10    Objective:     Communicated with: MIKEY Hill prior to session.  Patient found HOB elevated with blood pressure cuff, pulse ox (continuous), telemetry, PEG Tube, Tracheostomy, ventilator, CPAP upon OT entry to room.    General Precautions: Standard, fall, respiratory, aspiration   Orthopedic Precautions: (recent (L) radial fx)   Braces: N/A  Respiratory Status:  vent via tracheostomy     Occupational Performance:     Bed Mobility:    Patient completed Supine to Sit with maximal assistance and 2  persons  Patient completed Sit to Supine with maximal assistance and 2 persons     Functional Mobility/Transfers:  Patient completed Sit <> Stand Transfer with maximal assistance and of 2 persons  with  manual assistance and gait belt   Functional Mobility: Pt unable to take a step    Activities of Daily Living:  Grooming: total assistance        [facility] 6 Click ADL: 6    Treatment & Education:  Pt sat EOB x 15 min with mod to max (A) with strong focus on increasing forward flexion.    Patient left HOB elevated with all lines intact, call button in reach, and RN Shanell Hill notified    GOALS:   Multidisciplinary Problems       Occupational Therapy Goals          Problem: Occupational Therapy    Goal Priority Disciplines Outcome Interventions   Occupational Therapy Goal     OT, PT/OT Ongoing, Progressing    Description: ST. Pt will follow simple one step command  2.Pt will perform grooming with mod a  3. Pt will sit EOB x 10 min with mod a  4. Pt will andre gown with mod a  5. Pt will t/f bed/chair with mod a x 2  6. Pt will tolerate 15 min of tx      LTG: Restore to Max I with self care and mobility.                         Time Tracking:     OT Date of Treatment: 22  OT Start Time: 813  OT Stop Time: 850  OT Total Time (min): 37 min    Billable Minutes:Therapeutic Activity 15 min    OT/ABIGAIL: OT          2022

## 2022-08-29 NOTE — SUBJECTIVE & OBJECTIVE
Interval History: No change in neuro status    Review of patient's allergies indicates:  Not on File  Current Facility-Administered Medications   Medication Frequency    0.9%  NaCl infusion PRN    0.9%  NaCl infusion PRN    acetaminophen tablet 650 mg Q6H PRN    albuterol-ipratropium 2.5 mg-0.5 mg/3 mL nebulizer solution 3 mL Q12H    apixaban tablet 2.5 mg BID    aspirin chewable tablet 81 mg Daily    atorvastatin tablet 40 mg QHS    carvediloL tablet 25 mg BID    collagenase ointment Daily PRN    dextrose 50% injection 12.5 g PRN    doxepin capsule 25 mg QHS    ferrous sulfate tablet 1 each Daily    glucagon (human recombinant) injection 1 mg PRN    heparin (porcine) injection 4,000 Units PRN    insulin aspart U-100 injection 1-10 Units PRN    pantoprazole suspension 40 mg Daily    polyethylene glycol packet 17 g Daily PRN    senna-docusate 8.6-50 mg per tablet 1 tablet Daily PRN    venlafaxine tablet 37.5 mg BID       Objective:     Vital Signs (Most Recent):  Temp: 97.9 °F (36.6 °C) (08/28/22 1500)  Pulse: (!) 57 (08/28/22 1950)  Resp: 16 (08/28/22 1950)  BP: (!) 106/48 (08/28/22 2021)  SpO2: 100 % (08/28/22 1950)  O2 Device (Oxygen Therapy): ventilator (08/28/22 1950)   Vital Signs (24h Range):  Temp:  [97.2 °F (36.2 °C)-99 °F (37.2 °C)] 97.9 °F (36.6 °C)  Pulse:  [57-72] 57  Resp:  [13-22] 16  SpO2:  [98 %-100 %] 100 %  BP: ()/(35-60) 106/48     Weight: 94.5 kg (208 lb 5.4 oz) (08/27/22 0400)  Body mass index is 26.75 kg/m².  Body surface area is 2.22 meters squared.    I/O last 3 completed shifts:  In: 210 [NG/GT:210]  Out: -     Physical Exam  Constitutional:       Appearance: He is ill-appearing.   Neck:      Comments: Tracheostomy present  Cardiovascular:      Rate and Rhythm: Normal rate and regular rhythm.   Pulmonary:      Breath sounds: No wheezing or rales.   Abdominal:      Tenderness: There is no abdominal tenderness. There is no guarding.   Musculoskeletal:      Right lower leg: Edema present.       Left lower leg: Edema present.       Significant Labs:  BMP:   Recent Labs   Lab 08/26/22  0319   *      K 4.4      CO2 26   BUN 76*   CREATININE 3.41*   CALCIUM 7.5*     CBC:   Recent Labs   Lab 08/26/22  0320   WBC 18.59*   RBC 2.54*   HGB 7.6*   HCT 24.0*      MCV 94.5   MCH 29.9   MCHC 31.7*        Significant Imaging:

## 2022-08-30 PROBLEM — L89.890: Status: ACTIVE | Noted: 2022-01-01

## 2022-08-30 PROBLEM — T81.30XA WOUND DEHISCENCE: Status: ACTIVE | Noted: 2022-01-01

## 2022-08-30 PROBLEM — S31.109A: Status: ACTIVE | Noted: 2022-01-01

## 2022-08-30 NOTE — PT/OT/SLP PROGRESS
Occupational Therapy   Treatment    Name: Gregory Stuart  MRN: [id number]  Admitting Diagnosis:  Acute hypoxemic respiratory failure       Recommendations:     Discharge Recommendations: intermediate care facility/nursing home, nursing facility, skilled  Discharge Equipment Recommendations:   (to be determined)  Barriers to discharge:  None    Assessment:     Gregory Stuart is a 77 y.o. male with a medical diagnosis of Acute hypoxemic respiratory failure.  He presents with alert and agreeable to treatment. Performance deficits affecting function are decreased ROM, decreased lower extremity function, decreased safety awareness, decreased upper extremity function, decreased coordination, impaired balance, impaired cardiopulmonary response to activity, impaired cognition, impaired coordination, impaired endurance, impaired functional mobility, impaired self care skills.     Rehab Prognosis:  Good; patient would benefit from acute skilled OT services to address these deficits and reach maximum level of function.       Plan:     Patient to be seen 5 x/week to address the above listed problems via self-care/home management, therapeutic activities, therapeutic exercises  Plan of Care Expires: 08/30/22  Plan of Care Reviewed with: patient    Subjective     Pain/Comfort:  Pain Rating 1: 0/10 (up to 6/10 with ROM of LEs and sitting up EOB)  Location - Side 1: Bilateral  Location 1: leg  Pain Addressed 1: Reposition, Cessation of Activity, Distraction  Pain Rating Post-Intervention 1: 0/10    Objective:     Communicated with: MIKEY Hill prior to session.  Patient found HOB elevated with telemetry, PEG Tube, Tracheostomy, ventilator, blood pressure cuff, pulse ox (continuous), CPAP upon OT entry to room.    General Precautions: Standard, fall, respiratory   Orthopedic Precautions: (recent (L) radius fx)   Braces: N/A (beace for radius fx was removed at previous facility due to edema)  Respiratory Status:  vent via  tracheostomy     Occupational Performance:     Bed Mobility:    Patient completed Supine to Sit with maximal assistance and 2 persons  Patient completed Sit to Supine with maximal assistance and 2 persons     Functional Mobility/Transfers:  Patient completed Sit <> Stand Transfer with maximal assistance and of 2 persons  with  manual assistance and gait belt   Functional Mobility: Pt unable to take a step    Activities of Daily Living:  Grooming: total assistance        [facility] 6 Click ADL: 6    Treatment & Education:  Pt sat EOB x 15 min with mod to max (A) with strong focus on increasing forward flexion.  AAROM/ PROM to (R) UE through all planes x 15 reps, (L) hand flexion/extension PROM x 15 reps.    Patient left HOB elevated with all lines intact, call button in reach, and RN Shanell Hill notified    GOALS:   Multidisciplinary Problems       Occupational Therapy Goals          Problem: Occupational Therapy    Goal Priority Disciplines Outcome Interventions   Occupational Therapy Goal     OT, PT/OT Ongoing, Not Progressing    Description: ST. Pt will follow simple one step command  2.Pt will perform grooming with mod a  3. Pt will sit EOB x 10 min with mod a  4. Pt will andre gown with mod a  5. Pt will t/f bed/chair with mod a x 2  6. Pt will tolerate 15 min of tx      LTG: Restore to Max I with self care and mobility.                         Time Tracking:     OT Date of Treatment: 22  OT Start Time: 820  OT Stop Time: 853  OT Total Time (min): 33 min    Billable Minutes:Therapeutic Activity 15 min    OT/ABIGAIL: OT          2022

## 2022-08-30 NOTE — PT/OT/SLP PROGRESS
Physical Therapy Treatment    Patient Name:  Gregory Stuart   MRN:  [id number]    Recommendations:     Discharge Recommendations:  intermediate care facility/nursing home, nursing facility, skilled, rehabilitation facility   Discharge Equipment Recommendations: other (see comments) (to be determined)   Barriers to discharge:  vent weaning; 24/7 care required    Assessment:     Gregory Stuart is a 77 y.o. male admitted with a medical diagnosis of Acute hypoxemic respiratory failure.  He presents with the following impairments/functional limitations:  weakness, impaired endurance, impaired self care skills, impaired functional mobility, impaired balance, impaired cognition, decreased upper extremity function, decreased lower extremity function, decreased safety awareness, pain, impaired cardiopulmonary response to activity, edema, impaired skin .    Patient able to briefly stabilize self sitting EOB but once extensor tone causes any posterior lean, pt unable to to activate trunk to recover. LE edema and ROM restrictions interfering with foot placement to attempt sit to stand t/f.Patient will likely require 24/7 care at discharge.    Rehab Prognosis: Fair; patient would benefit from acute skilled PT services to address these deficits and reach maximum level of function.    Recent Surgery: * No surgery found *      Plan:     During this hospitalization, patient to be seen 5 x/week to address the identified rehab impairments via gait training, therapeutic activities, therapeutic exercises, neuromuscular re-education and progress toward the following goals:    Plan of Care Expires:  09/24/22    Subjective     Chief Complaint: resp failure post CABG, recent L radius fracture  Patient/Family Comments/goals: Pt will open eyes and intermittently track but not able to consistently follow commands or communicate.  Pain/Comfort:  Pain Rating 1: 0/10 (up to 6/10 with LE ROM)  Location - Side 1: Bilateral  Location 1: leg  Pain  Addressed 1: Reposition, Distraction, Cessation of Activity  Pain Rating Post-Intervention 1: 0/10      Objective:     Communicated with Shanell Hill RN prior to session.  Patient found supine with blood pressure cuff, pulse ox (continuous), telemetry, PEG Tube, Tracheostomy, ventilator, CPAP upon PT entry to room.     General Precautions: Standard, fall, respiratory, aspiration   Orthopedic Precautions: (recent L radial fracture)   Braces:    Respiratory Status:  CPAP via vent/trach     Functional Mobility:  Bed Mobility:     Rolling Left:  dependence  Rolling Right: dependence  Supine to Sit: dependence  Sit to Supine: dependence  Transfers:     Sit to Stand:  maximal assistance and of 2 persons with manual assist via gait belt      AM-PAC 6 CLICK MOBILITY  Turning over in bed (including adjusting bedclothes, sheets and blankets)?: 2  Sitting down on and standing up from a chair with arms (e.g., wheelchair, bedside commode, etc.): 2  Moving from lying on back to sitting on the side of the bed?: 2  Moving to and from a bed to a chair (including a wheelchair)?: 1  Need to walk in hospital room?: 1  Climbing 3-5 steps with a railing?: 1  Basic Mobility Total Score: 9       Therapeutic Activities and Exercises:   Sitting balance EOB x 25 minutes with focus on midline stability and trunk control during reaching outside of DANNI    PROM B LE to maintain joint integrity. Significant edema and restriction present  Sit to stand x 2 trials max A x 2; poor axial extension    Patient left HOB elevated with all lines intact, call button in reach, Shanell Hill RN notified, and Sierra Henley OT present..    GOALS:   Multidisciplinary Problems       Physical Therapy Goals          Problem: Physical Therapy    Goal Priority Disciplines Outcome Goal Variances Interventions   Physical Therapy Goal     PT, PT/OT Ongoing, Progressing     Description: Short Term Goals to be met by 9/6/2022    Patient will increase functional  independence and safety with mobility by performing    1.   Rolling right Moderate Assist; roll left Moderate Assist  2.   Supine to sit Moderate Assist  3.   Sitting balance edge of the bed x 15 minutes Minimal Assist  4.   Sit to stand Maximal Assist  using manual assistance with gait belt  5.   Bed to chair Maximal Assist using manual assistance with gait belt  6.   Lower extremity exercises x 30 reps with assist as necessary and no rest breaks      Long Term Goals to be met by 10/24/2022    Patient to require less than or equal to Minimal Assist for functional mobility to ease caregiver burden                        Time Tracking:     PT Received On: 08/30/22  PT Start Time: 0820     PT Stop Time: 0853  PT Total Time (min): 33 min     Billable Minutes: Therapeutic Activity 30 minutes    Treatment Type: Treatment  PT/PTA: PT     PTA Visit Number: 0     08/30/2022

## 2022-08-30 NOTE — PROGRESS NOTES
[facility]  Nephrology  Progress Note    Patient Name: Gregory Stuart  MRN: [id number]  Admission Date: 8/23/2022  Hospital Length of Stay: 6 days  Attending Provider: Ham Sanchez DO   Primary Care Physician: Primary Doctor No  Principal Problem:Acute hypoxemic respiratory failure    Subjective:     HPI: This gentleman with history of CAD, CVA and renal failure.  The patient transferred from the [facility] for ventilator management and debility.  He has a history of CKD which progressed to ESRD after a CABG.  The patient has been on dialysis for about 3 weeks.  He continues to have diffuse edema.  His last dialysis session was on yesterday.  His family is at the bedside to answer questions.  Nephrology has been consulted for renal issues.      Interval History: The patient is resting.  His condition is about the same.  He has groin and lower leg swelling.    Review of patient's allergies indicates:  Not on File  Current Facility-Administered Medications   Medication Frequency    0.9%  NaCl infusion PRN    acetaminophen tablet 650 mg Q6H PRN    albuterol-ipratropium 2.5 mg-0.5 mg/3 mL nebulizer solution 3 mL Q12H    apixaban tablet 2.5 mg BID    aspirin chewable tablet 81 mg Daily    atorvastatin tablet 40 mg QHS    carvediloL tablet 25 mg BID    collagenase ointment Daily PRN    dextrose 50% injection 12.5 g PRN    doxepin capsule 25 mg QHS    ferrous sulfate tablet 1 each Daily    glucagon (human recombinant) injection 1 mg PRN    heparin (porcine) injection 4,000 Units PRN    insulin aspart U-100 injection 1-10 Units PRN    pantoprazole suspension 40 mg Daily    polyethylene glycol packet 17 g Daily PRN    senna-docusate 8.6-50 mg per tablet 1 tablet Daily PRN    venlafaxine tablet 37.5 mg BID       Objective:     Vital Signs (Most Recent):  Temp: 97.6 °F (36.4 °C) (08/29/22 1500)  Pulse: 72 (08/29/22 1925)  Resp: 20 (08/29/22 1925)  BP: (!) 99/44  (08/29/22 2118)  SpO2: 100 % (08/29/22 1925)  O2 Device (Oxygen Therapy): ventilator (08/29/22 1925)   Vital Signs (24h Range):  Temp:  [97.6 °F (36.4 °C)-98.6 °F (37 °C)] 97.6 °F (36.4 °C)  Pulse:  [55-96] 72  Resp:  [11-24] 20  SpO2:  [98 %-100 %] 100 %  BP: ()/(30-78) 99/44     Weight: 94.5 kg (208 lb 5.4 oz) (08/27/22 0400)  Body mass index is 26.75 kg/m².  Body surface area is 2.22 meters squared.    I/O last 3 completed shifts:  In: 870 [NG/GT:870]  Out: 500 [Other:500]    Physical Exam  Vitals reviewed.   HENT:      Head: Normocephalic.      Mouth/Throat:      Mouth: Mucous membranes are moist.   Cardiovascular:      Rate and Rhythm: Rhythm irregular.   Abdominal:      Palpations: Abdomen is soft.   Musculoskeletal:      Right lower leg: Edema present.      Left lower leg: Edema present.   Skin:     General: Skin is warm.   Neurological:      Mental Status: He is alert.       Significant Labs:  BMP:   Recent Labs   Lab 08/29/22  0621   *   *   K 6.0*   CL 98   CO2 28   BUN 98*   CREATININE 4.50*   CALCIUM 8.1*     CBC:   Recent Labs   Lab 08/29/22  0621   WBC 20.65*   RBC 2.32*   HGB 7.0*   HCT 23.0*      MCV 99.1*   MCH 30.2   MCHC 30.4*        Significant Imaging:  Labs: Reviewed    Assessment/Plan:     Alteration in nutrition  Nutrition supplement is being adjusted.    DM (diabetes mellitus)  Underlying condition    JUVENTINO (acute kidney injury)  He was dialyzed Friday 8/29/2022Continue with scheduled hemodialysis.    CAD (coronary artery disease)  Chronic condition    A-fib  Chronic condition        Thank you for your consult. I will follow-up with patient. Please contact us if you have any additional questions.    Andres Lino Jr, MD  Nephrology  [facility]

## 2022-08-30 NOTE — ASSESSMENT & PLAN NOTE
Clean wound with vashe  Apply polymem silver max  Cover and secure with 4x4s and paper tape  Change M, W, F

## 2022-08-30 NOTE — PLAN OF CARE
Problem: Occupational Therapy  Goal: Occupational Therapy Goal  Description: ST. Pt will follow simple one step command  2.Pt will perform grooming with mod a  3. Pt will sit EOB x 10 min with mod a  4. Pt will andre gown with mod a  5. Pt will t/f bed/chair with mod a x 2  6. Pt will tolerate 15 min of tx      LTG: Restore to Max I with self care and mobility.    Outcome: Ongoing, Not Progressing   Pt not making much progress @ this time.Pt not following and not able to perform any act/task for himself at this time

## 2022-08-30 NOTE — ASSESSMENT & PLAN NOTE
Clean with vashe  Apply duoderm to wound bed  Cover and secure with abd pad and paper tape  Change M, W, F  Donnie scale and skin assessment q shift-Nursing  Wound care to assess with dressing changes  Turn every 2 hours  TAP system  Low air loss mattress

## 2022-08-30 NOTE — SUBJECTIVE & OBJECTIVE
Interval History: No acute events overnight. The patient is currently resting comfortably. He remains on the ventilator this am. He is doing well with his weaning trials. He is afebrile and vital signs are stable.    Objective:     Vital Signs (Most Recent):  Temp: 98.4 °F (36.9 °C) (08/30/22 0300)  Pulse: 70 (08/30/22 0630)  Resp: 15 (08/30/22 0630)  BP: (!) 111/44 (08/30/22 0630)  SpO2: 97 % (08/30/22 0630)   Vital Signs (24h Range):  Temp:  [97.6 °F (36.4 °C)-98.6 °F (37 °C)] 98.4 °F (36.9 °C)  Pulse:  [55-96] 70  Resp:  [11-24] 15  SpO2:  [97 %-100 %] 97 %  BP: ()/(30-56) 111/44     Weight: 94.7 kg (208 lb 12.4 oz)  Body mass index is 26.81 kg/m².      Intake/Output Summary (Last 24 hours) at 8/30/2022 0802  Last data filed at 8/30/2022 0600  Gross per 24 hour   Intake 1560 ml   Output 500 ml   Net 1060 ml         Physical Exam  Vitals reviewed.   Constitutional:       General: He is not in acute distress.     Appearance: He is ill-appearing.   HENT:      Head: Normocephalic and atraumatic.      Right Ear: External ear normal.      Left Ear: External ear normal.      Mouth/Throat:      Mouth: Mucous membranes are moist.   Eyes:      Extraocular Movements: Extraocular movements intact.      Conjunctiva/sclera: Conjunctivae normal.   Neck:      Comments: Trach in place with thick secretions  Cardiovascular:      Rate and Rhythm: Normal rate. Rhythm irregular.      Pulses: Normal pulses.   Pulmonary:      Breath sounds: Rhonchi present. No wheezing or rales.   Abdominal:      General: Bowel sounds are normal.      Palpations: Abdomen is soft.   Musculoskeletal:         General: Swelling present.      Right lower leg: Edema present.      Left lower leg: Edema present.      Comments: Edema to upper and lower ext. Worse in left arm than right -Hx radial fx; family states cast was removed due to edema    Skin:     General: Skin is warm and dry.   Neurological:      Mental Status: He is alert.       Vents:  Vent  Mode: A/C (08/30/22 0424)  Ventilator Initiated: Yes (08/23/22 1024)  Set Rate: 550 BPM (08/30/22 0424)  Vt Set: 12 mL (08/30/22 0424)  Pressure Support: 10 cmH20 (08/29/22 1925)  PEEP/CPAP: 5 cmH20 (08/30/22 0424)  Oxygen Concentration (%): 40 (08/30/22 0400)  Peak Airway Pressure: 34 cmH2O (08/30/22 0424)  Total Ve: 6.4 mL (08/30/22 0424)  F/VT Ratio<105 (RSBI): (!) 52.91 (08/29/22 1925)    Lines/Drains/Airways       Central Venous Catheter Line  Duration                  Hemodialysis Catheter right subclavian -- days              Drain  Duration                  Gastrostomy/Enterostomy 08/23/22 1030 LUQ 6 days              Airway  Duration                  Surgical Airway Shiley Cuffed -- days              Peripheral Intravenous Line  Duration                  Peripheral IV - Single Lumen 08/23/22 1422 20 G Anterior;Right Forearm 6 days                    Significant Labs:    CBC/Anemia Profile:  Recent Labs   Lab 08/29/22  0621   WBC 20.65*   HGB 7.0*   HCT 23.0*      MCV 99.1*   RDW 14.5          Chemistries:  Recent Labs   Lab 08/29/22  0621   *   K 6.0*   CL 98   CO2 28   BUN 98*   CREATININE 4.50*   CALCIUM 8.1*         All pertinent labs within the past 24 hours have been reviewed.    Significant Imaging:  I have reviewed all pertinent imaging results/findings within the past 24 hours.

## 2022-08-30 NOTE — PROGRESS NOTES
[facility]  Wound Care  Progress Note    Patient Name: Gregory Stuart  MRN: [id number]  Admission Date: 8/23/2022  Attending Physician: Ham Sanchez DO    Past Medical History:   Diagnosis Date    Anticoagulant long-term use     Cancer     CHF (congestive heart failure)     Coronary artery disease     Diabetes mellitus     Hypertension     Stroke         Subjective:     HPI:  Gregory Stuart is a 76 yo male with sacral, abdomen, left arm, and left leg. Wounds are covered in adherent slough. He has surgical incision to left leg from CABG in July. Incision on left lower leg has dehisced and is draining serous drainage. Patient continues to have edema which is contributing to poor wound healing. He was transferred from [facility] for continued ventilator weaning. He has trach and PEG in place. He had been on dialysis for for about 3 weeks.He continues to have edema. He also developed atrial fibrillation and was placed on weight based Lovenox. Significant past medical history of CVA, hypertension, CHF, diabetes mellitus, anemia, hyperlipidemia and bladder cancer (2011). Wound healing is complicated by hypoperfusion, respiratory failure, ESRD requiring dialysis, anemia, immobility, and diffuse edema.        Review of Systems   Unable to perform ROS: Acuity of condition   Objective:     Vital Signs (Most Recent):  Temp: 98.4 °F (36.9 °C) (08/30/22 0300)  Pulse: 73 (08/30/22 1015)  Resp: (!) 21 (08/30/22 1015)  BP: (!) 117/50 (08/30/22 1015)  SpO2: 99 % (08/30/22 1015)   Vital Signs (24h Range):  Temp:  [97.6 °F (36.4 °C)-98.4 °F (36.9 °C)] 98.4 °F (36.9 °C)  Pulse:  [61-96] 73  Resp:  [11-24] 21  SpO2:  [97 %-100 %] 99 %  BP: ()/(33-56) 117/50     Weight: 94.7 kg (208 lb 12.4 oz)  Body mass index is 26.81 kg/m².  No data recorded    Physical Exam  Vitals reviewed.   Constitutional:       Appearance: He is ill-appearing.      Comments: Chronically ill   HENT:      Head: Normocephalic.    Cardiovascular:      Rate and Rhythm: Normal rate. Rhythm irregular.   Pulmonary:      Breath sounds: Rhonchi and rales present.      Comments: On ventilator   Musculoskeletal:         General: Swelling present.      Right lower leg: Edema present.      Left lower leg: Edema present.   Skin:     Findings: Bruising and erythema present.      Comments: Wound, see LDA for photos/measurements   Neurological:      Mental Status: He is alert. Mental status is at baseline.      Cranial Nerves: Cranial nerve deficit present.      Motor: Weakness present.      Gait: Gait abnormal.             Altered Skin Integrity Left anterior;lower;proximal;posterior Arm Abrasion(s) (Active)       Altered Skin Integrity Present on Admission:    Side: Left   Orientation: anterior;lower;proximal;posterior   Location: Arm   Wound Number:    Is this injury device related?:    Primary Wound Type: Abrasion(s)   Description of Altered Skin Integrity:    Ankle-Brachial Index:    Pulses:    Removal Indication and Assessment:    Wound Outcome:    (Retired) Wound Length (cm):    (Retired) Wound Width (cm):    (Retired) Depth (cm):    Wound Description (Comments):    Removal Indications:    Dressing Appearance Open to air 08/27/22 0730   Drainage Amount Scant 08/24/22 0705   Appearance Purple;[place] 08/25/22 0700   Care Cleansed with:;Soap and water 08/29/22 1120   Number of days:             Altered Skin Integrity 08/23/22 1030 Right medial Heel Intact skin with non-blanchable redness of localized area (Active)   08/23/22 1030   Altered Skin Integrity Present on Admission: yes   Side: Right   Orientation: medial   Location: Heel   Wound Number:    Is this injury device related?: No   Primary Wound Type:    Description of Altered Skin Integrity: Intact skin with non-blanchable redness of localized area   Ankle-Brachial Index:    Pulses:    Removal Indication and Assessment:    Wound Outcome:    (Retired) Wound Length (cm):    (Retired) Wound Width  (cm):    (Retired) Depth (cm):    Wound Description (Comments):    Removal Indications:    Wound Image    08/24/22 1030   Description of Altered Skin Integrity Intact skin with non-blanchable redness of localized area 08/25/22 1157   Dressing Appearance Open to air 08/28/22 0720   Drainage Amount None 08/25/22 0700   Appearance Pink;Dry 08/25/22 1157   Tissue loss description Not applicable 08/25/22 1157   Periwound Area Dry 08/25/22 1157   Wound Edges Defined 08/25/22 1157   Care Cleansed with:;Soap and water 08/25/22 1157   Number of days: 7            Altered Skin Integrity 08/23/22 1030 Left posterior Thigh Contusion (Active)   08/23/22 1030   Altered Skin Integrity Present on Admission:    Side: Left   Orientation: posterior   Location: Thigh   Wound Number:    Is this injury device related?:    Primary Wound Type: Contusion   Description of Altered Skin Integrity:    Ankle-Brachial Index:    Pulses:    Removal Indication and Assessment:    Wound Outcome:    (Retired) Wound Length (cm):    (Retired) Wound Width (cm):    (Retired) Depth (cm):    Wound Description (Comments):    Removal Indications:    Dressing Appearance Open to air 08/25/22 0700   Drainage Amount None 08/25/22 0700   Appearance Purple;Other (see comments) 08/25/22 0700   Tissue loss description Not applicable 08/24/22 1030   Periwound Area Dry 08/25/22 1157   Wound Edges Undefined 08/24/22 1030   Care Cleansed with:;Soap and water 08/24/22 1030   Number of days: 7            Altered Skin Integrity 08/23/22 1030 Left upper;posterior Arm Ulceration (Active)   08/23/22 1030   Altered Skin Integrity Present on Admission:    Side: Left   Orientation: upper;posterior   Location: Arm   Wound Number:    Is this injury device related?:    Primary Wound Type: Ulceration   Description of Altered Skin Integrity:    Ankle-Brachial Index:    Pulses:    Removal Indication and Assessment:    Wound Outcome:    (Retired) Wound Length (cm):    (Retired) Wound  Width (cm):    (Retired) Depth (cm):    Wound Description (Comments):    Removal Indications:    Wound Image    08/24/22 1030   Description of Altered Skin Integrity Full thickness tissue loss. Base is covered by slough and/or eschar in the wound bed 08/24/22 1030   Dressing Appearance Dried drainage 08/26/22 1115   Drainage Amount Scant 08/26/22 1115   Drainage Characteristics/Odor Serous;Tan;No odor 08/26/22 1115   Appearance Dressing in place, unable to visualize 08/28/22 0720   Tissue loss description Not applicable 08/26/22 1115   Black (%), Wound Tissue Color 70 % 08/24/22 1030   Red (%), Wound Tissue Color 0 % 08/24/22 1030   Yellow (%), Wound Tissue Color 30 % 08/24/22 1030   Periwound Area Moist 08/26/22 1115   Wound Edges Defined;Open 08/26/22 1115   Wound Length (cm) 1 cm 08/24/22 1030   Wound Width (cm) 6 cm 08/24/22 1030   Wound Depth (cm) 0 cm 08/24/22 1030   Wound Volume (cm^3) 0 cm^3 08/24/22 1030   Wound Surface Area (cm^2) 6 cm^2 08/24/22 1030   Care Cleansed with:;Soap and water;Wound cleanser;Applied:;Removed:;Skin Barrier 08/26/22 1115   Dressing Removed;Applied;Changed;Hydrocolloid 08/26/22 1115   Dressing Change Due 08/29/22 08/26/22 1115   Number of days: 7            Altered Skin Integrity 08/23/22 1030 Left anterior Thigh Other (comment) (Active)   08/23/22 1030   Altered Skin Integrity Present on Admission:    Side: Left   Orientation: anterior   Location: Thigh   Wound Number:    Is this injury device related?:    Primary Wound Type: Other   Description of Altered Skin Integrity:    Ankle-Brachial Index:    Pulses:    Removal Indication and Assessment:    Wound Outcome:    (Retired) Wound Length (cm):    (Retired) Wound Width (cm):    (Retired) Depth (cm):    Wound Description (Comments):    Removal Indications:    Wound Image    08/24/22 1030   Dressing Appearance Open to air 08/28/22 0720   Drainage Amount None 08/25/22 1157   Drainage Characteristics/Odor Serous;Other (see comments)  08/24/22 0705   Appearance Dry 08/25/22 1157   Tissue loss description Not applicable 08/25/22 1157   Periwound Area Dry 08/25/22 1157   Wound Edges Undefined 08/24/22 1030   Care Cleansed with:;Soap and water 08/25/22 1157   Number of days: 7            Altered Skin Integrity 08/23/22 1030 Left anterior;lower Leg Other (comment) (Active)   08/23/22 1030   Altered Skin Integrity Present on Admission:    Side: Left   Orientation: anterior;lower   Location: Leg   Wound Number:    Is this injury device related?:    Primary Wound Type: Other   Description of Altered Skin Integrity:    Ankle-Brachial Index:    Pulses:    Removal Indication and Assessment:    Wound Outcome:    (Retired) Wound Length (cm):    (Retired) Wound Width (cm):    (Retired) Depth (cm):    Wound Description (Comments):    Removal Indications:    Wound Image    08/24/22 1030   Dressing Appearance Open to air 08/28/22 0720   Drainage Amount None 08/25/22 0700   Appearance Pink;Red 08/25/22 0700   Tissue loss description Not applicable 08/24/22 1030   Wound Edges Undefined 08/24/22 1030   Care Cleansed with:;Soap and water 08/24/22 1030   Number of days: 7            Altered Skin Integrity 08/23/22 1030 Right lateral Upper quadrant Puncture (Active)   08/23/22 1030   Altered Skin Integrity Present on Admission:    Side: Right   Orientation: lateral   Location: Upper quadrant   Wound Number:    Is this injury device related?:    Primary Wound Type: Puncture   Description of Altered Skin Integrity:    Ankle-Brachial Index:    Pulses:    Removal Indication and Assessment:    Wound Outcome:    (Retired) Wound Length (cm):    (Retired) Wound Width (cm):    (Retired) Depth (cm):    Wound Description (Comments):    Removal Indications:    Wound Image    08/24/22 1030   Description of Altered Skin Integrity Full thickness tissue loss. Base is covered by slough and/or eschar in the wound bed 08/24/22 1030   Dressing Appearance Dry 08/25/22 1157   Drainage  Amount None 08/25/22 1157   Appearance Dressing in place, unable to visualize 08/28/22 0720   Tissue loss description Not applicable 08/25/22 1157   Black (%), Wound Tissue Color 100 % 08/24/22 1030   Red (%), Wound Tissue Color 0 % 08/24/22 1030   Yellow (%), Wound Tissue Color 0 % 08/24/22 1030   Periwound Area Dry 08/25/22 1157   Wound Edges Defined 08/25/22 1157   Wound Length (cm) 1 cm 08/24/22 1030   Wound Width (cm) 1 cm 08/24/22 1030   Wound Depth (cm) 0 cm 08/24/22 1030   Wound Volume (cm^3) 0 cm^3 08/24/22 1030   Wound Surface Area (cm^2) 1 cm^2 08/24/22 1030   Care Cleansed with:;Soap and water;Applied:;Skin Barrier 08/25/22 1157   Dressing Hydrocolloid 08/26/22 1115   Dressing Change Due 08/29/22 08/26/22 1115   Number of days: 7            Altered Skin Integrity 08/23/22 1030 Left lateral Upper quadrant Abrasion(s) (Active)   08/23/22 1030   Altered Skin Integrity Present on Admission:    Side: Left   Orientation: lateral   Location: Upper quadrant   Wound Number:    Is this injury device related?:    Primary Wound Type: Abrasion(s)   Description of Altered Skin Integrity:    Ankle-Brachial Index:    Pulses:    Removal Indication and Assessment:    Wound Outcome:    (Retired) Wound Length (cm):    (Retired) Wound Width (cm):    (Retired) Depth (cm):    Wound Description (Comments):    Removal Indications:    Wound Image    08/24/22 1030   Description of Altered Skin Integrity Full thickness tissue loss. Base is covered by slough and/or eschar in the wound bed 08/25/22 1157   Dressing Appearance Dry 08/26/22 1115   Drainage Amount None 08/25/22 1157   Appearance Black 08/25/22 1157   Tissue loss description Not applicable 08/25/22 1157   Black (%), Wound Tissue Color 100 % 08/24/22 1030   Red (%), Wound Tissue Color 0 % 08/24/22 1030   Yellow (%), Wound Tissue Color 0 % 08/24/22 1030   Periwound Area Dry 08/25/22 1157   Wound Edges Defined 08/25/22 1157   Wound Length (cm) 1 cm 08/24/22 1030   Wound  Width (cm) 1 cm 08/24/22 1030   Wound Depth (cm) 0 cm 08/24/22 1030   Wound Volume (cm^3) 0 cm^3 08/24/22 1030   Wound Surface Area (cm^2) 1 cm^2 08/24/22 1030   Care Cleansed with:;Soap and water;Wound cleanser;Applied:;Removed:;Skin Barrier 08/25/22 1157   Dressing Changed 08/29/22 1120   Dressing Change Due 08/29/22 08/26/22 1115   Number of days: 7            Altered Skin Integrity 08/23/22 1030 midline;upper Sternal (Active)   08/23/22 1030   Altered Skin Integrity Present on Admission:    Side:    Orientation: midline;upper   Location: Sternal   Wound Number:    Is this injury device related?:    Primary Wound Type:    Description of Altered Skin Integrity:    Ankle-Brachial Index:    Pulses:    Removal Indication and Assessment:    Wound Outcome:    (Retired) Wound Length (cm):    (Retired) Wound Width (cm):    (Retired) Depth (cm):    Wound Description (Comments):    Removal Indications:    Wound Image    08/24/22 1030   Dressing Appearance Intact;Dry 08/25/22 0700   Drainage Amount None 08/25/22 0700   Appearance Dressing in place, unable to visualize 08/28/22 0720   Tissue loss description Not applicable 08/24/22 1030   Black (%), Wound Tissue Color 0 % 08/24/22 1030   Red (%), Wound Tissue Color 0 % 08/24/22 1030   Yellow (%), Wound Tissue Color 0 % 08/24/22 1030   Periwound Area Dry 08/24/22 1030   Wound Edges Defined 08/24/22 1030   Care Other (see comments) 08/24/22 0705   Dressing Other (comment);Silver;Foam 08/25/22 1157   Number of days: 7            Altered Skin Integrity 08/23/22 1030 Right anterior other (see comments) Other (comment) (Active)   08/23/22 1030   Altered Skin Integrity Present on Admission:    Side: Right   Orientation: anterior   Location: other (see comments)   Wound Number:    Is this injury device related?:    Primary Wound Type: Other   Description of Altered Skin Integrity:    Ankle-Brachial Index:    Pulses:    Removal Indication and Assessment:    Wound Outcome:     (Retired) Wound Length (cm):    (Retired) Wound Width (cm):    (Retired) Depth (cm):    Wound Description (Comments):    Removal Indications:    Number of days: 7            Altered Skin Integrity 08/24/22 1030 Right anterior Ear Purple or maroon localized area of discolored intact skin or non-intact skin or a blood-filled blister. (Active)   08/24/22 1030   Altered Skin Integrity Present on Admission: yes   Side: Right   Orientation: anterior   Location: Ear   Wound Number:    Is this injury device related?:    Primary Wound Type:    Description of Altered Skin Integrity: Purple or maroon localized area of discolored intact skin or non-intact skin or a blood-filled blister.   Ankle-Brachial Index:    Pulses:    Removal Indication and Assessment:    Wound Outcome:    (Retired) Wound Length (cm):    (Retired) Wound Width (cm):    (Retired) Depth (cm):    Wound Description (Comments):    Removal Indications:    Wound Image    08/24/22 1030   Description of Altered Skin Integrity Purple or maroon localized area of discolored intact skin or non-intact skin or a blood-filled blister. 08/25/22 0700   Dressing Appearance Open to air 08/28/22 0720   Drainage Amount None 08/24/22 1030   Appearance Purple 08/24/22 1030   Tissue loss description Not applicable 08/24/22 1030   Periwound Area Dry 08/24/22 1030   Wound Edges Undefined 08/24/22 1030   Care Cleansed with:;Soap and water 08/26/22 0715   Number of days: 6            Altered Skin Integrity 08/24/22 1030 midline Lower quadrant Ulceration (Active)   08/24/22 1030   Altered Skin Integrity Present on Admission:    Side:    Orientation: midline   Location: Lower quadrant   Wound Number:    Is this injury device related?:    Primary Wound Type: Ulceration   Description of Altered Skin Integrity:    Ankle-Brachial Index:    Pulses:    Removal Indication and Assessment:    Wound Outcome:    (Retired) Wound Length (cm):    (Retired) Wound Width (cm):    (Retired) Depth (cm):     Wound Description (Comments):    Removal Indications:    Wound Image    08/24/22 1030   Dressing Appearance Moist drainage 08/26/22 1115   Drainage Amount Scant 08/26/22 1115   Drainage Characteristics/Odor No odor;Serous;Tan 08/26/22 1115   Appearance Frank;Slough 08/26/22 1115   Tissue loss description Not applicable 08/26/22 1115   Black (%), Wound Tissue Color 0 % 08/24/22 1030   Red (%), Wound Tissue Color 50 % 08/24/22 1030   Yellow (%), Wound Tissue Color 50 % 08/24/22 1030   Periwound Area Moist 08/26/22 1115   Wound Edges Undefined 08/26/22 1115   Wound Length (cm) 1 cm 08/24/22 1030   Wound Width (cm) 2 cm 08/24/22 1030   Wound Depth (cm) 0.3 cm 08/24/22 1030   Wound Volume (cm^3) 0.6 cm^3 08/24/22 1030   Wound Surface Area (cm^2) 2 cm^2 08/24/22 1030   Care Cleansed with:;Soap and water;Wound cleanser;Applied:;Removed:;Skin Barrier;Moisturizing agent 08/26/22 1115   Dressing Changed 08/29/22 1120   Periwound Care Moisture barrier applied;Skin barrier film applied 08/26/22 1115   Dressing Change Due 08/27/22 08/26/22 1115   Number of days: 6            Altered Skin Integrity 08/24/22 1030 Left anterior Upper quadrant Full thickness tissue loss. Base is covered by slough and/or eschar in the wound bed (Active)   08/24/22 1030   Altered Skin Integrity Present on Admission: yes   Side: Left   Orientation: anterior   Location: Upper quadrant   Wound Number:    Is this injury device related?:    Primary Wound Type:    Description of Altered Skin Integrity: Full thickness tissue loss. Base is covered by slough and/or eschar in the wound bed   Ankle-Brachial Index:    Pulses:    Removal Indication and Assessment:    Wound Outcome:    (Retired) Wound Length (cm):    (Retired) Wound Width (cm):    (Retired) Depth (cm):    Wound Description (Comments):    Removal Indications:    Wound Image    08/24/22 1030   Description of Altered Skin Integrity Full thickness tissue loss. Base is covered by slough and/or eschar  in the wound bed 08/25/22 1157   Dressing Appearance Moist drainage 08/26/22 1115   Drainage Amount Scant 08/26/22 1115   Drainage Characteristics/Odor [place];Serous;No odor 08/26/22 1115   Appearance Moist;Tan 08/26/22 1115   Tissue loss description Not applicable 08/26/22 1115   Black (%), Wound Tissue Color 100 % 08/24/22 1030   Periwound Area Moist 08/26/22 1115   Wound Edges Defined 08/26/22 1115   Wound Length (cm) 1.5 cm 08/24/22 1030   Wound Width (cm) 1.5 cm 08/24/22 1030   Wound Depth (cm) 0 cm 08/24/22 1030   Wound Volume (cm^3) 0 cm^3 08/24/22 1030   Wound Surface Area (cm^2) 2.25 cm^2 08/24/22 1030   Care Cleansed with:;Soap and water;Wound cleanser;Applied:;Removed:;Skin Barrier;Moisturizing agent 08/26/22 1115   Dressing Changed 08/29/22 1120   Periwound Care Moisture barrier applied;Skin barrier film applied 08/26/22 1115   Dressing Change Due 08/27/22 08/26/22 1115   Number of days: 6            Altered Skin Integrity 08/24/22 1030 Left anterior;lower Arm Contusion (Active)   08/24/22 1030   Altered Skin Integrity Present on Admission:    Side: Left   Orientation: anterior;lower   Location: Arm   Wound Number:    Is this injury device related?: No   Primary Wound Type: Contusion   Description of Altered Skin Integrity:    Ankle-Brachial Index:    Pulses:    Removal Indication and Assessment:    Wound Outcome:    (Retired) Wound Length (cm):    (Retired) Wound Width (cm):    (Retired) Depth (cm):    Wound Description (Comments):    Removal Indications:    Wound Image    08/24/22 1030   Dressing Appearance Open to air;Dry 08/25/22 1157   Drainage Amount None 08/25/22 0700   Appearance Purple 08/25/22 1157   Tissue loss description Not applicable 08/25/22 1157   Periwound Area Dry 08/25/22 1157   Wound Edges Undefined 08/25/22 1157   Care Cleansed with:;Soap and water 08/26/22 0715   Number of days: 6            Altered Skin Integrity 08/24/22 1030 Left posterior Calf Full thickness tissue loss.  Subcutaneous fat may be visible but bone, tendon or muscle are not exposed (Active)   08/24/22 1030   Altered Skin Integrity Present on Admission: yes   Side: Left   Orientation: posterior   Location: Calf   Wound Number:    Is this injury device related?:    Primary Wound Type:    Description of Altered Skin Integrity: Full thickness tissue loss. Subcutaneous fat may be visible but bone, tendon or muscle are not exposed   Ankle-Brachial Index:    Pulses:    Removal Indication and Assessment:    Wound Outcome:    (Retired) Wound Length (cm):    (Retired) Wound Width (cm):    (Retired) Depth (cm):    Wound Description (Comments):    Removal Indications:    Wound Image    08/24/22 1030   Description of Altered Skin Integrity Full thickness tissue loss. Subcutaneous fat may be visible but bone, tendon or muscle are not exposed 08/25/22 1157   Dressing Appearance Moist drainage 08/26/22 1115   Drainage Amount Scant 08/26/22 1115   Drainage Characteristics/Odor Serosanguineous;No odor 08/26/22 1115   Appearance Red;Tan;Moist;Slough 08/26/22 1115   Periwound Area Moist 08/26/22 1115   Wound Edges Defined;Open 08/26/22 1115   Wound Length (cm) 4 cm 08/24/22 1030   Wound Width (cm) 2 cm 08/24/22 1030   Wound Depth (cm) 0.1 cm 08/24/22 1030   Wound Volume (cm^3) 0.8 cm^3 08/24/22 1030   Wound Surface Area (cm^2) 8 cm^2 08/24/22 1030   Care Cleansed with:;Soap and water;Wound cleanser;Applied:;Removed:;Skin Barrier;Moisturizing agent 08/26/22 1115   Dressing Changed 08/29/22 1120   Periwound Care Moisture barrier applied;Skin barrier film applied 08/26/22 1115   Dressing Change Due 08/27/22 08/26/22 1115   Number of days: 6            Wound 08/23/22 1030 Ulceration posterior Sacral Spine (Active)   08/23/22 1030    Pre-existing:    Primary Wound Type: Ulceration   Side:    Orientation: posterior   Location: Sacral Spine   Wound Number:    Ankle-Brachial Index:    Pulses:    Removal Indication and Assessment:    Wound Outcome:     (Retired) Wound Type:    (Retired) Wound Length (cm):    (Retired) Wound Width (cm):    (Retired) Depth (cm):    Wound Description (Comments):    Removal Indications:    Wound Image    08/24/22 1030   Wound WDL WDL 08/26/22 1115   Dressing Appearance Moist drainage 08/26/22 1115   Drainage Amount Scant 08/26/22 1115   Drainage Characteristics/Odor Serous;Tan;No odor 08/26/22 1115   Appearance Dressing in place, unable to visualize 08/30/22 0725   Tissue loss description Not applicable 08/26/22 1115   Black (%), Wound Tissue Color 95 % 08/24/22 1030   Red (%), Wound Tissue Color 0 % 08/24/22 1030   Yellow (%), Wound Tissue Color 5 % 08/24/22 1030   Periwound Area Moist;Redness 08/26/22 1115   Wound Edges Defined;Open 08/26/22 1115   Wound Length (cm) 3 cm 08/24/22 1030   Wound Width (cm) 2 cm 08/24/22 1030   Wound Depth (cm) 0 cm 08/24/22 1030   Wound Volume (cm^3) 0 cm^3 08/24/22 1030   Wound Surface Area (cm^2) 6 cm^2 08/24/22 1030   Care Cleansed with:;Soap and water;Wound cleanser;Applied:;Removed:;Skin Barrier;Moisturizing agent 08/26/22 1115   Dressing Changed 08/29/22 1120   Periwound Care Skin barrier film applied 08/26/22 1115   Off Loading Other (see comments) 08/24/22 0705   Dressing Change Due 08/29/22 08/26/22 1115   Number of days: 7         Assessment and Plan      Pressure injury of left leg, unstageable  Clean wound with vashe  Apply polymem silver max  Cover and secure with 4x4s and paper tape  Change M, W, F    Open wound of anterior abdominal wall without complication  Clean wound with vashe  Apply polymem silver max  Cover and secure with 4x4s and paper tape  Change M, W, F    Pressure injury of sacral region, unstageable  Clean with vashe  Apply duoderm to wound bed  Cover and secure with abd pad and paper tape  Change M, W, F  Donnie scale and skin assessment q shift-Nursing  Wound care to assess with dressing changes  Turn every 2 hours  TAP system  Low air loss  gary            Signature:  SOFIA Tyler  Wound Care    Date of encounter: 08/30/2022

## 2022-08-30 NOTE — ASSESSMENT & PLAN NOTE
Cr 0.9 March 2022   - was on CCRT post CABG -- transitioned to HD M/W/F  - tunneled HD cath in place   - consult Nephrology -- did not come with porter- unsure if he was making any urine at OSH   Nephrology note reviewed and appreciated  Plan to continue MWF dialysis

## 2022-08-30 NOTE — PROGRESS NOTES
[facility]  Pulmonology  Progress Note    Patient Name: Gregory Stuart  MRN: [id number]  Admission Date: 8/23/2022  Hospital Length of Stay: 7 days  Code Status: No Order  Attending Provider: Ham Sanchez DO  Primary Care Provider: Primary Doctor No   Principal Problem: Acute hypoxemic respiratory failure    Subjective:     Interval History: No acute events overnight. The patient is currently resting comfortably. He remains on the ventilator this am. He is doing well with his weaning trials. He is afebrile and vital signs are stable.    Objective:     Vital Signs (Most Recent):  Temp: 98.4 °F (36.9 °C) (08/30/22 0300)  Pulse: 70 (08/30/22 0630)  Resp: 15 (08/30/22 0630)  BP: (!) 111/44 (08/30/22 0630)  SpO2: 97 % (08/30/22 0630)   Vital Signs (24h Range):  Temp:  [97.6 °F (36.4 °C)-98.6 °F (37 °C)] 98.4 °F (36.9 °C)  Pulse:  [55-96] 70  Resp:  [11-24] 15  SpO2:  [97 %-100 %] 97 %  BP: ()/(30-56) 111/44     Weight: 94.7 kg (208 lb 12.4 oz)  Body mass index is 26.81 kg/m².      Intake/Output Summary (Last 24 hours) at 8/30/2022 0802  Last data filed at 8/30/2022 0600  Gross per 24 hour   Intake 1560 ml   Output 500 ml   Net 1060 ml         Physical Exam  Vitals reviewed.   Constitutional:       General: He is not in acute distress.     Appearance: He is ill-appearing.   HENT:      Head: Normocephalic and atraumatic.      Right Ear: External ear normal.      Left Ear: External ear normal.      Mouth/Throat:      Mouth: Mucous membranes are moist.   Eyes:      Extraocular Movements: Extraocular movements intact.      Conjunctiva/sclera: Conjunctivae normal.   Neck:      Comments: Trach in place with thick secretions  Cardiovascular:      Rate and Rhythm: Normal rate. Rhythm irregular.      Pulses: Normal pulses.   Pulmonary:      Breath sounds: Rhonchi present. No wheezing or rales.   Abdominal:      General: Bowel sounds are normal.      Palpations: Abdomen is soft.    Musculoskeletal:         General: Swelling present.      Right lower leg: Edema present.      Left lower leg: Edema present.      Comments: Edema to upper and lower ext. Worse in left arm than right -Hx radial fx; family states cast was removed due to edema    Skin:     General: Skin is warm and dry.   Neurological:      Mental Status: He is alert.       Vents:  Vent Mode: A/C (08/30/22 0424)  Ventilator Initiated: Yes (08/23/22 1024)  Set Rate: 550 BPM (08/30/22 0424)  Vt Set: 12 mL (08/30/22 0424)  Pressure Support: 10 cmH20 (08/29/22 1925)  PEEP/CPAP: 5 cmH20 (08/30/22 0424)  Oxygen Concentration (%): 40 (08/30/22 0400)  Peak Airway Pressure: 34 cmH2O (08/30/22 0424)  Total Ve: 6.4 mL (08/30/22 0424)  F/VT Ratio<105 (RSBI): (!) 52.91 (08/29/22 1925)    Lines/Drains/Airways       Central Venous Catheter Line  Duration                  Hemodialysis Catheter right subclavian -- days              Drain  Duration                  Gastrostomy/Enterostomy 08/23/22 1030 LUQ 6 days              Airway  Duration                  Surgical Airway Shiley Cuffed -- days              Peripheral Intravenous Line  Duration                  Peripheral IV - Single Lumen 08/23/22 1422 20 G Anterior;Right Forearm 6 days                    Significant Labs:    CBC/Anemia Profile:  Recent Labs   Lab 08/29/22  0621   WBC 20.65*   HGB 7.0*   HCT 23.0*      MCV 99.1*   RDW 14.5          Chemistries:  Recent Labs   Lab 08/29/22  0621   *   K 6.0*   CL 98   CO2 28   BUN 98*   CREATININE 4.50*   CALCIUM 8.1*         All pertinent labs within the past 24 hours have been reviewed.    Significant Imaging:  I have reviewed all pertinent imaging results/findings within the past 24 hours.    Assessment/Plan:     * Acute hypoxemic respiratory failure  Failed extubated post CABG (7/18)  Now with trach and peg     8/28- increase to PSV 12 hours -   8/29- Will plan to change trach out in the next day or two--continue to increase weaning  as tolerated  8/30- continue with current weaning plan for now. Will increase tomorrow      Alteration in nutrition  S/p peg tube placement   continue tube feeding    Weakness acquired in ICU  Extreme generalized weakness - continue PT/OT as tolerated      Left radial fracture  7/12/22--- per family he did not require surgical repair - he did have a split/cast in place but this was removed due to edema     DM (diabetes mellitus)  accuchecks and Sliding scale insulin   A1C 5.3%  Currently on sliding scale  Glucose<180    Anemia  Likely due to critical illness   Most recent H/H 7/23  - repeat labs Monday and fridays     JUVENTINO (acute kidney injury)  Cr 0.9 March 2022   - was on CCRT post CABG -- transitioned to HD M/W/F  - tunneled HD cath in place   - consult Nephrology -- did not come with porter- unsure if he was making any urine at OSH   Nephrology note reviewed and appreciated  Plan to continue MWF dialysis    CAD (coronary artery disease)  S/p CABG on 07/18    He is on aspirin, statin, beta blocker      Chronic systolic heart failure  EF 30-35% per Echo 07/2022  Daily wts/ I/Os     A-fib  No history documented prior to CABG - was on Lovenox at OSH   - resume medications once placed in computer   - irregular on telemetry--frequent pvc's  - he is on carvediolol, eliquis  - HR is currently adequate                 Ham Sanchez DO  Pulmonology  [facility]

## 2022-08-30 NOTE — PROGRESS NOTES
[facility]  Nephrology  Progress Note    Patient Name: Gregory Stuart  MRN: [id number]  Admission Date: 8/23/2022  Hospital Length of Stay: 7 days  Attending Provider: Ham Sanchez DO   Primary Care Physician: Primary Doctor No  Principal Problem:Acute hypoxemic respiratory failure    Consults  Subjective:     Interval History: F/U ARF post CABG. Dialyzed yesterday. Awake today. PEG and trach. No distress, chest clear    Review of patient's allergies indicates:  Not on File  Current Facility-Administered Medications   Medication Frequency    0.9%  NaCl infusion PRN    acetaminophen tablet 650 mg Q6H PRN    albuterol-ipratropium 2.5 mg-0.5 mg/3 mL nebulizer solution 3 mL Q12H    apixaban tablet 2.5 mg BID    aspirin chewable tablet 81 mg Daily    atorvastatin tablet 40 mg QHS    carvediloL tablet 25 mg BID    collagenase ointment Daily PRN    dextrose 50% injection 12.5 g PRN    doxepin capsule 25 mg QHS    ferrous sulfate tablet 1 each Daily    glucagon (human recombinant) injection 1 mg PRN    heparin (porcine) injection 4,000 Units PRN    insulin aspart U-100 injection 1-10 Units PRN    pantoprazole suspension 40 mg Daily    polyethylene glycol packet 17 g Daily PRN    senna-docusate 8.6-50 mg per tablet 1 tablet Daily PRN    venlafaxine tablet 37.5 mg BID       Objective:     Vital Signs (Most Recent):  Temp: 98.4 °F (36.9 °C) (08/30/22 0300)  Pulse: 73 (08/30/22 1015)  Resp: (!) 21 (08/30/22 1015)  BP: (!) 117/50 (08/30/22 1015)  SpO2: 99 % (08/30/22 1015)  O2 Device (Oxygen Therapy): ventilator (08/30/22 0830)   Vital Signs (24h Range):  Temp:  [97.6 °F (36.4 °C)-98.4 °F (36.9 °C)] 98.4 °F (36.9 °C)  Pulse:  [61-95] 73  Resp:  [11-24] 21  SpO2:  [97 %-100 %] 99 %  BP: ()/(33-56) 117/50     Weight: 94.7 kg (208 lb 12.4 oz) (08/30/22 0600)  Body mass index is 26.81 kg/m².  Body surface area is 2.22 meters squared.    I/O last 3 completed shifts:  In: 1560  [NG/GT:1560]  Out: 500 [Other:500]    Physical Exam Chest clear. Trach/PEG.    Significant Labs:sureBMP:   Recent Labs   Lab 08/29/22  0621   *   *   K 6.0*   CL 98   CO2 28   BUN 98*   CREATININE 4.50*   CALCIUM 8.1*     CBC:   Recent Labs   Lab 08/29/22  0621   WBC 20.65*   RBC 2.32*   HGB 7.0*   HCT 23.0*      MCV 99.1*   MCH 30.2   MCHC 30.4*     All labs within the past 24 hours have been reviewed.    Significant Imaging:  Labs: Reviewed    Assessment/Plan:     Active Diagnoses:    Diagnosis Date Noted POA    PRINCIPAL PROBLEM:  Acute hypoxemic respiratory failure [J96.01] 08/23/2022 Unknown    Open wound of anterior abdominal wall without complication [S31.109A] 08/30/2022 Yes    Pressure injury of left leg, unstageable [L89.890] 08/30/2022 Yes    Wound dehiscence [T81.30XA] 08/30/2022 Yes    Pressure injury of sacral region, unstageable [L89.150] 08/29/2022 Yes    A-fib [I48.91] 08/23/2022 Unknown    Chronic systolic heart failure [I50.22] 08/23/2022 Unknown    CAD (coronary artery disease) [I25.10] 08/23/2022 Unknown    JUVENTINO (acute kidney injury) [N17.9] 08/23/2022 Unknown    Anemia [D64.9] 08/23/2022 Unknown    DM (diabetes mellitus) [E11.9] 08/23/2022 Unknown    Left radial fracture [S52.92XA] 08/23/2022 Unknown    Weakness acquired in ICU [R53.1] 08/23/2022 Unknown    Alteration in nutrition [R63.8] 08/23/2022 Unknown    HLD (hyperlipidemia) [E78.5] 08/23/2022 Unknown    HTN (hypertension) [I10] 08/23/2022 Unknown      Problems Resolved During this Admission:       We'll continue with his MWF dialysis schedule    Thank you for your consult. I will follow-up with patient. Please contact us if you have any additional questions.    Derek Hill MD  Nephrology  [facility]

## 2022-08-30 NOTE — SUBJECTIVE & OBJECTIVE
Interval History: The patient is resting.  His condition is about the same.  He has groin and lower leg swelling.    Review of patient's allergies indicates:  Not on File  Current Facility-Administered Medications   Medication Frequency    0.9%  NaCl infusion PRN    acetaminophen tablet 650 mg Q6H PRN    albuterol-ipratropium 2.5 mg-0.5 mg/3 mL nebulizer solution 3 mL Q12H    apixaban tablet 2.5 mg BID    aspirin chewable tablet 81 mg Daily    atorvastatin tablet 40 mg QHS    carvediloL tablet 25 mg BID    collagenase ointment Daily PRN    dextrose 50% injection 12.5 g PRN    doxepin capsule 25 mg QHS    ferrous sulfate tablet 1 each Daily    glucagon (human recombinant) injection 1 mg PRN    heparin (porcine) injection 4,000 Units PRN    insulin aspart U-100 injection 1-10 Units PRN    pantoprazole suspension 40 mg Daily    polyethylene glycol packet 17 g Daily PRN    senna-docusate 8.6-50 mg per tablet 1 tablet Daily PRN    venlafaxine tablet 37.5 mg BID       Objective:     Vital Signs (Most Recent):  Temp: 97.6 °F (36.4 °C) (08/29/22 1500)  Pulse: 72 (08/29/22 1925)  Resp: 20 (08/29/22 1925)  BP: (!) 99/44 (08/29/22 2118)  SpO2: 100 % (08/29/22 1925)  O2 Device (Oxygen Therapy): ventilator (08/29/22 1925)   Vital Signs (24h Range):  Temp:  [97.6 °F (36.4 °C)-98.6 °F (37 °C)] 97.6 °F (36.4 °C)  Pulse:  [55-96] 72  Resp:  [11-24] 20  SpO2:  [98 %-100 %] 100 %  BP: ()/(30-78) 99/44     Weight: 94.5 kg (208 lb 5.4 oz) (08/27/22 0400)  Body mass index is 26.75 kg/m².  Body surface area is 2.22 meters squared.    I/O last 3 completed shifts:  In: 870 [NG/GT:870]  Out: 500 [Other:500]    Physical Exam  Vitals reviewed.   HENT:      Head: Normocephalic.      Mouth/Throat:      Mouth: Mucous membranes are moist.   Cardiovascular:      Rate and Rhythm: Rhythm irregular.   Abdominal:      Palpations: Abdomen is soft.   Musculoskeletal:      Right lower leg: Edema present.      Left lower leg: Edema present.   Skin:      General: Skin is warm.   Neurological:      Mental Status: He is alert.       Significant Labs:  BMP:   Recent Labs   Lab 08/29/22  0621   *   *   K 6.0*   CL 98   CO2 28   BUN 98*   CREATININE 4.50*   CALCIUM 8.1*     CBC:   Recent Labs   Lab 08/29/22  0621   WBC 20.65*   RBC 2.32*   HGB 7.0*   HCT 23.0*      MCV 99.1*   MCH 30.2   MCHC 30.4*        Significant Imaging:  Labs: Reviewed

## 2022-08-30 NOTE — PLAN OF CARE
Problem: Physical Therapy  Goal: Physical Therapy Goal  Description: Short Term Goals to be met by 9/6/2022    Patient will increase functional independence and safety with mobility by performing    1.   Rolling right Moderate Assist; roll left Moderate Assist  2.   Supine to sit Moderate Assist  3.   Sitting balance edge of the bed x 15 minutes Minimal Assist  4.   Sit to stand Maximal Assist  using manual assistance with gait belt  5.   Bed to chair Maximal Assist using manual assistance with gait belt  6.   Lower extremity exercises x 30 reps with assist as necessary and no rest breaks      Long Term Goals to be met by 10/24/2022    Patient to require less than or equal to Minimal Assist for functional mobility to ease caregiver burden   Outcome: Ongoing, Progressing       Patient more alert and has made minimal progress with sitting balance. Patient will likely require 24/7 total care at discharge.     Rolling: dependent  Supine to sit : dependent  Sit to stand: max A x 2  Sitting balance: pt able to stabilize 5-10 seconds but as soon as tone causes posterior lean, pt unable to correct to maintain balance.

## 2022-08-30 NOTE — ASSESSMENT & PLAN NOTE
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 Will increase tomorrow

## 2022-08-31 NOTE — PLAN OF CARE
Problem: Communication Impairment (Mechanical Ventilation, Invasive)  Goal: Effective Communication  8/31/2022 1304 by Barb Gonzalez, RRT  Outcome: Ongoing, Progressing  8/31/2022 1303 by Barb Gonzalez, RRT  Outcome: Ongoing, Progressing     Problem: Device-Related Complication Risk (Mechanical Ventilation, Invasive)  Goal: Optimal Device Function  Outcome: Ongoing, Progressing

## 2022-08-31 NOTE — PROGRESS NOTES
[facility]  Pulmonology  Progress Note    Patient Name: Gregory Stuart  MRN: [id number]  Admission Date: 8/23/2022  Hospital Length of Stay: 8 days  Code Status: No Order  Attending Provider: Ham Sanchez DO  Primary Care Provider: Primary Doctor No   Principal Problem: Acute hypoxemic respiratory failure    Subjective:     Interval History: No acute events overnight. The patient is currently resting comfortably. He remains on the ventilator this am. He is doing well with his weaning trials. He is afebrile and vital signs are stable.    Objective:     Vital Signs (Most Recent):  Temp: 97.4 °F (36.3 °C) (08/31/22 0400)  Pulse: 74 (08/31/22 0600)  Resp: (!) 24 (08/31/22 0600)  BP: (!) 105/41 (08/31/22 0600)  SpO2: (!) 94 % (08/31/22 0600)   Vital Signs (24h Range):  Temp:  [97.4 °F (36.3 °C)-98.8 °F (37.1 °C)] 97.4 °F (36.3 °C)  Pulse:  [65-97] 74  Resp:  [13-24] 24  SpO2:  [89 %-100 %] 94 %  BP: ()/(31-54) 105/41     Weight: 96.8 kg (213 lb 6.5 oz)  Body mass index is 27.4 kg/m².      Intake/Output Summary (Last 24 hours) at 8/31/2022 0759  Last data filed at 8/31/2022 0600  Gross per 24 hour   Intake 1562 ml   Output 1 ml   Net 1561 ml         Physical Exam  Vitals reviewed.   Constitutional:       General: He is not in acute distress.     Appearance: He is ill-appearing.   HENT:      Head: Normocephalic and atraumatic.      Right Ear: External ear normal.      Left Ear: External ear normal.      Mouth/Throat:      Mouth: Mucous membranes are moist.   Eyes:      Extraocular Movements: Extraocular movements intact.      Conjunctiva/sclera: Conjunctivae normal.   Neck:      Comments: Trach in place with thick secretions  Cardiovascular:      Rate and Rhythm: Normal rate. Rhythm irregular.      Pulses: Normal pulses.   Pulmonary:      Breath sounds: Rhonchi present. No wheezing or rales.   Abdominal:      General: Bowel sounds are normal.      Palpations: Abdomen is soft.    Musculoskeletal:         General: Swelling present.      Right lower leg: Edema present.      Left lower leg: Edema present.      Comments: Edema to upper and lower ext. Worse in left arm than right -Hx radial fx; family states cast was removed due to edema    Skin:     General: Skin is warm and dry.   Neurological:      Mental Status: He is alert.       Vents:  Vent Mode: A/C (08/31/22 0410)  Ventilator Initiated: Yes (08/23/22 1024)  Set Rate: 12 BPM (08/31/22 0410)  Vt Set: 550 mL (08/31/22 0410)  Pressure Support: 10 cmH20 (08/30/22 1715)  PEEP/CPAP: 5 cmH20 (08/31/22 0410)  Oxygen Concentration (%): 40 (08/30/22 2010)  Peak Airway Pressure: 27 cmH2O (08/31/22 0410)  Total Ve: 6 mL (08/31/22 0410)  F/VT Ratio<105 (RSBI): (!) 48.9 (08/31/22 0000)    Lines/Drains/Airways       Central Venous Catheter Line  Duration                  Hemodialysis Catheter right subclavian -- days              Drain  Duration                  Gastrostomy/Enterostomy 08/23/22 1030 LUQ 7 days              Airway  Duration                  Surgical Airway Shiley Cuffed -- days              Peripheral Intravenous Line  Duration                  Peripheral IV - Single Lumen 08/23/22 1422 20 G Anterior;Right Forearm 7 days                    Significant Labs:    CBC/Anemia Profile:  No results for input(s): WBC, HGB, HCT, PLT, MCV, RDW, IRON, FERRITIN, RETIC, FOLATE, CUPHWKEN00, OCCULTBLOOD in the last 48 hours.       Chemistries:  No results for input(s): NA, K, CL, CO2, BUN, CREATININE, CALCIUM, ALBUMIN, PROT, BILITOT, ALKPHOS, ALT, AST, GLUCOSE, MG, PHOS in the last 48 hours.      All pertinent labs within the past 24 hours have been reviewed.    Significant Imaging:  I have reviewed all pertinent imaging results/findings within the past 24 hours.    Assessment/Plan:     * Acute hypoxemic respiratory failure  Failed extubated post CABG (7/18)  Now with trach and peg     8/28- increase to PSV 12 hours -   8/29- Will plan to change  trach out in the next day or two--continue to increase weaning as tolerated  8/30- continue with current weaning plan for now. Will increase tomorrow  8/31- His strength is slowly improving which will also improve weaning. Will start to increase weaning again today      Alteration in nutrition  S/p peg tube placement   continue tube feeding    Weakness acquired in ICU  Extreme generalized weakness - continue PT/OT as tolerated      DM (diabetes mellitus)  accuchecks and Sliding scale insulin   A1C 5.3%  Currently on sliding scale  Glucose<180    Anemia  Likely due to critical illness   Most recent H/H 7/23  - repeat labs Monday and fridays     JUVENTINO (acute kidney injury)  Cr 0.9 March 2022   - was on CCRT post CABG -- transitioned to HD M/W/F  - tunneled HD cath in place   - consult Nephrology -- did not come with porter- unsure if he was making any urine at OSH   Nephrology note reviewed and appreciated  Plan to continue MWF dialysis    CAD (coronary artery disease)  S/p CABG on 07/18    He is on aspirin, statin, beta blocker      Chronic systolic heart failure  EF 30-35% per Echo 07/2022  Daily wts/ I/Os     A-fib  No history documented prior to CABG - was on Lovenox at OSH   - resume medications once placed in computer   - irregular on telemetry--frequent pvc's  - he is on carvediolol, eliquis  - HR is currently adequate                 Ham Sanchez DO  Pulmonology  [facility]

## 2022-08-31 NOTE — PT/OT/SLP PROGRESS
Occupational Therapy   Treatment    Name: Gregory Stuart  MRN: [id number]  Admitting Diagnosis:  Acute hypoxemic respiratory failure       Recommendations:     Discharge Recommendations: intermediate care facility/nursing home, nursing facility, skilled  Discharge Equipment Recommendations:   (to be determined)  Barriers to discharge:  None    Assessment:     Gregory Stuart is a 77 y.o. male with a medical diagnosis of Acute hypoxemic respiratory failure.  He presents with alert and agreeable to treatment. Pt is more alert and responsive today. Performance deficits affecting function are decreased ROM, decreased lower extremity function, decreased safety awareness, decreased upper extremity function, decreased coordination, impaired balance, impaired cardiopulmonary response to activity, impaired coordination, impaired endurance, impaired functional mobility, impaired self care skills.     Rehab Prognosis:  Good; patient would benefit from acute skilled OT services to address these deficits and reach maximum level of function.       Plan:     Patient to be seen 5 x/week to address the above listed problems via self-care/home management, therapeutic activities, therapeutic exercises  Plan of Care Expires: 08/30/22  Plan of Care Reviewed with: patient    Subjective     Pain/Comfort:  Pain Rating 1: 0/10  Pain Rating Post-Intervention 1: 0/10    Objective:     Communicated with: MIKEY Hill prior to session.  Patient found HOB elevated with telemetry, PEG Tube, Tracheostomy, ventilator, blood pressure cuff, pulse ox (continuous), CPAP upon OT entry to room.    General Precautions: Standard, fall, respiratory   Orthopedic Precautions: ((L) radius fx)   Braces: N/A  Respiratory Status:  on ventilator on AC setting via tracheostomy     Occupational Performance:     Bed Mobility:    Patient completed Supine to Sit with total assistance  Patient completed Sit to Supine with total assistance     Functional  Mobility/Transfers:  Patient completed Sit <> Stand Transfer with maximal assistance and of 2 persons  with  gait belt and manual assist   Functional Mobility: Pt unable to achieve full stance or take steps    Activities of Daily Living:  Unable      [facility] 6 Click ADL: 6    Treatment & Education:  Pt sat EOB for 15 min with mod(A). Pt having difficulty leaning forward and adjusting his balance.  Gentle AAROM/ PROM for (B) scapular elevation/ depression, (B) shoulder flexion, (B) horizontal abd/adduction.  Gentle AAROM/ PROM for (R) elbow flexion/extension, supination/pronation, hand flexion/extension    Patient left HOB elevated with all lines intact, call button in reach, and RN Shanell notified    GOALS:   Multidisciplinary Problems       Occupational Therapy Goals          Problem: Occupational Therapy    Goal Priority Disciplines Outcome Interventions   Occupational Therapy Goal     OT, PT/OT Ongoing, Not Progressing    Description: ST. Pt will follow simple one step command  2.Pt will perform grooming with mod a  3. Pt will sit EOB x 10 min with mod a  4. Pt will andre gown with mod a  5. Pt will t/f bed/chair with mod a x 2  6. Pt will tolerate 15 min of tx      LTG: Restore to Max I with self care and mobility.                         Time Tracking:     OT Date of Treatment: 22  OT Start Time: 933  OT Stop Time: 101  OT Total Time (min): 39 min    Billable Minutes:Therapeutic Activity 24 min    OT/ABIGAIL: OT          2022

## 2022-08-31 NOTE — PROGRESS NOTES
[facility]  Nephrology  Progress Note    Patient Name: Gregory Stuart  MRN: [id number]  Admission Date: 8/23/2022  Hospital Length of Stay: 8 days  Attending Provider: Ham Sanchez DO   Primary Care Physician: Primary Doctor No  Principal Problem:Acute hypoxemic respiratory failure    Consults  Subjective:     Interval History: Mr. Stuart is seen following his hemodialysis. He became hypotensive with attempts at fluid removal.    Review of patient's allergies indicates:  Not on File  Current Facility-Administered Medications   Medication Frequency    0.9%  NaCl infusion PRN    acetaminophen tablet 650 mg Q6H PRN    albuterol-ipratropium 2.5 mg-0.5 mg/3 mL nebulizer solution 3 mL Q12H    apixaban tablet 2.5 mg BID    aspirin chewable tablet 81 mg Daily    atorvastatin tablet 40 mg QHS    carvediloL tablet 25 mg BID    collagenase ointment Daily PRN    dextrose 50% injection 12.5 g PRN    doxepin capsule 25 mg QHS    ferrous sulfate tablet 1 each Daily    glucagon (human recombinant) injection 1 mg PRN    heparin (porcine) injection 4,000 Units PRN    insulin aspart U-100 injection 1-10 Units PRN    miconazole NITRATE 2 % top powder BID    pantoprazole suspension 40 mg Daily    polyethylene glycol packet 17 g Daily PRN    senna-docusate 8.6-50 mg per tablet 1 tablet Daily PRN    venlafaxine tablet 37.5 mg BID       Objective:     Vital Signs (Most Recent):  Temp: 97.3 °F (36.3 °C) (08/31/22 1500)  Pulse: 88 (08/31/22 1515)  Resp: 14 (08/31/22 1515)  BP: (!) 108/50 (08/31/22 1515)  SpO2: 100 % (08/31/22 1515)  O2 Device (Oxygen Therapy): ventilator (08/31/22 1515)   Vital Signs (24h Range):  Temp:  [97.3 °F (36.3 °C)-98.8 °F (37.1 °C)] 97.3 °F (36.3 °C)  Pulse:  [65-97] 88  Resp:  [13-24] 14  SpO2:  [89 %-100 %] 100 %  BP: ()/(35-56) 108/50     Weight: 96.8 kg (213 lb 6.5 oz) (08/31/22 0600)  Body mass index is 27.4 kg/m².  Body surface area is 2.25 meters squared.    I/O  last 3 completed shifts:  In: 2342 [NG/GT:2342]  Out: 1 [Stool:1]    Physical Exam Lying flat, trach with vent support. Generalized edema. Awake    Significant Labs:sureBMP:   Recent Labs   Lab 08/29/22  0621   *   *   K 6.0*   CL 98   CO2 28   BUN 98*   CREATININE 4.50*   CALCIUM 8.1*     CBC:   Recent Labs   Lab 08/29/22  0621   WBC 20.65*   RBC 2.32*   HGB 7.0*   HCT 23.0*      MCV 99.1*   MCH 30.2   MCHC 30.4*     All labs within the past 24 hours have been reviewed.    Significant Imaging:  Labs: Reviewed    Assessment/Plan:     Active Diagnoses:    Diagnosis Date Noted POA    PRINCIPAL PROBLEM:  Acute hypoxemic respiratory failure [J96.01] 08/23/2022 Unknown    Open wound of anterior abdominal wall without complication [S31.109A] 08/30/2022 Yes    Pressure injury of left leg, unstageable [L89.890] 08/30/2022 Yes    Wound dehiscence [T81.30XA] 08/30/2022 Yes    Pressure injury of sacral region, unstageable [L89.150] 08/29/2022 Yes    A-fib [I48.91] 08/23/2022 Unknown    Chronic systolic heart failure [I50.22] 08/23/2022 Unknown    CAD (coronary artery disease) [I25.10] 08/23/2022 Unknown    JUVENTINO (acute kidney injury) [N17.9] 08/23/2022 Unknown    Anemia [D64.9] 08/23/2022 Unknown    DM (diabetes mellitus) [E11.9] 08/23/2022 Unknown    Left radial fracture [S52.92XA] 08/23/2022 Unknown    Weakness acquired in ICU [R53.1] 08/23/2022 Unknown    Alteration in nutrition [R63.8] 08/23/2022 Unknown    HLD (hyperlipidemia) [E78.5] 08/23/2022 Unknown    HTN (hypertension) [I10] 08/23/2022 Unknown      Problems Resolved During this Admission:       Dialyzed today but unable to remove fluid. Will give albumin with next dialysis which should allow better tolerated fluid removal.  Thank you for your consult. I will follow-up with patient. Please contact us if you have any additional questions.    Derek Hill MD  Nephrology  [facility]

## 2022-08-31 NOTE — ASSESSMENT & PLAN NOTE
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 Will increase tomorrow  8/31- His strength is slowly improving which will also improve weaning. Will start to increase weaning again today

## 2022-08-31 NOTE — SUBJECTIVE & OBJECTIVE
Interval History: No acute events overnight. The patient is currently resting comfortably. He remains on the ventilator this am. He is doing well with his weaning trials. He is afebrile and vital signs are stable.    Objective:     Vital Signs (Most Recent):  Temp: 97.4 °F (36.3 °C) (08/31/22 0400)  Pulse: 74 (08/31/22 0600)  Resp: (!) 24 (08/31/22 0600)  BP: (!) 105/41 (08/31/22 0600)  SpO2: (!) 94 % (08/31/22 0600)   Vital Signs (24h Range):  Temp:  [97.4 °F (36.3 °C)-98.8 °F (37.1 °C)] 97.4 °F (36.3 °C)  Pulse:  [65-97] 74  Resp:  [13-24] 24  SpO2:  [89 %-100 %] 94 %  BP: ()/(31-54) 105/41     Weight: 96.8 kg (213 lb 6.5 oz)  Body mass index is 27.4 kg/m².      Intake/Output Summary (Last 24 hours) at 8/31/2022 0759  Last data filed at 8/31/2022 0600  Gross per 24 hour   Intake 1562 ml   Output 1 ml   Net 1561 ml         Physical Exam  Vitals reviewed.   Constitutional:       General: He is not in acute distress.     Appearance: He is ill-appearing.   HENT:      Head: Normocephalic and atraumatic.      Right Ear: External ear normal.      Left Ear: External ear normal.      Mouth/Throat:      Mouth: Mucous membranes are moist.   Eyes:      Extraocular Movements: Extraocular movements intact.      Conjunctiva/sclera: Conjunctivae normal.   Neck:      Comments: Trach in place with thick secretions  Cardiovascular:      Rate and Rhythm: Normal rate. Rhythm irregular.      Pulses: Normal pulses.   Pulmonary:      Breath sounds: Rhonchi present. No wheezing or rales.   Abdominal:      General: Bowel sounds are normal.      Palpations: Abdomen is soft.   Musculoskeletal:         General: Swelling present.      Right lower leg: Edema present.      Left lower leg: Edema present.      Comments: Edema to upper and lower ext. Worse in left arm than right -Hx radial fx; family states cast was removed due to edema    Skin:     General: Skin is warm and dry.   Neurological:      Mental Status: He is alert.        Vents:  Vent Mode: A/C (08/31/22 0410)  Ventilator Initiated: Yes (08/23/22 1024)  Set Rate: 12 BPM (08/31/22 0410)  Vt Set: 550 mL (08/31/22 0410)  Pressure Support: 10 cmH20 (08/30/22 1715)  PEEP/CPAP: 5 cmH20 (08/31/22 0410)  Oxygen Concentration (%): 40 (08/30/22 2010)  Peak Airway Pressure: 27 cmH2O (08/31/22 0410)  Total Ve: 6 mL (08/31/22 0410)  F/VT Ratio<105 (RSBI): (!) 48.9 (08/31/22 0000)    Lines/Drains/Airways       Central Venous Catheter Line  Duration                  Hemodialysis Catheter right subclavian -- days              Drain  Duration                  Gastrostomy/Enterostomy 08/23/22 1030 LUQ 7 days              Airway  Duration                  Surgical Airway Shiley Cuffed -- days              Peripheral Intravenous Line  Duration                  Peripheral IV - Single Lumen 08/23/22 1422 20 G Anterior;Right Forearm 7 days                    Significant Labs:    CBC/Anemia Profile:  No results for input(s): WBC, HGB, HCT, PLT, MCV, RDW, IRON, FERRITIN, RETIC, FOLATE, [id number], OCCULTBLOOD in the last 48 hours.       Chemistries:  No results for input(s): NA, K, CL, CO2, BUN, CREATININE, CALCIUM, ALBUMIN, PROT, BILITOT, ALKPHOS, ALT, AST, GLUCOSE, MG, PHOS in the last 48 hours.      All pertinent labs within the past 24 hours have been reviewed.    Significant Imaging:  I have reviewed all pertinent imaging results/findings within the past 24 hours.

## 2022-09-01 NOTE — ASSESSMENT & PLAN NOTE
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 Will increase tomorrow  8/31- His strength is slowly improving which will also improve weaning. Will start to increase weaning again today  9/1- Oxygenating adequately on 40%. Plan for 12 hours continuous of cpap during the day and can continue to rest overnight on assist control

## 2022-09-01 NOTE — SUBJECTIVE & OBJECTIVE
Interval History: No acute events overnight. The patient is currently resting comfortably. He remains on the ventilator this am. He is doing well with his weaning trials. He is afebrile and vital signs are stable.    Objective:     Vital Signs (Most Recent):  Temp: 98.3 °F (36.8 °C) (09/01/22 0400)  Pulse: 87 (09/01/22 0415)  Resp: 20 (09/01/22 0415)  BP: (!) 110/40 (09/01/22 0400)  SpO2: 100 % (09/01/22 0451)   Vital Signs (24h Range):  Temp:  [97 °F (36.1 °C)-98.3 °F (36.8 °C)] 98.3 °F (36.8 °C)  Pulse:  [] 87  Resp:  [13-29] 20  SpO2:  [93 %-100 %] 100 %  BP: ()/(34-56) 110/40     Weight: 96.8 kg (213 lb 6.5 oz)  Body mass index is 27.4 kg/m².      Intake/Output Summary (Last 24 hours) at 9/1/2022 0742  Last data filed at 8/31/2022 1800  Gross per 24 hour   Intake 781 ml   Output 500 ml   Net 281 ml         Physical Exam  Vitals reviewed.   Constitutional:       General: He is not in acute distress.     Appearance: He is ill-appearing.   HENT:      Head: Normocephalic and atraumatic.      Right Ear: External ear normal.      Left Ear: External ear normal.      Mouth/Throat:      Mouth: Mucous membranes are moist.   Eyes:      Extraocular Movements: Extraocular movements intact.      Conjunctiva/sclera: Conjunctivae normal.   Neck:      Comments: Trach in place with thick secretions  Cardiovascular:      Rate and Rhythm: Normal rate. Rhythm irregular.      Pulses: Normal pulses.   Pulmonary:      Breath sounds: Rhonchi present. No wheezing or rales.   Abdominal:      General: Bowel sounds are normal.      Palpations: Abdomen is soft.   Musculoskeletal:         General: Swelling present.      Right lower leg: Edema present.      Left lower leg: Edema present.      Comments: Edema to upper and lower ext. Worse in left arm than right -Hx radial fx; family states cast was removed due to edema    Skin:     General: Skin is warm and dry.   Neurological:      Mental Status: He is alert.       Vents:  Vent  Mode: A/C (09/01/22 0451)  Ventilator Initiated: Yes (08/23/22 1024)  Set Rate: 12 BPM (09/01/22 0451)  Vt Set: 550 mL (09/01/22 0451)  Pressure Support: 14 cmH20 (08/31/22 1939)  PEEP/CPAP: 5 cmH20 (09/01/22 0451)  Oxygen Concentration (%): 40 (09/01/22 0451)  Peak Airway Pressure: 40 cmH2O (09/01/22 0451)  Total Ve: 7.4 mL (09/01/22 0451)  F/VT Ratio<105 (RSBI): (!) 64.72 (08/31/22 1939)    Lines/Drains/Airways       Central Venous Catheter Line  Duration                  Hemodialysis Catheter right subclavian -- days              Drain  Duration                  Gastrostomy/Enterostomy 08/23/22 1030 LUQ 8 days              Airway  Duration                  Surgical Airway Shiley Cuffed -- days              Peripheral Intravenous Line  Duration                  Peripheral IV - Single Lumen 08/23/22 1422 20 G Anterior;Right Forearm 8 days                    Significant Labs:    CBC/Anemia Profile:  No results for input(s): WBC, HGB, HCT, PLT, MCV, RDW, IRON, FERRITIN, RETIC, FOLATE, [id number], OCCULTBLOOD in the last 48 hours.       Chemistries:  No results for input(s): NA, K, CL, CO2, BUN, CREATININE, CALCIUM, ALBUMIN, PROT, BILITOT, ALKPHOS, ALT, AST, GLUCOSE, MG, PHOS in the last 48 hours.      All pertinent labs within the past 24 hours have been reviewed.    Significant Imaging:  I have reviewed all pertinent imaging results/findings within the past 24 hours.

## 2022-09-01 NOTE — PT/OT/SLP PROGRESS
Occupational Therapy   Treatment    Name: Gregory Stuart  MRN: [id number]  Admitting Diagnosis:  Acute hypoxemic respiratory failure       Recommendations:     Discharge Recommendations: nursing facility, skilled  Discharge Equipment Recommendations:   (to be determined)  Barriers to discharge:  None    Assessment:     Gregory Stuart is a 77 y.o. male with a medical diagnosis of Acute hypoxemic respiratory failure.  He presents with no complaints.Tx focusing on EOB siting and ROM exercises. Performance deficits affecting function are weakness, impaired endurance, impaired self care skills, decreased lower extremity function, decreased safety awareness, edema, decreased upper extremity function.     Rehab Prognosis:  Fair and Poor; patient would benefit from acute skilled OT services to address these deficits and reach maximum level of function.       Plan:     Patient to be seen 5 x/week to address the above listed problems via self-care/home management, therapeutic activities, therapeutic exercises  Plan of Care Expires: 08/30/22  Plan of Care Reviewed with: patient    Subjective     Pain/Comfort:  Pain Rating 1: 0/10  Pain Rating Post-Intervention 1: 0/10    Objective:     Communicated with: MIKEY Garzon prior to session.  Patient found HOB elevated with blood pressure cuff, Tracheostomy, ventilator, telemetry, pulse ox (continuous), peripheral IV upon OT entry to room.    General Precautions: Standard, fall, respiratory   Orthopedic Precautions: ((L) radial fx)   Braces: N/A  Respiratory Status:  ventilator     Occupational Performance:     Bed Mobility:    Patient completed Rolling/Turning to Left with  dependent  Patient completed Rolling/Turning to Right with dependent  Patient completed Supine to Sit with dependent  Patient completed Sit to Supine with dependent     Functional Mobility/Transfers:  Patient completed Sit <> Stand Transfer with dependence and of x 2 persons  with  no assistive device and gait  belt for assistance   Functional Mobility: NT    Activities of Daily Living:  Grooming: dependence unable to wash face      AMPAC 6 Click ADL: 6    Treatment & Education:Pt sat EOB x 20 min with (D)/Max a x 2 to maintain balance. Pt able to hold therapist hand with (R) hand, but was unable to use extremity to assist functionally with maintaining or correcting balance. Pt demonstrated no ability to follow commands today.   Pt received ROM exercises to (R) UE x 2 sets of 15 reps all planes.Shoulder flexion/extension, adduction/abduction and elbow, wrist and finger flexion/extension and forearm supination/pronation. Finger flexion/extension on (L).    Patient left HOB elevated with all lines intact, call button in reach, and nurse Isha Garzon notified    GOALS:   Multidisciplinary Problems       Occupational Therapy Goals          Problem: Occupational Therapy    Goal Priority Disciplines Outcome Interventions   Occupational Therapy Goal     OT, PT/OT Ongoing, Not Progressing    Description: ST. Pt will follow simple one step command  2.Pt will perform grooming with mod a  3. Pt will sit EOB x 10 min with mod a  4. Pt will andre gown with mod a  5. Pt will t/f bed/chair with mod a x 2  6. Pt will tolerate 15 min of tx      LTG: Restore to Max I with self care and mobility.                         Time Tracking:     OT Date of Treatment: 22  OT Start Time: 824  OT Stop Time: 857  OT Total Time (min): 33 min    Billable Minutes:Therapeutic Activity 30    OT/ABIGAIL: OT          2022

## 2022-09-01 NOTE — PLAN OF CARE
Problem: Communication Impairment (Mechanical Ventilation, Invasive)  Goal: Effective Communication  9/1/2022 1806 by Barb Gonzalez, RRT  Outcome: Ongoing, Progressing  9/1/2022 0817 by Barb Gonzalez, RRT  Outcome: Ongoing, Progressing     Problem: Device-Related Complication Risk (Mechanical Ventilation, Invasive)  Goal: Optimal Device Function  Outcome: Ongoing, Progressing

## 2022-09-01 NOTE — PLAN OF CARE
Problem: Adult Inpatient Plan of Care  Goal: Plan of Care Review  9/1/2022 0521 by Cathi Moraes RN  Outcome: Ongoing, Progressing  9/1/2022 0520 by Cathi Moraes RN  Outcome: Ongoing, Progressing  Goal: Patient-Specific Goal (Individualized)  9/1/2022 0521 by Cathi Moraes RN  Outcome: Ongoing, Progressing  9/1/2022 0520 by Cathi Moraes RN  Outcome: Ongoing, Progressing  Goal: Absence of Hospital-Acquired Illness or Injury  9/1/2022 0521 by Cathi Moraes RN  Outcome: Ongoing, Progressing  9/1/2022 0520 by Cathi Moraes RN  Outcome: Ongoing, Progressing  Goal: Optimal Comfort and Wellbeing  9/1/2022 0521 by Cathi Moraes RN  Outcome: Ongoing, Progressing  9/1/2022 0520 by Cathi Moraes RN  Outcome: Ongoing, Progressing  Goal: Readiness for Transition of Care  9/1/2022 0521 by Cathi Moraes RN  Outcome: Ongoing, Progressing  9/1/2022 0520 by Cathi Moraes RN  Outcome: Ongoing, Progressing     Problem: Fall Injury Risk  Goal: Absence of Fall and Fall-Related Injury  9/1/2022 0521 by Cathi Moraes RN  Outcome: Ongoing, Progressing  9/1/2022 0520 by Cathi Moraes RN  Outcome: Ongoing, Progressing     Problem: Skin Injury Risk Increased  Goal: Skin Health and Integrity  9/1/2022 0521 by Cathi Moraes RN  Outcome: Ongoing, Progressing  9/1/2022 0520 by Cathi Moraes RN  Outcome: Ongoing, Progressing     Problem: Infection  Goal: Absence of Infection Signs and Symptoms  9/1/2022 0521 by Cathi Moraes RN  Outcome: Ongoing, Progressing  9/1/2022 0520 by Cathi Moraes RN  Outcome: Ongoing, Progressing     Problem: Diabetes Comorbidity  Goal: Blood Glucose Level Within Targeted Range  9/1/2022 0521 by Cathi Moraes RN  Outcome: Ongoing, Progressing  9/1/2022 0520 by Cathi Moraes RN  Outcome: Ongoing, Progressing     Problem: Fluid and Electrolyte Imbalance (Acute Kidney Injury/Impairment)  Goal: Fluid and Electrolyte Balance  9/1/2022 0521 by Cathi Moraes  RN  Outcome: Ongoing, Progressing  9/1/2022 0520 by Cathi Moraes RN  Outcome: Ongoing, Progressing     Problem: Oral Intake Inadequate (Acute Kidney Injury/Impairment)  Goal: Optimal Nutrition Intake  9/1/2022 0521 by Cathi Moraes RN  Outcome: Ongoing, Progressing  9/1/2022 0520 by Cathi Moraes RN  Outcome: Ongoing, Progressing     Problem: Renal Function Impairment (Acute Kidney Injury/Impairment)  Goal: Effective Renal Function  9/1/2022 0521 by Cathi Moraes RN  Outcome: Ongoing, Progressing  9/1/2022 0520 by Cathi Moraes RN  Outcome: Ongoing, Progressing     Problem: Impaired Wound Healing  Goal: Optimal Wound Healing  9/1/2022 0521 by Cathi Moraes RN  Outcome: Ongoing, Progressing  9/1/2022 0520 by Cathi Moraes RN  Outcome: Ongoing, Progressing     Problem: Communication Impairment (Mechanical Ventilation, Invasive)  Goal: Effective Communication  9/1/2022 0521 by Cathi Moraes RN  Outcome: Ongoing, Progressing  9/1/2022 0520 by Cathi Moraes RN  Outcome: Ongoing, Progressing     Problem: Device-Related Complication Risk (Mechanical Ventilation, Invasive)  Goal: Optimal Device Function  9/1/2022 0521 by Cathi Moraes RN  Outcome: Ongoing, Progressing  9/1/2022 0520 by Cathi Moraes RN  Outcome: Ongoing, Progressing     Problem: Inability to Wean (Mechanical Ventilation, Invasive)  Goal: Mechanical Ventilation Liberation  9/1/2022 0521 by Cathi Moraes RN  Outcome: Ongoing, Progressing  9/1/2022 0520 by Cathi Moraes RN  Outcome: Ongoing, Progressing     Problem: Nutrition Impairment (Mechanical Ventilation, Invasive)  Goal: Optimal Nutrition Delivery  9/1/2022 0521 by Cathi Moraes RN  Outcome: Ongoing, Progressing  9/1/2022 0520 by Cathi Moraes RN  Outcome: Ongoing, Progressing     Problem: Skin and Tissue Injury (Mechanical Ventilation, Invasive)  Goal: Absence of Device-Related Skin and Tissue Injury  9/1/2022 0521 by Cathi Moraes RN  Outcome: Ongoing,  Progressing  9/1/2022 0520 by Cathi Moraes RN  Outcome: Ongoing, Progressing     Problem: Ventilator-Induced Lung Injury (Mechanical Ventilation, Invasive)  Goal: Absence of Ventilator-Induced Lung Injury  9/1/2022 0521 by Cathi Moraes RN  Outcome: Ongoing, Progressing  9/1/2022 0520 by Cathi Moraes RN  Outcome: Ongoing, Progressing     Problem: Communication Impairment (Artificial Airway)  Goal: Effective Communication  9/1/2022 0521 by Cathi Moraes RN  Outcome: Ongoing, Progressing  9/1/2022 0520 by Cathi Moraes RN  Outcome: Ongoing, Progressing     Problem: Device-Related Complication Risk (Artificial Airway)  Goal: Optimal Device Function  9/1/2022 0521 by Cathi Moraes RN  Outcome: Ongoing, Progressing  9/1/2022 0520 by Cathi Moraes RN  Outcome: Ongoing, Progressing     Problem: Skin and Tissue Injury (Artificial Airway)  Goal: Absence of Device-Related Skin or Tissue Injury  9/1/2022 0521 by Cathi Moraes RN  Outcome: Ongoing, Progressing  9/1/2022 0520 by Cathi Moraes RN  Outcome: Ongoing, Progressing     Problem: Noninvasive Ventilation Acute  Goal: Effective Unassisted Ventilation and Oxygenation  9/1/2022 0521 by Cathi Moraes RN  Outcome: Ongoing, Progressing  9/1/2022 0520 by Cathi Moraes RN  Outcome: Ongoing, Progressing     Problem: Device-Related Complication Risk (Hemodialysis)  Goal: Safe, Effective Therapy Delivery  9/1/2022 0521 by Cathi Moraes RN  Outcome: Ongoing, Progressing  9/1/2022 0520 by Cathi Moraes RN  Outcome: Ongoing, Progressing     Problem: Hemodynamic Instability (Hemodialysis)  Goal: Effective Tissue Perfusion  9/1/2022 0521 by Cathi Moraes RN  Outcome: Ongoing, Progressing  9/1/2022 0520 by Cathi Moraes RN  Outcome: Ongoing, Progressing     Problem: Infection (Hemodialysis)  Goal: Absence of Infection Signs and Symptoms  9/1/2022 0521 by Cathi Moraes RN  Outcome: Ongoing, Progressing  9/1/2022 0520 by Cathi Moraes  RN  Outcome: Ongoing, Progressing

## 2022-09-01 NOTE — PT/OT/SLP PROGRESS
Physical Therapy Treatment    Patient Name:  Gregory Stuart   MRN:  [id number]    Recommendations:     Discharge Recommendations:  nursing facility, skilled   Discharge Equipment Recommendations: other (see comments) (to be determined)   Barriers to discharge:  ongoing medical treatment    Assessment:     Gregory Stuart is a 77 y.o. male admitted with a medical diagnosis of Acute hypoxemic respiratory failure.  He presents with the following impairments/functional limitations:  weakness, impaired functional mobility, impaired self care skills, decreased lower extremity function, impaired endurance, impaired skin, edema.     Pt cont to require increased assist with all functional mobility, sitting balance and unable to achieve full stance despite increased efforts and trials    Rehab Prognosis: Poor; patient would benefit from acute skilled PT services to address these deficits and reach maximum level of function.    Recent Surgery: * No surgery found *      Plan:     During this hospitalization, patient to be seen 5 x/week to address the identified rehab impairments via gait training, therapeutic activities, therapeutic exercises and progress toward the following goals:    Plan of Care Expires:  09/24/22    Subjective     Chief Complaint: resp failure  Patient/Family Comments/goals: pt alert this AM  Pain/Comfort:         Objective:     Communicated with Lisa Garzon RN prior to session.  Patient found HOB elevated with blood pressure cuff, telemetry, peripheral IV, ventilator, pulse ox (continuous) upon PT entry to room.     General Precautions: Standard, fall, respiratory   Orthopedic Precautions: (left radial fracture)   Braces:    Respiratory Status:  vent     Functional Mobility:  Bed Mobility:     Rolling Left:  dependence  Rolling Right: dependence  Supine to Sit: dependence and of 2 persons  Sit to Supine: dependence and of 2 persons  Transfers:     Sit to Stand:  dependence and of 2 persons with manual  assist with gait belt      AM-PAC 6 CLICK MOBILITY          Therapeutic Activities and Exercises:   Dependent to maximum assist x 2 to maintain balance/midline positioning sitting EOB x 20 minutes; heavily pushing into extension - able to sit with decreased assist sitting briefly, however, drifting backwards    PROM B LE extremity to maintain joint integrity     Patient left HOB elevated with all lines intact, call button in reach, and Eloisa Rivera OT present..    GOALS:   Multidisciplinary Problems       Physical Therapy Goals          Problem: Physical Therapy    Goal Priority Disciplines Outcome Goal Variances Interventions   Physical Therapy Goal     PT, PT/OT Ongoing, Progressing     Description: Short Term Goals to be met by 9/6/2022    Patient will increase functional independence and safety with mobility by performing    1.   Rolling right Moderate Assist; roll left Moderate Assist  2.   Supine to sit Moderate Assist  3.   Sitting balance edge of the bed x 15 minutes Minimal Assist  4.   Sit to stand Maximal Assist  using manual assistance with gait belt  5.   Bed to chair Maximal Assist using manual assistance with gait belt  6.   Lower extremity exercises x 30 reps with assist as necessary and no rest breaks      Long Term Goals to be met by 10/24/2022    Patient to require less than or equal to Minimal Assist for functional mobility to ease caregiver burden                        Time Tracking:     PT Received On: 09/01/22  PT Start Time: 0822     PT Stop Time: 0842  PT Total Time (min): 28 min     Billable Minutes: Therapeutic Activity 25    Treatment Type: Treatment  PT/PTA: PTA     PTA Visit Number: 1     09/01/2022

## 2022-09-01 NOTE — PROGRESS NOTES
[facility]  Nephrology  Progress Note    Patient Name: Gregory Stuart  MRN: [id number]  Admission Date: 8/23/2022  Hospital Length of Stay: 9 days  Attending Provider: Ham Sanchez DO   Primary Care Physician: Primary Doctor No  Principal Problem:Acute hypoxemic respiratory failure    Consults  Subjective:     Interval History: F/U Renal failure. Comfortable appearing today lying flat    Review of patient's allergies indicates:  Not on File  Current Facility-Administered Medications   Medication Frequency    0.9%  NaCl infusion PRN    acetaminophen tablet 650 mg Q6H PRN    [START ON 9/2/2022] albumin human 25% bottle 12.5 g Once    albuterol-ipratropium 2.5 mg-0.5 mg/3 mL nebulizer solution 3 mL Q12H    apixaban tablet 2.5 mg BID    aspirin chewable tablet 81 mg Daily    atorvastatin tablet 40 mg QHS    carvediloL tablet 25 mg BID    collagenase ointment Daily PRN    dextromethorphan-guaiFENesin  mg/5 ml liquid 10 mL Q6H PRN    dextrose 50% injection 12.5 g PRN    doxepin capsule 25 mg QHS    ferrous sulfate tablet 1 each Daily    glucagon (human recombinant) injection 1 mg PRN    heparin (porcine) injection 4,000 Units PRN    insulin aspart U-100 injection 1-10 Units PRN    miconazole NITRATE 2 % top powder BID    ondansetron injection 8 mg Q6H PRN    pantoprazole suspension 40 mg Daily    polyethylene glycol packet 17 g Daily PRN    senna-docusate 8.6-50 mg per tablet 1 tablet Daily PRN    venlafaxine tablet 37.5 mg BID       Objective:     Vital Signs (Most Recent):  Temp: 97 °F (36.1 °C) (09/01/22 1013)  Pulse: 73 (09/01/22 1045)  Resp: 18 (09/01/22 1045)  BP: (!) 91/40 (09/01/22 1013)  SpO2: 100 % (09/01/22 1045)  O2 Device (Oxygen Therapy): ventilator (09/01/22 0451)   Vital Signs (24h Range):  Temp:  [97 °F (36.1 °C)-98.3 °F (36.8 °C)] 97 °F (36.1 °C)  Pulse:  [] 73  Resp:  [14-29] 18  SpO2:  [93 %-100 %] 100 %  BP: ()/(34-56) 91/40     Weight: 96.8 kg  (213 lb 6.5 oz) (08/31/22 0600)  Body mass index is 27.4 kg/m².  Body surface area is 2.25 meters squared.    I/O last 3 completed shifts:  In: 1491 [NG/GT:1491]  Out: 500 [Other:500]    Physical Exam Edematous, alert, trach    Significant Labs:sureBMP:   Recent Labs   Lab 08/29/22  0621   *   *   K 6.0*   CL 98   CO2 28   BUN 98*   CREATININE 4.50*   CALCIUM 8.1*     CBC:   Recent Labs   Lab 08/29/22  0621   WBC 20.65*   RBC 2.32*   HGB 7.0*   HCT 23.0*      MCV 99.1*   MCH 30.2   MCHC 30.4*     All labs within the past 24 hours have been reviewed.    Significant Imaging:  Labs: Reviewed    Assessment/Plan:     Active Diagnoses:    Diagnosis Date Noted POA    PRINCIPAL PROBLEM:  Acute hypoxemic respiratory failure [J96.01] 08/23/2022 Unknown    Open wound of anterior abdominal wall without complication [S31.109A] 08/30/2022 Yes    Pressure injury of left leg, unstageable [L89.890] 08/30/2022 Yes    Wound dehiscence [T81.30XA] 08/30/2022 Yes    Pressure injury of sacral region, unstageable [L89.150] 08/29/2022 Yes    A-fib [I48.91] 08/23/2022 Unknown    Chronic systolic heart failure [I50.22] 08/23/2022 Unknown    CAD (coronary artery disease) [I25.10] 08/23/2022 Unknown    JUVENTINO (acute kidney injury) [N17.9] 08/23/2022 Unknown    Anemia [D64.9] 08/23/2022 Unknown    DM (diabetes mellitus) [E11.9] 08/23/2022 Unknown    Left radial fracture [S52.92XA] 08/23/2022 Unknown    Weakness acquired in ICU [R53.1] 08/23/2022 Unknown    Alteration in nutrition [R63.8] 08/23/2022 Unknown    HLD (hyperlipidemia) [E78.5] 08/23/2022 Unknown    HTN (hypertension) [I10] 08/23/2022 Unknown      Problems Resolved During this Admission:   Will check serum albumin tomorrow which should be quite low. Plan to give albumin during dialysis to facilitate fluid removal with dialysis.    Thank you for your consult. I will follow-up with patient. Please contact us if you have any additional questions.    Derek Hill,  MD  Nephrology  [facility]

## 2022-09-01 NOTE — PLAN OF CARE
Problem: Adult Inpatient Plan of Care  Goal: Plan of Care Review  9/1/2022 0521 by Cathi Moraes RN  Outcome: Ongoing, Progressing  9/1/2022 0521 by Cathi Moraes RN  Outcome: Ongoing, Progressing  9/1/2022 0520 by Cathi Moraes RN  Outcome: Ongoing, Progressing  Goal: Patient-Specific Goal (Individualized)  9/1/2022 0521 by Cathi Moraes RN  Outcome: Ongoing, Progressing  9/1/2022 0521 by Cathi Moraes RN  Outcome: Ongoing, Progressing  9/1/2022 0520 by Cathi Moraes RN  Outcome: Ongoing, Progressing  Goal: Absence of Hospital-Acquired Illness or Injury  9/1/2022 0521 by Cathi Moraes RN  Outcome: Ongoing, Progressing  9/1/2022 0521 by Cathi Moraes RN  Outcome: Ongoing, Progressing  9/1/2022 0520 by Cathi Moraes RN  Outcome: Ongoing, Progressing  Goal: Optimal Comfort and Wellbeing  9/1/2022 0521 by Cathi Moraes RN  Outcome: Ongoing, Progressing  9/1/2022 0521 by Cathi Moraes RN  Outcome: Ongoing, Progressing  9/1/2022 0520 by Cathi Moraes RN  Outcome: Ongoing, Progressing  Goal: Readiness for Transition of Care  9/1/2022 0521 by Cathi Moraes RN  Outcome: Ongoing, Progressing  9/1/2022 0521 by Cathi Moraes RN  Outcome: Ongoing, Progressing  9/1/2022 0520 by Cathi Moraes RN  Outcome: Ongoing, Progressing     Problem: Fall Injury Risk  Goal: Absence of Fall and Fall-Related Injury  9/1/2022 0521 by Cathi Moraes RN  Outcome: Ongoing, Progressing  9/1/2022 0521 by Cathi Moraes RN  Outcome: Ongoing, Progressing  9/1/2022 0520 by Cathi Moraes RN  Outcome: Ongoing, Progressing     Problem: Skin Injury Risk Increased  Goal: Skin Health and Integrity  9/1/2022 0521 by Cathi Moraes RN  Outcome: Ongoing, Progressing  9/1/2022 0521 by Cathi Moraes RN  Outcome: Ongoing, Progressing  9/1/2022 0520 by Cathi Moraes RN  Outcome: Ongoing, Progressing     Problem: Infection  Goal: Absence of Infection Signs and Symptoms  9/1/2022 0521 by Cathi Moraes,  RN  Outcome: Ongoing, Progressing  9/1/2022 0521 by Cathi Moraes RN  Outcome: Ongoing, Progressing  9/1/2022 0520 by Cathi Moraes RN  Outcome: Ongoing, Progressing     Problem: Diabetes Comorbidity  Goal: Blood Glucose Level Within Targeted Range  9/1/2022 0521 by Cathi Moraes RN  Outcome: Ongoing, Progressing  9/1/2022 0521 by Cathi Moraes RN  Outcome: Ongoing, Progressing  9/1/2022 0520 by Cathi Moraes RN  Outcome: Ongoing, Progressing     Problem: Fluid and Electrolyte Imbalance (Acute Kidney Injury/Impairment)  Goal: Fluid and Electrolyte Balance  9/1/2022 0521 by Cathi Moraes RN  Outcome: Ongoing, Progressing  9/1/2022 0521 by Cathi Moraes RN  Outcome: Ongoing, Progressing  9/1/2022 0520 by Cathi Moraes RN  Outcome: Ongoing, Progressing     Problem: Oral Intake Inadequate (Acute Kidney Injury/Impairment)  Goal: Optimal Nutrition Intake  9/1/2022 0521 by Cathi Moraes RN  Outcome: Ongoing, Progressing  9/1/2022 0521 by Cathi Moraes RN  Outcome: Ongoing, Progressing  9/1/2022 0520 by Cathi Moraes RN  Outcome: Ongoing, Progressing     Problem: Renal Function Impairment (Acute Kidney Injury/Impairment)  Goal: Effective Renal Function  9/1/2022 0521 by Cathi Moraes RN  Outcome: Ongoing, Progressing  9/1/2022 0521 by Cathi Moraes, RN  Outcome: Ongoing, Progressing  9/1/2022 0520 by Cathi Moraes RN  Outcome: Ongoing, Progressing     Problem: Impaired Wound Healing  Goal: Optimal Wound Healing  9/1/2022 0521 by Cathi Moraes RN  Outcome: Ongoing, Progressing  9/1/2022 0521 by Cathi Moraes RN  Outcome: Ongoing, Progressing  9/1/2022 0520 by Cathi Moraes RN  Outcome: Ongoing, Progressing     Problem: Communication Impairment (Mechanical Ventilation, Invasive)  Goal: Effective Communication  9/1/2022 0521 by Cathi Moraes RN  Outcome: Ongoing, Progressing  9/1/2022 0521 by Cathi Moraes RN  Outcome: Ongoing, Progressing  9/1/2022 0520 by Cathi  MIKEY Moraes  Outcome: Ongoing, Progressing     Problem: Device-Related Complication Risk (Mechanical Ventilation, Invasive)  Goal: Optimal Device Function  9/1/2022 0521 by Cathi Moraes RN  Outcome: Ongoing, Progressing  9/1/2022 0521 by Cathi Moraes RN  Outcome: Ongoing, Progressing  9/1/2022 0520 by Cathi Moraes RN  Outcome: Ongoing, Progressing     Problem: Inability to Wean (Mechanical Ventilation, Invasive)  Goal: Mechanical Ventilation Liberation  9/1/2022 0521 by Cathi Moraes RN  Outcome: Ongoing, Progressing  9/1/2022 0521 by Cathi Moraes RN  Outcome: Ongoing, Progressing  9/1/2022 0520 by Cathi Moraes RN  Outcome: Ongoing, Progressing     Problem: Nutrition Impairment (Mechanical Ventilation, Invasive)  Goal: Optimal Nutrition Delivery  9/1/2022 0521 by Cathi Moraes RN  Outcome: Ongoing, Progressing  9/1/2022 0521 by Cathi Moraes RN  Outcome: Ongoing, Progressing  9/1/2022 0520 by Cathi Moraes RN  Outcome: Ongoing, Progressing     Problem: Skin and Tissue Injury (Mechanical Ventilation, Invasive)  Goal: Absence of Device-Related Skin and Tissue Injury  9/1/2022 0521 by Cathi Moraes RN  Outcome: Ongoing, Progressing  9/1/2022 0521 by Cathi Moraes RN  Outcome: Ongoing, Progressing  9/1/2022 0520 by Cathi Moraes RN  Outcome: Ongoing, Progressing     Problem: Ventilator-Induced Lung Injury (Mechanical Ventilation, Invasive)  Goal: Absence of Ventilator-Induced Lung Injury  9/1/2022 0521 by Cathi Moraes RN  Outcome: Ongoing, Progressing  9/1/2022 0521 by Cathi Moraes RN  Outcome: Ongoing, Progressing  9/1/2022 0520 by Cathi Moraes RN  Outcome: Ongoing, Progressing     Problem: Communication Impairment (Artificial Airway)  Goal: Effective Communication  9/1/2022 0521 by Cathi Moraes RN  Outcome: Ongoing, Progressing  9/1/2022 0521 by Cathi Moraes RN  Outcome: Ongoing, Progressing  9/1/2022 0520 by Cathi Moraes RN  Outcome: Ongoing,  Progressing     Problem: Device-Related Complication Risk (Artificial Airway)  Goal: Optimal Device Function  9/1/2022 0521 by Cathi Moraes RN  Outcome: Ongoing, Progressing  9/1/2022 0521 by Cathi Moraes RN  Outcome: Ongoing, Progressing  9/1/2022 0520 by Cathi Moraes RN  Outcome: Ongoing, Progressing     Problem: Skin and Tissue Injury (Artificial Airway)  Goal: Absence of Device-Related Skin or Tissue Injury  9/1/2022 0521 by Cathi Moraes RN  Outcome: Ongoing, Progressing  9/1/2022 0521 by Cathi Moraes RN  Outcome: Ongoing, Progressing  9/1/2022 0520 by Cathi Moraes RN  Outcome: Ongoing, Progressing     Problem: Noninvasive Ventilation Acute  Goal: Effective Unassisted Ventilation and Oxygenation  9/1/2022 0521 by Cathi Moraes RN  Outcome: Ongoing, Progressing  9/1/2022 0521 by Cathi Moraes RN  Outcome: Ongoing, Progressing  9/1/2022 0520 by Cathi Moraes RN  Outcome: Ongoing, Progressing     Problem: Device-Related Complication Risk (Hemodialysis)  Goal: Safe, Effective Therapy Delivery  9/1/2022 0521 by Cathi Moraes RN  Outcome: Ongoing, Progressing  9/1/2022 0521 by Cathi Moraes RN  Outcome: Ongoing, Progressing  9/1/2022 0520 by Cathi Moraes RN  Outcome: Ongoing, Progressing     Problem: Hemodynamic Instability (Hemodialysis)  Goal: Effective Tissue Perfusion  9/1/2022 0521 by Cathi Moraes RN  Outcome: Ongoing, Progressing  9/1/2022 0521 by Cathi Moraes RN  Outcome: Ongoing, Progressing  9/1/2022 0520 by Cathi Moraes RN  Outcome: Ongoing, Progressing     Problem: Infection (Hemodialysis)  Goal: Absence of Infection Signs and Symptoms  9/1/2022 0521 by Cathi Moraes RN  Outcome: Ongoing, Progressing  9/1/2022 0521 by Cathi Moraes RN  Outcome: Ongoing, Progressing  9/1/2022 0520 by Cathi Moraes RN  Outcome: Ongoing, Progressing

## 2022-09-01 NOTE — PROGRESS NOTES
[facility]  Pulmonology  Progress Note    Patient Name: Gregory Stuart  MRN: [id number]  Admission Date: 8/23/2022  Hospital Length of Stay: 9 days  Code Status: No Order  Attending Provider: Ham Sanchez DO  Primary Care Provider: Primary Doctor No   Principal Problem: Acute hypoxemic respiratory failure    Subjective:     Interval History: No acute events overnight. The patient is currently resting comfortably. He remains on the ventilator this am. He is doing well with his weaning trials. He is afebrile and vital signs are stable.    Objective:     Vital Signs (Most Recent):  Temp: 98.3 °F (36.8 °C) (09/01/22 0400)  Pulse: 87 (09/01/22 0415)  Resp: 20 (09/01/22 0415)  BP: (!) 110/40 (09/01/22 0400)  SpO2: 100 % (09/01/22 0451)   Vital Signs (24h Range):  Temp:  [97 °F (36.1 °C)-98.3 °F (36.8 °C)] 98.3 °F (36.8 °C)  Pulse:  [] 87  Resp:  [13-29] 20  SpO2:  [93 %-100 %] 100 %  BP: ()/(34-56) 110/40     Weight: 96.8 kg (213 lb 6.5 oz)  Body mass index is 27.4 kg/m².      Intake/Output Summary (Last 24 hours) at 9/1/2022 0742  Last data filed at 8/31/2022 1800  Gross per 24 hour   Intake 781 ml   Output 500 ml   Net 281 ml         Physical Exam  Vitals reviewed.   Constitutional:       General: He is not in acute distress.     Appearance: He is ill-appearing.   HENT:      Head: Normocephalic and atraumatic.      Right Ear: External ear normal.      Left Ear: External ear normal.      Mouth/Throat:      Mouth: Mucous membranes are moist.   Eyes:      Extraocular Movements: Extraocular movements intact.      Conjunctiva/sclera: Conjunctivae normal.   Neck:      Comments: Trach in place with thick secretions  Cardiovascular:      Rate and Rhythm: Normal rate. Rhythm irregular.      Pulses: Normal pulses.   Pulmonary:      Breath sounds: Rhonchi present. No wheezing or rales.   Abdominal:      General: Bowel sounds are normal.      Palpations: Abdomen is soft.    Musculoskeletal:         General: Swelling present.      Right lower leg: Edema present.      Left lower leg: Edema present.      Comments: Edema to upper and lower ext. Worse in left arm than right -Hx radial fx; family states cast was removed due to edema    Skin:     General: Skin is warm and dry.   Neurological:      Mental Status: He is alert.       Vents:  Vent Mode: A/C (09/01/22 0451)  Ventilator Initiated: Yes (08/23/22 1024)  Set Rate: 12 BPM (09/01/22 0451)  Vt Set: 550 mL (09/01/22 0451)  Pressure Support: 14 cmH20 (08/31/22 1939)  PEEP/CPAP: 5 cmH20 (09/01/22 0451)  Oxygen Concentration (%): 40 (09/01/22 0451)  Peak Airway Pressure: 40 cmH2O (09/01/22 0451)  Total Ve: 7.4 mL (09/01/22 0451)  F/VT Ratio<105 (RSBI): (!) 64.72 (08/31/22 1939)    Lines/Drains/Airways       Central Venous Catheter Line  Duration                  Hemodialysis Catheter right subclavian -- days              Drain  Duration                  Gastrostomy/Enterostomy 08/23/22 1030 LUQ 8 days              Airway  Duration                  Surgical Airway Shiley Cuffed -- days              Peripheral Intravenous Line  Duration                  Peripheral IV - Single Lumen 08/23/22 1422 20 G Anterior;Right Forearm 8 days                    Significant Labs:    CBC/Anemia Profile:  No results for input(s): WBC, HGB, HCT, PLT, MCV, RDW, IRON, FERRITIN, RETIC, FOLATE, YOPYSDVN16, OCCULTBLOOD in the last 48 hours.       Chemistries:  No results for input(s): NA, K, CL, CO2, BUN, CREATININE, CALCIUM, ALBUMIN, PROT, BILITOT, ALKPHOS, ALT, AST, GLUCOSE, MG, PHOS in the last 48 hours.      All pertinent labs within the past 24 hours have been reviewed.    Significant Imaging:  I have reviewed all pertinent imaging results/findings within the past 24 hours.    Assessment/Plan:     * Acute hypoxemic respiratory failure  Failed extubated post CABG (7/18)  Now with trach and peg     8/28- increase to PSV 12 hours -   8/29- Will plan to change  trach out in the next day or two--continue to increase weaning as tolerated  8/30- continue with current weaning plan for now. Will increase tomorrow  8/31- His strength is slowly improving which will also improve weaning. Will start to increase weaning again today  9/1- Oxygenating adequately on 40%. Plan for 12 hours continuous of cpap during the day and can continue to rest overnight on assist control      Alteration in nutrition  S/p peg tube placement   continue tube feeding    Weakness acquired in ICU  Extreme generalized weakness - continue PT/OT as tolerated      DM (diabetes mellitus)  accuchecks and Sliding scale insulin   A1C 5.3%  Currently on sliding scale  Glucose<180    Anemia  Likely due to critical illness   Most recent H/H 7/23  - repeat labs Monday and fridays     JUVENTINO (acute kidney injury)  Cr 0.9 March 2022   - was on CCRT post CABG -- transitioned to HD M/W/F  - tunneled HD cath in place   - consult Nephrology -- did not come with porter- unsure if he was making any urine at OSH   Nephrology note reviewed and appreciated  Plan to continue MWF dialysis    CAD (coronary artery disease)  S/p CABG on 07/18    He is on aspirin, statin, beta blocker      A-fib  No history documented prior to CABG - was on Lovenox at OSH   - resume medications once placed in computer   - irregular on telemetry--frequent pvc's  - he is on carvediolol, eliquis  - HR is currently adequate                 Ham Sanchez DO  Pulmonology  [facility]

## 2022-09-02 NOTE — PLAN OF CARE
Per MD notes continue monitoring labs with tx and dialysis. Per notes continues with cpap during day and rest with assist control overnight. Continues PT.OT tx will follow dc needs as arise.

## 2022-09-02 NOTE — PROGRESS NOTES
[facility]  Pulmonology  Progress Note    Patient Name: Gregory Stuart  MRN: [id number]  Admission Date: 8/23/2022  Hospital Length of Stay: 10 days  Code Status: No Order  Attending Provider: Ham Sanchez DO  Primary Care Provider: Primary Doctor No   Principal Problem: Acute hypoxemic respiratory failure    Subjective:     Interval History: Pt resting in bed. No acute distress noted. CPAP during the day and rest on vent overnight. Plans for HD today- will transfuse 1u PRBCs     Objective:     Vital Signs (Most Recent):  Temp: 97.4 °F (36.3 °C) (09/02/22 1246)  Pulse: 87 (09/02/22 1400)  Resp: 20 (09/02/22 1400)  BP: (!) 101/43 (09/02/22 1400)  SpO2: 100 % (09/02/22 1400)   Vital Signs (24h Range):  Temp:  [97.4 °F (36.3 °C)-98.3 °F (36.8 °C)] 97.4 °F (36.3 °C)  Pulse:  [62-96] 87  Resp:  [13-26] 20  SpO2:  [62 %-100 %] 100 %  BP: ()/(35-55) 101/43     Weight: 96.8 kg (213 lb 6.5 oz)  Body mass index is 27.4 kg/m².    No intake or output data in the 24 hours ending 09/02/22 1436    Physical Exam  Vitals reviewed.   Constitutional:       General: He is not in acute distress.     Appearance: He is ill-appearing.   HENT:      Head: Normocephalic and atraumatic.      Right Ear: External ear normal.      Left Ear: External ear normal.      Mouth/Throat:      Mouth: Mucous membranes are moist.   Eyes:      Extraocular Movements: Extraocular movements intact.      Conjunctiva/sclera: Conjunctivae normal.   Neck:      Comments: Trach in place with thick secretions  Cardiovascular:      Rate and Rhythm: Normal rate. Rhythm irregular.      Pulses: Normal pulses.   Pulmonary:      Breath sounds: Rhonchi present. No wheezing or rales.   Abdominal:      General: Bowel sounds are normal.      Palpations: Abdomen is soft.   Musculoskeletal:         General: Swelling present.      Right lower leg: Edema present.      Left lower leg: Edema present.      Comments: Edema to upper and lower  ext. Worse in left arm than right -Hx radial fx; family states cast was removed due to edema    Skin:     General: Skin is warm and dry.   Neurological:      Mental Status: He is alert.       Vents:  Vent Mode: A/C (Placed on Cpap) (09/02/22 0726)  Ventilator Initiated: Yes (09/02/22 0047)  Set Rate: 12 BPM (09/02/22 0726)  Vt Set: 550 mL (09/02/22 0726)  Pressure Support: 14 cmH20 (09/01/22 1919)  PEEP/CPAP: 5 cmH20 (09/02/22 0726)  Oxygen Concentration (%): 40 (09/02/22 0726)  Peak Airway Pressure: 22 cmH2O (09/02/22 0726)  Total Ve: 4 mL (09/02/22 0726)  F/VT Ratio<105 (RSBI): (!) 75 (09/02/22 0726)    Lines/Drains/Airways       Central Venous Catheter Line  Duration                  Hemodialysis Catheter right subclavian -- days              Drain  Duration                  Gastrostomy/Enterostomy 08/23/22 1030 LUQ 10 days              Airway  Duration                  Surgical Airway Shiley Cuffed -- days              Peripheral Intravenous Line  Duration                  Peripheral IV - Single Lumen 08/23/22 1422 20 G Anterior;Right Forearm 10 days                    Significant Labs:    CBC/Anemia Profile:  Recent Labs   Lab 09/02/22  0402 09/02/22  0811   WBC 15.19*  --    HGB 6.0*  --    HCT 18.8*  --      --    MCV 93.5  --    RDW 14.1  --    IRON  --  19*   FERRITIN  --  1,197*   FOLATE  --  12.0   [id number]  --  1,031*        Chemistries:  Recent Labs   Lab 09/02/22  0402      K 4.5      CO2 28   BUN 72*   CREATININE 3.34*   CALCIUM 7.8*   ALBUMIN 1.4*       All pertinent labs within the past 24 hours have been reviewed.    Significant Imaging:  I have reviewed all pertinent imaging results/findings within the past 24 hours.    Assessment/Plan:     * Acute hypoxemic respiratory failure  Failed extubated post CABG (7/18)  Now with trach and peg     8/28- increase to PSV 12 hours -   8/29- Will plan to change trach out in the next day or two--continue to increase weaning as  tolerated  8/30- continue with current weaning plan for now. Will increase tomorrow  8/31- His strength is slowly improving which will also improve weaning. Will start to increase weaning again today  9/1- Oxygenating adequately on 40%. Plan for 12 hours continuous of cpap during the day and can continue to rest overnight on assist control  9/2- continue current tx     HTN (hypertension)  Resumed home medications     HLD (hyperlipidemia)  Resumed home medications     Alteration in nutrition  S/p peg tube placement   continue tube feeding    Left radial fracture  7/12/22--- per family he did not require surgical repair - he did have a split/cast in place but this was removed due to edema     DM (diabetes mellitus)  accuchecks and Sliding scale insulin   A1C 5.3%  Currently on sliding scale  Glucose<180    Anemia  Likely due to critical illness   Most recent H/H 7/23  - repeat labs Monday and fridays 9/2  - H/H 6/18  - anemia panel pending   - will transfuse 1u PRBCs with HD     JUVENTINO (acute kidney injury)  Cr 0.9 March 2022   - was on CCRT post CABG -- transitioned to HD M/W/F  - tunneled HD cath in place   - consult Nephrology -- did not come with porter- unsure if he was making any urine at OSH   Nephrology note reviewed and appreciated  Plan to continue MWF dialysis    CAD (coronary artery disease)  S/p CABG on 07/18    He is on aspirin, statin, beta blocker      Chronic systolic heart failure  EF 30-35% per Echo 07/2022  Daily wts/ I/Os     A-fib  No history documented prior to CABG - was on Lovenox at OSH   - resume medications once placed in computer   - irregular on telemetry--frequent pvc's  - he is on carvediolol, eliquis  - HR is currently adequate        SOFIA Bonilla/JOE  Pulmonology  [facility]

## 2022-09-02 NOTE — DIALYSIS ROUNDING
"          Nephrology Department            NAME: Gregory Stuart   YOB: 1945  MRN: [id number]  NOTE DATE: 09/02/2022       Seen in Dialysis Unit. He is getting PRBCs.  About 2 liters of fluid removed.      Patient complains:  None.      REVIEW OF SYSTEMS:  he reports no shortness of breath, nausea or vomiting. No acute changes.    VITALS:  height is 6' 2" (1.88 m) and weight is 96.8 kg (213 lb 6.5 oz). His temperature is 97.5 °F (36.4 °C). His blood pressure is 103/37 (abnormal) and his pulse is 75. His respiration is 22 (abnormal) and oxygen saturation is 100%.  Hemodialysis documentation flowsheet reviewed with dialysis nurse, including weight and vitals.    Resting comfortably, NAD.  Respiration unlabored. Lungs clear.  Heart regular, no rub.  Abdomen soft, nontender, positive bowl sounds  3 plus edema.    Recent Labs   Lab 08/29/22  0621 09/02/22  0402   * 138   K 6.0* 4.5   CL 98 102   CO2 28 28   BUN 98* 72*   CREATININE 4.50* 3.34*   CALCIUM 8.1* 7.8*     Recent Labs   Lab 08/29/22  0621 09/02/22  0402   WBC 20.65* 15.19*   HGB 7.0* 6.0*   HCT 23.0* 18.8*    267       ASSESSMENT/PLAN:  Continue with scheduled hemodialysis for this patient.        Andres Lino Jr, MD   "

## 2022-09-02 NOTE — ASSESSMENT & PLAN NOTE
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 Will increase tomorrow  8/31- His strength is slowly improving which will also improve weaning. Will start to increase weaning again today  9/1- Oxygenating adequately on 40%. Plan for 12 hours continuous of cpap during the day and can continue to rest overnight on assist control  9/2- continue current tx

## 2022-09-02 NOTE — ASSESSMENT & PLAN NOTE
Likely due to critical illness   Most recent H/H 7/23  - repeat labs Monday and fridays 9/2  - H/H 6/18  - anemia panel pending   - will transfuse 1u PRBCs with HD

## 2022-09-02 NOTE — NURSING
Blood consent obtained at this time via telephone consent given by Janet Jenkins (spouse). Witnessed by JESICA Garzon RN and NAN Hill RN.

## 2022-09-02 NOTE — SUBJECTIVE & OBJECTIVE
Interval History: Pt resting in bed. No acute distress noted. CPAP during the day and rest on vent overnight. Plans for HD today- will transfuse 1u PRBCs     Objective:     Vital Signs (Most Recent):  Temp: 97.4 °F (36.3 °C) (09/02/22 1246)  Pulse: 87 (09/02/22 1400)  Resp: 20 (09/02/22 1400)  BP: (!) 101/43 (09/02/22 1400)  SpO2: 100 % (09/02/22 1400)   Vital Signs (24h Range):  Temp:  [97.4 °F (36.3 °C)-98.3 °F (36.8 °C)] 97.4 °F (36.3 °C)  Pulse:  [62-96] 87  Resp:  [13-26] 20  SpO2:  [62 %-100 %] 100 %  BP: ()/(35-55) 101/43     Weight: 96.8 kg (213 lb 6.5 oz)  Body mass index is 27.4 kg/m².    No intake or output data in the 24 hours ending 09/02/22 1436    Physical Exam  Vitals reviewed.   Constitutional:       General: He is not in acute distress.     Appearance: He is ill-appearing.   HENT:      Head: Normocephalic and atraumatic.      Right Ear: External ear normal.      Left Ear: External ear normal.      Mouth/Throat:      Mouth: Mucous membranes are moist.   Eyes:      Extraocular Movements: Extraocular movements intact.      Conjunctiva/sclera: Conjunctivae normal.   Neck:      Comments: Trach in place with thick secretions  Cardiovascular:      Rate and Rhythm: Normal rate. Rhythm irregular.      Pulses: Normal pulses.   Pulmonary:      Breath sounds: Rhonchi present. No wheezing or rales.   Abdominal:      General: Bowel sounds are normal.      Palpations: Abdomen is soft.   Musculoskeletal:         General: Swelling present.      Right lower leg: Edema present.      Left lower leg: Edema present.      Comments: Edema to upper and lower ext. Worse in left arm than right -Hx radial fx; family states cast was removed due to edema    Skin:     General: Skin is warm and dry.   Neurological:      Mental Status: He is alert.       Vents:  Vent Mode: A/C (Placed on Cpap) (09/02/22 0726)  Ventilator Initiated: Yes (09/02/22 0047)  Set Rate: 12 BPM (09/02/22 0726)  Vt Set: 550 mL (09/02/22  0726)  Pressure Support: 14 cmH20 (09/01/22 1919)  PEEP/CPAP: 5 cmH20 (09/02/22 0726)  Oxygen Concentration (%): 40 (09/02/22 0726)  Peak Airway Pressure: 22 cmH2O (09/02/22 0726)  Total Ve: 4 mL (09/02/22 0726)  F/VT Ratio<105 (RSBI): (!) 75 (09/02/22 0726)    Lines/Drains/Airways       Central Venous Catheter Line  Duration                  Hemodialysis Catheter right subclavian -- days              Drain  Duration                  Gastrostomy/Enterostomy 08/23/22 1030 LUQ 10 days              Airway  Duration                  Surgical Airway Shiley Cuffed -- days              Peripheral Intravenous Line  Duration                  Peripheral IV - Single Lumen 08/23/22 1422 20 G Anterior;Right Forearm 10 days                    Significant Labs:    CBC/Anemia Profile:  Recent Labs   Lab 09/02/22  0402 09/02/22  0811   WBC 15.19*  --    HGB 6.0*  --    HCT 18.8*  --      --    MCV 93.5  --    RDW 14.1  --    IRON  --  19*   FERRITIN  --  1,197*   FOLATE  --  12.0   [id number]  --  1,031*        Chemistries:  Recent Labs   Lab 09/02/22  0402      K 4.5      CO2 28   BUN 72*   CREATININE 3.34*   CALCIUM 7.8*   ALBUMIN 1.4*       All pertinent labs within the past 24 hours have been reviewed.    Significant Imaging:  I have reviewed all pertinent imaging results/findings within the past 24 hours.

## 2022-09-02 NOTE — PROGRESS NOTES
[facility]  Adult Nutrition  Follow-up Note         Reason for Assessment  Reason For Assessment: RD follow-up  for tube feeding review  Nutrition Risk Screen: tube feeding or parenteral nutrition, large or nonhealing wound, burn or pressure injury     8/29/22- Patient seen today for tube feeding review. Potassium elevated at 6.0. Patient with dx of ESRD with dialysis. Recommend changing tube feeding to a more specific formula to meet his needs for renal dysfunction. Recommend Nepro @46ml with 25ml free water flush and continue with darwin.     9/1/22-Patient seen today to follow up with weights, new labs and tube feeding tolerance. Currently tolerating Nepro @ 46ml/h. His weight is 213#. His weight is up from 208# on 8/31/22. Weight fluctuations expected due to dialysis. Labs reviewed. RD following. Continue with poc.     Malnutrition  Is Patient Malnourished: No  Skin Integrity  Donnie Risk Assessment  Sensory Perception: 1-->completely limited  Moisture: 3-->occasionally moist  Activity: 1-->bedfast  Mobility: 2-->very limited  Nutrition: 3-->adequate  Friction and Shear: 2-->potential problem  Donnie Score: 12    Nutrition Diagnosis    Altered Nutrition Related Lab Values   related to Renal dysfunction as evidenced by ESRD    Nutrition Diagnosis Status: Chronic/ continues      Comments: Recommend changing tube feeding to renal formula  Nutrition Risk  Level of Risk/Frequency of Follow-up: moderate - high  Comments on nutrition risk: elevated potassium and recommended tube feeding change  Recent Labs   Lab 09/01/22  1723   POCGLU 119*       Comments on Glucose: elevated due to diabetes  Nutrition Prescription / Recommendations  Recommendation/Intervention: Recommend changing tube feeding to a renal/ dialysis formula due to renal labs.  Nepro @ 46ml/h with 25ml free water flush every hour.  Goals: labs WNL, weight miantenance, tolerance to TF  Nutrition Goal Status:  new  Communication of RD Recs: reviewed with physician (recommendations sent to MD via secure chat)  Current Diet Order: Recommend Nepro @ 46ml/h with 25ml free water flush every hour  Current Nutrition Support Formula Ordered: Nepro  Current Nutrition Support Rate Ordered: 46 (ml)  Current Nutrition Support Frequency Ordered: hourly  Oral Nutrition Supplement: Sarthak  Chewing or Swallowing Difficulty?: Swallowing difficulty  Recommended Diet: Renal (High Protein)  Recommended Oral Supplement: No Oral Supplements  Is Nutrition Support Recommended: No  Is Education Recommended: No  Monitor and Evaluation  % current Intake: Other: suggested new enteral order  % intake to meet estimated needs: Enteral Nutrition   Food and Nutrient Intake: enteral nutrition intake  Food and Nutrient Adminstration: enteral and parenteral nutrition administration  Anthropometric Measurements: weight, weight change, body mass index  Biochemical Data, Medical Tests and Procedures: glucose/endocrine profile, gastrointestinal profile, lipid profile, inflammatory profile, electrolyte and renal panel  Enteral Calories (kcal): 1987  Enteral Protein (gm): 89  Enteral (Free Water) Fluid (mL): 803  Free Water Flush Fluid (mL): 600  % Kcal Needs: 100  % Protein Needs: 100 (with Sarthak added)  Total Fluid Intake (mL): 1403  Protein Required:  (94g pro with Sarthak)  Current Medical Diagnosis and Past Medical History  Diagnosis: cardiac disease, pulmonary disease, renal disease, diabetes diagnosis/complications  Past Medical History:   Diagnosis Date    Anticoagulant long-term use     Cancer     CHF (congestive heart failure)     Coronary artery disease     Diabetes mellitus     Hypertension     Stroke      Nutrition/Diet History  Spiritual, Cultural Beliefs, Mosque Practices, Values that Affect Care: no  Food Allergies: NKFA  Factors Affecting Nutritional Intake: None identified at this time  Lab/Procedures/Meds  No results for input(s): NA, K, BUN,  "CREATININE, CALCIUM, ALBUMIN, CL, ALT, AST, PHOS in the last 72 hours.    Last A1c:   Lab Results   Component Value Date    HGBA1C 5.3 08/23/2022     Lab Results   Component Value Date    RBC 2.32 (L) 08/29/2022    HGB 7.0 (L) 08/29/2022    HCT 23.0 (L) 08/29/2022    MCV 99.1 (H) 08/29/2022    MCH 30.2 08/29/2022    MCHC 30.4 (L) 08/29/2022     Pertinent Labs Reviewed: reviewed  Pertinent Labs Comments: Glucose 113 (only lab available)  Pertinent Medications Reviewed: reviewed  Pertinent Medications Comments: albuterol  Anthropometrics  Temp: 97 °F (36.1 °C)  Height: 6' 2" (188 cm)  Height (inches): 74 in  Weight Method: Bed Scale  Weight: 96.8 kg (213 lb 6.5 oz)  Weight (lb): 213.41 lb  Ideal Body Weight (IBW), Male: 190 lb  % Ideal Body Weight, Male (lb): 108.72 %  BMI (Calculated): 27.4  BMI Grade: 25 - 29.9 - overweight     Estimated/Assessed Needs  Total Ve: 5 mL Temp: 97 °F (36.1 °C)Axillary  Weight Used For Calorie Calculations: 93.7 kg (206 lb 9.1 oz)   Energy Need Method: Kcal/kg Energy Calorie Requirements (kcal): 4809-7746 (25-30cals/kg)  Weight Used For Protein Calculations: 93.7 kg (206 lb 9.1 oz)  Protein Requirements: 112-140g/d  Estimated Fluid Requirement Method: RDA Method    RDA Method (mL): 2342     Nutrition by Nursing  Diet/Nutrition Received: tube feeding  Intake (%): other (see comments) (via peg)  Diet/Feeding Assistance: other (see comments)  Diet/Feeding Tolerance: good  Last Bowel Movement: 08/31/22       Gastrostomy/Enterostomy 08/23/22 1030 LUQ-Feeding Type: continuous, by pump       Gastrostomy/Enterostomy 08/23/22 1030 LUQ-Current Rate (mL/hr): 46 mL/hr       Gastrostomy/Enterostomy 08/23/22 1030 LUQ-Goal Rate (mL/hr): 46 mL/hr       Gastrostomy/Enterostomy 08/23/22 1030 LUQ-Formula Name: nepro  Nutrition Follow-Up  RD Follow-up?: Yes     "

## 2022-09-03 NOTE — PLAN OF CARE
Problem: Adult Inpatient Plan of Care  Goal: Plan of Care Review  Outcome: Ongoing, Progressing  Goal: Patient-Specific Goal (Individualized)  Outcome: Ongoing, Progressing  Goal: Absence of Hospital-Acquired Illness or Injury  Outcome: Ongoing, Progressing  Goal: Optimal Comfort and Wellbeing  Outcome: Ongoing, Progressing  Goal: Readiness for Transition of Care  Outcome: Ongoing, Progressing     Problem: Infection  Goal: Absence of Infection Signs and Symptoms  Outcome: Ongoing, Progressing     Problem: Diabetes Comorbidity  Goal: Blood Glucose Level Within Targeted Range  Outcome: Ongoing, Progressing     Problem: Fluid and Electrolyte Imbalance (Acute Kidney Injury/Impairment)  Goal: Fluid and Electrolyte Balance  Outcome: Ongoing, Progressing     Problem: Oral Intake Inadequate (Acute Kidney Injury/Impairment)  Goal: Optimal Nutrition Intake  Outcome: Ongoing, Progressing     Problem: Renal Function Impairment (Acute Kidney Injury/Impairment)  Goal: Effective Renal Function  Outcome: Ongoing, Progressing     Problem: Impaired Wound Healing  Goal: Optimal Wound Healing  Outcome: Ongoing, Progressing     Problem: Device-Related Complication Risk (Mechanical Ventilation, Invasive)  Goal: Optimal Device Function  Outcome: Ongoing, Progressing     Problem: Inability to Wean (Mechanical Ventilation, Invasive)  Goal: Mechanical Ventilation Liberation  Outcome: Ongoing, Progressing     Problem: Skin and Tissue Injury (Artificial Airway)  Goal: Absence of Device-Related Skin or Tissue Injury  Outcome: Ongoing, Progressing     Problem: Infection (Hemodialysis)  Goal: Absence of Infection Signs and Symptoms  Outcome: Ongoing, Progressing

## 2022-09-03 NOTE — ASSESSMENT & PLAN NOTE
Likely due to critical illness   Most recent H/H 7/23  - repeat labs Monday and fridays 9/2  - H/H 6/18  - anemia panel pending   - will transfuse 1u PRBCs with HD   9/2  - 1u PRBCs with H/H 6/21  - transfuse another unit of PRBCs   - recheck in AM   - SHIVAM with iron supplementation in place

## 2022-09-03 NOTE — NURSING
Pt resting in bed at this time, right wrist IV site is intact and hand very swollen, flushed without any difficulty without blood return (levophed stopped at this time due to inadequate IV site, SOFIA Bonilla at the bedside and assessed the patient informed that IV levophed was stopped at this time.  Informed that patient is a very hard stick and need some type of line. States she will work on getting someone to come stick the patient.  The patient is stable at this time with a MAP between 60-65. NAD. Will con't to monitor.

## 2022-09-03 NOTE — SUBJECTIVE & OBJECTIVE
Interval History: Pt resting in bed. No acute events overnight. CPAP continues during day. HD yesterday.     Objective:     Vital Signs (Most Recent):  Temp: 97.3 °F (36.3 °C) (09/03/22 1200)  Pulse: 88 (09/03/22 1400)  Resp: 17 (09/03/22 1400)  BP: (!) 104/48 (09/03/22 1400)  SpO2: 100 % (09/03/22 1400)   Vital Signs (24h Range):  Temp:  [97.3 °F (36.3 °C)-98.9 °F (37.2 °C)] 97.3 °F (36.3 °C)  Pulse:  [] 88  Resp:  [10-22] 17  SpO2:  [87 %-100 %] 100 %  BP: ()/(40-59) 104/48     Weight: 96.2 kg (212 lb 1.3 oz)  Body mass index is 27.23 kg/m².      Intake/Output Summary (Last 24 hours) at 9/3/2022 1505  Last data filed at 9/3/2022 1415  Gross per 24 hour   Intake 1073.5 ml   Output --   Net 1073.5 ml       Physical Exam  Vitals reviewed.   Constitutional:       General: He is not in acute distress.     Appearance: He is ill-appearing.   HENT:      Head: Normocephalic and atraumatic.      Right Ear: External ear normal.      Left Ear: External ear normal.      Mouth/Throat:      Mouth: Mucous membranes are moist.   Eyes:      Extraocular Movements: Extraocular movements intact.      Conjunctiva/sclera: Conjunctivae normal.   Neck:      Comments: Trach in place with thick secretions  Cardiovascular:      Rate and Rhythm: Normal rate. Rhythm irregular.      Pulses: Normal pulses.   Pulmonary:      Breath sounds: No wheezing or rales.      Comments: Coarse throughout anteriorly    Abdominal:      General: Bowel sounds are normal.      Palpations: Abdomen is soft.   Musculoskeletal:         General: Swelling present.      Right lower leg: Edema present.      Left lower leg: Edema present.      Comments: Edema to upper and lower ext. Worse in left arm than right -Hx radial fx; family states cast was removed due to edema    Skin:     General: Skin is warm and dry.   Neurological:      Mental Status: He is alert.       Vents:  Vent Mode: CPAP / PSV (09/03/22 1415)  Ventilator Initiated: Yes (09/02/22 0047)  Set  Rate: 0 BPM (09/03/22 1415)  Vt Set: 0 mL (09/03/22 1415)  Pressure Support: 12 cmH20 (09/03/22 1415)  PEEP/CPAP: 5 cmH20 (09/03/22 1415)  Oxygen Concentration (%): 40 (09/03/22 0400)  Peak Airway Pressure: 18 cmH2O (09/03/22 1415)  Total Ve: 5.8 mL (09/03/22 1415)  F/VT Ratio<105 (RSBI): (!) 69.34 (09/02/22 1955)    Lines/Drains/Airways       Central Venous Catheter Line  Duration                  Hemodialysis Catheter right subclavian -- days              Drain  Duration                  Gastrostomy/Enterostomy 08/23/22 1030 LUQ 11 days              Airway  Duration                  Surgical Airway Shiley Cuffed -- days              Peripheral Intravenous Line  Duration                  Peripheral IV - Single Lumen 08/23/22 1422 20 G Anterior;Right Forearm 11 days                    Significant Labs:    CBC/Anemia Profile:  Recent Labs   Lab 09/02/22  0402 09/02/22  0811 09/03/22  0512   WBC 15.19*  --  15.87*   HGB 6.0*  --  6.8*   HCT 18.8*  --  21.8*     --  257   MCV 93.5  --  96.5*   RDW 14.1  --  14.6*   IRON  --  19*  --    FERRITIN  --  1,197*  --    FOLATE  --  12.0  --    [id number]  --  1,031*  --         Chemistries:  Recent Labs   Lab 09/02/22  0402      K 4.5      CO2 28   BUN 72*   CREATININE 3.34*   CALCIUM 7.8*   ALBUMIN 1.4*       All pertinent labs within the past 24 hours have been reviewed.    Significant Imaging:  I have reviewed all pertinent imaging results/findings within the past 24 hours.

## 2022-09-03 NOTE — PROGRESS NOTES
[facility]  Pulmonology  Progress Note    Patient Name: Gregory Stuart  MRN: [id number]  Admission Date: 8/23/2022  Hospital Length of Stay: 11 days  Code Status: No Order  Attending Provider: Ham Sanchez DO  Primary Care Provider: Primary Doctor No   Principal Problem: Acute hypoxemic respiratory failure    Subjective:     Interval History: Pt resting in bed. No acute events overnight. CPAP continues during day. HD yesterday.     Objective:     Vital Signs (Most Recent):  Temp: 97.3 °F (36.3 °C) (09/03/22 1200)  Pulse: 88 (09/03/22 1400)  Resp: 17 (09/03/22 1400)  BP: (!) 104/48 (09/03/22 1400)  SpO2: 100 % (09/03/22 1400)   Vital Signs (24h Range):  Temp:  [97.3 °F (36.3 °C)-98.9 °F (37.2 °C)] 97.3 °F (36.3 °C)  Pulse:  [] 88  Resp:  [10-22] 17  SpO2:  [87 %-100 %] 100 %  BP: ()/(40-59) 104/48     Weight: 96.2 kg (212 lb 1.3 oz)  Body mass index is 27.23 kg/m².      Intake/Output Summary (Last 24 hours) at 9/3/2022 1505  Last data filed at 9/3/2022 1415  Gross per 24 hour   Intake 1073.5 ml   Output --   Net 1073.5 ml       Physical Exam  Vitals reviewed.   Constitutional:       General: He is not in acute distress.     Appearance: He is ill-appearing.   HENT:      Head: Normocephalic and atraumatic.      Right Ear: External ear normal.      Left Ear: External ear normal.      Mouth/Throat:      Mouth: Mucous membranes are moist.   Eyes:      Extraocular Movements: Extraocular movements intact.      Conjunctiva/sclera: Conjunctivae normal.   Neck:      Comments: Trach in place with thick secretions  Cardiovascular:      Rate and Rhythm: Normal rate. Rhythm irregular.      Pulses: Normal pulses.   Pulmonary:      Breath sounds: No wheezing or rales.      Comments: Coarse throughout anteriorly    Abdominal:      General: Bowel sounds are normal.      Palpations: Abdomen is soft.   Musculoskeletal:         General: Swelling present.      Right lower leg: Edema  present.      Left lower leg: Edema present.      Comments: Edema to upper and lower ext. Worse in left arm than right -Hx radial fx; family states cast was removed due to edema    Skin:     General: Skin is warm and dry.   Neurological:      Mental Status: He is alert.       Vents:  Vent Mode: CPAP / PSV (09/03/22 1415)  Ventilator Initiated: Yes (09/02/22 0047)  Set Rate: 0 BPM (09/03/22 1415)  Vt Set: 0 mL (09/03/22 1415)  Pressure Support: 12 cmH20 (09/03/22 1415)  PEEP/CPAP: 5 cmH20 (09/03/22 1415)  Oxygen Concentration (%): 40 (09/03/22 0400)  Peak Airway Pressure: 18 cmH2O (09/03/22 1415)  Total Ve: 5.8 mL (09/03/22 1415)  F/VT Ratio<105 (RSBI): (!) 69.34 (09/02/22 1955)    Lines/Drains/Airways       Central Venous Catheter Line  Duration                  Hemodialysis Catheter right subclavian -- days              Drain  Duration                  Gastrostomy/Enterostomy 08/23/22 1030 LUQ 11 days              Airway  Duration                  Surgical Airway Shiley Cuffed -- days              Peripheral Intravenous Line  Duration                  Peripheral IV - Single Lumen 08/23/22 1422 20 G Anterior;Right Forearm 11 days                    Significant Labs:    CBC/Anemia Profile:  Recent Labs   Lab 09/02/22  0402 09/02/22  0811 09/03/22  0512   WBC 15.19*  --  15.87*   HGB 6.0*  --  6.8*   HCT 18.8*  --  21.8*     --  257   MCV 93.5  --  96.5*   RDW 14.1  --  14.6*   IRON  --  19*  --    FERRITIN  --  1,197*  --    FOLATE  --  12.0  --    [id number]  --  1,031*  --         Chemistries:  Recent Labs   Lab 09/02/22  0402      K 4.5      CO2 28   BUN 72*   CREATININE 3.34*   CALCIUM 7.8*   ALBUMIN 1.4*       All pertinent labs within the past 24 hours have been reviewed.    Significant Imaging:  I have reviewed all pertinent imaging results/findings within the past 24 hours.    Assessment/Plan:     * Acute hypoxemic respiratory failure  Failed extubated post CABG (7/18)  Now with trach and  peg     8/28- increase to PSV 12 hours -   8/29- Will plan to change trach out in the next day or two--continue to increase weaning as tolerated  8/30- continue with current weaning plan for now. Will increase tomorrow  8/31- His strength is slowly improving which will also improve weaning. Will start to increase weaning again today  9/1- Oxygenating adequately on 40%. Plan for 12 hours continuous of cpap during the day and can continue to rest overnight on assist control  9/2- continue current tx     HTN (hypertension)  Resumed home medications     HLD (hyperlipidemia)  Resumed home medications     Alteration in nutrition  S/p peg tube placement   continue tube feeding    Weakness acquired in ICU  Extreme generalized weakness - continue PT/OT as tolerated      Left radial fracture  7/12/22--- per family he did not require surgical repair - he did have a split/cast in place but this was removed due to edema     DM (diabetes mellitus)  accuchecks and Sliding scale insulin   A1C 5.3%  Currently on sliding scale  Glucose<180    Anemia  Likely due to critical illness   Most recent H/H 7/23  - repeat labs Monday and fridays 9/2  - H/H 6/18  - anemia panel pending   - will transfuse 1u PRBCs with HD   9/2  - 1u PRBCs with H/H 6/21  - transfuse another unit of PRBCs   - recheck in AM   - SHIVAM with iron supplementation in place     JUVENTINO (acute kidney injury)  Cr 0.9 March 2022   - was on CCRT post CABG -- transitioned to HD M/W/F  - tunneled HD cath in place   - consult Nephrology -- did not come with porter- unsure if he was making any urine at OSH   Nephrology note reviewed and appreciated  Plan to continue MWF dialysis    CAD (coronary artery disease)  S/p CABG on 07/18    He is on aspirin, statin, beta blocker      Chronic systolic heart failure  EF 30-35% per Echo 07/2022  Daily wts/ I/Os     A-fib  No history documented prior to CABG - was on Lovenox at OSH   - resume medications once placed in computer   - irregular  on telemetry--frequent pvc's  - he is on carvediolol, eliquis  - HR is currently adequate        SOFIA Bonilla/JOE  Pulmonology  [facility]

## 2022-09-04 NOTE — ASSESSMENT & PLAN NOTE
Likely due to critical illness   Most recent H/H 7/23  - repeat labs Monday and fridays 9/2  - H/H 6/18  - anemia panel pending   - will transfuse 1u PRBCs with HD   9/2  - 1u PRBCs with H/H 6/21  - transfuse another unit of PRBCs   - recheck in AM   - SHIVAM with iron supplementation in place   9/4  - recheck H/H in AM  - no obvious s/s of bleeding

## 2022-09-04 NOTE — PROGRESS NOTES
[facility]  Pulmonology  Progress Note    Patient Name: Gregory Stuart  MRN: [id number]  Admission Date: 8/23/2022  Hospital Length of Stay: 12 days  Code Status: No Order  Attending Provider: Ham Sanchez DO  Primary Care Provider: Primary Doctor No   Principal Problem: Acute hypoxemic respiratory failure    Subjective:     Interval History: Pt resting in bed. No acute events overnight. CPAP continues during day.     Objective:     Vital Signs (Most Recent):  Temp: 98.6 °F (37 °C) (09/04/22 0300)  Pulse: 82 (09/04/22 0730)  Resp: (!) 22 (09/04/22 0730)  BP: (!) 118/52 (09/04/22 0700)  SpO2: (!) 93 % (09/04/22 0730)   Vital Signs (24h Range):  Temp:  [97.3 °F (36.3 °C)-98.6 °F (37 °C)] 98.6 °F (37 °C)  Pulse:  [] 82  Resp:  [11-25] 22  SpO2:  [89 %-100 %] 93 %  BP: ()/(39-64) 118/52     Weight: 98.8 kg (217 lb 13 oz)  Body mass index is 27.97 kg/m².      Intake/Output Summary (Last 24 hours) at 9/4/2022 1127  Last data filed at 9/3/2022 1415  Gross per 24 hour   Intake 292.5 ml   Output --   Net 292.5 ml         Physical Exam  Vitals reviewed.   Constitutional:       General: He is not in acute distress.     Appearance: He is ill-appearing.   HENT:      Head: Normocephalic and atraumatic.      Right Ear: External ear normal.      Left Ear: External ear normal.      Mouth/Throat:      Mouth: Mucous membranes are moist.   Eyes:      Extraocular Movements: Extraocular movements intact.      Conjunctiva/sclera: Conjunctivae normal.   Neck:      Comments: Trach in place with thick secretions  Cardiovascular:      Rate and Rhythm: Normal rate. Rhythm irregular.      Pulses: Normal pulses.   Pulmonary:      Breath sounds: No wheezing or rales.      Comments: Coarse throughout anteriorly    Abdominal:      General: Bowel sounds are normal.      Palpations: Abdomen is soft.   Musculoskeletal:         General: Swelling present.      Right lower leg: Edema present.      Left  lower leg: Edema present.      Comments: Edema to upper and lower ext. Worse in left arm than right -Hx radial fx; family states cast was removed due to edema    Skin:     General: Skin is warm and dry.   Neurological:      Mental Status: He is alert.       Vents:  Vent Mode: A/C (Pt placed on Cpap) (09/04/22 0730)  Ventilator Initiated: Yes (09/02/22 0047)  Set Rate: 12 BPM (09/04/22 0730)  Vt Set: 550 mL (09/04/22 0730)  Pressure Support: 12 cmH20 (09/03/22 1415)  PEEP/CPAP: 5 cmH20 (09/04/22 0730)  Oxygen Concentration (%): 40 (09/04/22 0730)  Peak Airway Pressure: 36 cmH2O (09/04/22 0730)  Total Ve: 9.1 mL (09/04/22 0730)  F/VT Ratio<105 (RSBI): (!) 53.66 (09/04/22 0730)    Lines/Drains/Airways       Central Venous Catheter Line  Duration                  Hemodialysis Catheter right subclavian -- days              Drain  Duration                  Gastrostomy/Enterostomy 08/23/22 1030 LUQ 12 days              Airway  Duration                  Surgical Airway Shiley Cuffed -- days              Peripheral Intravenous Line  Duration                  Peripheral IV - Single Lumen 08/23/22 1422 20 G Anterior;Right Forearm 11 days                    Significant Labs:    CBC/Anemia Profile:  Recent Labs   Lab 09/03/22  0512   WBC 15.87*   HGB 6.8*   HCT 21.8*      MCV 96.5*   RDW 14.6*          Chemistries:  No results for input(s): NA, K, CL, CO2, BUN, CREATININE, CALCIUM, ALBUMIN, PROT, BILITOT, ALKPHOS, ALT, AST, GLUCOSE, MG, PHOS in the last 48 hours.      All pertinent labs within the past 24 hours have been reviewed.    Significant Imaging:  I have reviewed all pertinent imaging results/findings within the past 24 hours.    Assessment/Plan:     * Acute hypoxemic respiratory failure  Failed extubated post CABG (7/18)  Now with trach and peg     8/28- increase to PSV 12 hours -   8/29- Will plan to change trach out in the next day or two--continue to increase weaning as tolerated  8/30- continue with current  weaning plan for now. Will increase tomorrow  8/31- His strength is slowly improving which will also improve weaning. Will start to increase weaning again today  9/1- Oxygenating adequately on 40%. Plan for 12 hours continuous of cpap during the day and can continue to rest overnight on assist control  9/2- continue current tx   9/4- increase CPAP to 16hrs and rest on vent     HTN (hypertension)  Resumed home medications     HLD (hyperlipidemia)  Resumed home medications     Alteration in nutrition  S/p peg tube placement   continue tube feeding    Weakness acquired in ICU  Extreme generalized weakness - continue PT/OT as tolerated      DM (diabetes mellitus)  accuchecks and Sliding scale insulin   A1C 5.3%  Currently on sliding scale  Glucose<180    Anemia  Likely due to critical illness   Most recent H/H 7/23  - repeat labs Monday and fridays 9/2  - H/H 6/18  - anemia panel pending   - will transfuse 1u PRBCs with HD   9/2  - 1u PRBCs with H/H 6/21  - transfuse another unit of PRBCs   - recheck in AM   - SHIVAM with iron supplementation in place   9/4  - recheck H/H in AM  - no obvious s/s of bleeding     JUVENTINO (acute kidney injury)  Cr 0.9 March 2022   - was on CCRT post CABG -- transitioned to HD M/W/F  - tunneled HD cath in place   - consult Nephrology -- did not come with german- unsure if he was making any urine at OSH   Nephrology note reviewed and appreciated  Plan to continue MWF dialysis    CAD (coronary artery disease)  S/p CABG on 07/18    He is on aspirin, statin, beta blocker      Chronic systolic heart failure  EF 30-35% per Echo 07/2022  Daily wts/ I/Os     A-fib  No history documented prior to CABG - was on Lovenox at OSH   - resume medications once placed in computer   - irregular on telemetry--frequent pvc's  - he is on carvediolol, eliquis  - HR is currently adequate          SOFIA Bonilla/JOE  Pulmonology  [facility]

## 2022-09-04 NOTE — PROGRESS NOTES
[facility]  Nephrology  Progress Note    Patient Name: Gregory Stuart  MRN: [id number]  Admission Date: 8/23/2022  Hospital Length of Stay: 11 days  Attending Provider: Ham Sanchez DO   Primary Care Physician: Primary Doctor No  Principal Problem:Acute hypoxemic respiratory failure    Consults  Subjective:     Interval History: The patient is lying in bed in no distress  Review of patient's allergies indicates:  Not on File  Current Facility-Administered Medications   Medication Frequency    0.9%  NaCl infusion (for blood administration) Q24H PRN    0.9%  NaCl infusion PRN    acetaminophen tablet 650 mg Q6H PRN    albuterol-ipratropium 2.5 mg-0.5 mg/3 mL nebulizer solution 3 mL Q12H    apixaban tablet 2.5 mg BID    aspirin chewable tablet 81 mg Daily    atorvastatin tablet 40 mg QHS    carvediloL tablet 25 mg BID    collagenase ointment Daily PRN    dextromethorphan-guaiFENesin  mg/5 ml liquid 10 mL Q6H PRN    dextrose 50% injection 12.5 g PRN    doxepin capsule 25 mg QHS    ferrous sulfate tablet 1 each Daily    glucagon (human recombinant) injection 1 mg PRN    heparin (porcine) injection 4,000 Units PRN    insulin aspart U-100 injection 1-10 Units PRN    miconazole NITRATE 2 % top powder BID    ondansetron injection 8 mg Q6H PRN    pantoprazole suspension 40 mg Daily    polyethylene glycol packet 17 g Daily PRN    senna-docusate 8.6-50 mg per tablet 1 tablet Daily PRN    venlafaxine tablet 37.5 mg BID       Objective:     Vital Signs (Most Recent):  Temp: 98.4 °F (36.9 °C) (09/03/22 1852)  Pulse: 91 (09/03/22 1948)  Resp: (!) 25 (09/03/22 1948)  BP: (!) 111/46 (09/03/22 2047)  SpO2: 100 % (09/03/22 1948)  O2 Device (Oxygen Therapy): ventilator (09/03/22 1948)   Vital Signs (24h Range):  Temp:  [97.3 °F (36.3 °C)-98.9 °F (37.2 °C)] 98.4 °F (36.9 °C)  Pulse:  [] 91  Resp:  [10-25] 25  SpO2:  [89 %-100 %] 100 %  BP: ()/(39-59) 111/46     Weight: 96.2 kg (212  lb 1.3 oz) (09/03/22 0623)  Body mass index is 27.23 kg/m².  Body surface area is 2.24 meters squared.    I/O last 3 completed shifts:  In: 2173.5 [Blood:592.5; Other:1581]  Out: 3000 [Other:3000]    Physical Exam  Lungs-coarse bs  Cor-no rub  Abd-soft    Significant Labs:sureCMP:   Recent Labs   Lab 09/02/22  0402      CALCIUM 7.8*   ALBUMIN 1.4*      K 4.5   CO2 28      BUN 72*   CREATININE 3.34*     All labs within the past 24 hours have been reviewed.    Significant Imaging:      Assessment/Plan:     Active Diagnoses:    Diagnosis Date Noted POA    PRINCIPAL PROBLEM:  Acute hypoxemic respiratory failure [J96.01] 08/23/2022 Unknown    Open wound of anterior abdominal wall without complication [S31.109A] 08/30/2022 Yes    Pressure injury of left leg, unstageable [L89.890] 08/30/2022 Yes    Wound dehiscence [T81.30XA] 08/30/2022 Yes    Pressure injury of sacral region, unstageable [L89.150] 08/29/2022 Yes    A-fib [I48.91] 08/23/2022 Unknown    Chronic systolic heart failure [I50.22] 08/23/2022 Unknown    CAD (coronary artery disease) [I25.10] 08/23/2022 Unknown    JUVENTINO (acute kidney injury) [N17.9] 08/23/2022 Unknown    Anemia [D64.9] 08/23/2022 Unknown    DM (diabetes mellitus) [E11.9] 08/23/2022 Unknown    Left radial fracture [S52.92XA] 08/23/2022 Unknown    Weakness acquired in ICU [R53.1] 08/23/2022 Unknown    Alteration in nutrition [R63.8] 08/23/2022 Unknown    HLD (hyperlipidemia) [E78.5] 08/23/2022 Unknown    HTN (hypertension) [I10] 08/23/2022 Unknown      Problems Resolved During this Admission:       Plan:  Continue the present care    Thank you for your consult. I will follow-up with patient. Please contact us if you have any additional questions.    Mango Vitale MD  Nephrology  [facility]

## 2022-09-04 NOTE — PT/OT/SLP PROGRESS
Physical Therapy Treatment    Patient Name:  Gregory Stuart   MRN:  [id number]    Recommendations:     Discharge Recommendations:  nursing facility, skilled   Discharge Equipment Recommendations: other (see comments) (to be determined)   Barriers to discharge:  complicated hospitalization    Assessment:     Gregory Stuart is a 77 y.o. male admitted with a medical diagnosis of Acute hypoxemic respiratory failure.  He presents with the following impairments/functional limitations:  weakness, impaired endurance, impaired functional mobility, gait instability, decreased lower extremity function, decreased ROM, impaired skin, edema, impaired cardiopulmonary response to activity. Pt appears to be better able to answer yes/no questions this visit. Does demo some attempts at participating with BLE ex but not fully and pt admits his lack of assistance.    Rehab Prognosis: Fair; patient would benefit from acute skilled PT services to address these deficits and reach maximum level of function.    Recent Surgery: * No surgery found *      Plan:     During this hospitalization, patient to be seen 5 x/week to address the identified rehab impairments via gait training, therapeutic activities, therapeutic exercises and progress toward the following goals:    Plan of Care Expires:  09/24/22    Subjective     Chief Complaint: none  Patient/Family Comments/goals: agreeable to supine ex  Pain/Comfort:         Objective:     Communicated with Kaur Gee RN prior to session.  Patient found HOB elevated with telemetry, PEG Tube, peripheral IV, blood pressure cuff, ventilator, pulse ox (continuous) upon PT entry to room.     General Precautions: Standard, fall, respiratory   Orthopedic Precautions: (left radial fracture)   Braces:    Respiratory Status:  trach     Functional Mobility:        AM-PAC 6 CLICK MOBILITY          Therapeutic Activities and Exercises:   Bilateral lower extremity exercise x 15 reps: ankle pumps, short arc quads,  heel slides, hip abduction/adduction, and straight leg raises with active assist ROM, verbal cues for sequencing and safety, and manual resistance as patient able     Patient left HOB elevated with all lines intact and call button in reach..    GOALS:   Multidisciplinary Problems       Physical Therapy Goals          Problem: Physical Therapy    Goal Priority Disciplines Outcome Goal Variances Interventions   Physical Therapy Goal     PT, PT/OT Ongoing, Progressing     Description: Short Term Goals to be met by 9/6/2022    Patient will increase functional independence and safety with mobility by performing    1.   Rolling right Moderate Assist; roll left Moderate Assist  2.   Supine to sit Moderate Assist  3.   Sitting balance edge of the bed x 15 minutes Minimal Assist  4.   Sit to stand Maximal Assist  using manual assistance with gait belt  5.   Bed to chair Maximal Assist using manual assistance with gait belt  6.   Lower extremity exercises x 30 reps with assist as necessary and no rest breaks      Long Term Goals to be met by 10/24/2022    Patient to require less than or equal to Minimal Assist for functional mobility to ease caregiver burden                        Time Tracking:     PT Received On: 09/04/22  PT Start Time: 1527     PT Stop Time: 1537  PT Total Time (min): 10 min     Billable Minutes: Therapeutic Exercise 9    Treatment Type: Treatment  PT/PTA: PT     PTA Visit Number: 1     09/04/2022

## 2022-09-04 NOTE — SUBJECTIVE & OBJECTIVE
Interval History: Pt resting in bed. No acute events overnight. CPAP continues during day.     Objective:     Vital Signs (Most Recent):  Temp: 98.6 °F (37 °C) (09/04/22 0300)  Pulse: 82 (09/04/22 0730)  Resp: (!) 22 (09/04/22 0730)  BP: (!) 118/52 (09/04/22 0700)  SpO2: (!) 93 % (09/04/22 0730)   Vital Signs (24h Range):  Temp:  [97.3 °F (36.3 °C)-98.6 °F (37 °C)] 98.6 °F (37 °C)  Pulse:  [] 82  Resp:  [11-25] 22  SpO2:  [89 %-100 %] 93 %  BP: ()/(39-64) 118/52     Weight: 98.8 kg (217 lb 13 oz)  Body mass index is 27.97 kg/m².      Intake/Output Summary (Last 24 hours) at 9/4/2022 1127  Last data filed at 9/3/2022 1415  Gross per 24 hour   Intake 292.5 ml   Output --   Net 292.5 ml         Physical Exam  Vitals reviewed.   Constitutional:       General: He is not in acute distress.     Appearance: He is ill-appearing.   HENT:      Head: Normocephalic and atraumatic.      Right Ear: External ear normal.      Left Ear: External ear normal.      Mouth/Throat:      Mouth: Mucous membranes are moist.   Eyes:      Extraocular Movements: Extraocular movements intact.      Conjunctiva/sclera: Conjunctivae normal.   Neck:      Comments: Trach in place with thick secretions  Cardiovascular:      Rate and Rhythm: Normal rate. Rhythm irregular.      Pulses: Normal pulses.   Pulmonary:      Breath sounds: No wheezing or rales.      Comments: Coarse throughout anteriorly    Abdominal:      General: Bowel sounds are normal.      Palpations: Abdomen is soft.   Musculoskeletal:         General: Swelling present.      Right lower leg: Edema present.      Left lower leg: Edema present.      Comments: Edema to upper and lower ext. Worse in left arm than right -Hx radial fx; family states cast was removed due to edema    Skin:     General: Skin is warm and dry.   Neurological:      Mental Status: He is alert.       Vents:  Vent Mode: A/C (Pt placed on Cpap) (09/04/22 0730)  Ventilator Initiated: Yes (09/02/22 0047)  Set  Rate: 12 BPM (09/04/22 0730)  Vt Set: 550 mL (09/04/22 0730)  Pressure Support: 12 cmH20 (09/03/22 1415)  PEEP/CPAP: 5 cmH20 (09/04/22 0730)  Oxygen Concentration (%): 40 (09/04/22 0730)  Peak Airway Pressure: 36 cmH2O (09/04/22 0730)  Total Ve: 9.1 mL (09/04/22 0730)  F/VT Ratio<105 (RSBI): (!) 53.66 (09/04/22 0730)    Lines/Drains/Airways       Central Venous Catheter Line  Duration                  Hemodialysis Catheter right subclavian -- days              Drain  Duration                  Gastrostomy/Enterostomy 08/23/22 1030 LUQ 12 days              Airway  Duration                  Surgical Airway Shiley Cuffed -- days              Peripheral Intravenous Line  Duration                  Peripheral IV - Single Lumen 08/23/22 1422 20 G Anterior;Right Forearm 11 days                    Significant Labs:    CBC/Anemia Profile:  Recent Labs   Lab 09/03/22  0512   WBC 15.87*   HGB 6.8*   HCT 21.8*      MCV 96.5*   RDW 14.6*          Chemistries:  No results for input(s): NA, K, CL, CO2, BUN, CREATININE, CALCIUM, ALBUMIN, PROT, BILITOT, ALKPHOS, ALT, AST, GLUCOSE, MG, PHOS in the last 48 hours.      All pertinent labs within the past 24 hours have been reviewed.    Significant Imaging:  I have reviewed all pertinent imaging results/findings within the past 24 hours.

## 2022-09-04 NOTE — PT/OT/SLP PROGRESS
Occupational Therapy   Treatment    Name: Gregory Stuart  MRN: [id number]  Admitting Diagnosis:  Acute hypoxemic respiratory failure       Recommendations:     Discharge Recommendations: nursing facility, skilled  Discharge Equipment Recommendations:   (to be determined)  Barriers to discharge:  None    Assessment:     Gregory Stuart is a 77 y.o. male with a medical diagnosis of Acute hypoxemic respiratory failure.  He presents with no complaints. Pt nodded yes to UE exercises. Performance deficits affecting function are weakness, impaired endurance, decreased upper extremity function, impaired cognition.     Rehab Prognosis:  Fair and Poor; patient would benefit from acute skilled OT services to address these deficits and reach maximum level of function.       Plan:     Patient to be seen 5 x/week to address the above listed problems via self-care/home management, therapeutic activities, therapeutic exercises  Plan of Care Expires: 08/30/22  Plan of Care Reviewed with: patient    Subjective     Pain/Comfort:  Pain Rating 1: 0/10  Pain Rating Post-Intervention 1: 0/10    Objective:     Communicated with: MIKEY Gee prior to session.  Patient found HOB elevated with blood pressure cuff, pulse ox (continuous), TLSO, CPAP, peripheral IV upon OT entry to room.    General Precautions: Standard, fall, respiratory   Orthopedic Precautions:LUE non weight bearing   Braces: N/A  Respiratory Status:  CPAP     Occupational Performance:     Bed Mobility:         Functional Mobility/Transfers:    Functional Mobility: NT    Act[facility] of Daily [facility]:        [facility]    Treatment & Education:  Pt performed (R) UE ROM exercises to maintain joint integrity x 2 sets of 15 reps (R) sjhoulder flexion/extension, adduction/abduction and elbow and wrist flexion/extension and forearm supination. Very Minimal active participation by pt with heavy cueing. ROM exercises to (L) hand x 2 sets of 15 reps followed by elevation on  pillow.    Patient left HOB elevated with all lines intact, call button in reach, and nurse notified    GOALS:   Multidisciplinary Problems       Occupational Therapy Goals          Problem: Occupational Therapy    Goal Priority Disciplines Outcome Interventions   Occupational Therapy Goal     OT, PT/OT Ongoing, Not Progressing    Description: ST. Pt will follow simple one step command  2.Pt will perform grooming with mod a  3. Pt will sit EOB x 10 min with mod a  4. Pt will andre gown with mod a  5. Pt will t/f bed/chair with mod a x 2  6. Pt will tolerate 15 min of tx      LTG: Restore to Max I with self care and mobility.                         Time Tracking:     OT Date of Treatment: 22  OT Start Time:   OT Stop Time: 155  OT Total Time (min): 10 min    Billable Minutes:Therapeutic Exercise 10    OT/ABIGAIL: OT          2022

## 2022-09-04 NOTE — ASSESSMENT & PLAN NOTE
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 Will increase tomorrow  8/31- His strength is slowly improving which will also improve weaning. Will start to increase weaning again today  9/1- Oxygenating adequately on 40%. Plan for 12 hours continuous of cpap during the day and can continue to rest overnight on assist control  9/2- continue current tx   9/4- increase CPAP to 16hrs and rest on vent

## 2022-09-04 NOTE — PLAN OF CARE
Problem: Skin and Tissue Injury (Mechanical Ventilation, Invasive)  Goal: Absence of Device-Related Skin and Tissue Injury  Outcome: Ongoing, Progressing     Problem: Impaired Wound Healing  Goal: Optimal Wound Healing  Outcome: Ongoing, Progressing     Problem: Diabetes Comorbidity  Goal: Blood Glucose Level Within Targeted Range  Outcome: Ongoing, Progressing     Problem: Skin Injury Risk Increased  Goal: Skin Health and Integrity  Outcome: Ongoing, Progressing

## 2022-09-05 NOTE — SUBJECTIVE & OBJECTIVE
Interval History: Pt resting in bed. No acute events overnight. Afebrile, VSS. Continue CPAP.     Objective:     Vital Signs (Most Recent):  Temp: 97.6 °F (36.4 °C) (09/05/22 0912)  Pulse: 80 (09/05/22 1115)  Resp: 17 (09/05/22 1115)  BP: (!) 105/47 (09/05/22 1115)  SpO2: 100 % (09/05/22 1115)   Vital Signs (24h Range):  Temp:  [97.4 °F (36.3 °C)-98.3 °F (36.8 °C)] 97.6 °F (36.4 °C)  Pulse:  [62-93] 80  Resp:  [10-34] 17  SpO2:  [98 %-100 %] 100 %  BP: ()/(42-59) 105/47     Weight: 97 kg (213 lb 13.5 oz)  Body mass index is 27.46 kg/m².      Intake/Output Summary (Last 24 hours) at 9/5/2022 1126  Last data filed at 9/5/2022 0700  Gross per 24 hour   Intake 1315 ml   Output 1 ml   Net 1314 ml       Physical Exam  Vitals and nursing note reviewed.   Constitutional:       General: He is not in acute distress.     Appearance: He is ill-appearing.   HENT:      Head: Normocephalic and atraumatic.      Right Ear: External ear normal.      Left Ear: External ear normal.      Mouth/Throat:      Mouth: Mucous membranes are moist.   Eyes:      Extraocular Movements: Extraocular movements intact.      Conjunctiva/sclera: Conjunctivae normal.   Neck:      Comments: Trach in place with thick secretions  Cardiovascular:      Rate and Rhythm: Normal rate. Rhythm irregular.      Pulses: Normal pulses.   Pulmonary:      Breath sounds: Rhonchi present. No wheezing or rales.      Comments: Coarse throughout anteriorly    Abdominal:      General: Bowel sounds are normal.      Palpations: Abdomen is soft.   Musculoskeletal:         General: Swelling present.      Right lower leg: Edema present.      Left lower leg: Edema present.      Comments: Edema to upper and lower ext. Worse in left arm than right -Hx radial fx; family states cast was removed due to edema    Skin:     General: Skin is warm and dry.   Neurological:      Mental Status: He is alert.       Vents:  Vent Mode: A/C (09/05/22 0436)  Ventilator Initiated: Yes (09/02/22  0047)  Set Rate: 12 BPM (09/05/22 0436)  Vt Set: 550 mL (09/05/22 0436)  Pressure Support: 12 cmH20 (09/04/22 1946)  PEEP/CPAP: 5 cmH20 (09/05/22 0436)  Oxygen Concentration (%): 40 (09/05/22 0820)  Peak Airway Pressure: 43 cmH2O (09/05/22 0436)  Total Ve: 7.3 mL (09/05/22 0436)  F/VT Ratio<105 (RSBI): (!) 61.49 (09/04/22 1946)    Lines/Drains/Airways       Central Venous Catheter Line  Duration                  Hemodialysis Catheter right subclavian -- days              Drain  Duration                  Gastrostomy/Enterostomy 08/23/22 1030 LUQ 13 days              Airway  Duration                  Surgical Airway Shiley Cuffed -- days              Peripheral Intravenous Line  Duration                  Peripheral IV - Single Lumen 08/23/22 1422 20 G Anterior;Right Forearm 12 days                    Significant Labs:    CBC/Anemia Profile:  Recent Labs   Lab 09/05/22 0446   WBC 15.03*   HGB 7.3*   HCT 23.2*      MCV 96.3*   RDW 14.9*        Chemistries:  Recent Labs   Lab 09/05/22 0446   *   K 4.2   CL 99   CO2 25   BUN 81*   CREATININE 3.52*   CALCIUM 7.8*       All pertinent labs within the past 24 hours have been reviewed.    Significant Imaging:  I have reviewed all pertinent imaging results/findings within the past 24 hours.

## 2022-09-05 NOTE — ASSESSMENT & PLAN NOTE
Likely due to critical illness   Most recent H/H 7/23  - repeat labs Monday and fridays 9/2  - H/H 6/18  - anemia panel pending   - will transfuse 1u PRBCs with HD   9/2  - 1u PRBCs with H/H 6/21  - transfuse another unit of PRBCs   - recheck in AM   - SHIVAM with iron supplementation in place   9/4  - recheck H/H in AM  - no obvious s/s of bleeding   9/5  - H/H 7/23

## 2022-09-05 NOTE — PROGRESS NOTES
[facility]  Nephrology  Progress Note    Patient Name: Gregory Stuart  MRN: [id number]  Admission Date: 8/23/2022  Hospital Length of Stay: 12 days  Attending Provider: Ham Sanchez DO   Primary Care Physician: Primary Doctor No  Principal Problem:Acute hypoxemic respiratory failure    Consults  Subjective:     Interval History: patient is non verbal with a trach in place    Review of patient's allergies indicates:  Not on File  Current Facility-Administered Medications   Medication Frequency    0.9%  NaCl infusion (for blood administration) Q24H PRN    0.9%  NaCl infusion PRN    acetaminophen tablet 650 mg Q6H PRN    albuterol-ipratropium 2.5 mg-0.5 mg/3 mL nebulizer solution 3 mL Q12H    apixaban tablet 2.5 mg BID    aspirin chewable tablet 81 mg Daily    atorvastatin tablet 40 mg QHS    carvediloL tablet 25 mg BID    collagenase ointment Daily PRN    dextromethorphan-guaiFENesin  mg/5 ml liquid 10 mL Q6H PRN    dextrose 50% injection 12.5 g PRN    doxepin capsule 25 mg QHS    ferrous sulfate tablet 1 each Daily    glucagon (human recombinant) injection 1 mg PRN    heparin (porcine) injection 4,000 Units PRN    insulin aspart U-100 injection 1-10 Units PRN    miconazole NITRATE 2 % top powder BID    ondansetron injection 8 mg Q6H PRN    pantoprazole suspension 40 mg Daily    polyethylene glycol packet 17 g Daily PRN    senna-docusate 8.6-50 mg per tablet 1 tablet Daily PRN    venlafaxine tablet 37.5 mg BID       Objective:     Vital Signs (Most Recent):  Temp: 97.5 °F (36.4 °C) (09/04/22 1930)  Pulse: 71 (09/04/22 1946)  Resp: 19 (09/04/22 1946)  BP: (!) 122/54 (09/04/22 2038)  SpO2: 99 % (09/04/22 1946)  O2 Device (Oxygen Therapy): ventilator (09/04/22 1946)   Vital Signs (24h Range):  Temp:  [97.5 °F (36.4 °C)-98.6 °F (37 °C)] 97.5 °F (36.4 °C)  Pulse:  [71-96] 71  Resp:  [8-34] 19  SpO2:  [93 %-100 %] 99 %  BP: ()/(43-64) 122/54     Weight: 98.8 kg (217 lb 13  oz) (09/04/22 0415)  Body mass index is 27.97 kg/m².  Body surface area is 2.27 meters squared.    I/O last 3 completed shifts:  In: 542.5 [Blood:292.5; NG/GT:250]  Out: 2 [Stool:2]    Physical Exam  Lungs-coarse bs  Cor-no rub  Abd-soft      Significant Labs:sureCMP:   Recent Labs   Lab 09/02/22 0402      CALCIUM 7.8*   ALBUMIN 1.4*      K 4.5   CO2 28      BUN 72*   CREATININE 3.34*     All labs within the past 24 hours have been reviewed.    Significant Imaging:      Assessment/Plan:     Active Diagnoses:    Diagnosis Date Noted POA    PRINCIPAL PROBLEM:  Acute hypoxemic respiratory failure [J96.01] 08/23/2022 Unknown    Open wound of anterior abdominal wall without complication [S31.109A] 08/30/2022 Yes    Pressure injury of left leg, unstageable [L89.890] 08/30/2022 Yes    Wound dehiscence [T81.30XA] 08/30/2022 Yes    Pressure injury of sacral region, unstageable [L89.150] 08/29/2022 Yes    A-fib [I48.91] 08/23/2022 Unknown    Chronic systolic heart failure [I50.22] 08/23/2022 Unknown    CAD (coronary artery disease) [I25.10] 08/23/2022 Unknown    JUVENTINO (acute kidney injury) [N17.9] 08/23/2022 Unknown    Anemia [D64.9] 08/23/2022 Unknown    DM (diabetes mellitus) [E11.9] 08/23/2022 Unknown    Left radial fracture [S52.92XA] 08/23/2022 Unknown    Weakness acquired in ICU [R53.1] 08/23/2022 Unknown    Alteration in nutrition [R63.8] 08/23/2022 Unknown    HLD (hyperlipidemia) [E78.5] 08/23/2022 Unknown    HTN (hypertension) [I10] 08/23/2022 Unknown      Problems Resolved During this Admission:       Plan:  Hemodialysis tomorrow    Thank you for your consult. I will follow-up with patient. Please contact us if you have any additional questions.    Mango Vitale MD  Nephrology  [facility]

## 2022-09-05 NOTE — PROGRESS NOTES
[facility]  Pulmonology  Progress Note    Patient Name: Gregory Stuart  MRN: [id number]  Admission Date: 8/23/2022  Hospital Length of Stay: 13 days  Code Status: No Order  Attending Provider: Ham Sanchez DO  Primary Care Provider: Primary Doctor No   Principal Problem: Acute hypoxemic respiratory failure    Subjective:     Interval History: Pt resting in bed. No acute events overnight. Afebrile, VSS. Continue CPAP.     Objective:     Vital Signs (Most Recent):  Temp: 97.6 °F (36.4 °C) (09/05/22 0912)  Pulse: 80 (09/05/22 1115)  Resp: 17 (09/05/22 1115)  BP: (!) 105/47 (09/05/22 1115)  SpO2: 100 % (09/05/22 1115)   Vital Signs (24h Range):  Temp:  [97.4 °F (36.3 °C)-98.3 °F (36.8 °C)] 97.6 °F (36.4 °C)  Pulse:  [62-93] 80  Resp:  [10-34] 17  SpO2:  [98 %-100 %] 100 %  BP: ()/(42-59) 105/47     Weight: 97 kg (213 lb 13.5 oz)  Body mass index is 27.46 kg/m².      Intake/Output Summary (Last 24 hours) at 9/5/2022 1126  Last data filed at 9/5/2022 0700  Gross per 24 hour   Intake 1315 ml   Output 1 ml   Net 1314 ml       Physical Exam  Vitals and nursing note reviewed.   Constitutional:       General: He is not in acute distress.     Appearance: He is ill-appearing.   HENT:      Head: Normocephalic and atraumatic.      Right Ear: External ear normal.      Left Ear: External ear normal.      Mouth/Throat:      Mouth: Mucous membranes are moist.   Eyes:      Extraocular Movements: Extraocular movements intact.      Conjunctiva/sclera: Conjunctivae normal.   Neck:      Comments: Trach in place with thick secretions  Cardiovascular:      Rate and Rhythm: Normal rate. Rhythm irregular.      Pulses: Normal pulses.   Pulmonary:      Breath sounds: Rhonchi present. No wheezing or rales.      Comments: Coarse throughout anteriorly    Abdominal:      General: Bowel sounds are normal.      Palpations: Abdomen is soft.   Musculoskeletal:         General: Swelling present.      Right  lower leg: Edema present.      Left lower leg: Edema present.      Comments: Edema to upper and lower ext. Worse in left arm than right -Hx radial fx; family states cast was removed due to edema    Skin:     General: Skin is warm and dry.   Neurological:      Mental Status: He is alert.       Vents:  Vent Mode: A/C (09/05/22 0436)  Ventilator Initiated: Yes (09/02/22 0047)  Set Rate: 12 BPM (09/05/22 0436)  Vt Set: 550 mL (09/05/22 0436)  Pressure Support: 12 cmH20 (09/04/22 1946)  PEEP/CPAP: 5 cmH20 (09/05/22 0436)  Oxygen Concentration (%): 40 (09/05/22 0820)  Peak Airway Pressure: 43 cmH2O (09/05/22 0436)  Total Ve: 7.3 mL (09/05/22 0436)  F/VT Ratio<105 (RSBI): (!) 61.49 (09/04/22 1946)    Lines/Drains/Airways       Central Venous Catheter Line  Duration                  Hemodialysis Catheter right subclavian -- days              Drain  Duration                  Gastrostomy/Enterostomy 08/23/22 1030 LUQ 13 days              Airway  Duration                  Surgical Airway Shiley Cuffed -- days              Peripheral Intravenous Line  Duration                  Peripheral IV - Single Lumen 08/23/22 1422 20 G Anterior;Right Forearm 12 days                    Significant Labs:    CBC/Anemia Profile:  Recent Labs   Lab 09/05/22 0446   WBC 15.03*   HGB 7.3*   HCT 23.2*      MCV 96.3*   RDW 14.9*        Chemistries:  Recent Labs   Lab 09/05/22 0446   *   K 4.2   CL 99   CO2 25   BUN 81*   CREATININE 3.52*   CALCIUM 7.8*       All pertinent labs within the past 24 hours have been reviewed.    Significant Imaging:  I have reviewed all pertinent imaging results/findings within the past 24 hours.    Assessment/Plan:     * Acute hypoxemic respiratory failure  Failed extubated post CABG (7/18)  Now with trach and peg     8/28- increase to PSV 12 hours -   8/29- Will plan to change trach out in the next day or two--continue to increase weaning as tolerated  8/30- continue with current weaning plan for now.  Will increase tomorrow  8/31- His strength is slowly improving which will also improve weaning. Will start to increase weaning again today  9/1- Oxygenating adequately on 40%. Plan for 12 hours continuous of cpap during the day and can continue to rest overnight on assist control  9/2- continue current tx   9/4- increase CPAP to 16hrs and rest on vent     HTN (hypertension)  Resumed home medications     HLD (hyperlipidemia)  Resumed home medications     Alteration in nutrition  S/p peg tube placement   continue tube feeding    Weakness acquired in ICU  Extreme generalized weakness - continue PT/OT as tolerated      Left radial fracture  7/12/22--- per family he did not require surgical repair - he did have a split/cast in place but this was removed due to edema     DM (diabetes mellitus)  accuchecks and Sliding scale insulin   A1C 5.3%  Currently on sliding scale  Glucose<180    Anemia  Likely due to critical illness   Most recent H/H 7/23  - repeat labs Monday and fridays 9/2  - H/H 6/18  - anemia panel pending   - will transfuse 1u PRBCs with HD   9/2  - 1u PRBCs with H/H 6/21  - transfuse another unit of PRBCs   - recheck in AM   - SHIVAM with iron supplementation in place   9/4  - recheck H/H in AM  - no obvious s/s of bleeding   9/5  - H/H 7/23    JUVENTINO (acute kidney injury)  Cr 0.9 March 2022   - was on CCRT post CABG -- transitioned to HD M/W/F  - tunneled HD cath in place   - consult Nephrology -- did not come with porter- unsure if he was making any urine at OSH   Nephrology note reviewed and appreciated  Plan to continue MWF dialysis    CAD (coronary artery disease)  S/p CABG on 07/18    He is on aspirin, statin, beta blocker      Chronic systolic heart failure  EF 30-35% per Echo 07/2022  Daily wts/ I/Os     A-fib  No history documented prior to CABG - was on Lovenox at OSH   - resume medications once placed in computer   - irregular on telemetry--frequent pvc's  - he is on carvediolol, eliquis  - HR is  currently adequate        SOFIA Bonilla/JOE  Pulmonology  [facility]

## 2022-09-06 NOTE — PROGRESS NOTES
[facility]  Nephrology  Progress Note    Patient Name: Gregory Stuart  MRN: [id number]  Admission Date: 8/23/2022  Hospital Length of Stay: 14 days  Attending Provider: Ham Sanchez DO   Primary Care Physician: Primary Doctor No  Principal Problem:Acute hypoxemic respiratory failure    Subjective:     HPI: This gentleman with history of CAD, CVA and renal failure.  The patient transferred from the [facility] for ventilator management and debility.  He has a history of CKD which progressed to ESRD after a CABG.  The patient has been on dialysis for about 3 weeks.  He continues to have diffuse edema.  His last dialysis session was on yesterday.  His family is at the bedside to answer questions.  Nephrology has been consulted for renal issues.      Interval History: The patient is resting.  No acute changes.    Review of patient's allergies indicates:  Not on File  Current Facility-Administered Medications   Medication Frequency    0.9%  NaCl infusion (for blood administration) Q24H PRN    0.9%  NaCl infusion PRN    acetaminophen tablet 650 mg Q6H PRN    albuterol-ipratropium 2.5 mg-0.5 mg/3 mL nebulizer solution 3 mL Q12H    apixaban tablet 2.5 mg BID    aspirin chewable tablet 81 mg Daily    atorvastatin tablet 40 mg QHS    carvediloL tablet 25 mg BID    collagenase ointment Daily PRN    dextromethorphan-guaiFENesin  mg/5 ml liquid 10 mL Q6H PRN    dextrose 50% injection 12.5 g PRN    doxepin capsule 25 mg QHS    ferrous sulfate tablet 1 each Daily    glucagon (human recombinant) injection 1 mg PRN    heparin (porcine) injection 4,000 Units PRN    insulin aspart U-100 injection 1-10 Units PRN    miconazole NITRATE 2 % top powder BID    ondansetron injection 8 mg Q6H PRN    pantoprazole suspension 40 mg Daily    polyethylene glycol packet 17 g Daily PRN    senna-docusate 8.6-50 mg per tablet 1 tablet Daily PRN    venlafaxine tablet 37.5 mg BID        Objective:     Vital Signs (Most Recent):  Temp: 98.4 °F (36.9 °C) (09/06/22 1500)  Pulse: 92 (09/06/22 1830)  Resp: 20 (09/06/22 1830)  BP: (!) 121/44 (09/06/22 1830)  SpO2: (!) 94 % (09/06/22 1830)  O2 Device (Oxygen Therapy): ventilator (09/06/22 1630)   Vital Signs (24h Range):  Temp:  [96.8 °F (36 °C)-98.4 °F (36.9 °C)] 98.4 °F (36.9 °C)  Pulse:  [] 92  Resp:  [6-28] 20  SpO2:  [71 %-100 %] 94 %  BP: (100-137)/(38-69) 121/44     Weight: 97 kg (213 lb 13.5 oz) (09/05/22 0400)  Body mass index is 27.46 kg/m².  Body surface area is 2.25 meters squared.    I/O last 3 completed shifts:  In: 1090 [NG/GT:1090]  Out: 2000 [Other:2000]    Physical Exam  Vitals reviewed.   HENT:      Head: Normocephalic and atraumatic.   Cardiovascular:      Rate and Rhythm: Rhythm irregular.   Pulmonary:      Effort: Pulmonary effort is normal.   Abdominal:      Palpations: Abdomen is soft.   Musculoskeletal:      Right lower leg: Edema present.   Neurological:      Mental Status: He is alert.       Significant Labs:  BMP:   Recent Labs   Lab 09/05/22  0446   *   *   K 4.2   CL 99   CO2 25   BUN 81*   CREATININE 3.52*   CALCIUM 7.8*     CBC:   Recent Labs   Lab 09/05/22  0446   WBC 15.03*   RBC 2.41*   HGB 7.3*   HCT 23.2*      MCV 96.3*   MCH 30.3   MCHC 31.5*        Significant Imaging:  Labs: Reviewed    Assessment/Plan:     DM (diabetes mellitus)  Underlying condition    JUVENTINO (acute kidney injury)  He was dialyzed Friday 8/29/2022Continue with scheduled hemodialysis.  9/6/2022  Continue with dialysis.    CAD (coronary artery disease)  Chronic condition    A-fib  Chronic condition        Thank you for your consult. I will follow-up with patient. Please contact us if you have any additional questions.    [place] Jr, MD  Nephrology  [facility]

## 2022-09-06 NOTE — ASSESSMENT & PLAN NOTE
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 Will increase tomorrow  8/31- His strength is slowly improving which will also improve weaning. Will start to increase weaning again today  9/1- Oxygenating adequately on 40%. Plan for 12 hours continuous of cpap during the day and can continue to rest overnight on assist control  9/2- continue current tx   9/4- increase CPAP to 16hrs and rest on vent   9/6- Doing well with 16 hours of cpap and then resting for 8. Will continue with this for now. Next plan will be to increase to continuous

## 2022-09-06 NOTE — PLAN OF CARE
Problem: Physical Therapy  Goal: Physical Therapy Goal  Description: Short Term Goals to be met by 9/13/2022    Patient will increase functional independence and safety with mobility by performing    1.   Rolling right Moderate Assist; roll left Moderate Assist  2.   Supine to sit Moderate Assist  3.   Sitting balance edge of the bed x 15 minutes Minimal Assist  4.   Sit to stand Maximal Assist  using manual assistance with gait belt  5.   Bed to chair Maximal Assist using manual assistance with gait belt  6.   Lower extremity exercises x 30 reps with assist as necessary and no rest breaks      Long Term Goals to be met by 10/24/2022    Patient to require less than or equal to Minimal Assist for functional mobility to ease caregiver burden   Outcome: Ongoing, Not Progressing     Functional Mobility: 2 persons  Bed Mobility:     Rolling Left:  maximal assistance and total assistance  Rolling Right: maximal assistance and total assistance  Scooting: dependence  Supine to Sit: total assistance and dependence  Sit to Supine: maximal assistance  Transfers:unable to dep    Pt has made minimal progress with skilled interventions. POC and treatment to be altered to accommodate.

## 2022-09-06 NOTE — PLAN OF CARE
Problem: Occupational Therapy  Goal: Occupational Therapy Goal  Description: ST. Pt will follow simple one step command  2.Pt will perform grooming with mod a  3. Pt will sit EOB x 10 min with mod a  4. Pt will andre gown with mod a  5. Pt will t/f bed/chair with mod a x 2  6. Pt will tolerate 15 min of tx      LTG: Restore to Max I with self care and mobility.    Outcome: Ongoing, Not Progressing   Pt not making  any functional progress with OT at this time. Pt  not following commands with minimal active participation  therefore recommend d/c from OT services at this time

## 2022-09-06 NOTE — PROGRESS NOTES
[facility]  Adult Nutrition  Follow-up Note         Reason for Assessment  Reason For Assessment: RD follow-up   Nutrition Risk Screen: tube feeding or parenteral nutrition   Patient seen for follow up. His weight is 213# and stale. MD notes 1.5liters fluid removed last dialysis treatment. He is on Nepro @46ml/h with 25ml flush. He receives Sarthak for added protein needed for wound healing and dialysis. Continue with poc. Labs/meds reviewed.    Malnutrition  Is Patient Malnourished: No  Skin Integrity  Donnie Risk Assessment  Sensory Perception: 1-->completely limited  Moisture: 3-->occasionally moist  Activity: 1-->bedfast  Mobility: 2-->very limited  Nutrition: 3-->adequate  Friction and Shear: 2-->potential problem  Donnie Score: 12  Comments on skin integrity: skin breakdown  Nutrition Diagnosis  Increased protein needs related to dialysis as evidence by ESRD    Nutrition Risk  Level of Risk/Frequency of Follow-up: moderate - high  Comments on nutrition risk: TF, wounds, dialysis   Recent Labs   Lab 09/05/22  0446 09/05/22  0519 09/06/22  1133   *  --   --    POCGLU  --    < > 120*    < > = values in this interval not displayed.     Comments on Glucose: wounds  Nutrition Prescription / Recommendations  Recommendation/Intervention: Continue with Nepro and Sarthak  Goals: labs WNL, weight miantenance, tolerance to TF  Nutrition Goal Status: new  Communication of RD Recs: reviewed with physician (recommendations sent to MD via secure chat)  Current Diet Order: Nepro @46ml/h with 25ml free water flush every hour  Current Nutrition Support Formula Ordered: Nepro  Current Nutrition Support Rate Ordered: 46 (ml)  Current Nutrition Support Frequency Ordered: hourly  Oral Nutrition Supplement: Sarthak  Chewing or Swallowing Difficulty?: Swallowing difficulty  Recommended Diet: Enteral Nutrition  Recommended Oral Supplement: No Oral Supplements  Is Nutrition Support Recommended: Yes    Needs Calculated  Energy Need Method: Kcal/kg Energy Calorie Requirements (kcal): 6530-5513 (25-30cals/kg)  Protein Requirements: 112-140g/d  Enteral Nutrition   Enteral Calories (kcal): 1987  Enteral Protein (gm): 89  Enteral (Free Water) Fluid (mL): 803  Free Water Flush Fluid (mL): 600  % Kcal Needs: 100  % Protein Needs: 100 (with Sarthak added)  Total Fluid Intake (mL): 1403    Is Education Recommended: No  Monitor and Evaluation  % current Intake: Enteral Nutrition at goal  % intake to meet estimated needs: Enteral Nutrition   Food and Nutrient Intake: enteral nutrition intake  Food and Nutrient Adminstration: enteral and parenteral nutrition administration  Anthropometric Measurements: weight, weight change, body mass index  Biochemical Data, Medical Tests and Procedures: glucose/endocrine profile, gastrointestinal profile, lipid profile, inflammatory profile, electrolyte and renal panel  Enteral Calories (kcal): 1987  Enteral Protein (gm): 89  Enteral (Free Water) Fluid (mL): 803  Free Water Flush Fluid (mL): 600  % Kcal Needs: 100  % Protein Needs: 100 (with Sarthak added)  Total Fluid Intake (mL): 1403  Protein Required:  (94g pro with Sarthak)  Current Medical Diagnosis and Past Medical History  Diagnosis: cardiac disease, pulmonary disease, renal disease, diabetes diagnosis/complications  Past Medical History:   Diagnosis Date    Anticoagulant long-term use     Cancer     CHF (congestive heart failure)     Coronary artery disease     Diabetes mellitus     Hypertension     Stroke      Nutrition/Diet History  Spiritual, Cultural Beliefs, Zoroastrianism Practices, Values that Affect Care: no  Food Allergies: NKFA  Factors Affecting Nutritional Intake: None identified at this time  Lab/Procedures/Meds  Recent Labs   Lab 09/05/22  0446   *   K 4.2   BUN 81*   CREATININE 3.52*   CALCIUM 7.8*   CL 99     Last A1c:   Lab Results   Component Value Date    HGBA1C 5.3 08/23/2022     Lab Results   Component  "Value Date    RBC 2.41 (L) 09/05/2022    HGB 7.3 (L) 09/05/2022    HCT 23.2 (L) 09/05/2022    MCV 96.3 (H) 09/05/2022    MCH 30.3 09/05/2022    MCHC 31.5 (L) 09/05/2022    TIBC 108 (L) 09/02/2022     Pertinent Labs Reviewed: reviewed  Pertinent Labs Comments: Glucose 113 (only lab available)  Pertinent Medications Reviewed: reviewed  Pertinent Medications Comments: albuterol  Anthropometrics  Temp: 97.8 °F (36.6 °C)  Height: 6' 2" (188 cm)  Height (inches): 74 in  Weight Method: Bed Scale  Weight: 97 kg (213 lb 13.5 oz)  Weight (lb): 213.85 lb  Ideal Body Weight (IBW), Male: 190 lb  % Ideal Body Weight, Male (lb): 108.72 %  BMI (Calculated): 27.4  BMI Grade: 25 - 29.9 - overweight     Estimated/Assessed Needs    Total Ve: 6.3 mL Temp: 97.8 °F (36.6 °C)Axillary  Weight Used For Calorie Calculations: 93.7 kg (206 lb 9.1 oz)   Energy Need Method: Kcal/kg Energy Calorie Requirements (kcal): 3478-8286 (25-30cals/kg)  Weight Used For Protein Calculations: 93.7 kg (206 lb 9.1 oz)  Protein Requirements: 112-140g/d  Estimated Fluid Requirement Method: RDA Method    RDA Method (mL): 2342     Nutrition by Nursing  Diet/Nutrition Received: tube feeding  Intake (%): 100%  Diet/Feeding Assistance: total feed  Diet/Feeding Tolerance: other (see comments)  Last Bowel Movement: 09/06/22       Gastrostomy/Enterostomy 08/23/22 1030 LUQ-Feeding Type: continuous, by pump       Gastrostomy/Enterostomy 08/23/22 1030 LUQ-Current Rate (mL/hr): 46 mL/hr       Gastrostomy/Enterostomy 08/23/22 1030 LUQ-Goal Rate (mL/hr): 46 mL/hr       Gastrostomy/Enterostomy 08/23/22 1030 LUQ-Formula Name: nepro  Nutrition Follow-Up  RD Follow-up?: Yes     "

## 2022-09-06 NOTE — ASSESSMENT & PLAN NOTE
He was dialyzed Friday 8/29/2022Continue with scheduled hemodialysis.  9/6/2022  Continue with dialysis.

## 2022-09-06 NOTE — PT/OT/SLP PROGRESS
Physical Therapy Treatment    Patient Name:  Gregory Stuart   MRN:  [id number]    Recommendations:     Discharge Recommendations:  nursing facility, skilled   Discharge Equipment Recommendations: other (see comments) (to be determined)   Barriers to discharge: None    Assessment:     Gregory Stuart is a 77 y.o. male admitted with a medical diagnosis of Acute hypoxemic respiratory failure.  He presents with the following impairments/functional limitations:  weakness .    Rehab Prognosis: Fair and Poor; patient would benefit from acute skilled PT services to address these deficits and reach maximum level of function.    Recent Surgery: * No surgery found *      Received Plan of Care per Tracee Baez PT     Plan:     During this hospitalization, patient to be seen 5 x/week to address the identified rehab impairments via gait training, therapeutic activities, therapeutic exercises and progress toward the following goals:    Plan of Care Expires:  09/24/22    Subjective     Chief Complaint: none  Patient/Family Comments/goals: nods in agreement of PT Tx   Pain/Comfort:  Pain Rating 1: 0/10      Objective:     Communicated with RN prior to session.  Patient found HOB elevated with blood pressure cuff, Tracheostomy, pulse ox (continuous), peripheral IV, PEG Tube, telemetry upon PT entry to room.     General Precautions: Standard, fall   Orthopedic Precautions:LUE non weight bearing   Braces:    Respiratory Status:  trach     Functional Mobility: 2 persons  Bed Mobility:     Rolling Left:  maximal assistance and total assistance  Rolling Right: maximal assistance and total assistance  Scooting: dependence  Supine to Sit: total assistance and dependence  Sit to Supine: maximal assistance  Transfers:unable   Balance: pt unable to hold himself in sitting edge of bed      AM-PAC 6 CLICK MOBILITY  Turning over in bed (including adjusting bedclothes, sheets and blankets)?: 2  Sitting down on and standing up from a chair with  arms (e.g., wheelchair, bedside commode, etc.): 1  Moving from lying on back to sitting on the side of the bed?: 2  Moving to and from a bed to a chair (including a wheelchair)?: 1  Need to walk in hospital room?: 1  Climbing 3-5 steps with a railing?: 1  Basic Mobility Total Score: 8       Therapeutic Activities and Exercises:   BLE exercises x 20 repetitions, AAROM to passive range of motion: ankle pumps, hip abduction, hip/knee flexion, straight leg raises  Pt sat edge of bed x 10 minutes with max-dependent assist with focus on core stability and sitting upright; pt with retro lean and unable to hold neutral position    Patient left HOB elevated with all lines intact and call button in reach..    GOALS:   Multidisciplinary Problems       Physical Therapy Goals          Problem: Physical Therapy    Goal Priority Disciplines Outcome Goal Variances Interventions   Physical Therapy Goal     PT, PT/OT Ongoing, Progressing     Description: Short Term Goals to be met by 9/6/2022    Patient will increase functional independence and safety with mobility by performing    1.   Rolling right Moderate Assist; roll left Moderate Assist  2.   Supine to sit Moderate Assist  3.   Sitting balance edge of the bed x 15 minutes Minimal Assist  4.   Sit to stand Maximal Assist  using manual assistance with gait belt  5.   Bed to chair Maximal Assist using manual assistance with gait belt  6.   Lower extremity exercises x 30 reps with assist as necessary and no rest breaks      Long Term Goals to be met by 10/24/2022    Patient to require less than or equal to Minimal Assist for functional mobility to ease caregiver burden                        Time Tracking:     PT Received On: 09/06/22  PT Start Time: 1000     PT Stop Time: 1030  PT Total Time (min): 30 min     Billable Minutes: Therapeutic Activity 20 and Therapeutic Exercise 10    Treatment Type: Treatment  PT/PTA: PTA     PTA Visit Number: 2     09/06/2022

## 2022-09-06 NOTE — SUBJECTIVE & OBJECTIVE
Interval History: No acute events overnight. The patient is currently resting comfortably. He is on cpap this am. He is doing well with his weaning trials. He is afebrile and vital signs are stable.    Objective:     Vital Signs (Most Recent):  Temp: 97.4 °F (36.3 °C) (09/06/22 0400)  Pulse: 94 (09/06/22 0645)  Resp: (!) 21 (09/06/22 0645)  BP: (!) 129/51 (09/06/22 0645)  SpO2: 98 % (09/06/22 0645)   Vital Signs (24h Range):  Temp:  [97.4 °F (36.3 °C)-98 °F (36.7 °C)] 97.4 °F (36.3 °C)  Pulse:  [] 94  Resp:  [6-22] 21  SpO2:  [91 %-100 %] 98 %  BP: ()/(30-69) 129/51     Weight: 97 kg (213 lb 13.5 oz)  Body mass index is 27.46 kg/m².      Intake/Output Summary (Last 24 hours) at 9/6/2022 0802  Last data filed at 9/5/2022 1920  Gross per 24 hour   Intake 25 ml   Output 2000 ml   Net -1975 ml         Physical Exam  Vitals reviewed.   Constitutional:       General: He is not in acute distress.     Appearance: He is ill-appearing.   HENT:      Head: Normocephalic and atraumatic.      Right Ear: External ear normal.      Left Ear: External ear normal.      Mouth/Throat:      Mouth: Mucous membranes are moist.   Eyes:      Extraocular Movements: Extraocular movements intact.      Conjunctiva/sclera: Conjunctivae normal.   Neck:      Comments: Trach in place with thick secretions  Cardiovascular:      Rate and Rhythm: Normal rate. Rhythm irregular.      Pulses: Normal pulses.   Pulmonary:      Breath sounds: Rhonchi present. No wheezing or rales.   Abdominal:      General: Bowel sounds are normal.      Palpations: Abdomen is soft.   Musculoskeletal:         General: Swelling present.      Right lower leg: Edema present.      Left lower leg: Edema present.      Comments: Edema to upper and lower ext. Worse in left arm than right -Hx radial fx; family states cast was removed due to edema    Skin:     General: Skin is warm and dry.   Neurological:      Mental Status: He is alert.       Vents:  Vent Mode: A/C  (09/06/22 0500)  Ventilator Initiated: Yes (09/02/22 0047)  Set Rate: 12 BPM (09/06/22 0500)  Vt Set: 550 mL (09/06/22 0500)  Pressure Support: 12 cmH20 (09/06/22 0000)  PEEP/CPAP: 5 cmH20 (09/06/22 0500)  Oxygen Concentration (%): 40 (09/06/22 0500)  Peak Airway Pressure: 42 cmH2O (09/06/22 0500)  Total Ve: 9 mL (09/06/22 0500)  F/VT Ratio<105 (RSBI): (!) 29.34 (09/06/22 0500)    Lines/Drains/Airways       Central Venous Catheter Line  Duration                  Hemodialysis Catheter right subclavian -- days              Drain  Duration                  Gastrostomy/Enterostomy 08/23/22 1030 LUQ 13 days              Airway  Duration                  Surgical Airway Shiley Cuffed -- days              Peripheral Intravenous Line  Duration                  Peripheral IV - Single Lumen 08/23/22 1422 20 G Anterior;Right Forearm 13 days                    Significant Labs:    CBC/Anemia Profile:  Recent Labs   Lab 09/05/22  0446   WBC 15.03*   HGB 7.3*   HCT 23.2*      MCV 96.3*   RDW 14.9*          Chemistries:  Recent Labs   Lab 09/05/22  0446   *   K 4.2   CL 99   CO2 25   BUN 81*   CREATININE 3.52*   CALCIUM 7.8*         All pertinent labs within the past 24 hours have been reviewed.    Significant Imaging:  I have reviewed all pertinent imaging results/findings within the past 24 hours.

## 2022-09-06 NOTE — SUBJECTIVE & OBJECTIVE
Interval History: The patient is resting.  No acute changes.    Review of patient's allergies indicates:  Not on File  Current Facility-Administered Medications   Medication Frequency    0.9%  NaCl infusion (for blood administration) Q24H PRN    0.9%  NaCl infusion PRN    acetaminophen tablet 650 mg Q6H PRN    albuterol-ipratropium 2.5 mg-0.5 mg/3 mL nebulizer solution 3 mL Q12H    apixaban tablet 2.5 mg BID    aspirin chewable tablet 81 mg Daily    atorvastatin tablet 40 mg QHS    carvediloL tablet 25 mg BID    collagenase ointment Daily PRN    dextromethorphan-guaiFENesin  mg/5 ml liquid 10 mL Q6H PRN    dextrose 50% injection 12.5 g PRN    doxepin capsule 25 mg QHS    ferrous sulfate tablet 1 each Daily    glucagon (human recombinant) injection 1 mg PRN    heparin (porcine) injection 4,000 Units PRN    insulin aspart U-100 injection 1-10 Units PRN    miconazole NITRATE 2 % top powder BID    ondansetron injection 8 mg Q6H PRN    pantoprazole suspension 40 mg Daily    polyethylene glycol packet 17 g Daily PRN    senna-docusate 8.6-50 mg per tablet 1 tablet Daily PRN    venlafaxine tablet 37.5 mg BID       Objective:     Vital Signs (Most Recent):  Temp: 98.4 °F (36.9 °C) (09/06/22 1500)  Pulse: 92 (09/06/22 1830)  Resp: 20 (09/06/22 1830)  BP: (!) 121/44 (09/06/22 1830)  SpO2: (!) 94 % (09/06/22 1830)  O2 Device (Oxygen Therapy): ventilator (09/06/22 1630)   Vital Signs (24h Range):  Temp:  [96.8 °F (36 °C)-98.4 °F (36.9 °C)] 98.4 °F (36.9 °C)  Pulse:  [] 92  Resp:  [6-28] 20  SpO2:  [71 %-100 %] 94 %  BP: (100-137)/(38-69) 121/44     Weight: 97 kg (213 lb 13.5 oz) (09/05/22 0400)  Body mass index is 27.46 kg/m².  Body surface area is 2.25 meters squared.    I/O last 3 completed shifts:  In: 1090 [NG/GT:1090]  Out: 2000 [Other:2000]    Physical Exam  Vitals reviewed.   HENT:      Head: Normocephalic and atraumatic.   Cardiovascular:      Rate and Rhythm: Rhythm irregular.   Pulmonary:      Effort:  Pulmonary effort is normal.   Abdominal:      Palpations: Abdomen is soft.   Musculoskeletal:      Right lower leg: Edema present.   Neurological:      Mental Status: He is alert.       Significant Labs:  BMP:   Recent Labs   Lab 09/05/22  0446   *   *   K 4.2   CL 99   CO2 25   BUN 81*   CREATININE 3.52*   CALCIUM 7.8*     CBC:   Recent Labs   Lab 09/05/22  0446   WBC 15.03*   RBC 2.41*   HGB 7.3*   HCT 23.2*      MCV 96.3*   MCH 30.3   MCHC 31.5*        Significant Imaging:  Labs: Reviewed

## 2022-09-06 NOTE — PROGRESS NOTES
[facility]  Pulmonology  Progress Note    Patient Name: Gregory Stuart  MRN: [id number]  Admission Date: 8/23/2022  Hospital Length of Stay: 14 days  Code Status: No Order  Attending Provider: Ham Sanchez DO  Primary Care Provider: Primary Doctor No   Principal Problem: Acute hypoxemic respiratory failure    Subjective:     Interval History: No acute events overnight. The patient is currently resting comfortably. He is on cpap this am. He is doing well with his weaning trials. He is afebrile and vital signs are stable.    Objective:     Vital Signs (Most Recent):  Temp: 97.4 °F (36.3 °C) (09/06/22 0400)  Pulse: 94 (09/06/22 0645)  Resp: (!) 21 (09/06/22 0645)  BP: (!) 129/51 (09/06/22 0645)  SpO2: 98 % (09/06/22 0645)   Vital Signs (24h Range):  Temp:  [97.4 °F (36.3 °C)-98 °F (36.7 °C)] 97.4 °F (36.3 °C)  Pulse:  [] 94  Resp:  [6-22] 21  SpO2:  [91 %-100 %] 98 %  BP: ()/(30-69) 129/51     Weight: 97 kg (213 lb 13.5 oz)  Body mass index is 27.46 kg/m².      Intake/Output Summary (Last 24 hours) at 9/6/2022 0802  Last data filed at 9/5/2022 1920  Gross per 24 hour   Intake 25 ml   Output 2000 ml   Net -1975 ml         Physical Exam  Vitals reviewed.   Constitutional:       General: He is not in acute distress.     Appearance: He is ill-appearing.   HENT:      Head: Normocephalic and atraumatic.      Right Ear: External ear normal.      Left Ear: External ear normal.      Mouth/Throat:      Mouth: Mucous membranes are moist.   Eyes:      Extraocular Movements: Extraocular movements intact.      Conjunctiva/sclera: Conjunctivae normal.   Neck:      Comments: Trach in place with thick secretions  Cardiovascular:      Rate and Rhythm: Normal rate. Rhythm irregular.      Pulses: Normal pulses.   Pulmonary:      Breath sounds: Rhonchi present. No wheezing or rales.   Abdominal:      General: Bowel sounds are normal.      Palpations: Abdomen is soft.   Musculoskeletal:          General: Swelling present.      Right lower leg: Edema present.      Left lower leg: Edema present.      Comments: Edema to upper and lower ext. Worse in left arm than right -Hx radial fx; family states cast was removed due to edema    Skin:     General: Skin is warm and dry.   Neurological:      Mental Status: He is alert.       Vents:  Vent Mode: A/C (09/06/22 0500)  Ventilator Initiated: Yes (09/02/22 0047)  Set Rate: 12 BPM (09/06/22 0500)  Vt Set: 550 mL (09/06/22 0500)  Pressure Support: 12 cmH20 (09/06/22 0000)  PEEP/CPAP: 5 cmH20 (09/06/22 0500)  Oxygen Concentration (%): 40 (09/06/22 0500)  Peak Airway Pressure: 42 cmH2O (09/06/22 0500)  Total Ve: 9 mL (09/06/22 0500)  F/VT Ratio<105 (RSBI): (!) 29.34 (09/06/22 0500)    Lines/Drains/Airways       Central Venous Catheter Line  Duration                  Hemodialysis Catheter right subclavian -- days              Drain  Duration                  Gastrostomy/Enterostomy 08/23/22 1030 LUQ 13 days              Airway  Duration                  Surgical Airway Shiley Cuffed -- days              Peripheral Intravenous Line  Duration                  Peripheral IV - Single Lumen 08/23/22 1422 20 G Anterior;Right Forearm 13 days                    Significant Labs:    CBC/Anemia Profile:  Recent Labs   Lab 09/05/22  0446   WBC 15.03*   HGB 7.3*   HCT 23.2*      MCV 96.3*   RDW 14.9*          Chemistries:  Recent Labs   Lab 09/05/22  0446   *   K 4.2   CL 99   CO2 25   BUN 81*   CREATININE 3.52*   CALCIUM 7.8*         All pertinent labs within the past 24 hours have been reviewed.    Significant Imaging:  I have reviewed all pertinent imaging results/findings within the past 24 hours.    Assessment/Plan:     * Acute hypoxemic respiratory failure  Failed extubated post CABG (7/18)  Now with trach and peg     8/28- increase to PSV 12 hours -   8/29- Will plan to change trach out in the next day or two--continue to increase weaning as tolerated  8/30-  continue with current weaning plan for now. Will increase tomorrow  8/31- His strength is slowly improving which will also improve weaning. Will start to increase weaning again today  9/1- Oxygenating adequately on 40%. Plan for 12 hours continuous of cpap during the day and can continue to rest overnight on assist control  9/2- continue current tx   9/4- increase CPAP to 16hrs and rest on vent   9/6- Doing well with 16 hours of cpap and then resting for 8. Will continue with this for now. Next plan will be to increase to continuous    Alteration in nutrition  S/p peg tube placement   continue tube feeding    Weakness acquired in ICU  Extreme generalized weakness - continue PT/OT as tolerated      DM (diabetes mellitus)  accuchecks and Sliding scale insulin   A1C 5.3%  Currently on sliding scale  Glucose<180    Anemia  Likely due to critical illness   Most recent H/H 7/23  - repeat labs Monday and fridays 9/2  - H/H 6/18  - anemia panel pending   - will transfuse 1u PRBCs with HD   9/2  - 1u PRBCs with H/H 6/21  - transfuse another unit of PRBCs   - recheck in AM   - SHIVAM with iron supplementation in place   9/4  - recheck H/H in AM  - no obvious s/s of bleeding   9/5  - H/H 7/23    JUVENTINO (acute kidney injury)  Cr 0.9 March 2022   - was on CCRT post CABG -- transitioned to HD M/W/F  - tunneled HD cath in place   - consult Nephrology -- did not come with porter- unsure if he was making any urine at OSH   Nephrology note reviewed and appreciated  Plan to continue MWF dialysis    CAD (coronary artery disease)  S/p CABG on 07/18    He is on aspirin, statin, beta blocker      Chronic systolic heart failure  EF 30-35% per Echo 07/2022  Daily wts/ I/Os     A-fib  No history documented prior to CABG - was on Lovenox at OSH   - resume medications once placed in computer   - irregular on telemetry--frequent pvc's  - he is on carvediolol, eliquis  - HR is currently adequate                 Ham Sanchez,  DO  Pulmonology  [facility]

## 2022-09-07 NOTE — PROGRESS NOTES
[facility]  Nephrology  Progress Note    Patient Name: Gregory Stuart  MRN: [id number]  Admission Date: 8/23/2022  Hospital Length of Stay: 15 days  Attending Provider: Ham Sanchez DO   Primary Care Physician: Primary Doctor No  Principal Problem:Acute hypoxemic respiratory failure    Subjective:     HPI: This gentleman with history of CAD, CVA and renal failure.  The patient transferred from the [facility] for ventilator management and debility.  He has a history of CKD which progressed to ESRD after a CABG.  The patient has been on dialysis for about 3 weeks.  He continues to have diffuse edema.  His last dialysis session was on yesterday.  His family is at the bedside to answer questions.  Nephrology has been consulted for renal issues.      Interval History: The patient is resting comfortably.  No other changes. Swelling continues to improve.    Review of patient's allergies indicates:  Not on File  Current Facility-Administered Medications   Medication Frequency    0.9%  NaCl infusion (for blood administration) Q24H PRN    0.9%  NaCl infusion PRN    acetaminophen tablet 650 mg Q6H PRN    albuterol-ipratropium 2.5 mg-0.5 mg/3 mL nebulizer solution 3 mL Q12H    apixaban tablet 2.5 mg BID    aspirin chewable tablet 81 mg Daily    atorvastatin tablet 40 mg QHS    carvediloL tablet 25 mg BID    collagenase ointment Daily PRN    dextromethorphan-guaiFENesin  mg/5 ml liquid 10 mL Q6H PRN    dextrose 50% injection 12.5 g PRN    doxepin capsule 25 mg QHS    ferrous sulfate tablet 1 each Daily    glucagon (human recombinant) injection 1 mg PRN    heparin (porcine) injection 4,000 Units PRN    insulin aspart U-100 injection 1-10 Units PRN    miconazole NITRATE 2 % top powder BID    ondansetron injection 8 mg Q6H PRN    pantoprazole suspension 40 mg Daily    polyethylene glycol packet 17 g Daily PRN    senna-docusate 8.6-50 mg per tablet 1 tablet  Daily PRN    venlafaxine tablet 37.5 mg BID       Objective:     Vital Signs (Most Recent):  Temp: 97.1 °F (36.2 °C) (09/07/22 1645)  Pulse: 77 (09/07/22 1645)  Resp: 17 (09/07/22 1645)  BP: (!) 113/47 (09/07/22 1645)  SpO2: 100 % (09/07/22 1645)  O2 Device (Oxygen Therapy): ventilator (09/07/22 1515)   Vital Signs (24h Range):  Temp:  [97.1 °F (36.2 °C)-98.5 °F (36.9 °C)] 97.1 °F (36.2 °C)  Pulse:  [77-93] 77  Resp:  [12-21] 17  SpO2:  [87 %-100 %] 100 %  BP: ()/(39-58) 113/47     Weight: 97 kg (213 lb 13.5 oz) (09/05/22 0400)  Body mass index is 27.46 kg/m².  Body surface area is 2.25 meters squared.    I/O last 3 completed shifts:  In: 1335 [NG/GT:1335]  Out: -     Physical Exam  Vitals reviewed.   HENT:      Head: Normocephalic and atraumatic.   Cardiovascular:      Rate and Rhythm: Rhythm irregular.   Pulmonary:      Comments: ventilated  Abdominal:      Palpations: Abdomen is soft.   Skin:     General: Skin is warm.   Neurological:      Mental Status: He is alert.       Significant Labs:  BMP:   Recent Labs   Lab 09/05/22 0446   *   *   K 4.2   CL 99   CO2 25   BUN 81*   CREATININE 3.52*   CALCIUM 7.8*     CBC:   Recent Labs   Lab 09/05/22 0446   WBC 15.03*   RBC 2.41*   HGB 7.3*   HCT 23.2*      MCV 96.3*   MCH 30.3   MCHC 31.5*        Significant Imaging:  Labs: Reviewed    Assessment/Plan:     JUVENTINO (acute kidney injury)  He was dialyzed Friday 8/29/2022Continue with scheduled hemodialysis.  9/6/2022  Continue with dialysis.  9/7/2022 Dialysis as scheduled.    CAD (coronary artery disease)  Chronic condition    A-fib  Chronic condition        Thank you for your consult. I will follow-up with patient. Please contact us if you have any additional questions.    Andres Lino Jr, MD  Nephrology  [facility]

## 2022-09-07 NOTE — ASSESSMENT & PLAN NOTE
He was dialyzed Friday 8/29/2022Continue with scheduled hemodialysis.  9/6/2022  Continue with dialysis.  9/7/2022 Dialysis as scheduled.

## 2022-09-07 NOTE — ASSESSMENT & PLAN NOTE
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 Will increase tomorrow  8/31- His strength is slowly improving which will also improve weaning. Will start to increase weaning again today  9/1- Oxygenating adequately on 40%. Plan for 12 hours continuous of cpap during the day and can continue to rest overnight on assist control  9/2- continue current tx   9/4- increase CPAP to 16hrs and rest on vent   9/6- Doing well with 16 hours of cpap and then resting for 8. Will continue with this for now. Next plan will be to increase to continuous  9/7- continue with 16 hours today. Discussed with respiratory.Will continue with 16 hours for the next couple of days and then increase

## 2022-09-07 NOTE — SUBJECTIVE & OBJECTIVE
Interval History: No acute events overnight. The patient is currently resting comfortably. He is on cpap this am. He is doing well with his weaning trials. He is afebrile and vital signs are stable.    Objective:     Vital Signs (Most Recent):  Temp: 98.5 °F (36.9 °C) (09/07/22 0400)  Pulse: 80 (09/07/22 0415)  Resp: 14 (09/07/22 0415)  BP: (!) 119/58 (09/07/22 0415)  SpO2: (!) 92 % (09/07/22 0415)   Vital Signs (24h Range):  Temp:  [97.8 °F (36.6 °C)-98.5 °F (36.9 °C)] 98.5 °F (36.9 °C)  Pulse:  [73-96] 80  Resp:  [8-28] 14  SpO2:  [71 %-100 %] 92 %  BP: (100-137)/(41-60) 119/58     Weight: 97 kg (213 lb 13.5 oz)  Body mass index is 27.46 kg/m².      Intake/Output Summary (Last 24 hours) at 9/7/2022 0822  Last data filed at 9/7/2022 0700  Gross per 24 hour   Intake 1285 ml   Output --   Net 1285 ml         Physical Exam  Vitals reviewed.   Constitutional:       General: He is not in acute distress.     Appearance: He is ill-appearing.   HENT:      Head: Normocephalic and atraumatic.      Right Ear: External ear normal.      Left Ear: External ear normal.      Mouth/Throat:      Mouth: Mucous membranes are moist.   Eyes:      Extraocular Movements: Extraocular movements intact.      Conjunctiva/sclera: Conjunctivae normal.   Neck:      Comments: Trach in place   Cardiovascular:      Rate and Rhythm: Normal rate. Rhythm irregular.      Pulses: Normal pulses.   Pulmonary:      Breath sounds: No wheezing, rhonchi or rales.   Abdominal:      General: Bowel sounds are normal.      Palpations: Abdomen is soft.   Musculoskeletal:         General: Swelling present.      Right lower leg: Edema present.      Left lower leg: Edema present.      Comments: Edema to upper and lower ext. Worse in left arm than right -Hx radial fx; family states cast was removed due to edema    Skin:     General: Skin is warm and dry.   Neurological:      Mental Status: He is alert.       Vents:  Vent Mode: A/C (09/07/22 3961)  Ventilator  Initiated: Yes (09/02/22 0047)  Set Rate: 12 BPM (09/07/22 0500)  Vt Set: 550 mL (09/07/22 0500)  Pressure Support: 12 cmH20 (09/06/22 2021)  PEEP/CPAP: 5 cmH20 (09/07/22 0500)  Oxygen Concentration (%): 40 (09/07/22 0500)  Peak Airway Pressure: 48 cmH2O (09/07/22 0500)  Total Ve: 8.2 mL (09/07/22 0500)  F/VT Ratio<105 (RSBI): (!) 45.45 (09/06/22 2021)    Lines/Drains/Airways       Central Venous Catheter Line  Duration                  Hemodialysis Catheter right subclavian -- days              Drain  Duration                  Gastrostomy/Enterostomy 08/23/22 1030 LUQ 14 days              Airway  Duration                  Surgical Airway Shiley Cuffed -- days              Peripheral Intravenous Line  Duration                  Peripheral IV - Single Lumen 08/23/22 1422 20 G Anterior;Right Forearm 14 days                    Significant Labs:    CBC/Anemia Profile:  No results for input(s): WBC, HGB, HCT, PLT, MCV, RDW, IRON, FERRITIN, RETIC, FOLATE, [id number], OCCULTBLOOD in the last 48 hours.       Chemistries:  No results for input(s): NA, K, CL, CO2, BUN, CREATININE, CALCIUM, ALBUMIN, PROT, BILITOT, ALKPHOS, ALT, AST, GLUCOSE, MG, PHOS in the last 48 hours.      All pertinent labs within the past 24 hours have been reviewed.    Significant Imaging:  I have reviewed all pertinent imaging results/findings within the past 24 hours.

## 2022-09-07 NOTE — PT/OT/SLP PROGRESS
Physical Therapy Treatment    Patient Name:  Gregory Stuart   MRN:  [id number]    Recommendations:     Discharge Recommendations:  nursing facility, skilled, intermediate care facility/nursing home   Discharge Equipment Recommendations: other (see comments) (to be determined)   Barriers to discharge:  ongoing vent weaning; dependence for cares    Assessment:     Gregory Stuart is a 77 y.o. male admitted with a medical diagnosis of Acute hypoxemic respiratory failure.  He presents with the following impairments/functional limitations:  weakness, impaired endurance, impaired self care skills, impaired functional mobility, abnormal tone, decreased ROM, pain, decreased coordination, decreased upper extremity function, decreased lower extremity function, decreased safety awareness, impaired skin, edema, orthopedic precautions .    PT able to break pt LE extensor tone more easily today than the last time this therapist worked with patient. Patient requires extensive assistance for any/all mobility and will require 24/7 care at discharge.     Rehab Prognosis: Poor; patient would benefit from acute skilled PT services to address these deficits and reach maximum level of function.    Recent Surgery: * No surgery found *      Plan:     During this hospitalization, patient to be seen 5 x/week to address the identified rehab impairments via gait training, therapeutic activities, therapeutic exercises, neuromuscular re-education and progress toward the following goals:    Plan of Care Expires:  09/24/22    Subjective     Chief Complaint: resp failure post CABG/L radial fracture  Patient/Family Comments/goals: Patient alert and visually tracking. Ability to follow commands fluctuates  Pain/Comfort:  Pain Rating 1: 0/10 (up to 6/10 Leary Borden during knee flexion bilaterally)  Location - Side 1: Bilateral  Location 1: knee  Pain Addressed 1: Reposition, Distraction, Cessation of Activity  Pain Rating Post-Intervention 1:  0/10      Objective:     Communicated with Africa Bautista RN prior to session.  Patient found supine with blood pressure cuff, pulse ox (continuous), telemetry, peripheral IV, PEG Tube, Tracheostomy, ventilator, CPAP upon PT entry to room.     General Precautions: Standard, fall   Orthopedic Precautions:LUE non weight bearing   Braces: N/A  Respiratory Status:  CPAP via vent/permanent trach     Functional Mobility:  Bed Mobility:     Rolling Left:  maximal assistance and of 2 persons; pt reaching for bed rail to assist once partially turned  Rolling Right: maximal assistance and of 2 persons  Supine to Sit: dependence  Sit to Supine: dependence  Transfers:     Sit to Stand:  maximal assistance and of 2 persons with manual assist via gait belt  Gait: unable      AM-PAC 6 CLICK MOBILITY  Turning over in bed (including adjusting bedclothes, sheets and blankets)?: 2  Sitting down on and standing up from a chair with arms (e.g., wheelchair, bedside commode, etc.): 2  Moving from lying on back to sitting on the side of the bed?: 2  Moving to and from a bed to a chair (including a wheelchair)?: 1  Need to walk in hospital room?: 1  Climbing 3-5 steps with a railing?: 1  Basic Mobility Total Score: 9       Therapeutic Activities and Exercises:   PROM bilateral LE to maintain joint integrity. PT more easily able to break extensor tone to achieve at least 90 degrees of knee flexion. Persistent LE edema.  Sitting balance EOB x 20 minutes with focus on midline stability. Slow, persistent posterior lean/LOB when external support released  Sit to stand x 2 trials- max A x 2    Patient left HOB elevated with all lines intact, call button in reach, and Africa Bautista RN notified..    GOALS:   Multidisciplinary Problems       Physical Therapy Goals          Problem: Physical Therapy    Goal Priority Disciplines Outcome Goal Variances Interventions   Physical Therapy Goal     PT, PT/OT Ongoing, Not Progressing     Description:  Short Term Goals to be met by 9/13/2022    Patient will increase functional independence and safety with mobility by performing    1.   Rolling right Moderate Assist; roll left Moderate Assist  2.   Supine to sit Moderate Assist  3.   Sitting balance edge of the bed x 15 minutes Minimal Assist  4.   Sit to stand Maximal Assist  using manual assistance with gait belt  5.   Bed to chair Maximal Assist using manual assistance with gait belt  6.   Lower extremity exercises x 30 reps with assist as necessary and no rest breaks      Long Term Goals to be met by 10/24/2022    Patient to require less than or equal to Minimal Assist for functional mobility to ease caregiver burden                        Time Tracking:     PT Received On: 09/07/22  PT Start Time: 0802     PT Stop Time: 0846  PT Total Time (min): 44 min     Billable Minutes: Therapeutic Activity 30 minutes and Therapeutic Exercise 10 minutes    Treatment Type: Treatment  PT/PTA: PT     PTA Visit Number: 0     09/07/2022

## 2022-09-07 NOTE — SUBJECTIVE & OBJECTIVE
Interval History: The patient is resting comfortably.  No other changes. Swelling continues to improve.    Review of patient's allergies indicates:  Not on File  Current Facility-Administered Medications   Medication Frequency    0.9%  NaCl infusion (for blood administration) Q24H PRN    0.9%  NaCl infusion PRN    acetaminophen tablet 650 mg Q6H PRN    albuterol-ipratropium 2.5 mg-0.5 mg/3 mL nebulizer solution 3 mL Q12H    apixaban tablet 2.5 mg BID    aspirin chewable tablet 81 mg Daily    atorvastatin tablet 40 mg QHS    carvediloL tablet 25 mg BID    collagenase ointment Daily PRN    dextromethorphan-guaiFENesin  mg/5 ml liquid 10 mL Q6H PRN    dextrose 50% injection 12.5 g PRN    doxepin capsule 25 mg QHS    ferrous sulfate tablet 1 each Daily    glucagon (human recombinant) injection 1 mg PRN    heparin (porcine) injection 4,000 Units PRN    insulin aspart U-100 injection 1-10 Units PRN    miconazole NITRATE 2 % top powder BID    ondansetron injection 8 mg Q6H PRN    pantoprazole suspension 40 mg Daily    polyethylene glycol packet 17 g Daily PRN    senna-docusate 8.6-50 mg per tablet 1 tablet Daily PRN    venlafaxine tablet 37.5 mg BID       Objective:     Vital Signs (Most Recent):  Temp: 97.1 °F (36.2 °C) (09/07/22 1645)  Pulse: 77 (09/07/22 1645)  Resp: 17 (09/07/22 1645)  BP: (!) 113/47 (09/07/22 1645)  SpO2: 100 % (09/07/22 1645)  O2 Device (Oxygen Therapy): ventilator (09/07/22 1515)   Vital Signs (24h Range):  Temp:  [97.1 °F (36.2 °C)-98.5 °F (36.9 °C)] 97.1 °F (36.2 °C)  Pulse:  [77-93] 77  Resp:  [12-21] 17  SpO2:  [87 %-100 %] 100 %  BP: ()/(39-58) 113/47     Weight: 97 kg (213 lb 13.5 oz) (09/05/22 0400)  Body mass index is 27.46 kg/m².  Body surface area is 2.25 meters squared.    I/O last 3 completed shifts:  In: 1335 [NG/GT:1335]  Out: -     Physical Exam  Vitals reviewed.   HENT:      Head: Normocephalic and atraumatic.   Cardiovascular:      Rate and Rhythm: Rhythm irregular.    Pulmonary:      Comments: ventilated  Abdominal:      Palpations: Abdomen is soft.   Skin:     General: Skin is warm.   Neurological:      Mental Status: He is alert.       Significant Labs:  BMP:   Recent Labs   Lab 09/05/22  0446   *   *   K 4.2   CL 99   CO2 25   BUN 81*   CREATININE 3.52*   CALCIUM 7.8*     CBC:   Recent Labs   Lab 09/05/22  0446   WBC 15.03*   RBC 2.41*   HGB 7.3*   HCT 23.2*      MCV 96.3*   MCH 30.3   MCHC 31.5*        Significant Imaging:  Labs: Reviewed

## 2022-09-07 NOTE — PT/OT/SLP PROGRESS
Occupational Therapy   Treatment    Name: Gregory Stuart  MRN: [id number]  Admitting Diagnosis:  Acute hypoxemic respiratory failure       Recommendations:     Discharge Recommendations: nursing facility, skilled, intermediate care facility/nursing home  Discharge Equipment Recommendations:   (to be determined)  Barriers to discharge:  None    Assessment:     Gregory Stuart is a 77 y.o. male with a medical diagnosis of Acute hypoxemic respiratory failure.  He presents with alert, but difficulty following commands, edema is less in (R) UE that it was when this therapist last treated pt. Performance deficits affecting function are weakness, impaired cardiopulmonary response to activity, impaired balance, impaired cognition, impaired coordination, impaired functional mobility, impaired fine motor, impaired self care skills, impaired skin, impaired endurance, edema, decreased lower extremity function, decreased upper extremity function.     Rehab Prognosis:  Fair; patient would benefit from acute skilled OT services to address these deficits and reach maximum level of function.       Plan:     Patient to be seen 5 x/week to address the above listed problems via self-care/home management, therapeutic activities, therapeutic exercises  Plan of Care Expires: 09/27/22  Plan of Care Reviewed with: patient    Subjective     Pain/Comfort:  Pain Rating 1: 0/10 (up to 4/10 anam caruso for (B) shoulder AAROM)  Location - Side 1: Bilateral  Location 1: shoulder  Pain Addressed 1: Reposition, Cessation of Activity  Pain Rating Post-Intervention 1: 0/10    Objective:     Communicated with: MIKEY Bautista prior to session.  Patient found HOB elevated with PEG Tube, peripheral IV, blood pressure cuff, pulse ox (continuous), CPAP, ventilator, Tracheostomy upon OT entry to room.    General Precautions: Standard, fall   Orthopedic Precautions:LUE non weight bearing   Braces: N/A  Respiratory Status:  CPAP setting on vent via tracheostomy      Occupational Performance:     Bed Mobility:    Patient completed Rolling/Turning to Left with  maximal assistance and 2 persons  Patient completed Rolling/Turning to Right with maximal assistance and 2 persons  Patient completed Supine to Sit with total assistance  Patient completed Sit to Supine with total assistance     Functional Mobility/Transfers:  Patient completed Sit <> Stand Transfer with maximal assistance and of 2 persons  with  gait belt and manual assistance   Functional Mobility: Pt unable to take steps    Activities of Daily Living:  Grooming: Pt wiped his mouth with washcloth when cued to wash his face. PT attempted to comb hair with max hand over hand assist        [facility] 6 Click ADL: 6    Treatment & Education:  Pt sat EOB with focus on incorporating a more forward position as he consistently loses balance posteriorly. Pt sat EOB x 20 min with max(A) to mod(A), but laking self-correction when he begins to lean.  Pt attempted grooming activity while sitting up EOB. PT performed 2 trials for standing with max(A) x2.  AAROM to (B) UE for 15 reps with slowly increasing range as pt loosens up and relaxes for each:  Low level shoulder flexion  Shoulder horizontal abd/adduction  Elbow flexion/ extension  (B) hand flexion/ extension    Patient left HOB elevated with all lines intact, call button in reach, and RN Africa notified    GOALS:   Multidisciplinary Problems       Occupational Therapy Goals          Problem: Occupational Therapy    Goal Priority Disciplines Outcome Interventions   Occupational Therapy Goal     OT, PT/OT Ongoing, Not Progressing    Description: ST. Pt will follow simple one step command  2.Pt will perform grooming with mod a  3. Pt will sit EOB x 10 min with mod a  4. Pt will andre gown with mod a  5. Pt will t/f bed/chair with mod a x 2  6. Pt will tolerate 15 min of tx      LTG: Restore to Max I with self care and mobility.                         Time Tracking:     OT Date  of Treatment: 09/07/22  OT Start Time: 0817  OT Stop Time: 0852  OT Total Time (min): 35 min    Billable Minutes:Therapeutic Activity 20 min    OT/ABIGAIL: OT          9/7/2022

## 2022-09-07 NOTE — ASSESSMENT & PLAN NOTE
Likely due to critical illness   Most recent H/H 7/23  - repeat labs Monday and fridays 9/2  - H/H 6/18  - anemia panel pending   - will transfuse 1u PRBCs with HD   9/2  - 1u PRBCs with H/H 6/21  - transfuse another unit of PRBCs   - recheck in AM   - SHIVAM with iron supplementation in place   9/4  - recheck H/H in AM  - no obvious s/s of bleeding   9/5  - H/H 7.3/23.2

## 2022-09-07 NOTE — PLAN OF CARE
Problem: Adult Inpatient Plan of Care  Goal: Plan of Care Review  Outcome: Ongoing, Progressing  Goal: Patient-Specific Goal (Individualized)  Outcome: Ongoing, Progressing  Goal: Optimal Comfort and Wellbeing  Outcome: Ongoing, Progressing     Problem: Fall Injury Risk  Goal: Absence of Fall and Fall-Related Injury  Outcome: Ongoing, Progressing     Problem: Skin Injury Risk Increased  Goal: Skin Health and Integrity  Outcome: Ongoing, Progressing     Problem: Infection  Goal: Absence of Infection Signs and Symptoms  Outcome: Ongoing, Progressing     Problem: Communication Impairment (Mechanical Ventilation, Invasive)  Goal: Effective Communication  Outcome: Ongoing, Progressing     Problem: Device-Related Complication Risk (Mechanical Ventilation, Invasive)  Goal: Optimal Device Function  Outcome: Ongoing, Progressing     Problem: Adult Inpatient Plan of Care  Goal: Readiness for Transition of Care  Outcome: Ongoing, Not Progressing     Problem: Diabetes Comorbidity  Goal: Blood Glucose Level Within Targeted Range  Outcome: Ongoing, Not Progressing     Problem: Fluid and Electrolyte Imbalance (Acute Kidney Injury/Impairment)  Goal: Fluid and Electrolyte Balance  Outcome: Ongoing, Not Progressing     Problem: Renal Function Impairment (Acute Kidney Injury/Impairment)  Goal: Effective Renal Function  Outcome: Ongoing, Not Progressing

## 2022-09-07 NOTE — PROGRESS NOTES
[facility]  Pulmonology  Progress Note    Patient Name: Gregory Stuart  MRN: [id number]  Admission Date: 8/23/2022  Hospital Length of Stay: 15 days  Code Status: No Order  Attending Provider: Ham Sanchez DO  Primary Care Provider: Primary Doctor No   Principal Problem: Acute hypoxemic respiratory failure    Subjective:     Interval History: No acute events overnight. The patient is currently resting comfortably. He is on cpap this am. He is doing well with his weaning trials. He is afebrile and vital signs are stable.    Objective:     Vital Signs (Most Recent):  Temp: 98.5 °F (36.9 °C) (09/07/22 0400)  Pulse: 80 (09/07/22 0415)  Resp: 14 (09/07/22 0415)  BP: (!) 119/58 (09/07/22 0415)  SpO2: (!) 92 % (09/07/22 0415)   Vital Signs (24h Range):  Temp:  [97.8 °F (36.6 °C)-98.5 °F (36.9 °C)] 98.5 °F (36.9 °C)  Pulse:  [73-96] 80  Resp:  [8-28] 14  SpO2:  [71 %-100 %] 92 %  BP: (100-137)/(41-60) 119/58     Weight: 97 kg (213 lb 13.5 oz)  Body mass index is 27.46 kg/m².      Intake/Output Summary (Last 24 hours) at 9/7/2022 0822  Last data filed at 9/7/2022 0700  Gross per 24 hour   Intake 1285 ml   Output --   Net 1285 ml         Physical Exam  Vitals reviewed.   Constitutional:       General: He is not in acute distress.     Appearance: He is ill-appearing.   HENT:      Head: Normocephalic and atraumatic.      Right Ear: External ear normal.      Left Ear: External ear normal.      Mouth/Throat:      Mouth: Mucous membranes are moist.   Eyes:      Extraocular Movements: Extraocular movements intact.      Conjunctiva/sclera: Conjunctivae normal.   Neck:      Comments: Trach in place   Cardiovascular:      Rate and Rhythm: Normal rate. Rhythm irregular.      Pulses: Normal pulses.   Pulmonary:      Breath sounds: No wheezing, rhonchi or rales.   Abdominal:      General: Bowel sounds are normal.      Palpations: Abdomen is soft.   Musculoskeletal:         General: Swelling present.       Right lower leg: Edema present.      Left lower leg: Edema present.      Comments: Edema to upper and lower ext. Worse in left arm than right -Hx radial fx; family states cast was removed due to edema    Skin:     General: Skin is warm and dry.   Neurological:      Mental Status: He is alert.       Vents:  Vent Mode: A/C (09/07/22 0500)  Ventilator Initiated: Yes (09/02/22 0047)  Set Rate: 12 BPM (09/07/22 0500)  Vt Set: 550 mL (09/07/22 0500)  Pressure Support: 12 cmH20 (09/06/22 2021)  PEEP/CPAP: 5 cmH20 (09/07/22 0500)  Oxygen Concentration (%): 40 (09/07/22 0500)  Peak Airway Pressure: 48 cmH2O (09/07/22 0500)  Total Ve: 8.2 mL (09/07/22 0500)  F/VT Ratio<105 (RSBI): (!) 45.45 (09/06/22 2021)    Lines/Drains/Airways       Central Venous Catheter Line  Duration                  Hemodialysis Catheter right subclavian -- days              Drain  Duration                  Gastrostomy/Enterostomy 08/23/22 1030 LUQ 14 days              Airway  Duration                  Surgical Airway Shiley Cuffed -- days              Peripheral Intravenous Line  Duration                  Peripheral IV - Single Lumen 08/23/22 1422 20 G Anterior;Right Forearm 14 days                    Significant Labs:    CBC/Anemia Profile:  No results for input(s): WBC, HGB, HCT, PLT, MCV, RDW, IRON, FERRITIN, RETIC, FOLATE, WNESALOO16, OCCULTBLOOD in the last 48 hours.       Chemistries:  No results for input(s): NA, K, CL, CO2, BUN, CREATININE, CALCIUM, ALBUMIN, PROT, BILITOT, ALKPHOS, ALT, AST, GLUCOSE, MG, PHOS in the last 48 hours.      All pertinent labs within the past 24 hours have been reviewed.    Significant Imaging:  I have reviewed all pertinent imaging results/findings within the past 24 hours.    Assessment/Plan:     * Acute hypoxemic respiratory failure  Failed extubated post CABG (7/18)  Now with trach and peg     8/28- increase to PSV 12 hours -   8/29- Will plan to change trach out in the next day or two--continue to  increase weaning as tolerated  8/30- continue with current weaning plan for now. Will increase tomorrow  8/31- His strength is slowly improving which will also improve weaning. Will start to increase weaning again today  9/1- Oxygenating adequately on 40%. Plan for 12 hours continuous of cpap during the day and can continue to rest overnight on assist control  9/2- continue current tx   9/4- increase CPAP to 16hrs and rest on vent   9/6- Doing well with 16 hours of cpap and then resting for 8. Will continue with this for now. Next plan will be to increase to continuous  9/7- continue with 16 hours today. Discussed with respiratory.Will continue with 16 hours for the next couple of days and then increase    Alteration in nutrition  S/p peg tube placement   continue tube feeding    Weakness acquired in ICU  Extreme generalized weakness - continue PT/OT as tolerated  He is showing some improvement      DM (diabetes mellitus)  accuchecks and Sliding scale insulin   A1C 5.3%  Currently on sliding scale  Glucose<180    Anemia  Likely due to critical illness   Most recent H/H 7/23  - repeat labs Monday and fridays 9/2  - H/H 6/18  - anemia panel pending   - will transfuse 1u PRBCs with HD   9/2  - 1u PRBCs with H/H 6/21  - transfuse another unit of PRBCs   - recheck in AM   - SHIVAM with iron supplementation in place   9/4  - recheck H/H in AM  - no obvious s/s of bleeding   9/5  - H/H 7.3/23.2    JUVENTINO (acute kidney injury)  Cr 0.9 March 2022   - was on CCRT post CABG -- transitioned to HD M/W/F  - tunneled HD cath in place   - consult Nephrology -- did not come with german- unsure if he was making any urine at OSH   Nephrology note reviewed and appreciated  Plan to continue MWF dialysis    CAD (coronary artery disease)  S/p CABG on 07/18    He is on aspirin, statin, beta blocker      Chronic systolic heart failure  EF 30-35% per Echo 07/2022  Daily wts/ I/Os     A-fib  No history documented prior to CABG - was on Lovenox at  OSH   - resume medications once placed in computer   - irregular on telemetry--frequent pvc's  - he is on carvediolol, eliquis  - HR is currently adequate                 Ham Sanchez,   Pulmonology  [facility]

## 2022-09-08 NOTE — ASSESSMENT & PLAN NOTE
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 Will increase tomorrow  8/31- His strength is slowly improving which will also improve weaning. Will start to increase weaning again today  9/1- Oxygenating adequately on 40%. Plan for 12 hours continuous of cpap during the day and can continue to rest overnight on assist control  9/2- continue current tx   9/4- increase CPAP to 16hrs and rest on vent   9/6- Doing well with 16 hours of cpap and then resting for 8. Will continue with this for now. Next plan will be to increase to continuous  9/7- continue with 16 hours today. Discussed with respiratory.Will continue with 16 hours for the next couple of days and then increase  9/8 plan to increase to continuous as tolerated

## 2022-09-08 NOTE — PROGRESS NOTES
[facility]  Nephrology  Progress Note    Patient Name: Gregory Stuart  MRN: [id number]  Admission Date: 8/23/2022  Hospital Length of Stay: 16 days  Attending Provider: Ham Sanchez DO   Primary Care Physician: Primary Doctor No  Principal Problem:Acute hypoxemic respiratory failure    Subjective:     HPI: This gentleman with history of CAD, CVA and renal failure.  The patient transferred from the [facility] for ventilator management and debility.  He has a history of CKD which progressed to ESRD after a CABG.  The patient has been on dialysis for about 3 weeks.  He continues to have diffuse edema.  His last dialysis session was on yesterday.  His family is at the bedside to answer questions.  Nephrology has been consulted for renal issues.      Interval History: The patient is sitting up in bed.  He is continuing with CPAP trials.  Swelling continues to improve.    Review of patient's allergies indicates:  Not on File  Current Facility-Administered Medications   Medication Frequency    0.9%  NaCl infusion (for blood administration) Q24H PRN    0.9%  NaCl infusion PRN    acetaminophen tablet 650 mg Q6H PRN    albuterol-ipratropium 2.5 mg-0.5 mg/3 mL nebulizer solution 3 mL Q12H    apixaban tablet 2.5 mg BID    aspirin chewable tablet 81 mg Daily    atorvastatin tablet 40 mg QHS    carvediloL tablet 25 mg BID    collagenase ointment Daily PRN    dextromethorphan-guaiFENesin  mg/5 ml liquid 10 mL Q6H PRN    dextrose 50% injection 12.5 g PRN    doxepin capsule 25 mg QHS    ferrous sulfate tablet 1 each Daily    glucagon (human recombinant) injection 1 mg PRN    heparin (porcine) injection 4,000 Units PRN    insulin aspart U-100 injection 1-10 Units PRN    miconazole NITRATE 2 % top powder BID    ondansetron injection 8 mg Q6H PRN    pantoprazole suspension 40 mg Daily    polyethylene glycol packet 17 g Daily PRN    senna-docusate 8.6-50 mg per  tablet 1 tablet Daily PRN    venlafaxine tablet 37.5 mg BID       Objective:     Vital Signs (Most Recent):  Temp: 97.5 °F (36.4 °C) (09/08/22 1535)  Pulse: 96 (09/08/22 1535)  Resp: 17 (09/08/22 1535)  BP: (!) 112/55 (09/08/22 1500)  SpO2: (!) 88 % (09/08/22 1535)  O2 Device (Oxygen Therapy): ventilator (09/08/22 1315)   Vital Signs (24h Range):  Temp:  [97.3 °F (36.3 °C)-98 °F (36.7 °C)] 97.5 °F (36.4 °C)  Pulse:  [] 96  Resp:  [10-26] 17  SpO2:  [88 %-100 %] 88 %  BP: ()/(41-58) 112/55     Weight: 94.2 kg (207 lb 10.8 oz) (09/08/22 0500)  Body mass index is 26.66 kg/m².  Body surface area is 2.22 meters squared.    I/O last 3 completed shifts:  In: 3784 [P.O.:480; NG/GT:3304]  Out: 2000 [Other:2000]    Physical Exam  Vitals reviewed.   HENT:      Head: Normocephalic and atraumatic.   Cardiovascular:      Rate and Rhythm: Rhythm irregular.   Pulmonary:      Effort: Pulmonary effort is normal.   Abdominal:      Palpations: Abdomen is soft.   Musculoskeletal:      Right lower leg: Edema present.      Left lower leg: Edema present.   Neurological:      Mental Status: He is alert.       Significant Labs:  BMP:   Recent Labs   Lab 09/05/22  0446   *   *   K 4.2   CL 99   CO2 25   BUN 81*   CREATININE 3.52*   CALCIUM 7.8*     CBC:   Recent Labs   Lab 09/05/22 0446   WBC 15.03*   RBC 2.41*   HGB 7.3*   HCT 23.2*      MCV 96.3*   MCH 30.3   MCHC 31.5*        Significant Imaging:  Labs: Reviewed    Assessment/Plan:     A-fib  Chronic condition      ESRD  Debility  Respiratory failure  CAD  Thank you for your consult. I will follow-up with patient. Please contact us if you have any additional questions.    Andres Lino Jr, MD  Nephrology  [facility]

## 2022-09-08 NOTE — SUBJECTIVE & OBJECTIVE
Interval History: The patient is sitting up in bed.  He is continuing with CPAP trials.  Swelling continues to improve.    Review of patient's allergies indicates:  Not on File  Current Facility-Administered Medications   Medication Frequency    0.9%  NaCl infusion (for blood administration) Q24H PRN    0.9%  NaCl infusion PRN    acetaminophen tablet 650 mg Q6H PRN    albuterol-ipratropium 2.5 mg-0.5 mg/3 mL nebulizer solution 3 mL Q12H    apixaban tablet 2.5 mg BID    aspirin chewable tablet 81 mg Daily    atorvastatin tablet 40 mg QHS    carvediloL tablet 25 mg BID    collagenase ointment Daily PRN    dextromethorphan-guaiFENesin  mg/5 ml liquid 10 mL Q6H PRN    dextrose 50% injection 12.5 g PRN    doxepin capsule 25 mg QHS    ferrous sulfate tablet 1 each Daily    glucagon (human recombinant) injection 1 mg PRN    heparin (porcine) injection 4,000 Units PRN    insulin aspart U-100 injection 1-10 Units PRN    miconazole NITRATE 2 % top powder BID    ondansetron injection 8 mg Q6H PRN    pantoprazole suspension 40 mg Daily    polyethylene glycol packet 17 g Daily PRN    senna-docusate 8.6-50 mg per tablet 1 tablet Daily PRN    venlafaxine tablet 37.5 mg BID       Objective:     Vital Signs (Most Recent):  Temp: 97.5 °F (36.4 °C) (09/08/22 1535)  Pulse: 96 (09/08/22 1535)  Resp: 17 (09/08/22 1535)  BP: (!) 112/55 (09/08/22 1500)  SpO2: (!) 88 % (09/08/22 1535)  O2 Device (Oxygen Therapy): ventilator (09/08/22 1315)   Vital Signs (24h Range):  Temp:  [97.3 °F (36.3 °C)-98 °F (36.7 °C)] 97.5 °F (36.4 °C)  Pulse:  [] 96  Resp:  [10-26] 17  SpO2:  [88 %-100 %] 88 %  BP: ()/(41-58) 112/55     Weight: 94.2 kg (207 lb 10.8 oz) (09/08/22 0500)  Body mass index is 26.66 kg/m².  Body surface area is 2.22 meters squared.    I/O last 3 completed shifts:  In: 3784 [P.O.:480; NG/GT:3304]  Out: 2000 [Other:2000]    Physical Exam  Vitals reviewed.   HENT:      Head: Normocephalic and atraumatic.   Cardiovascular:       Rate and Rhythm: Rhythm irregular.   Pulmonary:      Effort: Pulmonary effort is normal.   Abdominal:      Palpations: Abdomen is soft.   Musculoskeletal:      Right lower leg: Edema present.      Left lower leg: Edema present.   Neurological:      Mental Status: He is alert.       Significant Labs:  BMP:   Recent Labs   Lab 09/05/22  0446   *   *   K 4.2   CL 99   CO2 25   BUN 81*   CREATININE 3.52*   CALCIUM 7.8*     CBC:   Recent Labs   Lab 09/05/22 0446   WBC 15.03*   RBC 2.41*   HGB 7.3*   HCT 23.2*      MCV 96.3*   MCH 30.3   MCHC 31.5*        Significant Imaging:  Labs: Reviewed

## 2022-09-08 NOTE — PLAN OF CARE
Problem: Adult Inpatient Plan of Care  Goal: Plan of Care Review  Outcome: Ongoing, Progressing  Goal: Patient-Specific Goal (Individualized)  Outcome: Ongoing, Progressing  Goal: Absence of Hospital-Acquired Illness or Injury  Outcome: Ongoing, Progressing  Goal: Optimal Comfort and Wellbeing  Outcome: Ongoing, Progressing     Problem: Fall Injury Risk  Goal: Absence of Fall and Fall-Related Injury  Outcome: Ongoing, Progressing     Problem: Skin Injury Risk Increased  Goal: Skin Health and Integrity  Outcome: Ongoing, Progressing     Problem: Infection  Goal: Absence of Infection Signs and Symptoms  Outcome: Ongoing, Progressing     Problem: Impaired Wound Healing  Goal: Optimal Wound Healing  Outcome: Ongoing, Progressing     Problem: Communication Impairment (Mechanical Ventilation, Invasive)  Goal: Effective Communication  Outcome: Ongoing, Progressing     Problem: Device-Related Complication Risk (Mechanical Ventilation, Invasive)  Goal: Optimal Device Function  Outcome: Ongoing, Progressing     Problem: Inability to Wean (Mechanical Ventilation, Invasive)  Goal: Mechanical Ventilation Liberation  Outcome: Ongoing, Progressing     Problem: Nutrition Impairment (Mechanical Ventilation, Invasive)  Goal: Optimal Nutrition Delivery  Outcome: Ongoing, Progressing     Problem: Skin and Tissue Injury (Mechanical Ventilation, Invasive)  Goal: Absence of Device-Related Skin and Tissue Injury  Outcome: Ongoing, Progressing     Problem: Ventilator-Induced Lung Injury (Mechanical Ventilation, Invasive)  Goal: Absence of Ventilator-Induced Lung Injury  Outcome: Ongoing, Progressing     Problem: Communication Impairment (Artificial Airway)  Goal: Effective Communication  Outcome: Ongoing, Progressing     Problem: Device-Related Complication Risk (Artificial Airway)  Goal: Optimal Device Function  Outcome: Ongoing, Progressing     Problem: Skin and Tissue Injury (Artificial Airway)  Goal: Absence of Device-Related Skin or  Tissue Injury  Outcome: Ongoing, Progressing

## 2022-09-08 NOTE — PROGRESS NOTES
[facility]  Pulmonology  Progress Note    Patient Name: Gregory Stuart  MRN: [id number]  Admission Date: 8/23/2022  Hospital Length of Stay: 16 days  Code Status: No Order  Attending Provider: Ham Sanchez DO  Primary Care Provider: Primary Doctor No   Principal Problem: Acute hypoxemic respiratory failure    Subjective:     Interval History: No acute events overnight. The patient is currently resting comfortably. He is doing well with his weaning trials. He is afebrile and vital signs are stable.    Objective:     Vital Signs (Most Recent):  Temp: 97.6 °F (36.4 °C) (09/08/22 0700)  Pulse: 86 (09/08/22 0752)  Resp: 13 (09/08/22 0752)  BP: (!) 123/57 (09/08/22 0700)  SpO2: 100 % (09/08/22 0752)   Vital Signs (24h Range):  Temp:  [97.1 °F (36.2 °C)-98 °F (36.7 °C)] 97.6 °F (36.4 °C)  Pulse:  [68-96] 86  Resp:  [10-28] 13  SpO2:  [79 %-100 %] 100 %  BP: ()/(39-58) 123/57     Weight: 94.2 kg (207 lb 10.8 oz)  Body mass index is 26.66 kg/m².      Intake/Output Summary (Last 24 hours) at 9/8/2022 0800  Last data filed at 9/8/2022 0500  Gross per 24 hour   Intake 2284 ml   Output 2000 ml   Net 284 ml         Physical Exam  Vitals reviewed.   Constitutional:       General: He is not in acute distress.     Appearance: He is ill-appearing.   HENT:      Head: Normocephalic and atraumatic.      Right Ear: External ear normal.      Left Ear: External ear normal.      Mouth/Throat:      Mouth: Mucous membranes are moist.   Eyes:      Extraocular Movements: Extraocular movements intact.      Conjunctiva/sclera: Conjunctivae normal.   Neck:      Comments: Trach in place   Cardiovascular:      Rate and Rhythm: Normal rate. Rhythm irregular.      Pulses: Normal pulses.   Pulmonary:      Breath sounds: No wheezing, rhonchi or rales.   Abdominal:      General: Bowel sounds are normal.      Palpations: Abdomen is soft.   Musculoskeletal:         General: Swelling present.      Right lower  leg: Edema present.      Left lower leg: Edema present.      Comments: Edema to upper and lower ext. Worse in left arm than right -Hx radial fx; family states cast was removed due to edema    Skin:     General: Skin is warm and dry.   Neurological:      Mental Status: He is alert.       Vents:  Vent Mode: A/C (09/08/22 0315)  Ventilator Initiated: Yes (09/02/22 0047)  Set Rate: 12 BPM (09/08/22 0315)  Vt Set: 550 mL (09/08/22 0315)  Pressure Support: 12 cmH20 (09/07/22 2024)  PEEP/CPAP: 5 cmH20 (09/08/22 0315)  Oxygen Concentration (%): 40 (09/08/22 0315)  Peak Airway Pressure: 36 cmH2O (09/08/22 0315)  Total Ve: 8.8 mL (09/08/22 0315)  F/VT Ratio<105 (RSBI): (!) 35.43 (09/08/22 0315)    Lines/Drains/Airways       Central Venous Catheter Line  Duration                  Hemodialysis Catheter right subclavian -- days              Drain  Duration                  Gastrostomy/Enterostomy 08/23/22 1030 LUQ 15 days              Airway  Duration                  Surgical Airway Shiley Cuffed -- days              Peripheral Intravenous Line  Duration                  Peripheral IV - Single Lumen 08/23/22 1422 20 G Anterior;Right Forearm 15 days                    Significant Labs:    CBC/Anemia Profile:  No results for input(s): WBC, HGB, HCT, PLT, MCV, RDW, IRON, FERRITIN, RETIC, FOLATE, [id number], OCCULTBLOOD in the last 48 hours.       Chemistries:  No results for input(s): NA, K, CL, CO2, BUN, CREATININE, CALCIUM, ALBUMIN, PROT, BILITOT, ALKPHOS, ALT, AST, GLUCOSE, MG, PHOS in the last 48 hours.      All pertinent labs within the past 24 hours have been reviewed.    Significant Imaging:  I have reviewed all pertinent imaging results/findings within the past 24 hours.    Assessment/Plan:     * Acute hypoxemic respiratory failure  Failed extubated post CABG (7/18)  Now with trach and peg     8/28- increase to PSV 12 hours -   8/29- Will plan to change trach out in the next day or two--continue to increase weaning as  tolerated  8/30- continue with current weaning plan for now. Will increase tomorrow  8/31- His strength is slowly improving which will also improve weaning. Will start to increase weaning again today  9/1- Oxygenating adequately on 40%. Plan for 12 hours continuous of cpap during the day and can continue to rest overnight on assist control  9/2- continue current tx   9/4- increase CPAP to 16hrs and rest on vent   9/6- Doing well with 16 hours of cpap and then resting for 8. Will continue with this for now. Next plan will be to increase to continuous  9/7- continue with 16 hours today. Discussed with respiratory.Will continue with 16 hours for the next couple of days and then increase  9/8 plan to increase to continuous as tolerated    Alteration in nutrition  S/p peg tube placement   continue tube feeding  Tolerating without difficulty    Weakness acquired in ICU  Extreme generalized weakness - continue PT/OT as tolerated  He is showing some improvement      DM (diabetes mellitus)  accuchecks and Sliding scale insulin   A1C 5.3%  Currently on sliding scale  Glucose<180  Continue with current care    Anemia  Likely due to critical illness   Most recent H/H 7/23  - repeat labs Monday and fridays 9/2  - H/H 6/18  - anemia panel pending   - will transfuse 1u PRBCs with HD   9/2  - 1u PRBCs with H/H 6/21  - transfuse another unit of PRBCs   - recheck in AM   - SHIVAM with iron supplementation in place   9/4  - recheck H/H in AM  - no obvious s/s of bleeding   9/5  - H/H 7.3/23.2    JUVENTINO (acute kidney injury)  Cr 0.9 March 2022   - was on CCRT post CABG -- transitioned to HD M/W/F  - tunneled HD cath in place   - consult Nephrology -- did not come with german- unsure if he was making any urine at OSH   Nephrology note reviewed and appreciated  Plan to continue MWF dialysis  Nephrology note reviewed and appreciated    CAD (coronary artery disease)  S/p CABG on 07/18    He is on aspirin, statin, beta blocker      Chronic  systolic heart failure  EF 30-35% per Echo 07/2022  Daily wts/ I/Os   Appears compensated    A-fib  No history documented prior to CABG - was on Lovenox at OSH   - resume medications once placed in computer   - irregular on telemetry--frequent pvc's  - he is on carvediolol, eliquis  - HR is currently adequate                 Ham Sanchez, DO  Pulmonology  [facility] \[facility]\""

## 2022-09-08 NOTE — ASSESSMENT & PLAN NOTE
accuchecks and Sliding scale insulin   A1C 5.3%  Currently on sliding scale  Glucose<180  Continue with current care

## 2022-09-08 NOTE — PLAN OF CARE
Problem: Adult Inpatient Plan of Care  Goal: Plan of Care Review  9/7/2022 1942 by Africa Bautista RN  Outcome: Ongoing, Progressing  9/7/2022 1829 by Africa Bautista RN  Outcome: Ongoing, Progressing  Goal: Patient-Specific Goal (Individualized)  Outcome: Ongoing, Progressing  Goal: Absence of Hospital-Acquired Illness or Injury  Outcome: Ongoing, Progressing  Goal: Optimal Comfort and Wellbeing  9/7/2022 1942 by Africa Bautista RN  Outcome: Ongoing, Progressing  9/7/2022 1829 by Africa Bautista RN  Outcome: Ongoing, Progressing     Problem: Fall Injury Risk  Goal: Absence of Fall and Fall-Related Injury  9/7/2022 1942 by Africa Bautista RN  Outcome: Ongoing, Progressing  9/7/2022 1829 by Africa Bautista RN  Outcome: Ongoing, Progressing     Problem: Skin Injury Risk Increased  Goal: Skin Health and Integrity  9/7/2022 1942 by Africa Bautista RN  Outcome: Ongoing, Progressing  9/7/2022 1829 by Africa Bautista RN  Outcome: Ongoing, Progressing     Problem: Infection  Goal: Absence of Infection Signs and Symptoms  9/7/2022 1942 by Africa Bautista RN  Outcome: Ongoing, Progressing  9/7/2022 1829 by Africa Bautista RN  Outcome: Ongoing, Progressing     Problem: Device-Related Complication Risk (Mechanical Ventilation, Invasive)  Goal: Optimal Device Function  Outcome: Ongoing, Progressing     Problem: Inability to Wean (Mechanical Ventilation, Invasive)  Goal: Mechanical Ventilation Liberation  Outcome: Ongoing, Progressing     Problem: Nutrition Impairment (Mechanical Ventilation, Invasive)  Goal: Optimal Nutrition Delivery  Outcome: Ongoing, Progressing     Problem: Device-Related Complication Risk (Mechanical Ventilation, Invasive)  Goal: Optimal Device Function  Outcome: Ongoing, Progressing     Problem: Adult Inpatient Plan of Care  Goal: Readiness for Transition of Care  9/7/2022 1942 by Africa Bautista RN  Outcome: Ongoing, Not Progressing  9/7/2022 1829 by Africa Bautista RN  Outcome: Ongoing, Not  Progressing     Problem: Diabetes Comorbidity  Goal: Blood Glucose Level Within Targeted Range  Outcome: Ongoing, Not Progressing     Problem: Fluid and Electrolyte Imbalance (Acute Kidney Injury/Impairment)  Goal: Fluid and Electrolyte Balance  9/7/2022 1942 by Africa Bautista RN  Outcome: Ongoing, Not Progressing  9/7/2022 1829 by Africa Bautista RN  Outcome: Ongoing, Not Progressing     Problem: Renal Function Impairment (Acute Kidney Injury/Impairment)  Goal: Effective Renal Function  Outcome: Ongoing, Not Progressing     Problem: Impaired Wound Healing  Goal: Optimal Wound Healing  Outcome: Ongoing, Not Progressing     Problem: Communication Impairment (Mechanical Ventilation, Invasive)  Goal: Effective Communication  9/7/2022 1942 by Africa Bautista RN  Outcome: Ongoing, Not Progressing  9/7/2022 1829 by Africa Bautista RN  Outcome: Ongoing, Progressing     Problem: Ventilator-Induced Lung Injury (Mechanical Ventilation, Invasive)  Goal: Absence of Ventilator-Induced Lung Injury  Outcome: Ongoing, Not Progressing     Problem: Communication Impairment (Artificial Airway)  Goal: Effective Communication  Outcome: Ongoing, Not Progressing     Problem: Adult Inpatient Plan of Care  Goal: Plan of Care Review  9/7/2022 1942 by Africa Bautista RN  Outcome: Ongoing, Progressing  9/7/2022 1829 by Africa Bautista RN  Outcome: Ongoing, Progressing  Goal: Patient-Specific Goal (Individualized)  Outcome: Ongoing, Progressing  Goal: Absence of Hospital-Acquired Illness or Injury  Outcome: Ongoing, Progressing  Goal: Optimal Comfort and Wellbeing  9/7/2022 1942 by Africa Bautista RN  Outcome: Ongoing, Progressing  9/7/2022 1829 by Africa Bautista RN  Outcome: Ongoing, Progressing     Problem: Fall Injury Risk  Goal: Absence of Fall and Fall-Related Injury  9/7/2022 1942 by Africa Bautista RN  Outcome: Ongoing, Progressing  9/7/2022 1829 by Africa Bautista RN  Outcome: Ongoing, Progressing     Problem: Skin Injury  Risk Increased  Goal: Skin Health and Integrity  9/7/2022 1942 by Africa Bautista RN  Outcome: Ongoing, Progressing  9/7/2022 1829 by Africa Bautista RN  Outcome: Ongoing, Progressing     Problem: Infection  Goal: Absence of Infection Signs and Symptoms  9/7/2022 1942 by Africa Bautista RN  Outcome: Ongoing, Progressing  9/7/2022 1829 by Africa Bautista RN  Outcome: Ongoing, Progressing     Problem: Device-Related Complication Risk (Mechanical Ventilation, Invasive)  Goal: Optimal Device Function  Outcome: Ongoing, Progressing     Problem: Inability to Wean (Mechanical Ventilation, Invasive)  Goal: Mechanical Ventilation Liberation  Outcome: Ongoing, Progressing     Problem: Nutrition Impairment (Mechanical Ventilation, Invasive)  Goal: Optimal Nutrition Delivery  Outcome: Ongoing, Progressing

## 2022-09-08 NOTE — PROGRESS NOTES
Placed patient back on the vent via ACV with previous settings for rest.  Heart rate 74, respiratory rate 20, sats 100%, Blood Pressure 101/50.  Patient did 16 hours of CPAP 5, PSV 12, FIO2 40% and did fine with no complications.   144

## 2022-09-08 NOTE — PLAN OF CARE
Plans of care ongoing   Problem: Adult Inpatient Plan of Care  Goal: Plan of Care Review  Outcome: Ongoing, Progressing  Goal: Patient-Specific Goal (Individualized)  Outcome: Ongoing, Progressing  Goal: Absence of Hospital-Acquired Illness or Injury  Outcome: Ongoing, Progressing  Goal: Optimal Comfort and Wellbeing  Outcome: Ongoing, Progressing  Goal: Readiness for Transition of Care  Outcome: Ongoing, Progressing     Problem: Fall Injury Risk  Goal: Absence of Fall and Fall-Related Injury  Outcome: Ongoing, Progressing     Problem: Skin Injury Risk Increased  Goal: Skin Health and Integrity  Outcome: Ongoing, Progressing     Problem: Infection  Goal: Absence of Infection Signs and Symptoms  Outcome: Ongoing, Progressing     Problem: Diabetes Comorbidity  Goal: Blood Glucose Level Within Targeted Range  Outcome: Ongoing, Progressing     Problem: Fluid and Electrolyte Imbalance (Acute Kidney Injury/Impairment)  Goal: Fluid and Electrolyte Balance  Outcome: Ongoing, Progressing     Problem: Oral Intake Inadequate (Acute Kidney Injury/Impairment)  Goal: Optimal Nutrition Intake  Outcome: Ongoing, Progressing     Problem: Renal Function Impairment (Acute Kidney Injury/Impairment)  Goal: Effective Renal Function  Outcome: Ongoing, Progressing     Problem: Impaired Wound Healing  Goal: Optimal Wound Healing  Outcome: Ongoing, Progressing     Problem: Communication Impairment (Mechanical Ventilation, Invasive)  Goal: Effective Communication  Outcome: Ongoing, Progressing     Problem: Device-Related Complication Risk (Mechanical Ventilation, Invasive)  Goal: Optimal Device Function  Outcome: Ongoing, Progressing     Problem: Inability to Wean (Mechanical Ventilation, Invasive)  Goal: Mechanical Ventilation Liberation  Outcome: Ongoing, Progressing     Problem: Nutrition Impairment (Mechanical Ventilation, Invasive)  Goal: Optimal Nutrition Delivery  Outcome: Ongoing, Progressing     Problem: Skin and Tissue Injury  (Mechanical Ventilation, Invasive)  Goal: Absence of Device-Related Skin and Tissue Injury  Outcome: Ongoing, Progressing     Problem: Ventilator-Induced Lung Injury (Mechanical Ventilation, Invasive)  Goal: Absence of Ventilator-Induced Lung Injury  Outcome: Ongoing, Progressing     Problem: Communication Impairment (Artificial Airway)  Goal: Effective Communication  Outcome: Ongoing, Progressing     Problem: Device-Related Complication Risk (Artificial Airway)  Goal: Optimal Device Function  Outcome: Ongoing, Progressing     Problem: Skin and Tissue Injury (Artificial Airway)  Goal: Absence of Device-Related Skin or Tissue Injury  Outcome: Ongoing, Progressing     Problem: Noninvasive Ventilation Acute  Goal: Effective Unassisted Ventilation and Oxygenation  Outcome: Ongoing, Progressing     Problem: Device-Related Complication Risk (Hemodialysis)  Goal: Safe, Effective Therapy Delivery  Outcome: Ongoing, Progressing     Problem: Hemodynamic Instability (Hemodialysis)  Goal: Effective Tissue Perfusion  Outcome: Ongoing, Progressing     Problem: Infection (Hemodialysis)  Goal: Absence of Infection Signs and Symptoms  Outcome: Ongoing, Progressing

## 2022-09-08 NOTE — PLAN OF CARE
Per IDTM continue with current plan of care. Dialysis, Vent with CPAP trials. PT/OT working with pt. SS will follow for discharge needs.

## 2022-09-08 NOTE — ASSESSMENT & PLAN NOTE
Cr 0.9 March 2022   - was on CCRT post CABG -- transitioned to HD M/W/F  - tunneled HD cath in place   - consult Nephrology -- did not come with german- unsure if he was making any urine at OSH   Nephrology note reviewed and appreciated  Plan to continue MWF dialysis  Nephrology note reviewed and appreciated

## 2022-09-08 NOTE — PT/OT/SLP PROGRESS
Physical Therapy Treatment    Patient Name:  Gregory Stuart   MRN:  [id number]    Recommendations:     Discharge Recommendations:  nursing facility, skilled, intermediate care facility/nursing home   Discharge Equipment Recommendations: other (see comments) (to be determined)   Barriers to discharge:  ongoing medical treatment    Assessment:     Gregory Stuart is a 77 y.o. male admitted with a medical diagnosis of Acute hypoxemic respiratory failure.  He presents with the following impairments/functional limitations:  weakness, impaired functional mobility, impaired cardiopulmonary response to activity, impaired endurance, impaired balance, impaired skin, abnormal tone, impaired self care skills, decreased lower extremity function, edema.    Pt cont to require increased assist with all functional mobility, however, able to demo improved sit to stand as compared to previous treatment session    Rehab Prognosis: Poor; patient would benefit from acute skilled PT services to address these deficits and reach maximum level of function.    Recent Surgery: * No surgery found *      Plan:     During this hospitalization, patient to be seen 5 x/week to address the identified rehab impairments via gait training, therapeutic activities, therapeutic exercises, neuromuscular re-education and progress toward the following goals:    Plan of Care Expires:  09/24/22    Subjective     Chief Complaint: resp failure  Patient/Family Comments/goals: pt alert this AM  Pain/Comfort:         Objective:     Communicated with Cathi Moraes RN prior to session.  Patient found HOB elevated with Tracheostomy, PEG Tube, blood pressure cuff, CPAP, peripheral IV, pulse ox (continuous) upon PT entry to room.     General Precautions: Standard, fall   Orthopedic Precautions:LUE non weight bearing   Braces:    Respiratory Status:  CPAP     Functional Mobility:  Bed Mobility:     Rolling Left:  dependence and of 2 persons for hygiene  Rolling Right:  dependence and of 2 persons for hygiene  Supine to Sit: dependence and of 2 persons  Sit to Supine: dependence and of 2 persons  Transfers:     Sit to Stand:  maximal assistance and of 2 persons with manual assist with gait belt      AM-PAC 6 CLICK MOBILITY  Turning over in bed (including adjusting bedclothes, sheets and blankets)?: 2  Sitting down on and standing up from a chair with arms (e.g., wheelchair, bedside commode, etc.): 2  Moving from lying on back to sitting on the side of the bed?: 2  Moving to and from a bed to a chair (including a wheelchair)?: 1  Need to walk in hospital room?: 1  Climbing 3-5 steps with a railing?: 1  Basic Mobility Total Score: 9       Therapeutic Activities and Exercises:   Moderate assist x 2 to maximum assist x 2 sitting EOB x 15 minutes focusing on maintaining balance/midline positioning    Maximum assist x 2 persons sit to stand x 2 trials with manual assist and gait belt    PROM B LE to maintain joint integrity     Patient left HOB elevated with all lines intact and call button in reach..    GOALS:   Multidisciplinary Problems       Physical Therapy Goals          Problem: Physical Therapy    Goal Priority Disciplines Outcome Goal Variances Interventions   Physical Therapy Goal     PT, PT/OT Ongoing, Not Progressing     Description: Short Term Goals to be met by 9/13/2022    Patient will increase functional independence and safety with mobility by performing    1.   Rolling right Moderate Assist; roll left Moderate Assist  2.   Supine to sit Moderate Assist  3.   Sitting balance edge of the bed x 15 minutes Minimal Assist  4.   Sit to stand Maximal Assist  using manual assistance with gait belt  5.   Bed to chair Maximal Assist using manual assistance with gait belt  6.   Lower extremity exercises x 30 reps with assist as necessary and no rest breaks      Long Term Goals to be met by 10/24/2022    Patient to require less than or equal to Minimal Assist for functional  mobility to ease caregiver burden                        Time Tracking:     PT Received On: 09/08/22  PT Start Time: 0827     PT Stop Time: 0905  PT Total Time (min): 38 min     Billable Minutes: Therapeutic Activity 25    Treatment Type: Treatment  PT/PTA: PTA     PTA Visit Number: 1     09/08/2022

## 2022-09-08 NOTE — PT/OT/SLP PROGRESS
Occupational Therapy   Treatment    Name: Gregory Stuart  MRN: [id number]  Admitting Diagnosis:  Acute hypoxemic respiratory failure       Recommendations:     Discharge Recommendations: nursing facility, skilled, intermediate care facility/nursing home  Discharge Equipment Recommendations:   (to be determined)  Barriers to discharge:       Assessment:     Gregory Stuart is a 77 y.o. male with a medical diagnosis of Acute hypoxemic respiratory failure.  . Performance deficits affecting function are weakness, impaired endurance, impaired self care skills, impaired cognition, impaired cardiopulmonary response to activity.     Rehab Prognosis:  Fair and Poor; patient would benefit from acute skilled OT services to address these deficits and reach maximum level of function.       Plan:     Patient to be seen 5 x/week to address the above listed problems via self-care/home management, therapeutic activities, therapeutic exercises  Plan of Care Expires: 09/27/22  Plan of Care Reviewed with: patient    Subjective     Pain/Comfort:  Pain Rating 1: 0/10    Objective:     Communicated with: MIKEY Moraes prior to session.  Patient found supine with Tracheostomy, peripheral IV, PEG Tube, CPAP, blood pressure cuff, pulse ox (continuous) upon OT entry to room.    General Precautions: Standard, fall   Orthopedic Precautions:LUE non weight bearing   Braces: N/A  Respiratory Status:  CPAP     Occupational Performance:     Bed Mobility:        Functional Mobility/Transfers:    Functional Mobility:     Activities of Daily Living:  Pt washed the lower part of his face but would not follow commands to wash his entire face      [facility] 6 Click ADL:      Treatment & Education:  Pt performed aarom on R 10 reps x 2 but would not follow commands to perform any more ex's actively, prom 10 reps x 2 B shld flex,abd/add,L elbow flex/ext, wrist flex/ext, hand helper with 2 rubber band resistances on r 4 reps but would not following commands to  cont with hand helper, attempted red t band but pt would not follow commands to perform these ex's   Pt not making consistent functional progress at this time. Pt not following commands consistently and is resistive to ex's at times. Therapist has observed pt moving his arms in various range but he does not follow commands well enough to perform his ex's when asked .       Patient left supine with all lines intact and call button in reach    GOALS:   Multidisciplinary Problems       Occupational Therapy Goals          Problem: Occupational Therapy    Goal Priority Disciplines Outcome Interventions   Occupational Therapy Goal     OT, PT/OT Ongoing, Not Progressing    Description: ST. Pt will follow simple one step command  2.Pt will perform grooming with mod a  3. Pt will sit EOB x 10 min with mod a  4. Pt will andre gown with mod a  5. Pt will t/f bed/chair with mod a x 2  6. Pt will tolerate 15 min of tx      LTG: Restore to Max I with self care and mobility.                         Time Tracking:     OT Date of Treatment: 22  OT Start Time: 908  OT Stop Time: 934  OT Total Time (min): 26 min    Billable Minutes:no charges billed secondary to pt did not actively participated enough     OT/ABIGAIL: ABIGAIL          2022

## 2022-09-08 NOTE — SUBJECTIVE & OBJECTIVE
Interval History: No acute events overnight. The patient is currently resting comfortably. He is doing well with his weaning trials. He is afebrile and vital signs are stable.    Objective:     Vital Signs (Most Recent):  Temp: 97.6 °F (36.4 °C) (09/08/22 0700)  Pulse: 86 (09/08/22 0752)  Resp: 13 (09/08/22 0752)  BP: (!) 123/57 (09/08/22 0700)  SpO2: 100 % (09/08/22 0752)   Vital Signs (24h Range):  Temp:  [97.1 °F (36.2 °C)-98 °F (36.7 °C)] 97.6 °F (36.4 °C)  Pulse:  [68-96] 86  Resp:  [10-28] 13  SpO2:  [79 %-100 %] 100 %  BP: ()/(39-58) 123/57     Weight: 94.2 kg (207 lb 10.8 oz)  Body mass index is 26.66 kg/m².      Intake/Output Summary (Last 24 hours) at 9/8/2022 0800  Last data filed at 9/8/2022 0500  Gross per 24 hour   Intake 2284 ml   Output 2000 ml   Net 284 ml         Physical Exam  Vitals reviewed.   Constitutional:       General: He is not in acute distress.     Appearance: He is ill-appearing.   HENT:      Head: Normocephalic and atraumatic.      Right Ear: External ear normal.      Left Ear: External ear normal.      Mouth/Throat:      Mouth: Mucous membranes are moist.   Eyes:      Extraocular Movements: Extraocular movements intact.      Conjunctiva/sclera: Conjunctivae normal.   Neck:      Comments: Trach in place   Cardiovascular:      Rate and Rhythm: Normal rate. Rhythm irregular.      Pulses: Normal pulses.   Pulmonary:      Breath sounds: No wheezing, rhonchi or rales.   Abdominal:      General: Bowel sounds are normal.      Palpations: Abdomen is soft.   Musculoskeletal:         General: Swelling present.      Right lower leg: Edema present.      Left lower leg: Edema present.      Comments: Edema to upper and lower ext. Worse in left arm than right -Hx radial fx; family states cast was removed due to edema    Skin:     General: Skin is warm and dry.   Neurological:      Mental Status: He is alert.       Vents:  Vent Mode: A/C (09/08/22 0315)  Ventilator Initiated: Yes (09/02/22  0047)  Set Rate: 12 BPM (09/08/22 0315)  Vt Set: 550 mL (09/08/22 0315)  Pressure Support: 12 cmH20 (09/07/22 2024)  PEEP/CPAP: 5 cmH20 (09/08/22 0315)  Oxygen Concentration (%): 40 (09/08/22 0315)  Peak Airway Pressure: 36 cmH2O (09/08/22 0315)  Total Ve: 8.8 mL (09/08/22 0315)  F/VT Ratio<105 (RSBI): (!) 35.43 (09/08/22 0315)    Lines/Drains/Airways       Central Venous Catheter Line  Duration                  Hemodialysis Catheter right subclavian -- days              Drain  Duration                  Gastrostomy/Enterostomy 08/23/22 1030 LUQ 15 days              Airway  Duration                  Surgical Airway Shiley Cuffed -- days              Peripheral Intravenous Line  Duration                  Peripheral IV - Single Lumen 08/23/22 1422 20 G Anterior;Right Forearm 15 days                    Significant Labs:    CBC/Anemia Profile:  No results for input(s): WBC, HGB, HCT, PLT, MCV, RDW, IRON, FERRITIN, RETIC, FOLATE, [id number], OCCULTBLOOD in the last 48 hours.       Chemistries:  No results for input(s): NA, K, CL, CO2, BUN, CREATININE, CALCIUM, ALBUMIN, PROT, BILITOT, ALKPHOS, ALT, AST, GLUCOSE, MG, PHOS in the last 48 hours.      All pertinent labs within the past 24 hours have been reviewed.    Significant Imaging:  I have reviewed all pertinent imaging results/findings within the past 24 hours.

## 2022-09-09 NOTE — PLAN OF CARE
Problem: Adult Inpatient Plan of Care  Goal: Plan of Care Review  Outcome: Ongoing, Progressing  Goal: Patient-Specific Goal (Individualized)  Outcome: Ongoing, Progressing  Goal: Absence of Hospital-Acquired Illness or Injury  Outcome: Ongoing, Progressing  Goal: Optimal Comfort and Wellbeing  Outcome: Ongoing, Progressing     Problem: Skin Injury Risk Increased  Goal: Skin Health and Integrity  Outcome: Ongoing, Progressing     Problem: Infection  Goal: Absence of Infection Signs and Symptoms  Outcome: Ongoing, Progressing     Problem: Impaired Wound Healing  Goal: Optimal Wound Healing  Outcome: Ongoing, Progressing     Problem: Communication Impairment (Mechanical Ventilation, Invasive)  Goal: Effective Communication  Outcome: Ongoing, Progressing     Problem: Device-Related Complication Risk (Mechanical Ventilation, Invasive)  Goal: Optimal Device Function  Outcome: Ongoing, Progressing     Problem: Inability to Wean (Mechanical Ventilation, Invasive)  Goal: Mechanical Ventilation Liberation  Outcome: Ongoing, Progressing     Problem: Nutrition Impairment (Mechanical Ventilation, Invasive)  Goal: Optimal Nutrition Delivery  Outcome: Ongoing, Progressing     Problem: Skin and Tissue Injury (Mechanical Ventilation, Invasive)  Goal: Absence of Device-Related Skin and Tissue Injury  Outcome: Ongoing, Progressing     Problem: Ventilator-Induced Lung Injury (Mechanical Ventilation, Invasive)  Goal: Absence of Ventilator-Induced Lung Injury  Outcome: Ongoing, Progressing     Problem: Communication Impairment (Artificial Airway)  Goal: Effective Communication  Outcome: Ongoing, Progressing     Problem: Device-Related Complication Risk (Artificial Airway)  Goal: Optimal Device Function  Outcome: Ongoing, Progressing     Problem: Skin and Tissue Injury (Artificial Airway)  Goal: Absence of Device-Related Skin or Tissue Injury  Outcome: Ongoing, Progressing     Problem: Noninvasive Ventilation Acute  Goal: Effective  Unassisted Ventilation and Oxygenation  Outcome: Ongoing, Progressing     Problem: Infection (Hemodialysis)  Goal: Absence of Infection Signs and Symptoms  Outcome: Ongoing, Progressing

## 2022-09-09 NOTE — ASSESSMENT & PLAN NOTE
He was dialyzed Friday 8/29/2022Continue with scheduled hemodialysis.  9/6/2022  Continue with dialysis.  9/7/2022 Dialysis as scheduled.  9/9/2022  Continue with dialysis.

## 2022-09-09 NOTE — PT/OT/SLP PROGRESS
Occupational Therapy      Patient Name:  Gregory Stuart   MRN:  [id number]    Patient not seen today secondary to Dialysis. Will follow-up on next treatment day.    9/9/2022

## 2022-09-09 NOTE — PROGRESS NOTES
[facility]  Pulmonology  Progress Note    Patient Name: Gregory Stuart  MRN: [id number]  Admission Date: 8/23/2022  Hospital Length of Stay: 17 days  Code Status: No Order  Attending Provider: Ham Sanchez DO  Primary Care Provider: Primary Doctor No   Principal Problem: Acute hypoxemic respiratory failure    Subjective:     Interval History: No acute events overnight. The patient is currently resting comfortably. He is doing well with his weaning trials. He is afebrile and vital signs are stable.    Objective:     Vital Signs (Most Recent):  Temp: 98.2 °F (36.8 °C) (09/09/22 0300)  Pulse: 91 (09/09/22 0806)  Resp: 19 (09/09/22 0806)  BP: (!) 114/51 (09/09/22 0600)  SpO2: 100 % (09/09/22 0806)   Vital Signs (24h Range):  Temp:  [97.5 °F (36.4 °C)-98.8 °F (37.1 °C)] 98.2 °F (36.8 °C)  Pulse:  [] 91  Resp:  [14-22] 19  SpO2:  [85 %-100 %] 100 %  BP: (100-125)/(41-58) 114/51     Weight: 96.7 kg (213 lb 3 oz)  Body mass index is 27.37 kg/m².      Intake/Output Summary (Last 24 hours) at 9/9/2022 0951  Last data filed at 9/9/2022 0600  Gross per 24 hour   Intake 972 ml   Output --   Net 972 ml         Physical Exam  Vitals reviewed.   Constitutional:       General: He is not in acute distress.     Appearance: He is ill-appearing.   HENT:      Head: Normocephalic and atraumatic.      Right Ear: External ear normal.      Left Ear: External ear normal.      Mouth/Throat:      Mouth: Mucous membranes are moist.   Eyes:      Extraocular Movements: Extraocular movements intact.      Conjunctiva/sclera: Conjunctivae normal.   Neck:      Comments: Trach in place   Cardiovascular:      Rate and Rhythm: Normal rate. Rhythm irregular.      Pulses: Normal pulses.   Pulmonary:      Breath sounds: No wheezing, rhonchi or rales.   Abdominal:      General: Bowel sounds are normal.      Palpations: Abdomen is soft.   Musculoskeletal:         General: Swelling present.      Right lower leg:  Edema present.      Left lower leg: Edema present.      Comments: Edema to upper and lower ext. Worse in left arm than right -Hx radial fx; family states cast was removed due to edema    Skin:     General: Skin is warm and dry.   Neurological:      Mental Status: He is alert.       Vents:  Vent Mode: CPAP / PSV (09/09/22 0817)  Ventilator Initiated: Yes (09/02/22 0047)  Set Rate: 0 BPM (09/08/22 1315)  Vt Set: 0 mL (09/08/22 1315)  Pressure Support: 12 cmH20 (09/09/22 0817)  PEEP/CPAP: 5 cmH20 (09/09/22 0817)  Oxygen Concentration (%): 40 (09/09/22 0806)  Peak Airway Pressure: 18 cmH2O (09/09/22 0817)  Total Ve: 7.4 mL (09/09/22 0817)  F/VT Ratio<105 (RSBI): (!) 73.53 (09/09/22 0255)    Lines/Drains/Airways       Central Venous Catheter Line  Duration                  Hemodialysis Catheter right subclavian -- days              Drain  Duration                  Gastrostomy/Enterostomy 08/23/22 1030 LUQ 16 days              Airway  Duration                  Surgical Airway Shiley Cuffed -- days              Peripheral Intravenous Line  Duration                  Peripheral IV - Single Lumen 08/23/22 1422 20 G Anterior;Right Forearm 16 days                    Significant Labs:    CBC/Anemia Profile:  Recent Labs   Lab 09/09/22  0305   WBC 12.76*   HGB 6.4*   HCT 21.7*      .4*   RDW 14.7*          Chemistries:  Recent Labs   Lab 09/09/22  0305   *   K 4.2      CO2 25   BUN 68*   CREATININE 3.09*   CALCIUM 8.0*         All pertinent labs within the past 24 hours have been reviewed.    Significant Imaging:  I have reviewed all pertinent imaging results/findings within the past 24 hours.    Assessment/Plan:     * Acute hypoxemic respiratory failure  Failed extubated post CABG (7/18)  Now with trach and peg     8/28- increase to PSV 12 hours -   8/29- Will plan to change trach out in the next day or two--continue to increase weaning as tolerated  8/30- continue with current weaning plan for now.  Will increase tomorrow  8/31- His strength is slowly improving which will also improve weaning. Will start to increase weaning again today  9/1- Oxygenating adequately on 40%. Plan for 12 hours continuous of cpap during the day and can continue to rest overnight on assist control  9/2- continue current tx   9/4- increase CPAP to 16hrs and rest on vent   9/6- Doing well with 16 hours of cpap and then resting for 8. Will continue with this for now. Next plan will be to increase to continuous  9/7- continue with 16 hours today. Discussed with respiratory.Will continue with 16 hours for the next couple of days and then increase  9/8 plan to increase to continuous as tolerated  9/9- did well with continuous cpap which was started yesterday. Plan to continue for now.     Alteration in nutrition  S/p peg tube placement   continue tube feeding  Tolerating without difficulty    Weakness acquired in ICU  Extreme generalized weakness - continue PT/OT as tolerated  He is showing some improvement      DM (diabetes mellitus)  accuchecks and Sliding scale insulin   A1C 5.3%  Currently on sliding scale  Glucose<180  Continue with current care    Anemia  Likely due to critical illness   Most recent H/H 7/23  - repeat labs Monday and fridays 9/2  - H/H 6/18  - anemia panel pending   - will transfuse 1u PRBCs with HD   9/2  - 1u PRBCs with H/H 6/21  - transfuse another unit of PRBCs   - recheck in AM   - SHIVAM with iron supplementation in place   9/4  - recheck H/H in AM  - no obvious s/s of bleeding   9/5  - H/H 7.3/23.2  9/9- Hgb is 6.4. Can transfuse 1 unit with next HD    JUVENTINO (acute kidney injury)  Cr 0.9 March 2022   - was on CCRT post CABG -- transitioned to HD M/W/F  - tunneled HD cath in place   - consult Nephrology -- did not come with german- unsure if he was making any urine at OSH   Nephrology note reviewed and appreciated  Plan to continue MWF dialysis  Nephrology note reviewed and appreciated    CAD (coronary artery  disease)  S/p CABG on 07/18    He is on aspirin, statin, beta blocker      Chronic systolic heart failure  EF 30-35% per Echo 07/2022  Daily wts/ I/Os   Appears compensated    A-fib  No history documented prior to CABG - was on Lovenox at OSH   - resume medications once placed in computer   - irregular on telemetry--frequent pvc's  - he is on carvediolol, eliquis  - HR is currently adequate                 Ham Sanchez, DO  Pulmonology  [facility]

## 2022-09-09 NOTE — ASSESSMENT & PLAN NOTE
Likely due to critical illness   Most recent H/H 7/23  - repeat labs Monday and fridays 9/2  - H/H 6/18  - anemia panel pending   - will transfuse 1u PRBCs with HD   9/2  - 1u PRBCs with H/H 6/21  - transfuse another unit of PRBCs   - recheck in AM   - SHIVAM with iron supplementation in place   9/4  - recheck H/H in AM  - no obvious s/s of bleeding   9/5  - H/H 7.3/23.2  9/9- Hgb is 6.4. Can transfuse 1 unit with next HD

## 2022-09-09 NOTE — SUBJECTIVE & OBJECTIVE
Interval History: The patient is resting.  He is continuing with his weaning trials.  No other changes.    Review of patient's allergies indicates:  Not on File  Current Facility-Administered Medications   Medication Frequency    0.9%  NaCl infusion (for blood administration) Q24H PRN    0.9%  NaCl infusion PRN    acetaminophen tablet 650 mg Q6H PRN    albuterol-ipratropium 2.5 mg-0.5 mg/3 mL nebulizer solution 3 mL Q12H    apixaban tablet 2.5 mg BID    aspirin chewable tablet 81 mg Daily    atorvastatin tablet 40 mg QHS    carvediloL tablet 25 mg BID    collagenase ointment Daily PRN    dextromethorphan-guaiFENesin  mg/5 ml liquid 10 mL Q6H PRN    dextrose 50% injection 12.5 g PRN    doxepin capsule 25 mg QHS    ferrous sulfate tablet 1 each Daily    glucagon (human recombinant) injection 1 mg PRN    heparin (porcine) injection 4,000 Units PRN    insulin aspart U-100 injection 1-10 Units PRN    miconazole NITRATE 2 % top powder BID    ondansetron injection 8 mg Q6H PRN    pantoprazole suspension 40 mg Daily    polyethylene glycol packet 17 g Daily PRN    senna-docusate 8.6-50 mg per tablet 1 tablet Daily PRN    venlafaxine tablet 37.5 mg BID       Objective:     Vital Signs (Most Recent):  Temp: 96.3 °F (35.7 °C) (09/09/22 1500)  Pulse: 63 (09/09/22 1506)  Resp: 12 (09/09/22 1506)  BP: (!) 132/50 (09/09/22 1506)  SpO2: 100 % (09/09/22 1506)  O2 Device (Oxygen Therapy): ventilator (09/09/22 1620)   Vital Signs (24h Range):  Temp:  [96.3 °F (35.7 °C)-98.8 °F (37.1 °C)] 96.3 °F (35.7 °C)  Pulse:  [63-91] 63  Resp:  [12-24] 12  SpO2:  [85 %-100 %] 100 %  BP: ()/(43-63) 132/50     Weight: 96.7 kg (213 lb 3 oz) (09/09/22 0500)  Body mass index is 27.37 kg/m².  Body surface area is 2.25 meters squared.    I/O last 3 completed shifts:  In: 1824 [NG/GT:1824]  Out: -     Physical Exam  Vitals reviewed.   HENT:      Head: Normocephalic and atraumatic.   Eyes:      Pupils: Pupils are equal, round, and reactive to  light.   Cardiovascular:      Rate and Rhythm: Rhythm irregular.   Pulmonary:      Effort: Pulmonary effort is normal.      Comments: Trach  Abdominal:      Palpations: Abdomen is soft.   Musculoskeletal:      Right lower leg: Edema present.      Left lower leg: Edema present.   Neurological:      Mental Status: He is alert.       Significant Labs:  BMP:   Recent Labs   Lab 09/09/22  0305      *   K 4.2      CO2 25   BUN 68*   CREATININE 3.09*   CALCIUM 8.0*     CBC:   Recent Labs   Lab 09/09/22  0305   WBC 12.76*   RBC 2.14*   HGB 6.4*   HCT 21.7*      .4*   MCH 29.9   MCHC 29.5*        Significant Imaging:  Labs: Reviewed

## 2022-09-09 NOTE — SUBJECTIVE & OBJECTIVE
Interval History: No acute events overnight. The patient is currently resting comfortably. He is doing well with his weaning trials. He is afebrile and vital signs are stable.    Objective:     Vital Signs (Most Recent):  Temp: 98.2 °F (36.8 °C) (09/09/22 0300)  Pulse: 91 (09/09/22 0806)  Resp: 19 (09/09/22 0806)  BP: (!) 114/51 (09/09/22 0600)  SpO2: 100 % (09/09/22 0806)   Vital Signs (24h Range):  Temp:  [97.5 °F (36.4 °C)-98.8 °F (37.1 °C)] 98.2 °F (36.8 °C)  Pulse:  [] 91  Resp:  [14-22] 19  SpO2:  [85 %-100 %] 100 %  BP: (100-125)/(41-58) 114/51     Weight: 96.7 kg (213 lb 3 oz)  Body mass index is 27.37 kg/m².      Intake/Output Summary (Last 24 hours) at 9/9/2022 0951  Last data filed at 9/9/2022 0600  Gross per 24 hour   Intake 972 ml   Output --   Net 972 ml         Physical Exam  Vitals reviewed.   Constitutional:       General: He is not in acute distress.     Appearance: He is ill-appearing.   HENT:      Head: Normocephalic and atraumatic.      Right Ear: External ear normal.      Left Ear: External ear normal.      Mouth/Throat:      Mouth: Mucous membranes are moist.   Eyes:      Extraocular Movements: Extraocular movements intact.      Conjunctiva/sclera: Conjunctivae normal.   Neck:      Comments: Trach in place   Cardiovascular:      Rate and Rhythm: Normal rate. Rhythm irregular.      Pulses: Normal pulses.   Pulmonary:      Breath sounds: No wheezing, rhonchi or rales.   Abdominal:      General: Bowel sounds are normal.      Palpations: Abdomen is soft.   Musculoskeletal:         General: Swelling present.      Right lower leg: Edema present.      Left lower leg: Edema present.      Comments: Edema to upper and lower ext. Worse in left arm than right -Hx radial fx; family states cast was removed due to edema    Skin:     General: Skin is warm and dry.   Neurological:      Mental Status: He is alert.       Vents:  Vent Mode: CPAP / PSV (09/09/22 0817)  Ventilator Initiated: Yes (09/02/22  0047)  Set Rate: 0 BPM (09/08/22 1315)  Vt Set: 0 mL (09/08/22 1315)  Pressure Support: 12 cmH20 (09/09/22 0817)  PEEP/CPAP: 5 cmH20 (09/09/22 0817)  Oxygen Concentration (%): 40 (09/09/22 0806)  Peak Airway Pressure: 18 cmH2O (09/09/22 0817)  Total Ve: 7.4 mL (09/09/22 0817)  F/VT Ratio<105 (RSBI): (!) 73.53 (09/09/22 0255)    Lines/Drains/Airways       Central Venous Catheter Line  Duration                  Hemodialysis Catheter right subclavian -- days              Drain  Duration                  Gastrostomy/Enterostomy 08/23/22 1030 LUQ 16 days              Airway  Duration                  Surgical Airway Shiley Cuffed -- days              Peripheral Intravenous Line  Duration                  Peripheral IV - Single Lumen 08/23/22 1422 20 G Anterior;Right Forearm 16 days                    Significant Labs:    CBC/Anemia Profile:  Recent Labs   Lab 09/09/22  0305   WBC 12.76*   HGB 6.4*   HCT 21.7*      .4*   RDW 14.7*          Chemistries:  Recent Labs   Lab 09/09/22  0305   *   K 4.2      CO2 25   BUN 68*   CREATININE 3.09*   CALCIUM 8.0*         All pertinent labs within the past 24 hours have been reviewed.    Significant Imaging:  I have reviewed all pertinent imaging results/findings within the past 24 hours.

## 2022-09-09 NOTE — PROGRESS NOTES
[facility]  Nephrology  Progress Note    Patient Name: Gregory Stuart  MRN: [id number]  Admission Date: 8/23/2022  Hospital Length of Stay: 17 days  Attending Provider: Ham Sanchez DO   Primary Care Physician: Primary Doctor No  Principal Problem:Acute hypoxemic respiratory failure    Subjective:     HPI: This gentleman with history of CAD, CVA and renal failure.  The patient transferred from the [facility] for ventilator management and debility.  He has a history of CKD which progressed to ESRD after a CABG.  The patient has been on dialysis for about 3 weeks.  He continues to have diffuse edema.  His last dialysis session was on yesterday.  His family is at the bedside to answer questions.  Nephrology has been consulted for renal issues.      Interval History: The patient is resting.  He is continuing with his weaning trials.  No other changes.    Review of patient's allergies indicates:  Not on File  Current Facility-Administered Medications   Medication Frequency    0.9%  NaCl infusion (for blood administration) Q24H PRN    0.9%  NaCl infusion PRN    acetaminophen tablet 650 mg Q6H PRN    albuterol-ipratropium 2.5 mg-0.5 mg/3 mL nebulizer solution 3 mL Q12H    apixaban tablet 2.5 mg BID    aspirin chewable tablet 81 mg Daily    atorvastatin tablet 40 mg QHS    carvediloL tablet 25 mg BID    collagenase ointment Daily PRN    dextromethorphan-guaiFENesin  mg/5 ml liquid 10 mL Q6H PRN    dextrose 50% injection 12.5 g PRN    doxepin capsule 25 mg QHS    ferrous sulfate tablet 1 each Daily    glucagon (human recombinant) injection 1 mg PRN    heparin (porcine) injection 4,000 Units PRN    insulin aspart U-100 injection 1-10 Units PRN    miconazole NITRATE 2 % top powder BID    ondansetron injection 8 mg Q6H PRN    pantoprazole suspension 40 mg Daily    polyethylene glycol packet 17 g Daily PRN    senna-docusate 8.6-50 mg per tablet 1 tablet  Daily PRN    venlafaxine tablet 37.5 mg BID       Objective:     Vital Signs (Most Recent):  Temp: 96.3 °F (35.7 °C) (09/09/22 1500)  Pulse: 63 (09/09/22 1506)  Resp: 12 (09/09/22 1506)  BP: (!) 132/50 (09/09/22 1506)  SpO2: 100 % (09/09/22 1506)  O2 Device (Oxygen Therapy): ventilator (09/09/22 1620)   Vital Signs (24h Range):  Temp:  [96.3 °F (35.7 °C)-98.8 °F (37.1 °C)] 96.3 °F (35.7 °C)  Pulse:  [63-91] 63  Resp:  [12-24] 12  SpO2:  [85 %-100 %] 100 %  BP: ()/(43-63) 132/50     Weight: 96.7 kg (213 lb 3 oz) (09/09/22 0500)  Body mass index is 27.37 kg/m².  Body surface area is 2.25 meters squared.    I/O last 3 completed shifts:  In: 1824 [NG/GT:1824]  Out: -     Physical Exam  Vitals reviewed.   HENT:      Head: Normocephalic and atraumatic.   Eyes:      Pupils: Pupils are equal, round, and reactive to light.   Cardiovascular:      Rate and Rhythm: Rhythm irregular.   Pulmonary:      Effort: Pulmonary effort is normal.      Comments: Trach  Abdominal:      Palpations: Abdomen is soft.   Musculoskeletal:      Right lower leg: Edema present.      Left lower leg: Edema present.   Neurological:      Mental Status: He is alert.       Significant Labs:  BMP:   Recent Labs   Lab 09/09/22  0305      *   K 4.2      CO2 25   BUN 68*   CREATININE 3.09*   CALCIUM 8.0*     CBC:   Recent Labs   Lab 09/09/22  0305   WBC 12.76*   RBC 2.14*   HGB 6.4*   HCT 21.7*      .4*   MCH 29.9   MCHC 29.5*        Significant Imaging:  Labs: Reviewed    Assessment/Plan:     Anemia  Current management    JUVENTINO (acute kidney injury)  He was dialyzed Friday 8/29/2022Continue with scheduled hemodialysis.  9/6/2022  Continue with dialysis.  9/7/2022 Dialysis as scheduled.  9/9/2022  Continue with dialysis.    CAD (coronary artery disease)  Chronic condition    A-fib  Chronic condition    Debility    Thank you for your consult. I will follow-up with patient. Please contact us if you have any additional  questions.    Andres Lino Jr, MD  Nephrology  [facility]

## 2022-09-09 NOTE — ASSESSMENT & PLAN NOTE
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 Will increase tomorrow  8/31- His strength is slowly improving which will also improve weaning. Will start to increase weaning again today  9/1- Oxygenating adequately on 40%. Plan for 12 hours continuous of cpap during the day and can continue to rest overnight on assist control  9/2- continue current tx   9/4- increase CPAP to 16hrs and rest on vent   9/6- Doing well with 16 hours of cpap and then resting for 8. Will continue with this for now. Next plan will be to increase to continuous  9/7- continue with 16 hours today. Discussed with respiratory.Will continue with 16 hours for the next couple of days and then increase  9/8 plan to increase to continuous as tolerated  9/9- did well with continuous cpap which was started yesterday. Plan to continue for now.

## 2022-09-10 NOTE — PLAN OF CARE
Problem: Impaired Wound Healing  Goal: Optimal Wound Healing  Outcome: Ongoing, Progressing  Intervention: Promote Wound Healing  Flowsheets (Taken 9/10/2022 1606)  Oral Nutrition Promotion: (tube feeding continued as ordered)   other (see comments)   social interaction promoted   rest periods promoted  Sleep/Rest Enhancement:   awakenings minimized   therapeutic touch utilized   regular sleep/rest pattern promoted   relaxation techniques promoted   room darkened   noise level reduced   natural light exposure provided   family presence promoted   consistent schedule promoted  Activity Management: Rolling - L1  Pain Management Interventions:   quiet environment facilitated   relaxation techniques promoted   pillow support provided   care clustered

## 2022-09-10 NOTE — SUBJECTIVE & OBJECTIVE
Interval History:   awake, alert, tolerating cpap during the day -- rested on AC for 6 hours last night     Objective:     Vital Signs (Most Recent):  Temp: 96.8 °F (36 °C) (09/10/22 1500)  Pulse: 79 (09/10/22 1800)  Resp: 19 (09/10/22 1800)  BP: (!) 116/49 (09/10/22 1800)  SpO2: 100 % (09/10/22 1800)   Vital Signs (24h Range):  Temp:  [96.8 °F (36 °C)-97.8 °F (36.6 °C)] 96.8 °F (36 °C)  Pulse:  [70-92] 79  Resp:  [10-23] 19  SpO2:  [89 %-100 %] 100 %  BP: ()/(39-61) 116/49     Weight: 96.7 kg (213 lb 3 oz)  Body mass index is 27.37 kg/m².      Intake/Output Summary (Last 24 hours) at 9/10/2022 1845  Last data filed at 9/10/2022 1833  Gross per 24 hour   Intake 781 ml   Output 1 ml   Net 780 ml       Physical Exam  Vitals reviewed.   Constitutional:       General: He is not in acute distress.     Appearance: He is ill-appearing.   HENT:      Head: Normocephalic and atraumatic.      Right Ear: External ear normal.      Left Ear: External ear normal.      Nose: Nose normal.      Mouth/Throat:      Mouth: Mucous membranes are moist.   Eyes:      Extraocular Movements: Extraocular movements intact.      Conjunctiva/sclera: Conjunctivae normal.   Neck:      Comments: Trach in place   Cardiovascular:      Rate and Rhythm: Normal rate. Rhythm irregular.      Pulses: Normal pulses.   Pulmonary:      Breath sounds: No wheezing, rhonchi or rales.   Abdominal:      General: Bowel sounds are normal.      Palpations: Abdomen is soft.   Musculoskeletal:         General: Swelling present.      Right lower leg: Edema present.      Left lower leg: Edema present.      Comments: Edema to upper and lower ext. Worse in left arm than right -Hx radial fx; family states cast was removed due to edema    Skin:     General: Skin is warm and dry.   Neurological:      Mental Status: He is alert.       Vents:  Vent Mode: CPAP / PSV (09/10/22 1615)  Ventilator Initiated: Yes (09/02/22 0047)  Set Rate: 12 BPM (09/10/22 0239)  Vt Set: 550 mL  (09/10/22 0239)  Pressure Support: 12 cmH20 (09/10/22 1615)  PEEP/CPAP: 5 cmH20 (09/10/22 1615)  Oxygen Concentration (%): 40 (09/10/22 1558)  Peak Airway Pressure: 18 cmH2O (09/10/22 1215)  Total Ve: 18 mL (09/10/22 1615)  F/VT Ratio<105 (RSBI): (!) 43.48 (09/09/22 1927)    Lines/Drains/Airways       Central Venous Catheter Line  Duration                  Hemodialysis Catheter right subclavian -- days              Drain  Duration                  Gastrostomy/Enterostomy 08/23/22 1030 LUQ 18 days              Airway  Duration                  Surgical Airway Shiley Cuffed -- days              Peripheral Intravenous Line  Duration                  Peripheral IV - Single Lumen 08/23/22 1422 20 G Anterior;Right Forearm 18 days                    Significant Labs:    CBC/Anemia Profile:  Recent Labs   Lab 09/09/22  0305   WBC 12.76*   HGB 6.4*   HCT 21.7*      .4*   RDW 14.7*        Chemistries:  Recent Labs   Lab 09/09/22  0305   *   K 4.2      CO2 25   BUN 68*   CREATININE 3.09*   CALCIUM 8.0*       All pertinent labs within the past 24 hours have been reviewed.    Significant Imaging:  I have reviewed all pertinent imaging results/findings within the past 24 hours.

## 2022-09-10 NOTE — ASSESSMENT & PLAN NOTE
He was dialyzed Friday 8/29/2022Continue with scheduled hemodialysis.  9/6/2022  Continue with dialysis.  9/7/2022 Dialysis as scheduled.  9/9/2022  Continue with dialysis.  9/10/2022  Dialysis as scheduled.  Volume status is improving.

## 2022-09-10 NOTE — ASSESSMENT & PLAN NOTE
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 Will increase tomorrow  8/31- His strength is slowly improving which will also improve weaning. Will start to increase weaning again today  9/1- Oxygenating adequately on 40%. Plan for 12 hours continuous of cpap during the day and can continue to rest overnight on assist control  9/2- continue current tx   9/4- increase CPAP to 16hrs and rest on vent   9/6- Doing well with 16 hours of cpap and then resting for 8. Will continue with this for now. Next plan will be to increase to continuous  9/7- continue with 16 hours today. Discussed with respiratory.Will continue with 16 hours for the next couple of days and then increase  9/8 plan to increase to continuous as tolerated  9/9- did well with continuous cpap which was started yesterday. Plan to continue for now.   9-10 - CPAP as toelrated

## 2022-09-10 NOTE — RESPIRATORY THERAPY
Patient was on continuous CPAP but continue to have apnea episodes ..Doctor was notified and patient was placed back on A/C ..

## 2022-09-10 NOTE — SUBJECTIVE & OBJECTIVE
Interval History: The patient is resting.  He continues with his weaning trials.  No other changes.    Review of patient's allergies indicates:  Not on File  Current Facility-Administered Medications   Medication Frequency    0.9%  NaCl infusion (for blood administration) Q24H PRN    0.9%  NaCl infusion PRN    acetaminophen tablet 650 mg Q6H PRN    albuterol-ipratropium 2.5 mg-0.5 mg/3 mL nebulizer solution 3 mL Q12H    apixaban tablet 2.5 mg BID    aspirin chewable tablet 81 mg Daily    atorvastatin tablet 40 mg QHS    carvediloL tablet 25 mg BID    collagenase ointment Daily PRN    dextromethorphan-guaiFENesin  mg/5 ml liquid 10 mL Q6H PRN    dextrose 50% injection 12.5 g PRN    doxepin capsule 25 mg QHS    ferrous sulfate tablet 1 each Daily    glucagon (human recombinant) injection 1 mg PRN    heparin (porcine) injection 4,000 Units PRN    insulin aspart U-100 injection 1-10 Units PRN    miconazole NITRATE 2 % top powder BID    ondansetron injection 8 mg Q6H PRN    pantoprazole suspension 40 mg Daily    polyethylene glycol packet 17 g Daily PRN    senna-docusate 8.6-50 mg per tablet 1 tablet Daily PRN    venlafaxine tablet 37.5 mg BID       Objective:     Vital Signs (Most Recent):  Temp: 97.8 °F (36.6 °C) (09/10/22 1100)  Pulse: 79 (09/10/22 1100)  Resp: 17 (09/10/22 1100)  BP: (!) 103/48 (09/10/22 1100)  SpO2: 99 % (09/10/22 1100)  O2 Device (Oxygen Therapy): ventilator (09/10/22 0810)   Vital Signs (24h Range):  Temp:  [96.3 °F (35.7 °C)-97.8 °F (36.6 °C)] 97.8 °F (36.6 °C)  Pulse:  [63-90] 79  Resp:  [10-23] 17  SpO2:  [97 %-100 %] 99 %  BP: (103-135)/(43-63) 103/48     Weight: 96.7 kg (213 lb 3 oz) (09/09/22 0500)  Body mass index is 27.37 kg/m².  Body surface area is 2.25 meters squared.    I/O last 3 completed shifts:  In: 2401 [I.V.:348; Blood:300; NG/GT:1753]  Out: 1800 [Other:1800]    Physical Exam  Vitals reviewed.   HENT:      Head: Normocephalic and atraumatic.   Cardiovascular:      Rate and  Rhythm: Rhythm irregular.   Abdominal:      Palpations: Abdomen is soft.   Musculoskeletal:      Right lower leg: Edema present.      Left lower leg: Edema present.      Comments: Lower leg swelling is improving.   Neurological:      Mental Status: He is alert.       Significant Labs:  BMP:   Recent Labs   Lab 09/09/22  0305      *   K 4.2      CO2 25   BUN 68*   CREATININE 3.09*   CALCIUM 8.0*     CBC:   Recent Labs   Lab 09/09/22  0305   WBC 12.76*   RBC 2.14*   HGB 6.4*   HCT 21.7*      .4*   MCH 29.9   MCHC 29.5*        Significant Imaging:  Labs: Reviewed

## 2022-09-10 NOTE — PROGRESS NOTES
[facility]  Nephrology  Progress Note    Patient Name: Gregory Stuart  MRN: [id number]  Admission Date: 8/23/2022  Hospital Length of Stay: 18 days  Attending Provider: Ham Sanchez DO   Primary Care Physician: Primary Doctor No  Principal Problem:Acute hypoxemic respiratory failure    Subjective:     HPI: This gentleman with history of CAD, CVA and renal failure.  The patient transferred from the [facility] for ventilator management and debility.  He has a history of CKD which progressed to ESRD after a CABG.  The patient has been on dialysis for about 3 weeks.  He continues to have diffuse edema.  His last dialysis session was on yesterday.  His family is at the bedside to answer questions.  Nephrology has been consulted for renal issues.      Interval History: The patient is resting.  He continues with his weaning trials.  No other changes.    Review of patient's allergies indicates:  Not on File  Current Facility-Administered Medications   Medication Frequency    0.9%  NaCl infusion (for blood administration) Q24H PRN    0.9%  NaCl infusion PRN    acetaminophen tablet 650 mg Q6H PRN    albuterol-ipratropium 2.5 mg-0.5 mg/3 mL nebulizer solution 3 mL Q12H    apixaban tablet 2.5 mg BID    aspirin chewable tablet 81 mg Daily    atorvastatin tablet 40 mg QHS    carvediloL tablet 25 mg BID    collagenase ointment Daily PRN    dextromethorphan-guaiFENesin  mg/5 ml liquid 10 mL Q6H PRN    dextrose 50% injection 12.5 g PRN    doxepin capsule 25 mg QHS    ferrous sulfate tablet 1 each Daily    glucagon (human recombinant) injection 1 mg PRN    heparin (porcine) injection 4,000 Units PRN    insulin aspart U-100 injection 1-10 Units PRN    miconazole NITRATE 2 % top powder BID    ondansetron injection 8 mg Q6H PRN    pantoprazole suspension 40 mg Daily    polyethylene glycol packet 17 g Daily PRN    senna-docusate 8.6-50 mg per tablet 1 tablet Daily  PRN    venlafaxine tablet 37.5 mg BID       Objective:     Vital Signs (Most Recent):  Temp: 97.8 °F (36.6 °C) (09/10/22 1100)  Pulse: 79 (09/10/22 1100)  Resp: 17 (09/10/22 1100)  BP: (!) 103/48 (09/10/22 1100)  SpO2: 99 % (09/10/22 1100)  O2 Device (Oxygen Therapy): ventilator (09/10/22 0810)   Vital Signs (24h Range):  Temp:  [96.3 °F (35.7 °C)-97.8 °F (36.6 °C)] 97.8 °F (36.6 °C)  Pulse:  [63-90] 79  Resp:  [10-23] 17  SpO2:  [97 %-100 %] 99 %  BP: (103-135)/(43-63) 103/48     Weight: 96.7 kg (213 lb 3 oz) (09/09/22 0500)  Body mass index is 27.37 kg/m².  Body surface area is 2.25 meters squared.    I/O last 3 completed shifts:  In: 2401 [I.V.:348; Blood:300; NG/GT:1753]  Out: 1800 [Other:1800]    Physical Exam  Vitals reviewed.   HENT:      Head: Normocephalic and atraumatic.   Cardiovascular:      Rate and Rhythm: Rhythm irregular.   Abdominal:      Palpations: Abdomen is soft.   Musculoskeletal:      Right lower leg: Edema present.      Left lower leg: Edema present.      Comments: Lower leg swelling is improving.   Neurological:      Mental Status: He is alert.       Significant Labs:  BMP:   Recent Labs   Lab 09/09/22  0305      *   K 4.2      CO2 25   BUN 68*   CREATININE 3.09*   CALCIUM 8.0*     CBC:   Recent Labs   Lab 09/09/22  0305   WBC 12.76*   RBC 2.14*   HGB 6.4*   HCT 21.7*      .4*   MCH 29.9   MCHC 29.5*        Significant Imaging:  Labs: Reviewed    Assessment/Plan:     DM (diabetes mellitus)  Underlying condition    JUVENTINO (acute kidney injury)  He was dialyzed Friday 8/29/2022Continue with scheduled hemodialysis.  9/6/2022  Continue with dialysis.  9/7/2022 Dialysis as scheduled.  9/9/2022  Continue with dialysis.  9/10/2022  Dialysis as scheduled.  Volume status is improving.    CAD (coronary artery disease)  Chronic condition    A-fib  Chronic condition        Thank you for your consult. I will follow-up with patient. Please contact us if you have any  additional questions.    Andres Lino Jr, MD  Nephrology  [facility] \[facility]\""

## 2022-09-10 NOTE — PROGRESS NOTES
[facility]  Pulmonology  Progress Note    Patient Name: Gregory Stuart  MRN: [id number]  Admission Date: 8/23/2022  Hospital Length of Stay: 18 days  Code Status: No Order  Attending Provider: Ham Sanchez DO  Primary Care Provider: Primary Doctor No   Principal Problem: Acute hypoxemic respiratory failure    Subjective:     Interval History:   awake, alert, tolerating cpap during the day -- rested on AC for 6 hours last night     Objective:     Vital Signs (Most Recent):  Temp: 96.8 °F (36 °C) (09/10/22 1500)  Pulse: 79 (09/10/22 1800)  Resp: 19 (09/10/22 1800)  BP: (!) 116/49 (09/10/22 1800)  SpO2: 100 % (09/10/22 1800)   Vital Signs (24h Range):  Temp:  [96.8 °F (36 °C)-97.8 °F (36.6 °C)] 96.8 °F (36 °C)  Pulse:  [70-92] 79  Resp:  [10-23] 19  SpO2:  [89 %-100 %] 100 %  BP: ()/(39-61) 116/49     Weight: 96.7 kg (213 lb 3 oz)  Body mass index is 27.37 kg/m².      Intake/Output Summary (Last 24 hours) at 9/10/2022 1845  Last data filed at 9/10/2022 1833  Gross per 24 hour   Intake 781 ml   Output 1 ml   Net 780 ml       Physical Exam  Vitals reviewed.   Constitutional:       General: He is not in acute distress.     Appearance: He is ill-appearing.   HENT:      Head: Normocephalic and atraumatic.      Right Ear: External ear normal.      Left Ear: External ear normal.      Nose: Nose normal.      Mouth/Throat:      Mouth: Mucous membranes are moist.   Eyes:      Extraocular Movements: Extraocular movements intact.      Conjunctiva/sclera: Conjunctivae normal.   Neck:      Comments: Trach in place   Cardiovascular:      Rate and Rhythm: Normal rate. Rhythm irregular.      Pulses: Normal pulses.   Pulmonary:      Breath sounds: No wheezing, rhonchi or rales.   Abdominal:      General: Bowel sounds are normal.      Palpations: Abdomen is soft.   Musculoskeletal:         General: Swelling present.      Right lower leg: Edema present.      Left lower leg: Edema present.       Comments: Edema to upper and lower ext. Worse in left arm than right -Hx radial fx; family states cast was removed due to edema    Skin:     General: Skin is warm and dry.   Neurological:      Mental Status: He is alert.       Vents:  Vent Mode: CPAP / PSV (09/10/22 1615)  Ventilator Initiated: Yes (09/02/22 0047)  Set Rate: 12 BPM (09/10/22 0239)  Vt Set: 550 mL (09/10/22 0239)  Pressure Support: 12 cmH20 (09/10/22 1615)  PEEP/CPAP: 5 cmH20 (09/10/22 1615)  Oxygen Concentration (%): 40 (09/10/22 1558)  Peak Airway Pressure: 18 cmH2O (09/10/22 1215)  Total Ve: 18 mL (09/10/22 1615)  F/VT Ratio<105 (RSBI): (!) 43.48 (09/09/22 1927)    Lines/Drains/Airways       Central Venous Catheter Line  Duration                  Hemodialysis Catheter right subclavian -- days              Drain  Duration                  Gastrostomy/Enterostomy 08/23/22 1030 LUQ 18 days              Airway  Duration                  Surgical Airway Shiley Cuffed -- days              Peripheral Intravenous Line  Duration                  Peripheral IV - Single Lumen 08/23/22 1422 20 G Anterior;Right Forearm 18 days                    Significant Labs:    CBC/Anemia Profile:  Recent Labs   Lab 09/09/22  0305   WBC 12.76*   HGB 6.4*   HCT 21.7*      .4*   RDW 14.7*        Chemistries:  Recent Labs   Lab 09/09/22  0305   *   K 4.2      CO2 25   BUN 68*   CREATININE 3.09*   CALCIUM 8.0*       All pertinent labs within the past 24 hours have been reviewed.    Significant Imaging:  I have reviewed all pertinent imaging results/findings within the past 24 hours.    Assessment/Plan:     * Acute hypoxemic respiratory failure  Failed extubated post CABG (7/18)  Now with trach and peg     8/28- increase to PSV 12 hours -   8/29- Will plan to change trach out in the next day or two--continue to increase weaning as tolerated  8/30- continue with current weaning plan for now. Will increase tomorrow  8/31- His strength is slowly  improving which will also improve weaning. Will start to increase weaning again today  9/1- Oxygenating adequately on 40%. Plan for 12 hours continuous of cpap during the day and can continue to rest overnight on assist control  9/2- continue current tx   9/4- increase CPAP to 16hrs and rest on vent   9/6- Doing well with 16 hours of cpap and then resting for 8. Will continue with this for now. Next plan will be to increase to continuous  9/7- continue with 16 hours today. Discussed with respiratory.Will continue with 16 hours for the next couple of days and then increase  9/8 plan to increase to continuous as tolerated  9/9- did well with continuous cpap which was started yesterday. Plan to continue for now.   9-10 - CPAP as toelrated     HTN (hypertension)  Resumed home medications     Weakness acquired in ICU  Extreme generalized weakness - continue PT/OT as tolerated  He is showing some improvement      DM (diabetes mellitus)  accuchecks and Sliding scale insulin   A1C 5.3%  Currently on sliding scale  Glucose<180  Continue with current care    Anemia  Likely due to critical illness   Most recent H/H 7/23  - repeat labs Monday and fridays 9/2  - H/H 6/18  - anemia panel pending   - will transfuse 1u PRBCs with HD   9/2  - 1u PRBCs with H/H 6/21  - transfuse another unit of PRBCs   - recheck in AM   - SHIVAM with iron supplementation in place   9/4  - recheck H/H in AM  - no obvious s/s of bleeding   9/5  - H/H 7.3/23.2  9/9- Hgb is 6.4. Can transfuse 1 unit with next HD    JUVENTINO (acute kidney injury)  Cr 0.9 March 2022   - was on CCRT post CABG -- transitioned to HD M/W/F  - tunneled HD cath in place   - consult Nephrology -- did not come with porter- unsure if he was making any urine at OSH   Nephrology note reviewed and appreciated  Plan to continue MWF dialysis  Nephrology note reviewed and appreciated                 Meg Dalton, ADRI-ACNP  Pulmonology  [facility]

## 2022-09-11 NOTE — ASSESSMENT & PLAN NOTE
He was dialyzed Friday 8/29/2022Continue with scheduled hemodialysis.  9/6/2022  Continue with dialysis.  9/7/2022 Dialysis as scheduled.  9/9/2022  Continue with dialysis.  9/10/2022  Dialysis as scheduled.  Volume status is improving.  9/11/2022  Continue with current therapy.

## 2022-09-11 NOTE — ASSESSMENT & PLAN NOTE
Due to critical illness   9/9- Hgb is 6.4. Can transfuse 1 unit with next HD  9/11- VSS -- repeat CBC in AM

## 2022-09-11 NOTE — PROGRESS NOTES
[facility]  Nephrology  Progress Note    Patient Name: Gregory Stuart  MRN: [id number]  Admission Date: 8/23/2022  Hospital Length of Stay: 19 days  Attending Provider: Ham Sanchez DO   Primary Care Physician: Primary Doctor No  Principal Problem:Acute hypoxemic respiratory failure    Subjective:     HPI: This gentleman with history of CAD, CVA and renal failure.  The patient transferred from the [facility] for ventilator management and debility.  He has a history of CKD which progressed to ESRD after a CABG.  The patient has been on dialysis for about 3 weeks.  He continues to have diffuse edema.  His last dialysis session was on yesterday.  His family is at the bedside to answer questions.  Nephrology has been consulted for renal issues.      Interval History: The patient is resting.  No acute changes.    Review of patient's allergies indicates:  Not on File  Current Facility-Administered Medications   Medication Frequency    0.9%  NaCl infusion (for blood administration) Q24H PRN    0.9%  NaCl infusion PRN    acetaminophen tablet 650 mg Q6H PRN    albuterol-ipratropium 2.5 mg-0.5 mg/3 mL nebulizer solution 3 mL Q12H    apixaban tablet 2.5 mg BID    aspirin chewable tablet 81 mg Daily    atorvastatin tablet 40 mg QHS    carvediloL tablet 25 mg BID    collagenase ointment Daily PRN    dextromethorphan-guaiFENesin  mg/5 ml liquid 10 mL Q6H PRN    dextrose 50% injection 12.5 g PRN    doxepin capsule 25 mg QHS    ferrous sulfate tablet 1 each Daily    glucagon (human recombinant) injection 1 mg PRN    heparin (porcine) injection 4,000 Units PRN    insulin aspart U-100 injection 1-10 Units PRN    miconazole NITRATE 2 % top powder BID    ondansetron injection 8 mg Q6H PRN    pantoprazole suspension 40 mg Daily    polyethylene glycol packet 17 g Daily PRN    senna-docusate 8.6-50 mg per tablet 1 tablet Daily PRN    venlafaxine tablet 37.5 mg BID        Objective:     Vital Signs (Most Recent):  Temp: 98.2 °F (36.8 °C) (09/11/22 0701)  Pulse: 92 (09/11/22 0900)  Resp: 19 (09/11/22 0900)  BP: (!) 99/47 (09/11/22 0900)  SpO2: 96 % (09/11/22 0900)  O2 Device (Oxygen Therapy): ventilator (09/10/22 1940)   Vital Signs (24h Range):  Temp:  [96.8 °F (36 °C)-98.2 °F (36.8 °C)] 98.2 °F (36.8 °C)  Pulse:  [66-92] 92  Resp:  [9-24] 19  SpO2:  [89 %-100 %] 96 %  BP: ()/(39-57) 99/47     Weight: 96.7 kg (213 lb 3 oz) (09/09/22 0500)  Body mass index is 27.37 kg/m².  Body surface area is 2.25 meters squared.    I/O last 3 completed shifts:  In: 781 [NG/GT:781]  Out: 1 [Stool:1]    Physical Exam  Vitals reviewed.   HENT:      Head: Normocephalic and atraumatic.      Mouth/Throat:      Mouth: Mucous membranes are moist.   Cardiovascular:      Rate and Rhythm: Rhythm irregular.   Pulmonary:      Effort: Pulmonary effort is normal.   Abdominal:      General: Abdomen is flat.   Musculoskeletal:         General: Swelling present.      Right lower leg: Edema present.      Left lower leg: Edema present.   Neurological:      Mental Status: He is alert.       Significant Labs:  BMP:   Recent Labs   Lab 09/09/22  0305      *   K 4.2      CO2 25   BUN 68*   CREATININE 3.09*   CALCIUM 8.0*     CBC:   Recent Labs   Lab 09/09/22  0305   WBC 12.76*   RBC 2.14*   HGB 6.4*   HCT 21.7*      .4*   MCH 29.9   MCHC 29.5*        Significant Imaging:  Labs: Reviewed    Assessment/Plan:     * Acute hypoxemic respiratory failure  On ventilator via tracheostomy    JUVENTINO (acute kidney injury)  He was dialyzed Friday 8/29/2022Continue with scheduled hemodialysis.  9/6/2022  Continue with dialysis.  9/7/2022 Dialysis as scheduled.  9/9/2022  Continue with dialysis.  9/10/2022  Dialysis as scheduled.  Volume status is improving.  9/11/2022  Continue with current therapy.    CAD (coronary artery disease)  Chronic condition    A-fib  Chronic condition        Thank you  for your consult. I will follow-up with patient. Please contact us if you have any additional questions.    Andres Lino Jr, MD  Nephrology  [facility]

## 2022-09-11 NOTE — PLAN OF CARE
Problem: Adult Inpatient Plan of Care  Goal: Optimal Comfort and Wellbeing  Outcome: Ongoing, Progressing  Intervention: Monitor Pain and Promote Comfort  Flowsheets (Taken 9/11/2022 1855)  Pain Management Interventions:   quiet environment facilitated   relaxation techniques promoted  Intervention: Provide Person-Centered Care  Flowsheets (Taken 9/11/2022 1855)  Trust Relationship/Rapport:   care explained   questions encouraged   reassurance provided

## 2022-09-11 NOTE — PROCEDURES
"Gregory Stuart is a 77 y.o. male patient.    Temp: 96 °F (35.6 °C) (09/11/22 1100)  Pulse: 82 (09/11/22 1300)  Resp: (!) 8 (09/11/22 1300)  BP: (!) 90/45 (09/11/22 1300)  SpO2: 100 % (09/11/22 1300)  Weight: 96.7 kg (213 lb 3 oz) (09/09/22 0500)  Height: 6' 2" (188 cm) (08/23/22 1459)       Procedures  Downsized Trach     Trach downsized to 6.0 from 8.5 -- 8.5 trach was not holding air in the cuff well... Pt remained with a leak with air coming around his trach after 6.0 was placed. Unable to hold TV on vent.  Exchanged trach with 6.0XLT and air leak stopped. Will keep on AC for an hour then switch back to PSV.     9/11/2022    "

## 2022-09-11 NOTE — ASSESSMENT & PLAN NOTE
No history documented prior to CABG - was on Lovenox at OSH   - he is on carvediolol, eliquis  - HR is controlled

## 2022-09-11 NOTE — ASSESSMENT & PLAN NOTE
Cr 0.9 March 2022   - was on CCRT post CABG -- transitioned to HD M/W/F    Plan to continue MWF dialysis  Nephrology note reviewed and appreciated

## 2022-09-11 NOTE — PT/OT/SLP PROGRESS
Physical Therapy Treatment    Patient Name:  Gregory Stuart   MRN:  [id number]    Recommendations:     Discharge Recommendations:  intermediate care facility/nursing home, nursing facility, skilled   Discharge Equipment Recommendations: other (see comments) (to be determined)   Barriers to discharge:  ongoing vent weaning    Assessment:     Gregory Stuart is a 77 y.o. male admitted with a medical diagnosis of Acute hypoxemic respiratory failure.  He presents with the following impairments/functional limitations:  weakness, impaired endurance, impaired self care skills, impaired functional mobility, impaired cognition, impaired balance, decreased upper extremity function, decreased lower extremity function, pain, decreased ROM, impaired cardiopulmonary response to activity .    Patient continues to require significant assistance for all bed mobility and transfers. Patient able to achieve knee flexion more easily than at admit but still limited by stiffness, tone, edema. Focus of PT treatment is increasing activity/mobility to support vent weaning and to maintain joint integrity. Patient will require total care when medically ready for d/c from LTACH.    Rehab Prognosis: Poor; patient would benefit from acute skilled PT services to address these deficits and reach maximum level of function.    Recent Surgery: * No surgery found *      Plan:     During this hospitalization, patient to be seen 5 x/week to address the identified rehab impairments via gait training, therapeutic activities, therapeutic exercises, neuromuscular re-education and progress toward the following goals:    Plan of Care Expires:  09/24/22    Subjective     Chief Complaint: resp failure  Patient/Family Comments/goals: Pt alert and appears agreeable to PT tretment  Pain/Comfort:  Pain Rating 1: 0/10 (up to 5/10 Gibran Borden during knee flexion stretch)  Location - Side 1: Bilateral  Location 1: knee  Pain Addressed 1: Cessation of Activity  Pain Rating  Post-Intervention 1: 0/10      Objective:     Communicated with Jennyfer Messina RN prior to session.  Patient found supine with Tracheostomy, ventilator, CPAP, blood pressure cuff, pulse ox (continuous), telemetry, PEG Tube upon PT entry to room.     General Precautions: Standard, fall, respiratory   Orthopedic Precautions:LUE non weight bearing   Braces: N/A  Respiratory Status:  CPAP via vent/permanent trach     Functional Mobility:  Bed Mobility:     Rolling Left:  dependence  Rolling Right: dependence  Supine to Sit: dependence  Sit to Supine: dependence  Transfers:     Sit to Stand:  maximal assistance and of 2 persons with manual assist via gait belt      AM-PAC 6 CLICK MOBILITY  Turning over in bed (including adjusting bedclothes, sheets and blankets)?: 2  Sitting down on and standing up from a chair with arms (e.g., wheelchair, bedside commode, etc.): 2  Moving to and from a bed to a chair (including a wheelchair)?: 1  Need to walk in hospital room?: 1  Climbing 3-5 steps with a railing?: 1       Therapeutic Activities and Exercises:   Sitting balance EOB x 20 minutes with focus on midline stability and trunk flexibility. Pt able to stabilize self for 5-10 seconds after stretching but the quickly loses control and overbalances posteriorly. Sit to stand x 2 trials with static stance for hygiene.  PROM B LE to maintain joint integrity focusing most of the time on knee flexion.    Patient left HOB elevated with all lines intact, call button in reach, and Jennyfer Messina RN notified..    GOALS:   Multidisciplinary Problems       Physical Therapy Goals          Problem: Physical Therapy    Goal Priority Disciplines Outcome Goal Variances Interventions   Physical Therapy Goal     PT, PT/OT Ongoing, Not Progressing     Description: Short Term Goals to be met by 9/13/2022    Patient will increase functional independence and safety with mobility by performing    1.   Rolling right Moderate Assist; roll left  Moderate Assist  2.   Supine to sit Moderate Assist  3.   Sitting balance edge of the bed x 15 minutes Minimal Assist  4.   Sit to stand Maximal Assist  using manual assistance with gait belt  5.   Bed to chair Maximal Assist using manual assistance with gait belt  6.   Lower extremity exercises x 30 reps with assist as necessary and no rest breaks      Long Term Goals to be met by 10/24/2022    Patient to require less than or equal to Minimal Assist for functional mobility to ease caregiver burden                        Time Tracking:     PT Received On: 09/11/22  PT Start Time: 1028     PT Stop Time: 1057  PT Total Time (min): 29 min     Billable Minutes: Therapeutic Activity 29 minutes    Treatment Type: Treatment  PT/PTA: PT     PTA Visit Number: 0     09/11/2022

## 2022-09-11 NOTE — PROGRESS NOTES
[facility]  Pulmonology  Progress Note    Patient Name: Gregory Stuart  MRN: [id number]  Admission Date: 8/23/2022  Hospital Length of Stay: 19 days  Code Status: No Order  Attending Provider: Ham Sanchez DO  Primary Care Provider: Primary Doctor No   Principal Problem: Acute hypoxemic respiratory failure    Subjective:     Interval History: on cpap over 24 hours. Trach downsized at bedside.     Objective:     Vital Signs (Most Recent):  Temp: 98.2 °F (36.8 °C) (09/11/22 0701)  Pulse: 75 (09/11/22 1000)  Resp: 19 (09/11/22 1000)  BP: (!) 93/47 (09/11/22 1000)  SpO2: 100 % (09/11/22 1200)   Vital Signs (24h Range):  Temp:  [96.8 °F (36 °C)-98.2 °F (36.8 °C)] 98.2 °F (36.8 °C)  Pulse:  [66-92] 75  Resp:  [9-24] 19  SpO2:  [89 %-100 %] 100 %  BP: ()/(39-57) 93/47     Weight: 96.7 kg (213 lb 3 oz)  Body mass index is 27.37 kg/m².      Intake/Output Summary (Last 24 hours) at 9/11/2022 1314  Last data filed at 9/10/2022 1833  Gross per 24 hour   Intake 781 ml   Output 1 ml   Net 780 ml       Physical Exam  Vitals reviewed.   Constitutional:       General: He is not in acute distress.     Appearance: He is ill-appearing.   HENT:      Head: Normocephalic and atraumatic.      Right Ear: External ear normal.      Left Ear: External ear normal.      Nose: Nose normal.      Mouth/Throat:      Mouth: Mucous membranes are moist.   Eyes:      Extraocular Movements: Extraocular movements intact.      Conjunctiva/sclera: Conjunctivae normal.   Neck:      Comments: Trach in place   Cardiovascular:      Rate and Rhythm: Normal rate. Rhythm irregular.      Pulses: Normal pulses.   Pulmonary:      Breath sounds: No wheezing, rhonchi or rales.   Abdominal:      General: Bowel sounds are normal.      Palpations: Abdomen is soft.   Musculoskeletal:         General: Swelling present.      Right lower leg: Edema present.      Left lower leg: Edema present.      Comments: Edema to upper and lower  ext. Worse in left arm than right -Hx radial fx; family states cast was removed due to edema    Skin:     General: Skin is warm and dry.   Neurological:      Mental Status: He is alert.       Vents:  Vent Mode: A/C (09/11/22 1200)  Ventilator Initiated: Yes (09/02/22 0047)  Set Rate: 12 BPM (09/11/22 1200)  Vt Set: 550 mL (09/11/22 1200)  Pressure Support: 12 cmH20 (09/11/22 0756)  PEEP/CPAP: 5 cmH20 (09/11/22 1200)  Oxygen Concentration (%): 40 (09/11/22 0815)  Peak Airway Pressure: 18 cmH2O (09/11/22 0756)  Total Ve: 20 mL (09/11/22 0756)  F/VT Ratio<105 (RSBI): (!) 33.9 (09/11/22 0500)    Lines/Drains/Airways       Central Venous Catheter Line  Duration                  Hemodialysis Catheter right subclavian -- days              Drain  Duration                  Gastrostomy/Enterostomy 08/23/22 1030 LUQ 19 days              Airway  Duration                  Surgical Airway Shiley Cuffed -- days              Peripheral Intravenous Line  Duration                  Peripheral IV - Single Lumen 08/23/22 1422 20 G Anterior;Right Forearm 18 days                    Significant Labs:    CBC/Anemia Profile:  No results for input(s): WBC, HGB, HCT, PLT, MCV, RDW, IRON, FERRITIN, RETIC, FOLATE, [id number], OCCULTBLOOD in the last 48 hours.     Chemistries:  No results for input(s): NA, K, CL, CO2, BUN, CREATININE, CALCIUM, ALBUMIN, PROT, BILITOT, ALKPHOS, ALT, AST, GLUCOSE, MG, PHOS in the last 48 hours.    All pertinent labs within the past 24 hours have been reviewed.    Significant Imaging:  I have reviewed all pertinent imaging results/findings within the past 24 hours.    Assessment/Plan:     * Acute hypoxemic respiratory failure  Failed extubated post CABG (7/18)  Now with trach and peg     8/28- increase to PSV 12 hours -   8/29- Will plan to change trach out in the next day or two--continue to increase weaning as tolerated  8/30- continue with current weaning plan for now. Will increase tomorrow  8/31- His strength is  slowly improving which will also improve weaning. Will start to increase weaning again today  9/1- Oxygenating adequately on 40%. Plan for 12 hours continuous of cpap during the day and can continue to rest overnight on assist control  9/2- continue current tx   9/4- increase CPAP to 16hrs and rest on vent   9/6- Doing well with 16 hours of cpap and then resting for 8. Will continue with this for now. Next plan will be to increase to continuous  9/7- continue with 16 hours today. Discussed with respiratory.Will continue with 16 hours for the next couple of days and then increase  9/8 plan to increase to continuous as tolerated  9/9- did well with continuous cpap which was started yesterday. Plan to continue for now.   9-10 - CPAP as toelrated   9/11-  Trach downsized to 6 XLT --    HTN (hypertension)  Resumed home medications     Weakness acquired in ICU  Extreme generalized weakness - continue PT/OT as tolerated  He is showing some improvement      DM (diabetes mellitus)  accuchecks and Sliding scale insulin   A1C 5.3%  Currently on sliding scale  Glucose<180  Continue with current care    Anemia  Due to critical illness   9/9- Hgb is 6.4. Can transfuse 1 unit with next HD  9/11- VSS -- repeat CBC in AM     JUVENTINO (acute kidney injury)  Cr 0.9 March 2022   - was on CCRT post CABG -- transitioned to HD M/W/F    Plan to continue MWF dialysis  Nephrology note reviewed and appreciated    Chronic systolic heart failure  EF 30-35% per Echo 07/2022  Daily wts/ I/Os   Appears compensated    A-fib  No history documented prior to CABG - was on Lovenox at OSH   - he is on carvediolol, eliquis  - HR is controlled     This includes 32 minutes of critical care time spent evaluating and managing patient. This includes time spent at bedside, reviewing labs, data, xrays and monitoring for acute decompensation.              Meg Dalton, AG-ACNP  Pulmonology  [facility]

## 2022-09-11 NOTE — PT/OT/SLP PROGRESS
Occupational Therapy   Treatment    Name: Gregory Stuart  MRN: [id number]  Admitting Diagnosis:  Acute hypoxemic respiratory failure       Recommendations:     Discharge Recommendations: intermediate care facility/nursing home, nursing facility, skilled  Discharge Equipment Recommendations:   (to be determined)  Barriers to discharge:  None    Assessment:     Gregory Stuart is a 77 y.o. male with a medical diagnosis of Acute hypoxemic respiratory failure.  He presents with alert, less interactive, much decreased edema in (L) UE, but still edematous trunck and (B) LEs. Performance deficits affecting function are weakness, impaired cardiopulmonary response to activity, impaired cognition, impaired coordination, impaired endurance, impaired fine motor, impaired functional mobility, impaired self care skills.     Rehab Prognosis:  Fair; patient would benefit from acute skilled OT services to address these deficits and reach maximum level of function.       Plan:     Patient to be seen 5 x/week to address the above listed problems via self-care/home management, therapeutic activities, therapeutic exercises  Plan of Care Expires: 09/27/22  Plan of Care Reviewed with: patient    Subjective     Pain/Comfort:  Pain Rating 1: 0/10 (at rest and up to 5/ 10 with LE flexion)  Location - Side 1: Bilateral  Location - Orientation 1: generalized  Location 1: knee  Pain Addressed 1: Cessation of Activity  Pain Rating Post-Intervention 1: 0/10    Objective:     Communicated with: MIKEY Messina prior to session.  Patient found HOB elevated with Tracheostomy, CPAP, blood pressure cuff, ventilator, pulse ox (continuous), telemetry, PEG Tube upon OT entry to room.    General Precautions: Standard, fall, respiratory   Orthopedic Precautions:LUE non weight bearing   Braces: N/A  Respiratory Status:  Ventilator with 40% oxygen     Occupational Performance:     Bed Mobility:    Patient completed Supine to Sit with dependent and 2  persons  Patient completed Sit to Supine with dependent and 2 persons     Functional Mobility/Transfers:  Patient completed Sit <> Stand Transfer with maximal assistance and of 2 persons  with  gait belt and manual assistance   Functional Mobility: Pt unable to take steps    Activities of Daily Living:  NT      [facility] 6 Click ADL: 7    Treatment & Education:  Pt sat EOB x 20 min with focus on balancing and maintaining a forward lean. Pt also worked on increasing knee bend. Pt performed side to side weight shifts and forward and back weight shifts. Pt performed AAROM/PROM with (B) Ues for shoulder flex, shoulder horizontal abd/adduction, elbow flexion/ extension, finger flexion/ extension.    Patient left HOB elevated with all lines intact, call button in reach, and RN Lexy notified    GOALS:   Multidisciplinary Problems       Occupational Therapy Goals          Problem: Occupational Therapy    Goal Priority Disciplines Outcome Interventions   Occupational Therapy Goal     OT, PT/OT Ongoing, Not Progressing    Description: ST. Pt will follow simple one step command  2.Pt will perform grooming with mod a  3. Pt will sit EOB x 10 min with mod a  4. Pt will andre gown with mod a  5. Pt will t/f bed/chair with mod a x 2  6. Pt will tolerate 15 min of tx      LTG: Restore to Max I with self care and mobility.                         Time Tracking:     OT Date of Treatment: 22  OT Start Time: 1028  OT Stop Time: 1100  OT Total Time (min): 32 min    Billable Minutes:Therapeutic Activity 17 min    OT/ABIGAIL: OT          2022

## 2022-09-11 NOTE — NURSING
Contacted ROSALINDA Shaw, via secure chat,  to question if CXR was going to be ordered after she downsized pt trache to #[id number]. She responded that she would order it. Also notified ROSALINDA Dalton that pt was having some bleeding from trache site and that he had received one unit PRBC on Friday and CBC/BMP were currently ordered for tomorrow. Krystle responded that she may transfuse pt on tomorrow.

## 2022-09-11 NOTE — SUBJECTIVE & OBJECTIVE
Interval History: The patient is resting.  No acute changes.    Review of patient's allergies indicates:  Not on File  Current Facility-Administered Medications   Medication Frequency    0.9%  NaCl infusion (for blood administration) Q24H PRN    0.9%  NaCl infusion PRN    acetaminophen tablet 650 mg Q6H PRN    albuterol-ipratropium 2.5 mg-0.5 mg/3 mL nebulizer solution 3 mL Q12H    apixaban tablet 2.5 mg BID    aspirin chewable tablet 81 mg Daily    atorvastatin tablet 40 mg QHS    carvediloL tablet 25 mg BID    collagenase ointment Daily PRN    dextromethorphan-guaiFENesin  mg/5 ml liquid 10 mL Q6H PRN    dextrose 50% injection 12.5 g PRN    doxepin capsule 25 mg QHS    ferrous sulfate tablet 1 each Daily    glucagon (human recombinant) injection 1 mg PRN    heparin (porcine) injection 4,000 Units PRN    insulin aspart U-100 injection 1-10 Units PRN    miconazole NITRATE 2 % top powder BID    ondansetron injection 8 mg Q6H PRN    pantoprazole suspension 40 mg Daily    polyethylene glycol packet 17 g Daily PRN    senna-docusate 8.6-50 mg per tablet 1 tablet Daily PRN    venlafaxine tablet 37.5 mg BID       Objective:     Vital Signs (Most Recent):  Temp: 98.2 °F (36.8 °C) (09/11/22 0701)  Pulse: 92 (09/11/22 0900)  Resp: 19 (09/11/22 0900)  BP: (!) 99/47 (09/11/22 0900)  SpO2: 96 % (09/11/22 0900)  O2 Device (Oxygen Therapy): ventilator (09/10/22 1940)   Vital Signs (24h Range):  Temp:  [96.8 °F (36 °C)-98.2 °F (36.8 °C)] 98.2 °F (36.8 °C)  Pulse:  [66-92] 92  Resp:  [9-24] 19  SpO2:  [89 %-100 %] 96 %  BP: ()/(39-57) 99/47     Weight: 96.7 kg (213 lb 3 oz) (09/09/22 0500)  Body mass index is 27.37 kg/m².  Body surface area is 2.25 meters squared.    I/O last 3 completed shifts:  In: 781 [NG/GT:781]  Out: 1 [Stool:1]    Physical Exam  Vitals reviewed.   HENT:      Head: Normocephalic and atraumatic.      Mouth/Throat:      Mouth: Mucous membranes are moist.   Cardiovascular:      Rate and Rhythm: Rhythm  irregular.   Pulmonary:      Effort: Pulmonary effort is normal.   Abdominal:      General: Abdomen is flat.   Musculoskeletal:         General: Swelling present.      Right lower leg: Edema present.      Left lower leg: Edema present.   Neurological:      Mental Status: He is alert.       Significant Labs:  BMP:   Recent Labs   Lab 09/09/22  0305      *   K 4.2      CO2 25   BUN 68*   CREATININE 3.09*   CALCIUM 8.0*     CBC:   Recent Labs   Lab 09/09/22  0305   WBC 12.76*   RBC 2.14*   HGB 6.4*   HCT 21.7*      .4*   MCH 29.9   MCHC 29.5*        Significant Imaging:  Labs: Reviewed

## 2022-09-11 NOTE — SUBJECTIVE & OBJECTIVE
Interval History: on cpap over 24 hours. Trach downsized at bedside.     Objective:     Vital Signs (Most Recent):  Temp: 98.2 °F (36.8 °C) (09/11/22 0701)  Pulse: 75 (09/11/22 1000)  Resp: 19 (09/11/22 1000)  BP: (!) 93/47 (09/11/22 1000)  SpO2: 100 % (09/11/22 1200)   Vital Signs (24h Range):  Temp:  [96.8 °F (36 °C)-98.2 °F (36.8 °C)] 98.2 °F (36.8 °C)  Pulse:  [66-92] 75  Resp:  [9-24] 19  SpO2:  [89 %-100 %] 100 %  BP: ()/(39-57) 93/47     Weight: 96.7 kg (213 lb 3 oz)  Body mass index is 27.37 kg/m².      Intake/Output Summary (Last 24 hours) at 9/11/2022 1314  Last data filed at 9/10/2022 1833  Gross per 24 hour   Intake 781 ml   Output 1 ml   Net 780 ml       Physical Exam  Vitals reviewed.   Constitutional:       General: He is not in acute distress.     Appearance: He is ill-appearing.   HENT:      Head: Normocephalic and atraumatic.      Right Ear: External ear normal.      Left Ear: External ear normal.      Nose: Nose normal.      Mouth/Throat:      Mouth: Mucous membranes are moist.   Eyes:      Extraocular Movements: Extraocular movements intact.      Conjunctiva/sclera: Conjunctivae normal.   Neck:      Comments: Trach in place   Cardiovascular:      Rate and Rhythm: Normal rate. Rhythm irregular.      Pulses: Normal pulses.   Pulmonary:      Breath sounds: No wheezing, rhonchi or rales.   Abdominal:      General: Bowel sounds are normal.      Palpations: Abdomen is soft.   Musculoskeletal:         General: Swelling present.      Right lower leg: Edema present.      Left lower leg: Edema present.      Comments: Edema to upper and lower ext. Worse in left arm than right -Hx radial fx; family states cast was removed due to edema    Skin:     General: Skin is warm and dry.   Neurological:      Mental Status: He is alert.       Vents:  Vent Mode: A/C (09/11/22 1200)  Ventilator Initiated: Yes (09/02/22 0047)  Set Rate: 12 BPM (09/11/22 1200)  Vt Set: 550 mL (09/11/22 1200)  Pressure Support: 12  cmH20 (09/11/22 0756)  PEEP/CPAP: 5 cmH20 (09/11/22 1200)  Oxygen Concentration (%): 40 (09/11/22 0815)  Peak Airway Pressure: 18 cmH2O (09/11/22 0756)  Total Ve: 20 mL (09/11/22 0756)  F/VT Ratio<105 (RSBI): (!) 33.9 (09/11/22 0500)    Lines/Drains/Airways       Central Venous Catheter Line  Duration                  Hemodialysis Catheter right subclavian -- days              Drain  Duration                  Gastrostomy/Enterostomy 08/23/22 1030 LUQ 19 days              Airway  Duration                  Surgical Airway Shiley Cuffed -- days              Peripheral Intravenous Line  Duration                  Peripheral IV - Single Lumen 08/23/22 1422 20 G Anterior;Right Forearm 18 days                    Significant Labs:    CBC/Anemia Profile:  No results for input(s): WBC, HGB, HCT, PLT, MCV, RDW, IRON, FERRITIN, RETIC, FOLATE, [id number], OCCULTBLOOD in the last 48 hours.     Chemistries:  No results for input(s): NA, K, CL, CO2, BUN, CREATININE, CALCIUM, ALBUMIN, PROT, BILITOT, ALKPHOS, ALT, AST, GLUCOSE, MG, PHOS in the last 48 hours.    All pertinent labs within the past 24 hours have been reviewed.    Significant Imaging:  I have reviewed all pertinent imaging results/findings within the past 24 hours.

## 2022-09-11 NOTE — ASSESSMENT & PLAN NOTE
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 Will increase tomorrow  8/31- His strength is slowly improving which will also improve weaning. Will start to increase weaning again today  9/1- Oxygenating adequately on 40%. Plan for 12 hours continuous of cpap during the day and can continue to rest overnight on assist control  9/2- continue current tx   9/4- increase CPAP to 16hrs and rest on vent   9/6- Doing well with 16 hours of cpap and then resting for 8. Will continue with this for now. Next plan will be to increase to continuous  9/7- continue with 16 hours today. Discussed with respiratory.Will continue with 16 hours for the next couple of days and then increase  9/8 plan to increase to continuous as tolerated  9/9- did well with continuous cpap which was started yesterday. Plan to continue for now.   9-10 - CPAP as toelrated   9/11-  Trach downsized to 6 XLT --

## 2022-09-11 NOTE — NURSING
Notified ROSALINDA Shaw, via secure chat, of pt being unable to maintain O2 above 90% consistently for the past hour despite being on AC per vent via trache with settings of 12/550/5/100%. Also notified her of CXR results being available for review. ROSALINDA Dalton responds that pt has aspirated and she will put orders in.

## 2022-09-12 NOTE — PT/OT/SLP PROGRESS
Occupational Therapy      Patient Name:  Gregory Stuart   MRN:  [id number]    Patient not seen today secondary to Unarousable. Will follow-up on next treatment day.    9/12/2022

## 2022-09-12 NOTE — PT/OT/SLP PROGRESS
Physical Therapy      Patient Name:  Gregory Stuart   MRN:  [id number]    Patient not seen today secondary to Therapist assessment. pt with right eye opened, however, not focusing nor responding to heavy verbal and tactile stimuli. Will follow-up next treatment date.

## 2022-09-12 NOTE — PLAN OF CARE
Problem: Adult Inpatient Plan of Care  Goal: Plan of Care Review  Outcome: Ongoing, Progressing  Goal: Patient-Specific Goal (Individualized)  Outcome: Ongoing, Progressing  Goal: Absence of Hospital-Acquired Illness or Injury  Outcome: Ongoing, Progressing  Goal: Optimal Comfort and Wellbeing  Outcome: Ongoing, Progressing  Goal: Readiness for Transition of Care  Outcome: Ongoing, Progressing     Problem: Fall Injury Risk  Goal: Absence of Fall and Fall-Related Injury  Outcome: Ongoing, Progressing     Problem: Skin Injury Risk Increased  Goal: Skin Health and Integrity  Outcome: Ongoing, Progressing     Problem: Infection  Goal: Absence of Infection Signs and Symptoms  Outcome: Ongoing, Progressing     Problem: Diabetes Comorbidity  Goal: Blood Glucose Level Within Targeted Range  Outcome: Ongoing, Progressing     Problem: Fluid and Electrolyte Imbalance (Acute Kidney Injury/Impairment)  Goal: Fluid and Electrolyte Balance  Outcome: Ongoing, Progressing     Problem: Oral Intake Inadequate (Acute Kidney Injury/Impairment)  Goal: Optimal Nutrition Intake  Outcome: Ongoing, Progressing     Problem: Renal Function Impairment (Acute Kidney Injury/Impairment)  Goal: Effective Renal Function  Outcome: Ongoing, Progressing     Problem: Impaired Wound Healing  Goal: Optimal Wound Healing  Outcome: Ongoing, Progressing     Problem: Communication Impairment (Mechanical Ventilation, Invasive)  Goal: Effective Communication  Outcome: Ongoing, Progressing     Problem: Device-Related Complication Risk (Mechanical Ventilation, Invasive)  Goal: Optimal Device Function  Outcome: Ongoing, Progressing     Problem: Inability to Wean (Mechanical Ventilation, Invasive)  Goal: Mechanical Ventilation Liberation  Outcome: Ongoing, Progressing     Problem: Nutrition Impairment (Mechanical Ventilation, Invasive)  Goal: Optimal Nutrition Delivery  Outcome: Ongoing, Progressing     Problem: Skin and Tissue Injury (Mechanical Ventilation,  Invasive)  Goal: Absence of Device-Related Skin and Tissue Injury  Outcome: Ongoing, Progressing     Problem: Ventilator-Induced Lung Injury (Mechanical Ventilation, Invasive)  Goal: Absence of Ventilator-Induced Lung Injury  Outcome: Ongoing, Progressing     Problem: Communication Impairment (Artificial Airway)  Goal: Effective Communication  Outcome: Ongoing, Progressing     Problem: Device-Related Complication Risk (Artificial Airway)  Goal: Optimal Device Function  Outcome: Ongoing, Progressing     Problem: Skin and Tissue Injury (Artificial Airway)  Goal: Absence of Device-Related Skin or Tissue Injury  Outcome: Ongoing, Progressing     Problem: Noninvasive Ventilation Acute  Goal: Effective Unassisted Ventilation and Oxygenation  Outcome: Ongoing, Progressing     Problem: Device-Related Complication Risk (Hemodialysis)  Goal: Safe, Effective Therapy Delivery  Outcome: Ongoing, Progressing     Problem: Hemodynamic Instability (Hemodialysis)  Goal: Effective Tissue Perfusion  Outcome: Ongoing, Progressing     Problem: Infection (Hemodialysis)  Goal: Absence of Infection Signs and Symptoms  Outcome: Ongoing, Progressing     Problem: Airway Clearance Ineffective  Goal: Effective Airway Clearance  Outcome: Ongoing, Progressing     Problem: Breathing Pattern Ineffective  Goal: Effective Breathing Pattern  Outcome: Ongoing, Progressing     Problem: Gas Exchange Impaired  Goal: Optimal Gas Exchange  Outcome: Ongoing, Progressing

## 2022-09-12 NOTE — NURSING
Dr. Sanchez notified that pts feeding is leaking around peg side. Verbal order to hold feedings at this time per Dr. Sanchez. Feedings held.

## 2022-09-12 NOTE — ASSESSMENT & PLAN NOTE
Failed extubated post CABG ()  Now with trach and peg     - increase to PSV 12 hours -   - Will plan to change trach out in the next day or two--continue to increase weaning as tolerated  - continue with current weaning plan for now. Will increase tomorrow  - His strength is slowly improving which will also improve weaning. Will start to increase weaning again today  - Oxygenating adequately on 40%. Plan for 12 hours continuous of cpap during the day and can continue to rest overnight on assist control  - continue current tx   - increase CPAP to 16hrs and rest on vent   - Doing well with 16 hours of cpap and then resting for 8. Will continue with this for now. Next plan will be to increase to continuous  - continue with 16 hours today. Discussed with respiratory.Will continue with 16 hours for the next couple of days and then increase   plan to increase to continuous as tolerated  - did well with continuous cpap which was started yesterday. Plan to continue for now.   9-10 - CPAP as toelrated   -  Trach downsized to 6 XLT --  - Had an episode of emesis and is thought to have aspirated. Chest xray reviewed with worsening bilateral infiltrates. I have  FiO2 to 60% and peep is at 10.  Might need to bronch the patient (therapeutic)

## 2022-09-12 NOTE — PROGRESS NOTES
[facility]  Nephrology  Progress Note    Patient Name: Gregory Stuart  MRN: [id number]  Admission Date: 8/23/2022  Hospital Length of Stay: 20 days  Attending Provider: Ham Sanchez DO   Primary Care Physician: Primary Doctor No  Principal Problem:Acute hypoxemic respiratory failure    Subjective:     HPI: This gentleman with history of CAD, CVA and renal failure.  The patient transferred from the [facility] for ventilator management and debility.  He has a history of CKD which progressed to ESRD after a CABG.  The patient has been on dialysis for about 3 weeks.  He continues to have diffuse edema.  His last dialysis session was on yesterday.  His family is at the bedside to answer questions.  Nephrology has been consulted for renal issues.      Interval History: The patient is doing    Review of patient's allergies indicates:  Not on File  Current Facility-Administered Medications   Medication Frequency    0.9%  NaCl infusion (for blood administration) Q24H PRN    0.9%  NaCl infusion PRN    acetaminophen tablet 650 mg Q6H PRN    albuterol-ipratropium 2.5 mg-0.5 mg/3 mL nebulizer solution 3 mL Q12H    apixaban tablet 2.5 mg BID    aspirin chewable tablet 81 mg Daily    atorvastatin tablet 40 mg QHS    carvediloL tablet 25 mg BID    clindamycin in D5W 600 mg/50 mL IVPB 600 mg Q8H    collagenase ointment Daily PRN    dextromethorphan-guaiFENesin  mg/5 ml liquid 10 mL Q6H PRN    dextrose 50% injection 12.5 g PRN    doxepin capsule 25 mg QHS    ferrous sulfate tablet 1 each Daily    glucagon (human recombinant) injection 1 mg PRN    heparin (porcine) injection 4,000 Units PRN    insulin aspart U-100 injection 1-10 Units PRN    miconazole NITRATE 2 % top powder BID    ondansetron injection 8 mg Q6H PRN    pantoprazole suspension 40 mg Daily    polyethylene glycol packet 17 g Daily PRN    senna-docusate 8.6-50 mg per tablet 1 tablet Daily PRN     venlafaxine tablet 37.5 mg BID       Objective:     Vital Signs (Most Recent):  Temp: 98.8 °F (37.1 °C) (09/12/22 1130)  Pulse: 81 (09/12/22 1315)  Resp: 14 (09/12/22 1315)  BP: (!) 93/41 (09/12/22 1315)  SpO2: 99 % (09/12/22 1315)  O2 Device (Oxygen Therapy): ventilator (09/12/22 0930)   Vital Signs (24h Range):  Temp:  [97.1 °F (36.2 °C)-98.8 °F (37.1 °C)] 98.8 °F (37.1 °C)  Pulse:  [66-91] 81  Resp:  [9-29] 14  SpO2:  [85 %-100 %] 99 %  BP: ()/(10-97) 93/41     Weight: 98.5 kg (217 lb 2.5 oz) (09/12/22 0600)  Body mass index is 27.88 kg/m².  Body surface area is 2.27 meters squared.    I/O last 3 completed shifts:  In: 831 [NG/GT:781; IV Piggyback:50]  Out: -     Physical Exam  Vitals reviewed.   HENT:      Head: Normocephalic and atraumatic.      Mouth/Throat:      Mouth: Mucous membranes are moist.   Eyes:      Pupils: Pupils are equal, round, and reactive to light.   Cardiovascular:      Rate and Rhythm: Rhythm irregular.   Pulmonary:      Effort: Pulmonary effort is normal.   Abdominal:      General: Abdomen is flat.   Neurological:      Mental Status: He is alert.   Psychiatric:         Mood and Affect: Mood normal.       Significant Labs:  BMP:   Recent Labs   Lab 09/12/22  0304   GLU 54*   *   K 4.4   CL 96*   CO2 25   BUN 81*   CREATININE 3.45*   CALCIUM 8.2*     CBC:   Recent Labs   Lab 09/12/22  0304   WBC 14.93*   RBC 2.29*   HGB 6.9*   HCT 21.5*      MCV 93.9   MCH 30.1   MCHC 32.1        Significant Imaging:  Labs: Reviewed    Assessment/Plan:     DM (diabetes mellitus)  Underlying condition    JUVENTINO (acute kidney injury)  He was dialyzed Friday 8/29/2022Continue with scheduled hemodialysis.  9/6/2022  Continue with dialysis.  9/7/2022 Dialysis as scheduled.  9/9/2022  Continue with dialysis.  9/10/2022  Dialysis as scheduled.  Volume status is improving.  9/11/2022  Continue with current therapy.  9/12/2022 Dialysis    A-fib  Chronic condition        Thank you for your consult. I  will follow-up with patient. Please contact us if you have any additional questions.    Andres Lino Jr, MD  Nephrology  [facility]

## 2022-09-12 NOTE — SUBJECTIVE & OBJECTIVE
Interval History: The patient is doing    Review of patient's allergies indicates:  Not on File  Current Facility-Administered Medications   Medication Frequency    0.9%  NaCl infusion (for blood administration) Q24H PRN    0.9%  NaCl infusion PRN    acetaminophen tablet 650 mg Q6H PRN    albuterol-ipratropium 2.5 mg-0.5 mg/3 mL nebulizer solution 3 mL Q12H    apixaban tablet 2.5 mg BID    aspirin chewable tablet 81 mg Daily    atorvastatin tablet 40 mg QHS    carvediloL tablet 25 mg BID    clindamycin in D5W 600 mg/50 mL IVPB 600 mg Q8H    collagenase ointment Daily PRN    dextromethorphan-guaiFENesin  mg/5 ml liquid 10 mL Q6H PRN    dextrose 50% injection 12.5 g PRN    doxepin capsule 25 mg QHS    ferrous sulfate tablet 1 each Daily    glucagon (human recombinant) injection 1 mg PRN    heparin (porcine) injection 4,000 Units PRN    insulin aspart U-100 injection 1-10 Units PRN    miconazole NITRATE 2 % top powder BID    ondansetron injection 8 mg Q6H PRN    pantoprazole suspension 40 mg Daily    polyethylene glycol packet 17 g Daily PRN    senna-docusate 8.6-50 mg per tablet 1 tablet Daily PRN    venlafaxine tablet 37.5 mg BID       Objective:     Vital Signs (Most Recent):  Temp: 98.8 °F (37.1 °C) (09/12/22 1130)  Pulse: 81 (09/12/22 1315)  Resp: 14 (09/12/22 1315)  BP: (!) 93/41 (09/12/22 1315)  SpO2: 99 % (09/12/22 1315)  O2 Device (Oxygen Therapy): ventilator (09/12/22 0930)   Vital Signs (24h Range):  Temp:  [97.1 °F (36.2 °C)-98.8 °F (37.1 °C)] 98.8 °F (37.1 °C)  Pulse:  [66-91] 81  Resp:  [9-29] 14  SpO2:  [85 %-100 %] 99 %  BP: ()/(10-97) 93/41     Weight: 98.5 kg (217 lb 2.5 oz) (09/12/22 0600)  Body mass index is 27.88 kg/m².  Body surface area is 2.27 meters squared.    I/O last 3 completed shifts:  In: 831 [NG/GT:781; IV Piggyback:50]  Out: -     Physical Exam  Vitals reviewed.   HENT:      Head: Normocephalic and atraumatic.      Mouth/Throat:      Mouth: Mucous membranes are moist.    Eyes:      Pupils: Pupils are equal, round, and reactive to light.   Cardiovascular:      Rate and Rhythm: Rhythm irregular.   Pulmonary:      Effort: Pulmonary effort is normal.   Abdominal:      General: Abdomen is flat.   Neurological:      Mental Status: He is alert.   Psychiatric:         Mood and Affect: Mood normal.       Significant Labs:  BMP:   Recent Labs   Lab 09/12/22 0304   GLU 54*   *   K 4.4   CL 96*   CO2 25   BUN 81*   CREATININE 3.45*   CALCIUM 8.2*     CBC:   Recent Labs   Lab 09/12/22 0304   WBC 14.93*   RBC 2.29*   HGB 6.9*   HCT 21.5*      MCV 93.9   MCH 30.1   MCHC 32.1        Significant Imaging:  Labs: Reviewed

## 2022-09-12 NOTE — PROGRESS NOTES
[facility]  Pulmonology  Progress Note    Patient Name: Gregory Stuart  MRN: [id number]  Admission Date: 8/23/2022  Hospital Length of Stay: 20 days  Code Status: No Order  Attending Provider: Ham Sanchez DO  Primary Care Provider: Primary Doctor No   Principal Problem: Acute hypoxemic respiratory failure    Subjective:     Interval History: No acute events overnight. The patient is currently resting comfortably. He likely aspirated over the weekend. Increased FiO2 requirements. Currently afebrile and vital signs are stable.    Objective:     Vital Signs (Most Recent):  Temp: 98.5 °F (36.9 °C) (09/12/22 0400)  Pulse: 68 (09/12/22 0727)  Resp: 18 (09/12/22 0727)  BP: (!) 90/43 (09/12/22 0937)  SpO2: 100 % (09/12/22 0727)   Vital Signs (24h Range):  Temp:  [96 °F (35.6 °C)-98.7 °F (37.1 °C)] 98.5 °F (36.9 °C)  Pulse:  [66-94] 68  Resp:  [8-29] 18  SpO2:  [85 %-100 %] 100 %  BP: ()/(10-97) 90/43     Weight: 98.5 kg (217 lb 2.5 oz)  Body mass index is 27.88 kg/m².      Intake/Output Summary (Last 24 hours) at 9/12/2022 1038  Last data filed at 9/11/2022 1832  Gross per 24 hour   Intake 831 ml   Output --   Net 831 ml         Physical Exam  Vitals reviewed.   Constitutional:       General: He is not in acute distress.     Appearance: He is ill-appearing.   HENT:      Head: Normocephalic and atraumatic.      Right Ear: External ear normal.      Left Ear: External ear normal.      Mouth/Throat:      Mouth: Mucous membranes are moist.   Eyes:      Extraocular Movements: Extraocular movements intact.      Conjunctiva/sclera: Conjunctivae normal.   Neck:      Comments: Trach in place   Cardiovascular:      Rate and Rhythm: Normal rate. Rhythm irregular.      Pulses: Normal pulses.   Pulmonary:      Breath sounds: No wheezing, rhonchi or rales.   Abdominal:      General: Bowel sounds are normal.      Palpations: Abdomen is soft.   Musculoskeletal:         General: Swelling present.       Right lower leg: Edema present.      Left lower leg: Edema present.      Comments: Edema to upper and lower ext. Worse in left arm than right -Hx radial fx; family states cast was removed due to edema    Skin:     General: Skin is warm and dry.   Neurological:      Mental Status: He is alert.       Vents:  Vent Mode: A/C (09/12/22 0350)  Ventilator Initiated: Yes (09/02/22 0047)  Set Rate: 12 BPM (09/12/22 0350)  Vt Set: 550 mL (09/12/22 0350)  Pressure Support: 12 cmH20 (09/12/22 0350)  PEEP/CPAP: 10 cmH20 (09/12/22 0350)  Oxygen Concentration (%): 100 (09/12/22 0020)  Peak Airway Pressure: 36 cmH2O (09/12/22 0350)  Total Ve: 8.2 mL (09/12/22 0350)  F/VT Ratio<105 (RSBI): (!) 47.03 (09/11/22 1200)    Lines/Drains/Airways       Central Venous Catheter Line  Duration                  Hemodialysis Catheter right subclavian -- days              Drain  Duration                  Gastrostomy/Enterostomy 08/23/22 1030 LUQ 20 days              Airway  Duration                  Surgical Airway Shiley Cuffed -- days              Peripheral Intravenous Line  Duration                  Peripheral IV - Single Lumen 08/23/22 1422 20 G Anterior;Right Forearm 19 days                    Significant Labs:    CBC/Anemia Profile:  Recent Labs   Lab 09/12/22  0304   WBC 14.93*   HGB 6.9*   HCT 21.5*      MCV 93.9   RDW 14.6*          Chemistries:  Recent Labs   Lab 09/12/22  0304   *   K 4.4   CL 96*   CO2 25   BUN 81*   CREATININE 3.45*   CALCIUM 8.2*         All pertinent labs within the past 24 hours have been reviewed.    Significant Imaging:  I have reviewed all pertinent imaging results/findings within the past 24 hours.    Assessment/Plan:     * Acute hypoxemic respiratory failure  Failed extubated post CABG (7/18)  Now with trach and peg     8/28- increase to PSV 12 hours -   8/29- Will plan to change trach out in the next day or two--continue to increase weaning as tolerated  8/30- continue with current weaning  plan for now. Will increase tomorrow  - His strength is slowly improving which will also improve weaning. Will start to increase weaning again today  - Oxygenating adequately on 40%. Plan for 12 hours continuous of cpap during the day and can continue to rest overnight on assist control  - continue current tx   - increase CPAP to 16hrs and rest on vent   - Doing well with 16 hours of cpap and then resting for 8. Will continue with this for now. Next plan will be to increase to continuous  - continue with 16 hours today. Discussed with respiratory.Will continue with 16 hours for the next couple of days and then increase   plan to increase to continuous as tolerated  - did well with continuous cpap which was started yesterday. Plan to continue for now.   9-10 - CPAP as toelrated   -  Trach downsized to 6 XLT --  - Had an episode of emesis and is thought to have aspirated. Chest xray reviewed with worsening bilateral infiltrates. I have  FiO2 to 60% and peep is at 10.  Might need to bronch the patient (therapeutic)    HTN (hypertension)  Resumed home medications   BP low normal this am. Held carvedilol as HD is scheduled for this am    Weakness acquired in ICU  Extreme generalized weakness - continue PT/OT as tolerated  He is showing some improvement      DM (diabetes mellitus)  accuchecks and Sliding scale insulin   A1C 5.3%  Currently on sliding scale  Glucose<180  Continue with current care  accuchecks 54-76. Monitor closely. Might need to make adjustment with tube feeds    Anemia  Due to critical illness   - Hgb is 6.4. Can transfuse 1 unit with next HD  - VSS -- repeat CBC in AM   6.9/21.5 this am    JUVENTINO (acute kidney injury)  Cr 0.2022   - was on CCRT post CABG -- transitioned to HD M/W/F  Plan to continue MWF dialysis  Nephrology note reviewed and appreciated    CAD (coronary artery disease)  S/p CABG on   Currently no acute issues  He is on aspirin,  statin, beta blocker      Chronic systolic heart failure  EF 30-35% per Echo 07/2022  Daily wts/ I/Os   Appears compensated    A-fib  No history documented prior to CABG - was on Lovenox at OSH   - he is on carvediolol, eliquis  - HR is controlled                  Ham Sanchez, DO  Pulmonology  [facility]

## 2022-09-12 NOTE — ASSESSMENT & PLAN NOTE
accuchecks and Sliding scale insulin   A1C 5.3%  Currently on sliding scale  Glucose<180  Continue with current care  accuchecks 54-76. Monitor closely. Might need to make adjustment with tube feeds

## 2022-09-12 NOTE — ASSESSMENT & PLAN NOTE
Due to critical illness   9/9- Hgb is 6.4. Can transfuse 1 unit with next HD  9/11- VSS -- repeat CBC in AM   6.9/21.5 this am

## 2022-09-12 NOTE — NURSING
ADRI Shaw-ACNP at bedside and downsized pt trache to #[id number]. Pt vomited during procedure. Will monitor.

## 2022-09-12 NOTE — SUBJECTIVE & OBJECTIVE
Interval History: No acute events overnight. The patient is currently resting comfortably. He likely aspirated over the weekend. Increased FiO2 requirements. Currently afebrile and vital signs are stable.    Objective:     Vital Signs (Most Recent):  Temp: 98.5 °F (36.9 °C) (09/12/22 0400)  Pulse: 68 (09/12/22 0727)  Resp: 18 (09/12/22 0727)  BP: (!) 90/43 (09/12/22 0937)  SpO2: 100 % (09/12/22 0727)   Vital Signs (24h Range):  Temp:  [96 °F (35.6 °C)-98.7 °F (37.1 °C)] 98.5 °F (36.9 °C)  Pulse:  [66-94] 68  Resp:  [8-29] 18  SpO2:  [85 %-100 %] 100 %  BP: ()/(10-97) 90/43     Weight: 98.5 kg (217 lb 2.5 oz)  Body mass index is 27.88 kg/m².      Intake/Output Summary (Last 24 hours) at 9/12/2022 1038  Last data filed at 9/11/2022 1832  Gross per 24 hour   Intake 831 ml   Output --   Net 831 ml         Physical Exam  Vitals reviewed.   Constitutional:       General: He is not in acute distress.     Appearance: He is ill-appearing.   HENT:      Head: Normocephalic and atraumatic.      Right Ear: External ear normal.      Left Ear: External ear normal.      Mouth/Throat:      Mouth: Mucous membranes are moist.   Eyes:      Extraocular Movements: Extraocular movements intact.      Conjunctiva/sclera: Conjunctivae normal.   Neck:      Comments: Trach in place   Cardiovascular:      Rate and Rhythm: Normal rate. Rhythm irregular.      Pulses: Normal pulses.   Pulmonary:      Breath sounds: No wheezing, rhonchi or rales.   Abdominal:      General: Bowel sounds are normal.      Palpations: Abdomen is soft.   Musculoskeletal:         General: Swelling present.      Right lower leg: Edema present.      Left lower leg: Edema present.      Comments: Edema to upper and lower ext. Worse in left arm than right -Hx radial fx; family states cast was removed due to edema    Skin:     General: Skin is warm and dry.   Neurological:      Mental Status: He is alert.       Vents:  Vent Mode: A/C (09/12/22 0350)  Ventilator  Initiated: Yes (09/02/22 0047)  Set Rate: 12 BPM (09/12/22 0350)  Vt Set: 550 mL (09/12/22 0350)  Pressure Support: 12 cmH20 (09/12/22 0350)  PEEP/CPAP: 10 cmH20 (09/12/22 0350)  Oxygen Concentration (%): 100 (09/12/22 0020)  Peak Airway Pressure: 36 cmH2O (09/12/22 0350)  Total Ve: 8.2 mL (09/12/22 0350)  F/VT Ratio<105 (RSBI): (!) 47.03 (09/11/22 1200)    Lines/Drains/Airways       Central Venous Catheter Line  Duration                  Hemodialysis Catheter right subclavian -- days              Drain  Duration                  Gastrostomy/Enterostomy 08/23/22 1030 LUQ 20 days              Airway  Duration                  Surgical Airway Shiley Cuffed -- days              Peripheral Intravenous Line  Duration                  Peripheral IV - Single Lumen 08/23/22 1422 20 G Anterior;Right Forearm 19 days                    Significant Labs:    CBC/Anemia Profile:  Recent Labs   Lab 09/12/22  0304   WBC 14.93*   HGB 6.9*   HCT 21.5*      MCV 93.9   RDW 14.6*          Chemistries:  Recent Labs   Lab 09/12/22  0304   *   K 4.4   CL 96*   CO2 25   BUN 81*   CREATININE 3.45*   CALCIUM 8.2*         All pertinent labs within the past 24 hours have been reviewed.    Significant Imaging:  I have reviewed all pertinent imaging results/findings within the past 24 hours.

## 2022-09-12 NOTE — PROGRESS NOTES
"[facility]  Adult Nutrition  Progress Note    SUMMARY       Recommendations    Recommendation/Intervention: Contnue with current tube feeding order of Nepro @55ml/h with 25ml free water flush every hour.Continue wtih Sarthak to aide in wound healing.  Goals: weight maintenance, wound healing, TF tolerance  Nutrition Goal Status: new  Communication of RD Recs: reviewed with physician (recommendations sent to MD via secure chat)     Patients weight is 217# on 9/12/22. His weight fluctuates due to dialysis. Meds/labs reviewed. Current TF order with Sarthak can meet estimated nutritional needs. Continue with poc. RD following weights,labs and tube feeding tolerance.      Reason for Assessment    Reason For Assessment: RD follow-up  Diagnosis: cardiac disease, pulmonary disease, renal disease, diabetes diagnosis/complications  Relevant Medical History: PEG, trach, dialysis    Nutrition Risk Screen    Nutrition Risk Screen: tube feeding or parenteral nutrition    Nutrition/Diet History    Spiritual, Cultural Beliefs, Congregational Practices, Values that Affect Care: no  Food Allergies: NKFA  Factors Affecting Nutritional Intake: None identified at this time    Anthropometrics    Temp: 98.5 °F (36.9 °C)  Height: 6' 2" (188 cm)  Height (inches): 74 in  Weight Method: Bed Scale  Weight: 98.5 kg (217 lb 2.5 oz)  Weight (lb): 217.16 lb  Ideal Body Weight (IBW), Male: 190 lb  % Ideal Body Weight, Male (lb): 108.72 %  BMI (Calculated): 27.9  BMI Grade: 25 - 29.9 - overweight       Lab/Procedures/Meds   Latest Reference Range & Units 09/12/22 03:04   Sodium 136 - 145 mmol/L 130 (L)   Potassium 3.5 - 5.1 mmol/L 4.4   Chloride 98 - 107 mmol/L 96 (L)   CO2 21 - 32 mmol/L 25   Anion Gap 7 - 16 mmol/L 13   BUN 7 - 18 mg/dL 81 (H)   Creatinine 0.70 - 1.30 mg/dL 3.45 (H)   BUN/CREAT RATIO 6 - 20  23 (H)   eGFR >=60 mL/min/1.73m² 17 (L)   Glucose 74 - 106 mg/dL 54 (L)   (L): Data is abnormally low  (H): Data is " abnormally high      Estimated/Assessed Needs    Weight Used For Calorie Calculations: 93.7 kg (206 lb 9.1 oz)  Energy Calorie Requirements (kcal): 6678-1431 (25-30cals/kg)  Energy Need Method: Kcal/kg  Protein Requirements: 112-140g/d  Weight Used For Protein Calculations: 93.7 kg (206 lb 9.1 oz)     Estimated Fluid Requirement Method: RDA Method  RDA Method (mL): 2342         Nutrition Prescription Ordered    Current Diet Order: Nepro@46ml/h with 25ml free water flush every hour  Current Nutrition Support Formula Ordered: Nepro  Current Nutrition Support Rate Ordered: 46 (ml)  Current Nutrition Support Frequency Ordered: jourly  Oral Nutrition Supplement: sarthak    Evaluation of Received Nutrient/Fluid Intake    Enteral Calories (kcal): 1987  Enteral Protein (gm): 89  Enteral (Free Water) Fluid (mL): 803  Free Water Flush Fluid (mL): 600  % Kcal Needs: 100  % Protein Needs: 100  Total Fluid Intake (mL): 1403  Protein Required:  (94g pro with Sarthak)  % Intake of Estimated Energy Needs: 75 - 100 %  % Meal Intake: NPO    Nutrition Risk    Level of Risk/Frequency of Follow-up: moderate - high     Monitor and Evaluation    Food and Nutrient Intake: enteral nutrition intake  Food and Nutrient Adminstration: enteral and parenteral nutrition administration  Anthropometric Measurements: weight, weight change, body mass index  Biochemical Data, Medical Tests and Procedures: glucose/endocrine profile, gastrointestinal profile, lipid profile, inflammatory profile, electrolyte and renal panel     Nutrition Follow-Up    RD Follow-up?: Yes

## 2022-09-12 NOTE — ASSESSMENT & PLAN NOTE
He was dialyzed Friday 8/29/2022Continue with scheduled hemodialysis.  9/6/2022  Continue with dialysis.  9/7/2022 Dialysis as scheduled.  9/9/2022  Continue with dialysis.  9/10/2022  Dialysis as scheduled.  Volume status is improving.  9/11/2022  Continue with current therapy.  9/12/2022 Dialysis

## 2022-09-13 NOTE — PLAN OF CARE
Problem: Physical Therapy  Goal: Physical Therapy Goal  Description: Short Term Goals to be met by 9/13/2022    Patient will increase functional independence and safety with mobility by performing    1.   Rolling right Moderate Assist; roll left Moderate Assist  2.   Supine to sit Moderate Assist  3.   Sitting balance edge of the bed x 15 minutes Minimal Assist  4.   Sit to stand Maximal Assist  using manual assistance with gait belt  5.   Bed to chair Maximal Assist using manual assistance with gait belt  6.   Lower extremity exercises x 30 reps with assist as necessary and no rest breaks      Long Term Goals to be met by 10/24/2022    Patient to require less than or equal to Minimal Assist for functional mobility to ease caregiver burden   Outcome: Ongoing, Not Progressing     Pt with a decline in medical status past several days and not responding to heavy verbal and tactile stimuli. Prior to present decline, pt demo no sig progress requiring near total assist with all functional mobility with decreased ability to follow commands

## 2022-09-13 NOTE — PT/OT/SLP PROGRESS
Occupational Therapy      Patient Name:  Gregory Stuart   MRN:  [id number]    Patient not seen today secondary to Unarousable. Will follow-up on next treatment day is pt able.    9/13/2022

## 2022-09-13 NOTE — PROGRESS NOTES
[facility] [place]  Pulmonology  Progress Note    Patient Name: Gregory Stuart  MRN: [id number]  Admission Date: 8/23/2022  Hospital Length of Stay: 21 days  Code Status: No Order  Attending Provider: Ham Sanchez DO  Primary Care Provider: Primary Doctor No   Principal Problem: Acute hypoxemic respiratory failure    Subjective:     Interval History: No acute events overnight. The patient is currently resting comfortably. He likely aspirated over the weekend. Increased FiO2 requirements. Currently afebrile and vital signs are stable. FiO2 down to 50% and peep is at 8.    Objective:     Vital Signs (Most Recent):  Temp: 97.2 °F (36.2 °C) (09/13/22 0701)  Pulse: 86 (09/13/22 0900)  Resp: 15 (09/13/22 0900)  BP: (!) 85/40 (09/13/22 0900)  SpO2: 100 % (09/13/22 0900)   Vital Signs (24h Range):  Temp:  [97.1 °F (36.2 °C)-98.8 °F (37.1 °C)] 97.2 °F (36.2 °C)  Pulse:  [73-95] 86  Resp:  [13-25] 15  SpO2:  [95 %-100 %] 100 %  BP: ()/(34-62) 85/40     Weight: 93.7 kg (206 lb 9.1 oz)  Body mass index is 26.52 kg/m².      Intake/Output Summary (Last 24 hours) at 9/13/2022 1013  Last data filed at 9/13/2022 0600  Gross per 24 hour   Intake 568 ml   Output --   Net 568 ml         Physical Exam  Vitals reviewed.   Constitutional:       General: He is not in acute distress.     Appearance: He is ill-appearing.   HENT:      Head: Normocephalic and atraumatic.      Right Ear: External ear normal.      Left Ear: External ear normal.      Mouth/Throat:      Mouth: Mucous membranes are moist.   Eyes:      Extraocular Movements: Extraocular movements intact.      Conjunctiva/sclera: Conjunctivae normal.   Neck:      Comments: Trach in place   Cardiovascular:      Rate and Rhythm: Normal rate. Rhythm irregular.      Pulses: Normal pulses.   Pulmonary:      Breath sounds: No wheezing, rhonchi or rales.   Abdominal:      General: Bowel sounds are normal.      Palpations: Abdomen is soft.    Musculoskeletal:         General: Swelling present.      Right lower leg: Edema present.      Left lower leg: Edema present.      Comments: Edema to upper and lower ext. Worse in left arm than right -Hx radial fx; family states cast was removed due to edema    Skin:     General: Skin is warm and dry.   Neurological:      Mental Status: He is alert.       Vents:  Vent Mode: A/C (09/13/22 0233)  Ventilator Initiated: Yes (09/02/22 0047)  Set Rate: 12 BPM (09/13/22 0233)  Vt Set: 550 mL (09/13/22 0233)  Pressure Support: 12 cmH20 (09/12/22 0350)  PEEP/CPAP: 10 cmH20 (09/13/22 0233)  Oxygen Concentration (%): 60 (09/13/22 0233)  Peak Airway Pressure: 48 cmH2O (09/13/22 0233)  Total Ve: 9.4 mL (09/13/22 0233)  F/VT Ratio<105 (RSBI): (!) 34.62 (09/12/22 2300)    Lines/Drains/Airways       Central Venous Catheter Line  Duration                  Hemodialysis Catheter right subclavian -- days              Drain  Duration                  Gastrostomy/Enterostomy 08/23/22 1030 LUQ 20 days              Airway  Duration                  Surgical Airway Shiley Cuffed -- days              Peripheral Intravenous Line  Duration                  Peripheral IV - Single Lumen 08/23/22 1422 20 G Anterior;Right Forearm 20 days                    Significant Labs:    CBC/Anemia Profile:  Recent Labs   Lab 09/12/22  0304   WBC 14.93*   HGB 6.9*   HCT 21.5*      MCV 93.9   RDW 14.6*          Chemistries:  Recent Labs   Lab 09/12/22  0304   *   K 4.4   CL 96*   CO2 25   BUN 81*   CREATININE 3.45*   CALCIUM 8.2*         All pertinent labs within the past 24 hours have been reviewed.    Significant Imaging:  I have reviewed all pertinent imaging results/findings within the past 24 hours.    Assessment/Plan:     * Acute hypoxemic respiratory failure  Failed extubated post CABG (7/18)  Now with trach and peg     8/28- increase to PSV 12 hours -   8/29- Will plan to change trach out in the next day or two--continue to increase  weaning as tolerated  - continue with current weaning plan for now. Will increase tomorrow  - His strength is slowly improving which will also improve weaning. Will start to increase weaning again today  - Oxygenating adequately on 40%. Plan for 12 hours continuous of cpap during the day and can continue to rest overnight on assist control  - continue current tx   - increase CPAP to 16hrs and rest on vent   - Doing well with 16 hours of cpap and then resting for 8. Will continue with this for now. Next plan will be to increase to continuous  - continue with 16 hours today. Discussed with respiratory.Will continue with 16 hours for the next couple of days and then increase   plan to increase to continuous as tolerated  - did well with continuous cpap which was started yesterday. Plan to continue for now.   9-10 - CPAP as toelrated   -  Trach downsized to 6 XLT --  - Had an episode of emesis and is thought to have aspirated. Chest xray reviewed with worsening bilateral infiltrates. I have  FiO2 to 60% and peep is at 10.  Might need to bronch the patient (therapeutic)  - FiO2 down to 50% and peep is at 8. Once FiO2 back to 40% we will resume cpap    HTN (hypertension)  Resumed home medications   BP low normal this am. Held carvedilol as HD is scheduled for this am   decreased carvedilol to 6.25 bid    Weakness acquired in ICU  Extreme generalized weakness - continue PT/OT as tolerated  He is showing some improvement      DM (diabetes mellitus)  accuchecks and Sliding scale insulin   A1C 5.3%  Currently on sliding scale  Glucose<180  Continue with current care  accuchecks . Monitor closely. Might need to make adjustment with tube feeds    Anemia  Due to critical illness   - Hgb is 6.4. Can transfuse 1 unit with next HD  - VSS -- repeat CBC in AM   6.9.5 this am  BP on lower side. Will transfuse 1 unit with next dialysis session    JUVENTINO (acute kidney  injury)  Cr 0.9 March 2022   - was on CCRT post CABG -- transitioned to HD M/W/F  Plan to continue MWF dialysis  Nephrology note reviewed and appreciated    CAD (coronary artery disease)  S/p CABG on 07/18  Currently no acute issues  He is on aspirin, statin, beta blocker      Chronic systolic heart failure  EF 30-35% per Echo 07/2022  Daily wts/ I/Os   Appears compensated    A-fib  No history documented prior to CABG - was on Lovenox at OSH   - he is on carvediolol, eliquis  - HR is controlled                  Ham Sanchez,   Pulmonology  [facility]

## 2022-09-13 NOTE — PT/OT/SLP PROGRESS
Physical Therapy      Patient Name:  Gregory Stuart   MRN:  [id number]    Patient not seen today secondary to Nurse/ RADHA hold. pt with declined medical status, low BP and not responding to heavy verbal/tactile stimuli. Will follow-up next treatment date as pt is able.

## 2022-09-13 NOTE — ASSESSMENT & PLAN NOTE
Resumed home medications   BP low normal this am. Held carvedilol as HD is scheduled for this am  9/13 decreased carvedilol to 6.25 bid

## 2022-09-13 NOTE — PROGRESS NOTES
[facility]  Nephrology  Progress Note    Patient Name: Gregory Stuart  MRN: [id number]  Admission Date: 8/23/2022  Hospital Length of Stay: 21 days  Attending Provider: Ham Sanchez DO   Primary Care Physician: Primary Doctor No  Principal Problem:Acute hypoxemic respiratory failure    Subjective:     HPI: This gentleman with history of CAD, CVA and renal failure.  The patient transferred from the [facility] for ventilator management and debility.  He has a history of CKD which progressed to ESRD after a CABG.  The patient has been on dialysis for about 3 weeks.  He continues to have diffuse edema.  His last dialysis session was on yesterday.  His family is at the bedside to answer questions.  Nephrology has been consulted for renal issues.      Interval History: The patient is resting.  No other changes.  PRBCs on tomorrow.    Review of patient's allergies indicates:  Not on File  Current Facility-Administered Medications   Medication Frequency    0.9%  NaCl infusion (for blood administration) Q24H PRN    0.9%  NaCl infusion PRN    acetaminophen tablet 650 mg Q6H PRN    albuterol-ipratropium 2.5 mg-0.5 mg/3 mL nebulizer solution 3 mL Q12H    apixaban tablet 2.5 mg BID    aspirin chewable tablet 81 mg Daily    atorvastatin tablet 40 mg QHS    carvediloL tablet 6.25 mg BID    clindamycin in D5W 600 mg/50 mL IVPB 600 mg Q8H    collagenase ointment Daily PRN    dextromethorphan-guaiFENesin  mg/5 ml liquid 10 mL Q6H PRN    dextrose 50% injection 12.5 g PRN    doxepin capsule 25 mg QHS    ferrous sulfate tablet 1 each Daily    glucagon (human recombinant) injection 1 mg PRN    heparin (porcine) injection 4,000 Units PRN    insulin aspart U-100 injection 1-10 Units PRN    miconazole NITRATE 2 % top powder BID    ondansetron injection 8 mg Q6H PRN    pantoprazole suspension 40 mg Daily    polyethylene glycol packet 17 g Daily PRN    senna-docusate  8.6-50 mg per tablet 1 tablet Daily PRN    venlafaxine tablet 37.5 mg BID       Objective:     Vital Signs (Most Recent):  Temp: 96.6 °F (35.9 °C) (09/13/22 1500)  Pulse: 87 (09/13/22 1600)  Resp: (!) 24 (09/13/22 1600)  BP: (!) 88/46 (09/13/22 1600)  SpO2: 99 % (09/13/22 1600)  O2 Device (Oxygen Therapy): ventilator (09/13/22 0720)   Vital Signs (24h Range):  Temp:  [96.6 °F (35.9 °C)-97.6 °F (36.4 °C)] 96.6 °F (35.9 °C)  Pulse:  [74-95] 87  Resp:  [13-28] 24  SpO2:  [88 %-100 %] 99 %  BP: ()/(25-54) 88/46     Weight: 93.7 kg (206 lb 9.1 oz) (09/13/22 0500)  Body mass index is 26.52 kg/m².  Body surface area is 2.21 meters squared.    I/O last 3 completed shifts:  In: 568 [NG/GT:568]  Out: -     Physical Exam  Vitals reviewed.   Cardiovascular:      Rate and Rhythm: Rhythm irregular.   Pulmonary:      Effort: Pulmonary effort is normal.      Comments: Trach collar in place  Abdominal:      Palpations: Abdomen is soft.   Musculoskeletal:         General: Swelling present.      Right lower leg: Edema present.      Left lower leg: Edema present.   Neurological:      Mental Status: He is alert.       Significant Labs:  BMP:   Recent Labs   Lab 09/12/22  0304   GLU 54*   *   K 4.4   CL 96*   CO2 25   BUN 81*   CREATININE 3.45*   CALCIUM 8.2*     CBC:   Recent Labs   Lab 09/12/22  0304   WBC 14.93*   RBC 2.29*   HGB 6.9*   HCT 21.5*      MCV 93.9   MCH 30.1   MCHC 32.1        Significant Imaging:  Labs: Reviewed    Assessment/Plan:     DM (diabetes mellitus)  Underlying condition    JUVENTINO (acute kidney injury)  He was dialyzed Friday 8/29/2022Continue with scheduled hemodialysis.  9/6/2022  Continue with dialysis.  9/7/2022 Dialysis as scheduled.  9/9/2022  Continue with dialysis.  9/10/2022  Dialysis as scheduled.  Volume status is improving.  9/11/2022  Continue with current therapy.  9/12/2022 Dialysis  9/13/2022 Continue with dialysis as scheduled.    A-fib  Chronic condition        Thank you for  your consult. I will follow-up with patient. Please contact us if you have any additional questions.    Andres Lino Jr, MD  Nephrology  [facility]

## 2022-09-13 NOTE — ASSESSMENT & PLAN NOTE
Due to critical illness   9/9- Hgb is 6.4. Can transfuse 1 unit with next HD  9/11- VSS -- repeat CBC in AM   6.9/21.5 this am  BP on lower side. Will transfuse 1 unit with next dialysis session

## 2022-09-13 NOTE — ASSESSMENT & PLAN NOTE
accuchecks and Sliding scale insulin   A1C 5.3%  Currently on sliding scale  Glucose<180  Continue with current care  accuchecks . Monitor closely. Might need to make adjustment with tube feeds

## 2022-09-13 NOTE — SUBJECTIVE & OBJECTIVE
Interval History: The patient is resting.  No other changes.  PRBCs on tomorrow.    Review of patient's allergies indicates:  Not on File  Current Facility-Administered Medications   Medication Frequency    0.9%  NaCl infusion (for blood administration) Q24H PRN    0.9%  NaCl infusion PRN    acetaminophen tablet 650 mg Q6H PRN    albuterol-ipratropium 2.5 mg-0.5 mg/3 mL nebulizer solution 3 mL Q12H    apixaban tablet 2.5 mg BID    aspirin chewable tablet 81 mg Daily    atorvastatin tablet 40 mg QHS    carvediloL tablet 6.25 mg BID    clindamycin in D5W 600 mg/50 mL IVPB 600 mg Q8H    collagenase ointment Daily PRN    dextromethorphan-guaiFENesin  mg/5 ml liquid 10 mL Q6H PRN    dextrose 50% injection 12.5 g PRN    doxepin capsule 25 mg QHS    ferrous sulfate tablet 1 each Daily    glucagon (human recombinant) injection 1 mg PRN    heparin (porcine) injection 4,000 Units PRN    insulin aspart U-100 injection 1-10 Units PRN    miconazole NITRATE 2 % top powder BID    ondansetron injection 8 mg Q6H PRN    pantoprazole suspension 40 mg Daily    polyethylene glycol packet 17 g Daily PRN    senna-docusate 8.6-50 mg per tablet 1 tablet Daily PRN    venlafaxine tablet 37.5 mg BID       Objective:     Vital Signs (Most Recent):  Temp: 96.6 °F (35.9 °C) (09/13/22 1500)  Pulse: 87 (09/13/22 1600)  Resp: (!) 24 (09/13/22 1600)  BP: (!) 88/46 (09/13/22 1600)  SpO2: 99 % (09/13/22 1600)  O2 Device (Oxygen Therapy): ventilator (09/13/22 0720)   Vital Signs (24h Range):  Temp:  [96.6 °F (35.9 °C)-97.6 °F (36.4 °C)] 96.6 °F (35.9 °C)  Pulse:  [74-95] 87  Resp:  [13-28] 24  SpO2:  [88 %-100 %] 99 %  BP: ()/(25-54) 88/46     Weight: 93.7 kg (206 lb 9.1 oz) (09/13/22 0500)  Body mass index is 26.52 kg/m².  Body surface area is 2.21 meters squared.    I/O last 3 completed shifts:  In: 568 [NG/GT:568]  Out: -     Physical Exam  Vitals reviewed.   Cardiovascular:      Rate and Rhythm: Rhythm irregular.   Pulmonary:       Effort: Pulmonary effort is normal.      Comments: Trach collar in place  Abdominal:      Palpations: Abdomen is soft.   Musculoskeletal:         General: Swelling present.      Right lower leg: Edema present.      Left lower leg: Edema present.   Neurological:      Mental Status: He is alert.       Significant Labs:  BMP:   Recent Labs   Lab 09/12/22  0304   GLU 54*   *   K 4.4   CL 96*   CO2 25   BUN 81*   CREATININE 3.45*   CALCIUM 8.2*     CBC:   Recent Labs   Lab 09/12/22  0304   WBC 14.93*   RBC 2.29*   HGB 6.9*   HCT 21.5*      MCV 93.9   MCH 30.1   MCHC 32.1        Significant Imaging:  Labs: Reviewed   - - -

## 2022-09-13 NOTE — ASSESSMENT & PLAN NOTE
He was dialyzed Friday 8/29/2022Continue with scheduled hemodialysis.  9/6/2022  Continue with dialysis.  9/7/2022 Dialysis as scheduled.  9/9/2022  Continue with dialysis.  9/10/2022  Dialysis as scheduled.  Volume status is improving.  9/11/2022  Continue with current therapy.  9/12/2022 Dialysis  9/13/2022 Continue with dialysis as scheduled.

## 2022-09-13 NOTE — ASSESSMENT & PLAN NOTE
Failed extubated post CABG ()  Now with trach and peg     - increase to PSV 12 hours -   - Will plan to change trach out in the next day or two--continue to increase weaning as tolerated  - continue with current weaning plan for now. Will increase tomorrow  - His strength is slowly improving which will also improve weaning. Will start to increase weaning again today  - Oxygenating adequately on 40%. Plan for 12 hours continuous of cpap during the day and can continue to rest overnight on assist control  - continue current tx   - increase CPAP to 16hrs and rest on vent   - Doing well with 16 hours of cpap and then resting for 8. Will continue with this for now. Next plan will be to increase to continuous  - continue with 16 hours today. Discussed with respiratory.Will continue with 16 hours for the next couple of days and then increase   plan to increase to continuous as tolerated  - did well with continuous cpap which was started yesterday. Plan to continue for now.   9-10 - CPAP as toelrated   -  Trach downsized to 6 XLT --  - Had an episode of emesis and is thought to have aspirated. Chest xray reviewed with worsening bilateral infiltrates. I have  FiO2 to 60% and peep is at 10.  Might need to bronch the patient (therapeutic)  - FiO2 down to 50% and peep is at 8. Once FiO2 back to 40% we will resume cpap

## 2022-09-13 NOTE — PLAN OF CARE
Problem: Occupational Therapy  Goal: Occupational Therapy Goal  Description: ST. Pt will follow simple one step command  2.Pt will perform grooming with mod a  3. Pt will sit EOB x 10 min with mod a  4. Pt will andre gown with mod a  5. Pt will t/f bed/chair with mod a x 2  6. Pt will tolerate 15 min of tx      LTG: Restore to Max I with self care and mobility.    Outcome: Ongoing, Not Progressing   Pt has had a decline with his medical status over the last few day and therapist was not able to arouse pt. Pt not following commands and has not made an significant progress at this time.

## 2022-09-13 NOTE — PLAN OF CARE
Problem: Infection  Goal: Absence of Infection Signs and Symptoms  Outcome: Ongoing, Progressing  Intervention: Prevent or Manage Infection  Flowsheets (Taken 9/13/2022 1750)  Fever Reduction/Comfort Measures:   lightweight clothing   lightweight bedding  Infection Management: aseptic technique maintained  Isolation Precautions: precautions maintained

## 2022-09-13 NOTE — SUBJECTIVE & OBJECTIVE
Interval History: No acute events overnight. The patient is currently resting comfortably. He likely aspirated over the weekend. Increased FiO2 requirements. Currently afebrile and vital signs are stable. FiO2 down to 50% and peep is at 8.    Objective:     Vital Signs (Most Recent):  Temp: 97.2 °F (36.2 °C) (09/13/22 0701)  Pulse: 86 (09/13/22 0900)  Resp: 15 (09/13/22 0900)  BP: (!) 85/40 (09/13/22 0900)  SpO2: 100 % (09/13/22 0900)   Vital Signs (24h Range):  Temp:  [97.1 °F (36.2 °C)-98.8 °F (37.1 °C)] 97.2 °F (36.2 °C)  Pulse:  [73-95] 86  Resp:  [13-25] 15  SpO2:  [95 %-100 %] 100 %  BP: ()/(34-62) 85/40     Weight: 93.7 kg (206 lb 9.1 oz)  Body mass index is 26.52 kg/m².      Intake/Output Summary (Last 24 hours) at 9/13/2022 1013  Last data filed at 9/13/2022 0600  Gross per 24 hour   Intake 568 ml   Output --   Net 568 ml         Physical Exam  Vitals reviewed.   Constitutional:       General: He is not in acute distress.     Appearance: He is ill-appearing.   HENT:      Head: Normocephalic and atraumatic.      Right Ear: External ear normal.      Left Ear: External ear normal.      Mouth/Throat:      Mouth: Mucous membranes are moist.   Eyes:      Extraocular Movements: Extraocular movements intact.      Conjunctiva/sclera: Conjunctivae normal.   Neck:      Comments: Trach in place   Cardiovascular:      Rate and Rhythm: Normal rate. Rhythm irregular.      Pulses: Normal pulses.   Pulmonary:      Breath sounds: No wheezing, rhonchi or rales.   Abdominal:      General: Bowel sounds are normal.      Palpations: Abdomen is soft.   Musculoskeletal:         General: Swelling present.      Right lower leg: Edema present.      Left lower leg: Edema present.      Comments: Edema to upper and lower ext. Worse in left arm than right -Hx radial fx; family states cast was removed due to edema    Skin:     General: Skin is warm and dry.   Neurological:      Mental Status: He is alert.       Vents:  Vent Mode: A/C  (09/13/22 0233)  Ventilator Initiated: Yes (09/02/22 0047)  Set Rate: 12 BPM (09/13/22 0233)  Vt Set: 550 mL (09/13/22 0233)  Pressure Support: 12 cmH20 (09/12/22 0350)  PEEP/CPAP: 10 cmH20 (09/13/22 0233)  Oxygen Concentration (%): 60 (09/13/22 0233)  Peak Airway Pressure: 48 cmH2O (09/13/22 0233)  Total Ve: 9.4 mL (09/13/22 0233)  F/VT Ratio<105 (RSBI): (!) 34.62 (09/12/22 2300)    Lines/Drains/Airways       Central Venous Catheter Line  Duration                  Hemodialysis Catheter right subclavian -- days              Drain  Duration                  Gastrostomy/Enterostomy 08/23/22 1030 LUQ 20 days              Airway  Duration                  Surgical Airway Shiley Cuffed -- days              Peripheral Intravenous Line  Duration                  Peripheral IV - Single Lumen 08/23/22 1422 20 G Anterior;Right Forearm 20 days                    Significant Labs:    CBC/Anemia Profile:  Recent Labs   Lab 09/12/22  0304   WBC 14.93*   HGB 6.9*   HCT 21.5*      MCV 93.9   RDW 14.6*          Chemistries:  Recent Labs   Lab 09/12/22  0304   *   K 4.4   CL 96*   CO2 25   BUN 81*   CREATININE 3.45*   CALCIUM 8.2*         All pertinent labs within the past 24 hours have been reviewed.    Significant Imaging:  I have reviewed all pertinent imaging results/findings within the past 24 hours.

## 2022-09-14 PROBLEM — K94.23 PEG TUBE MALFUNCTION: Status: ACTIVE | Noted: 2022-01-01

## 2022-09-14 NOTE — PROGRESS NOTES
[facility]  Nephrology  Progress Note    Patient Name: Gregory Stuart  MRN: [id number]  Admission Date: 8/23/2022  Hospital Length of Stay: 22 days  Attending Provider: Ham Sanchez DO   Primary Care Physician: Primary Doctor No  Principal Problem:Acute hypoxemic respiratory failure    Subjective:     HPI: This gentleman with history of CAD, CVA and renal failure.  The patient transferred from the [facility] for ventilator management and debility.  He has a history of CKD which progressed to ESRD after a CABG.  The patient has been on dialysis for about 3 weeks.  He continues to have diffuse edema.  His last dialysis session was on yesterday.  His family is at the bedside to answer questions.  Nephrology has been consulted for renal issues.      Interval History: The patient is resting.  No acute changes.    Review of patient's allergies indicates:  Not on File  Current Facility-Administered Medications   Medication Frequency    0.9%  NaCl infusion (for blood administration) Q24H PRN    0.9%  NaCl infusion PRN    acetaminophen tablet 650 mg Q6H PRN    albuterol-ipratropium 2.5 mg-0.5 mg/3 mL nebulizer solution 3 mL Q12H    apixaban tablet 2.5 mg BID    aspirin chewable tablet 81 mg Daily    atorvastatin tablet 40 mg QHS    carvediloL tablet 6.25 mg BID    clindamycin in D5W 600 mg/50 mL IVPB 600 mg Q8H    collagenase ointment Daily PRN    dextromethorphan-guaiFENesin  mg/5 ml liquid 10 mL Q6H PRN    dextrose 50% injection 12.5 g PRN    doxepin capsule 25 mg QHS    ferrous sulfate tablet 1 each Daily    glucagon (human recombinant) injection 1 mg PRN    heparin (porcine) injection 4,000 Units PRN    insulin aspart U-100 injection 1-10 Units PRN    miconazole NITRATE 2 % top powder BID    ondansetron injection 8 mg Q6H PRN    pantoprazole suspension 40 mg Daily    polyethylene glycol packet 17 g Daily PRN    senna-docusate 8.6-50 mg per tablet 1  tablet Daily PRN    venlafaxine tablet 37.5 mg BID       Objective:     Vital Signs (Most Recent):  Temp: 97.3 °F (36.3 °C) (09/14/22 0940)  Pulse: 85 (09/14/22 1600)  Resp: (!) 29 (09/14/22 1600)  BP: (!) 88/40 (09/14/22 1600)  SpO2: (!) 94 % (09/14/22 1600)  O2 Device (Oxygen Therapy): ventilator (09/14/22 0940)   Vital Signs (24h Range):  Temp:  [96.4 °F (35.8 °C)-98.5 °F (36.9 °C)] 97.3 °F (36.3 °C)  Pulse:  [] 85  Resp:  [15-31] 29  SpO2:  [82 %-100 %] 94 %  BP: ()/(37-67) 88/40     Weight: 95.7 kg (210 lb 15.7 oz) (09/14/22 0500)  Body mass index is 27.09 kg/m².  Body surface area is 2.24 meters squared.    I/O last 3 completed shifts:  In: 1520 [NG/GT:1420; IV Piggyback:100]  Out: -     Physical Exam  Vitals reviewed.   Constitutional:       Appearance: He is obese.   HENT:      Head: Normocephalic and atraumatic.      Mouth/Throat:      Mouth: Mucous membranes are moist.   Cardiovascular:      Rate and Rhythm: Rhythm irregular.   Pulmonary:      Effort: Pulmonary effort is normal.      Comments: Trach collar in place  Abdominal:      Palpations: Abdomen is soft.   Musculoskeletal:         General: Swelling present.      Right lower leg: Edema present.      Left lower leg: Edema present.   Neurological:      Mental Status: He is alert.       Significant Labs:  BMP:   Recent Labs   Lab 09/12/22  0304   GLU 54*   *   K 4.4   CL 96*   CO2 25   BUN 81*   CREATININE 3.45*   CALCIUM 8.2*     CBC:   Recent Labs   Lab 09/12/22  0304   WBC 14.93*   RBC 2.29*   HGB 6.9*   HCT 21.5*      MCV 93.9   MCH 30.1   MCHC 32.1        Significant Imaging:  Labs: Reviewed    Assessment/Plan:     * Acute hypoxemic respiratory failure  On ventilator via tracheostomy    JUVENTINO (acute kidney injury)    9/12/2022 Dialysis  9/13/2022 Continue with dialysis as scheduled.  9/14/2022  Continue with dialysis.    A-fib  Chronic condition      Severe debility.  Thank you for your consult. I will follow-up with patient.  Please contact us if you have any additional questions.    Andres Lino Jr, MD  Nephrology  [facility] [place]

## 2022-09-14 NOTE — PLAN OF CARE
Problem: Skin Injury Risk Increased  Goal: Skin Health and Integrity  Outcome: Ongoing, Progressing  Intervention: Optimize Skin Protection  Flowsheets (Taken 9/14/2022 1800)  Pressure Reduction Techniques: heels elevated off bed  Pressure Reduction Devices: heel offloading device utilized  Skin Protection:   tubing/devices free from skin contact   incontinence pads utilized  Head of Bed (HOB) Positioning: HOB elevated  Intervention: Promote and Optimize Oral Intake  Flowsheets (Taken 9/14/2022 1800)  Oral Nutrition Promotion: rest periods promoted

## 2022-09-14 NOTE — SUBJECTIVE & OBJECTIVE
Interval History: The patient is resting.  No acute changes.    Review of patient's allergies indicates:  Not on File  Current Facility-Administered Medications   Medication Frequency    0.9%  NaCl infusion (for blood administration) Q24H PRN    0.9%  NaCl infusion PRN    acetaminophen tablet 650 mg Q6H PRN    albuterol-ipratropium 2.5 mg-0.5 mg/3 mL nebulizer solution 3 mL Q12H    apixaban tablet 2.5 mg BID    aspirin chewable tablet 81 mg Daily    atorvastatin tablet 40 mg QHS    carvediloL tablet 6.25 mg BID    clindamycin in D5W 600 mg/50 mL IVPB 600 mg Q8H    collagenase ointment Daily PRN    dextromethorphan-guaiFENesin  mg/5 ml liquid 10 mL Q6H PRN    dextrose 50% injection 12.5 g PRN    doxepin capsule 25 mg QHS    ferrous sulfate tablet 1 each Daily    glucagon (human recombinant) injection 1 mg PRN    heparin (porcine) injection 4,000 Units PRN    insulin aspart U-100 injection 1-10 Units PRN    miconazole NITRATE 2 % top powder BID    ondansetron injection 8 mg Q6H PRN    pantoprazole suspension 40 mg Daily    polyethylene glycol packet 17 g Daily PRN    senna-docusate 8.6-50 mg per tablet 1 tablet Daily PRN    venlafaxine tablet 37.5 mg BID       Objective:     Vital Signs (Most Recent):  Temp: 97.3 °F (36.3 °C) (09/14/22 0940)  Pulse: 85 (09/14/22 1600)  Resp: (!) 29 (09/14/22 1600)  BP: (!) 88/40 (09/14/22 1600)  SpO2: (!) 94 % (09/14/22 1600)  O2 Device (Oxygen Therapy): ventilator (09/14/22 0940)   Vital Signs (24h Range):  Temp:  [96.4 °F (35.8 °C)-98.5 °F (36.9 °C)] 97.3 °F (36.3 °C)  Pulse:  [] 85  Resp:  [15-31] 29  SpO2:  [82 %-100 %] 94 %  BP: ()/(37-67) 88/40     Weight: 95.7 kg (210 lb 15.7 oz) (09/14/22 0500)  Body mass index is 27.09 kg/m².  Body surface area is 2.24 meters squared.    I/O last 3 completed shifts:  In: 1520 [NG/GT:1420; IV Piggyback:100]  Out: -     Physical Exam  Vitals reviewed.   Constitutional:       Appearance: He is obese.   HENT:      Head:  Normocephalic and atraumatic.      Mouth/Throat:      Mouth: Mucous membranes are moist.   Cardiovascular:      Rate and Rhythm: Rhythm irregular.   Pulmonary:      Effort: Pulmonary effort is normal.      Comments: Trach collar in place  Abdominal:      Palpations: Abdomen is soft.   Musculoskeletal:         General: Swelling present.      Right lower leg: Edema present.      Left lower leg: Edema present.   Neurological:      Mental Status: He is alert.       Significant Labs:  BMP:   Recent Labs   Lab 09/12/22  0304   GLU 54*   *   K 4.4   CL 96*   CO2 25   BUN 81*   CREATININE 3.45*   CALCIUM 8.2*     CBC:   Recent Labs   Lab 09/12/22  0304   WBC 14.93*   RBC 2.29*   HGB 6.9*   HCT 21.5*      MCV 93.9   MCH 30.1   MCHC 32.1        Significant Imaging:  Labs: Reviewed

## 2022-09-14 NOTE — ASSESSMENT & PLAN NOTE
09/14/2022  Mushroom phalange eroding to skin level Dr. Ling removed #24 Spanish peg at bedside unable to attempt to reinsert, stoma caked with tube feeds particles debris  Insert ngt for tube feeds  Hold eliquis sat Sunday for peg vs g tube Monday per dr ling at [facility] OR will have to coordinate with dialysis

## 2022-09-14 NOTE — ASSESSMENT & PLAN NOTE
9/12/2022 Dialysis  9/13/2022 Continue with dialysis as scheduled.  9/14/2022  Continue with dialysis.

## 2022-09-14 NOTE — PROGRESS NOTES
[facility]  Pulmonology  Progress Note    Patient Name: Gregory Stuart  MRN: [id number]  Admission Date: 8/23/2022  Hospital Length of Stay: 22 days  Code Status: No Order  Attending Provider: Ham Sanchez DO  Primary Care Provider: Primary Doctor No   Principal Problem: Acute hypoxemic respiratory failure    Subjective:     Interval History: No acute events overnight. The patient is currently resting comfortably. He likely aspirated over the weekend. Increased FiO2 requirements. Currently afebrile and vital signs are stable. FiO2 down to 50% and peep is at 8.    Objective:     Vital Signs (Most Recent):  Temp: 97.3 °F (36.3 °C) (09/14/22 0940)  Pulse: 93 (09/14/22 1045)  Resp: (!) 27 (09/14/22 1045)  BP: (!) 89/41 (09/14/22 1045)  SpO2: (!) 91 % (09/14/22 1045)   Vital Signs (24h Range):  Temp:  [96.4 °F (35.8 °C)-98.5 °F (36.9 °C)] 97.3 °F (36.3 °C)  Pulse:  [] 93  Resp:  [15-31] 27  SpO2:  [82 %-100 %] 91 %  BP: ()/(32-67) 89/41     Weight: 95.7 kg (210 lb 15.7 oz)  Body mass index is 27.09 kg/m².      Intake/Output Summary (Last 24 hours) at 9/14/2022 1058  Last data filed at 9/14/2022 0940  Gross per 24 hour   Intake 1770 ml   Output --   Net 1770 ml         Physical Exam  Vitals reviewed.   Constitutional:       General: He is not in acute distress.     Appearance: He is ill-appearing.   HENT:      Head: Normocephalic and atraumatic.      Right Ear: External ear normal.      Left Ear: External ear normal.      Mouth/Throat:      Mouth: Mucous membranes are moist.   Eyes:      Extraocular Movements: Extraocular movements intact.      Conjunctiva/sclera: Conjunctivae normal.   Neck:      Comments: Trach in place   Cardiovascular:      Rate and Rhythm: Normal rate. Rhythm irregular.      Pulses: Normal pulses.   Pulmonary:      Breath sounds: No wheezing, rhonchi or rales.   Abdominal:      General: Bowel sounds are normal.      Palpations: Abdomen is soft.       Comments: Drainage around peg tube site   Musculoskeletal:         General: Swelling present.      Right lower leg: Edema present.      Left lower leg: Edema present.      Comments: Edema to upper and lower ext. Worse in left arm than right -Hx radial fx; family states cast was removed due to edema    Skin:     General: Skin is warm and dry.   Neurological:      Mental Status: He is alert. Mental status is at baseline.   Psychiatric:         Mood and Affect: Mood normal.       Vents:  Vent Mode: A/C (09/14/22 0317)  Ventilator Initiated: Yes (09/02/22 0047)  Set Rate: 12 BPM (09/14/22 0317)  Vt Set: 550 mL (09/14/22 0317)  Pressure Support: 12 cmH20 (09/12/22 0350)  PEEP/CPAP: 8 cmH20 (09/14/22 0317)  Oxygen Concentration (%): 50 (09/14/22 0317)  Peak Airway Pressure: 36 cmH2O (09/14/22 0317)  Total Ve: 10.4 mL (09/14/22 0317)  F/VT Ratio<105 (RSBI): (!) 57.55 (09/1943)    Lines/Drains/Airways       Central Venous Catheter Line  Duration                  Hemodialysis Catheter right subclavian -- days              Drain  Duration                  Gastrostomy/Enterostomy 08/23/22 1030 LUQ 22 days              Airway  Duration                  Surgical Airway Shiley Cuffed -- days              Peripheral Intravenous Line  Duration                  Peripheral IV - Single Lumen 08/23/22 1422 20 G Anterior;Right Forearm 21 days                    Significant Labs:    CBC/Anemia Profile:  No results for input(s): WBC, HGB, HCT, PLT, MCV, RDW, IRON, FERRITIN, RETIC, FOLATE, ORCFMMGO97, OCCULTBLOOD in the last 48 hours.       Chemistries:  No results for input(s): NA, K, CL, CO2, BUN, CREATININE, CALCIUM, ALBUMIN, PROT, BILITOT, ALKPHOS, ALT, AST, GLUCOSE, MG, PHOS in the last 48 hours.      All pertinent labs within the past 24 hours have been reviewed.    Significant Imaging:  I have reviewed all pertinent imaging results/findings within the past 24 hours.    Assessment/Plan:     * Acute hypoxemic respiratory  failure  Failed extubated post CABG ()  Now with trach and peg     - increase to PSV 12 hours -   - Will plan to change trach out in the next day or two--continue to increase weaning as tolerated  - continue with current weaning plan for now. Will increase tomorrow  - His strength is slowly improving which will also improve weaning. Will start to increase weaning again today  - Oxygenating adequately on 40%. Plan for 12 hours continuous of cpap during the day and can continue to rest overnight on assist control  - continue current tx   - increase CPAP to 16hrs and rest on vent   - Doing well with 16 hours of cpap and then resting for 8. Will continue with this for now. Next plan will be to increase to continuous  - continue with 16 hours today. Discussed with respiratory.Will continue with 16 hours for the next couple of days and then increase   plan to increase to continuous as tolerated  - did well with continuous cpap which was started yesterday. Plan to continue for now.   9-10 - CPAP as toelrated   -  Trach downsized to 6 XLT --  - Had an episode of emesis and is thought to have aspirated. Chest xray reviewed with worsening bilateral infiltrates. I have  FiO2 to 60% and peep is at 10.  Might need to bronch the patient (therapeutic)  - FiO2 down to 50% and peep is at 8. Once FiO2 back to 40% we will resume cpap  - FiO2 down to 40%. Peep is at 8.    HTN (hypertension)  Resumed home medications   BP low normal this am. Held carvedilol as HD is scheduled for this am   decreased carvedilol to 6.25 bid    Alteration in nutrition  S/p peg tube placement   continue tube feeding  Tolerating without difficulty   now with some leaking around peg tube  Will ask surgery to take a look    Weakness acquired in ICU  Extreme generalized weakness - continue PT/OT as tolerated  He is showing some improvement      DM (diabetes mellitus)  accuchecks and  Sliding scale insulin   A1C 5.3%  Currently on sliding scale  Glucose<180  Continue with current care  accuchecks look good    Anemia  Due to critical illness   9/9- Hgb is 6.4. Can transfuse 1 unit with next HD  9/11- VSS -- repeat CBC in AM   6.9/21.5 this am  BP on lower side. Will transfuse 1 unit with next dialysis session 9/14    JUVENTINO (acute kidney injury)  Cr 0.9 March 2022   - was on CCRT post CABG -- transitioned to HD M/W/F  Plan to continue MWF dialysis  Nephrology note reviewed and appreciated    CAD (coronary artery disease)  S/p CABG on 07/18  Currently no acute issues  He is on aspirin, statin, beta blocker      Chronic systolic heart failure  EF 30-35% per Echo 07/2022  Daily wts/ I/Os   Appears compensated    A-fib  No history documented prior to CABG - was on Lovenox at OSH   - he is on carvediolol, eliquis  - HR is controlled                  Ham Sanchez, DO  Pulmonology  [facility]

## 2022-09-14 NOTE — SUBJECTIVE & OBJECTIVE
No current facility-administered medications on file prior to encounter.     Current Outpatient Medications on File Prior to Encounter   Medication Sig    donepeziL (ARICEPT) 10 MG tablet Take 10 mg by mouth once daily.    doxepin (SINEQUAN) 50 MG capsule Take 50 mg by mouth nightly.    venlafaxine (EFFEXOR-XR) 75 MG 24 hr capsule Take 75 mg by mouth once daily.       Review of patient's allergies indicates:  Not on File    Past Medical History:   Diagnosis Date    Anticoagulant long-term use     Cancer     CHF (congestive heart failure)     Coronary artery disease     Diabetes mellitus     Hypertension     Stroke      Past Surgical History:   Procedure Laterality Date    CORONARY ARTERY BYPASS GRAFT       Family History    None       Tobacco Use    Smoking status: Not on file    Smokeless tobacco: Not on file   Substance and Sexual Activity    Alcohol use: Not on file    Drug use: Not on file    Sexual activity: Not on file     Review of Systems   Unable to perform ROS: Intubated   Objective:     Vital Signs (Most Recent):  Temp: 97.3 °F (36.3 °C) (09/14/22 0940)  Pulse: 89 (09/14/22 1115)  Resp: (!) 27 (09/14/22 1115)  BP: (!) 86/37 (09/14/22 1100)  SpO2: (!) 91 % (09/14/22 1045)   Vital Signs (24h Range):  Temp:  [96.4 °F (35.8 °C)-98.5 °F (36.9 °C)] 97.3 °F (36.3 °C)  Pulse:  [] 89  Resp:  [15-31] 27  SpO2:  [82 %-100 %] 91 %  BP: ()/(37-67) 86/37     Weight: 95.7 kg (210 lb 15.7 oz)  Body mass index is 27.09 kg/m².    Physical Exam  Vitals and nursing note reviewed. Exam conducted with a chaperone present.   HENT:      Mouth/Throat:      Comments: trach  Cardiovascular:      Rate and Rhythm: Normal rate.   Pulmonary:      Comments: vent  Abdominal:      Palpations: Abdomen is soft.      Comments: PEG phalange mushroom eroding at skin level   Skin:     General: Skin is warm and dry.       Significant Labs:  I have reviewed all pertinent lab results within the past 24 hours.  CBC:   Recent Labs   Lab  09/12/22 0304   WBC 14.93*   RBC 2.29*   HGB 6.9*   HCT 21.5*      MCV 93.9   MCH 30.1   MCHC 32.1     BMP:   Recent Labs   Lab 09/12/22 0304   GLU 54*   *   K 4.4   CL 96*   CO2 25   BUN 81*   CREATININE 3.45*   CALCIUM 8.2*     CMP:   Recent Labs   Lab 09/12/22 0304   GLU 54*   CALCIUM 8.2*   *   K 4.4   CO2 25   CL 96*   BUN 81*   CREATININE 3.45*     LFTs: No results for input(s): ALT, AST, ALKPHOS, BILITOT, PROT, ALBUMIN in the last 168 hours.    Significant Diagnostics:  I have reviewed all pertinent imaging results/findings within the past 24 hours.

## 2022-09-14 NOTE — ASSESSMENT & PLAN NOTE
S/p peg tube placement   continue tube feeding  Tolerating without difficulty  9/14 now with some leaking around peg tube  Will ask surgery to take a look

## 2022-09-14 NOTE — HPI
Transferred to [facility] approximately 3 weeks ago for vent weaning  CABG x5  July 18th at [facility] on eliquis for A FIB  Dialysis via RIGHT IJ HD cath M-W-F  Peg has been leaking and unable to flush today  Gen surgery consulted for PEG malfunction

## 2022-09-14 NOTE — CONSULTS
[facility]  General Surgery  Consult Note    Patient Name: Gregory Stuart  MRN: [id number]  Code Status: No Order  Admission Date: 8/23/2022  Hospital Length of Stay: 22 days  Attending Physician: Ham Sanchez DO  Primary Care Provider: Primary Doctor No    Patient information was obtained from ER records.     Inpatient consult to General Surgery  Consult performed by: ISMAEL Davila  Consult ordered by: Ham Sanchez DO  Reason for consult: peg malfunction  Assessment/Recommendations: Insert ngt for feeds  repalce peg in or Monday allow eliquis to wear off hold sat sun        Subjective:     Principal Problem: Acute hypoxemic respiratory failure    History of Present Illness: Transferred to [facility] approximately 3 weeks ago for vent weaning  CABG x5  July 18th at [place] on eliquis for A FIB  Dialysis via RIGHT IJ HD cath M-W-F  Peg has been leaking and unable to flush today  Gen surgery consulted for PEG malfunction        No current facility-administered medications on file prior to encounter.     Current Outpatient Medications on File Prior to Encounter   Medication Sig    donepeziL (ARICEPT) 10 MG tablet Take 10 mg by mouth once daily.    doxepin (SINEQUAN) 50 MG capsule Take 50 mg by mouth nightly.    venlafaxine (EFFEXOR-XR) 75 MG 24 hr capsule Take 75 mg by mouth once daily.       Review of patient's allergies indicates:  Not on File    Past Medical History:   Diagnosis Date    Anticoagulant long-term use     Cancer     CHF (congestive heart failure)     Coronary artery disease     Diabetes mellitus     Hypertension     Stroke      Past Surgical History:   Procedure Laterality Date    CORONARY ARTERY BYPASS GRAFT       Family History    None       Tobacco Use    Smoking status: Not on file    Smokeless tobacco: Not on file   Substance and Sexual Activity    Alcohol use: Not on file    Drug use: Not on file    Sexual activity: Not on file      Review of Systems   Unable to perform ROS: Intubated   Objective:     Vital Signs (Most Recent):  Temp: 97.3 °F (36.3 °C) (09/14/22 0940)  Pulse: 89 (09/14/22 1115)  Resp: (!) 27 (09/14/22 1115)  BP: (!) 86/37 (09/14/22 1100)  SpO2: (!) 91 % (09/14/22 1045)   Vital Signs (24h Range):  Temp:  [96.4 °F (35.8 °C)-98.5 °F (36.9 °C)] 97.3 °F (36.3 °C)  Pulse:  [] 89  Resp:  [15-31] 27  SpO2:  [82 %-100 %] 91 %  BP: ()/(37-67) 86/37     Weight: 95.7 kg (210 lb 15.7 oz)  Body mass index is 27.09 kg/m².    Physical Exam  Vitals and nursing note reviewed. Exam conducted with a chaperone present.   HENT:      Mouth/Throat:      Comments: trach  Cardiovascular:      Rate and Rhythm: Normal rate.   Pulmonary:      Comments: vent  Abdominal:      Palpations: Abdomen is soft.      Comments: PEG phalange mushroom eroding at skin level   Skin:     General: Skin is warm and dry.       Significant Labs:  I have reviewed all pertinent lab results within the past 24 hours.  CBC:   Recent Labs   Lab 09/12/22  0304   WBC 14.93*   RBC 2.29*   HGB 6.9*   HCT 21.5*      MCV 93.9   MCH 30.1   MCHC 32.1     BMP:   Recent Labs   Lab 09/12/22  0304   GLU 54*   *   K 4.4   CL 96*   CO2 25   BUN 81*   CREATININE 3.45*   CALCIUM 8.2*     CMP:   Recent Labs   Lab 09/12/22  0304   GLU 54*   CALCIUM 8.2*   *   K 4.4   CO2 25   CL 96*   BUN 81*   CREATININE 3.45*     LFTs: No results for input(s): ALT, AST, ALKPHOS, BILITOT, PROT, ALBUMIN in the last 168 hours.    Significant Diagnostics:  I have reviewed all pertinent imaging results/findings within the past 24 hours.      Assessment/Plan:     PEG tube malfunction  09/14/2022  Mushroom phalange eroding to skin level Dr. Ling removed #24 Luxembourgish peg at bedside unable to attempt to reinsert, stoma caked with tube feeds particles debris  Insert ngt for tube feeds  Hold eliquis sat Sunday for peg vs g tube Monday per dr ling at [facility] OR will have to coordinate with  dialysis    Wound dehiscence  Clean wound with vashe  Apply polymem silver max  Cover and secure with 4x4s and paper tape  Change M, W, F    Pressure injury of left leg, unstageable  Clean wound with vashe  Apply polymem silver max  Cover and secure with 4x4s and paper tape  Change M, W, F    Open wound of anterior abdominal wall without complication  Clean wound with vashe  Apply polymem silver max  Cover and secure with 4x4s and paper tape  Change M, W, F      Pressure injury of sacral region, unstageable  Clean with vashe  Apply duoderm to wound bed  Cover and secure with abd pad and paper tape  Change M, W, F  Donnie scale and skin assessment q shift-Nursing  Wound care to assess with dressing changes  Turn every 2 hours  TAP system  Low air loss mattress      VTE Risk Mitigation (From admission, onward)         Ordered     apixaban tablet 2.5 mg  2 times daily         08/26/22 1302     heparin (porcine) injection 4,000 Units  As needed (PRN)         08/24/22 9571                Thank you for your consult. I will follow-up with patient. Please contact us if you have any additional questions.    Paige Gómez, NAVINP  General Surgery  [facility]

## 2022-09-14 NOTE — PT/OT/SLP PROGRESS
Occupational Therapy      Patient Name:  Gregory Stuart   MRN:  [id number]    Patient not seen today secondary to Nurse/ RADHA hold. Pt presents with decreased responsiveness. Will follow-up 9/15/22.    9/14/2022

## 2022-09-14 NOTE — PLAN OF CARE
Plans of care ongoing  Problem: Adult Inpatient Plan of Care  Goal: Plan of Care Review  Outcome: Ongoing, Progressing  Goal: Patient-Specific Goal (Individualized)  Outcome: Ongoing, Progressing  Goal: Absence of Hospital-Acquired Illness or Injury  Outcome: Ongoing, Progressing  Goal: Optimal Comfort and Wellbeing  Outcome: Ongoing, Progressing  Goal: Readiness for Transition of Care  Outcome: Ongoing, Progressing     Problem: Fall Injury Risk  Goal: Absence of Fall and Fall-Related Injury  Outcome: Ongoing, Progressing     Problem: Skin Injury Risk Increased  Goal: Skin Health and Integrity  Outcome: Ongoing, Progressing     Problem: Infection  Goal: Absence of Infection Signs and Symptoms  Outcome: Ongoing, Progressing     Problem: Diabetes Comorbidity  Goal: Blood Glucose Level Within Targeted Range  Outcome: Ongoing, Progressing     Problem: Fluid and Electrolyte Imbalance (Acute Kidney Injury/Impairment)  Goal: Fluid and Electrolyte Balance  Outcome: Ongoing, Progressing     Problem: Oral Intake Inadequate (Acute Kidney Injury/Impairment)  Goal: Optimal Nutrition Intake  Outcome: Ongoing, Progressing     Problem: Renal Function Impairment (Acute Kidney Injury/Impairment)  Goal: Effective Renal Function  Outcome: Ongoing, Progressing     Problem: Impaired Wound Healing  Goal: Optimal Wound Healing  Outcome: Ongoing, Progressing     Problem: Communication Impairment (Mechanical Ventilation, Invasive)  Goal: Effective Communication  Outcome: Ongoing, Progressing     Problem: Device-Related Complication Risk (Mechanical Ventilation, Invasive)  Goal: Optimal Device Function  Outcome: Ongoing, Progressing     Problem: Inability to Wean (Mechanical Ventilation, Invasive)  Goal: Mechanical Ventilation Liberation  Outcome: Ongoing, Progressing     Problem: Nutrition Impairment (Mechanical Ventilation, Invasive)  Goal: Optimal Nutrition Delivery  Outcome: Ongoing, Progressing     Problem: Skin and Tissue Injury  (Mechanical Ventilation, Invasive)  Goal: Absence of Device-Related Skin and Tissue Injury  Outcome: Ongoing, Progressing     Problem: Ventilator-Induced Lung Injury (Mechanical Ventilation, Invasive)  Goal: Absence of Ventilator-Induced Lung Injury  Outcome: Ongoing, Progressing     Problem: Communication Impairment (Artificial Airway)  Goal: Effective Communication  Outcome: Ongoing, Progressing     Problem: Device-Related Complication Risk (Artificial Airway)  Goal: Optimal Device Function  Outcome: Ongoing, Progressing     Problem: Skin and Tissue Injury (Artificial Airway)  Goal: Absence of Device-Related Skin or Tissue Injury  Outcome: Ongoing, Progressing     Problem: Noninvasive Ventilation Acute  Goal: Effective Unassisted Ventilation and Oxygenation  Outcome: Ongoing, Progressing     Problem: Device-Related Complication Risk (Hemodialysis)  Goal: Safe, Effective Therapy Delivery  Outcome: Ongoing, Progressing     Problem: Hemodynamic Instability (Hemodialysis)  Goal: Effective Tissue Perfusion  Outcome: Ongoing, Progressing     Problem: Infection (Hemodialysis)  Goal: Absence of Infection Signs and Symptoms  Outcome: Ongoing, Progressing     Problem: Airway Clearance Ineffective  Goal: Effective Airway Clearance  Outcome: Ongoing, Progressing     Problem: Breathing Pattern Ineffective  Goal: Effective Breathing Pattern  Outcome: Ongoing, Progressing     Problem: Gas Exchange Impaired  Goal: Optimal Gas Exchange  Outcome: Ongoing, Progressing

## 2022-09-14 NOTE — PT/OT/SLP PROGRESS
Physical Therapy      Patient Name:  Gregory Stuart   MRN:  [id number]    Patient not seen today secondary to Nurse/ RADHA hold, Therapist assessment (Patient demonstrates decreased responsiveness). Will follow-up tomorrow.

## 2022-09-14 NOTE — SUBJECTIVE & OBJECTIVE
Interval History: No acute events overnight. The patient is currently resting comfortably. He likely aspirated over the weekend. Increased FiO2 requirements. Currently afebrile and vital signs are stable. FiO2 down to 50% and peep is at 8.    Objective:     Vital Signs (Most Recent):  Temp: 97.3 °F (36.3 °C) (09/14/22 0940)  Pulse: 93 (09/14/22 1045)  Resp: (!) 27 (09/14/22 1045)  BP: (!) 89/41 (09/14/22 1045)  SpO2: (!) 91 % (09/14/22 1045)   Vital Signs (24h Range):  Temp:  [96.4 °F (35.8 °C)-98.5 °F (36.9 °C)] 97.3 °F (36.3 °C)  Pulse:  [] 93  Resp:  [15-31] 27  SpO2:  [82 %-100 %] 91 %  BP: ()/(32-67) 89/41     Weight: 95.7 kg (210 lb 15.7 oz)  Body mass index is 27.09 kg/m².      Intake/Output Summary (Last 24 hours) at 9/14/2022 1058  Last data filed at 9/14/2022 0940  Gross per 24 hour   Intake 1770 ml   Output --   Net 1770 ml         Physical Exam  Vitals reviewed.   Constitutional:       General: He is not in acute distress.     Appearance: He is ill-appearing.   HENT:      Head: Normocephalic and atraumatic.      Right Ear: External ear normal.      Left Ear: External ear normal.      Mouth/Throat:      Mouth: Mucous membranes are moist.   Eyes:      Extraocular Movements: Extraocular movements intact.      Conjunctiva/sclera: Conjunctivae normal.   Neck:      Comments: Trach in place   Cardiovascular:      Rate and Rhythm: Normal rate. Rhythm irregular.      Pulses: Normal pulses.   Pulmonary:      Breath sounds: No wheezing, rhonchi or rales.   Abdominal:      General: Bowel sounds are normal.      Palpations: Abdomen is soft.      Comments: Drainage around peg tube site   Musculoskeletal:         General: Swelling present.      Right lower leg: Edema present.      Left lower leg: Edema present.      Comments: Edema to upper and lower ext. Worse in left arm than right -Hx radial fx; family states cast was removed due to edema    Skin:     General: Skin is warm and dry.   Neurological:       Mental Status: He is alert. Mental status is at baseline.   Psychiatric:         Mood and Affect: Mood normal.       Vents:  Vent Mode: A/C (09/14/22 0317)  Ventilator Initiated: Yes (09/02/22 0047)  Set Rate: 12 BPM (09/14/22 0317)  Vt Set: 550 mL (09/14/22 0317)  Pressure Support: 12 cmH20 (09/12/22 0350)  PEEP/CPAP: 8 cmH20 (09/14/22 0317)  Oxygen Concentration (%): 50 (09/14/22 0317)  Peak Airway Pressure: 36 cmH2O (09/14/22 0317)  Total Ve: 10.4 mL (09/14/22 0317)  F/VT Ratio<105 (RSBI): (!) 57.55 (09/1943)    Lines/Drains/Airways       Central Venous Catheter Line  Duration                  Hemodialysis Catheter right subclavian -- days              Drain  Duration                  Gastrostomy/Enterostomy 08/23/22 1030 LUQ 22 days              Airway  Duration                  Surgical Airway Shiley Cuffed -- days              Peripheral Intravenous Line  Duration                  Peripheral IV - Single Lumen 08/23/22 1422 20 G Anterior;Right Forearm 21 days                    Significant Labs:    CBC/Anemia Profile:  No results for input(s): WBC, HGB, HCT, PLT, MCV, RDW, IRON, FERRITIN, RETIC, FOLATE, [id number], OCCULTBLOOD in the last 48 hours.       Chemistries:  No results for input(s): NA, K, CL, CO2, BUN, CREATININE, CALCIUM, ALBUMIN, PROT, BILITOT, ALKPHOS, ALT, AST, GLUCOSE, MG, PHOS in the last 48 hours.      All pertinent labs within the past 24 hours have been reviewed.    Significant Imaging:  I have reviewed all pertinent imaging results/findings within the past 24 hours.

## 2022-09-14 NOTE — ASSESSMENT & PLAN NOTE
Failed extubated post CABG ()  Now with trach and peg     - increase to PSV 12 hours -   - Will plan to change trach out in the next day or two--continue to increase weaning as tolerated  - continue with current weaning plan for now. Will increase tomorrow  - His strength is slowly improving which will also improve weaning. Will start to increase weaning again today  - Oxygenating adequately on 40%. Plan for 12 hours continuous of cpap during the day and can continue to rest overnight on assist control  - continue current tx   - increase CPAP to 16hrs and rest on vent   - Doing well with 16 hours of cpap and then resting for 8. Will continue with this for now. Next plan will be to increase to continuous  - continue with 16 hours today. Discussed with respiratory.Will continue with 16 hours for the next couple of days and then increase   plan to increase to continuous as tolerated  - did well with continuous cpap which was started yesterday. Plan to continue for now.   9-10 - CPAP as toelrated   -  Trach downsized to 6 XLT --  - Had an episode of emesis and is thought to have aspirated. Chest xray reviewed with worsening bilateral infiltrates. I have  FiO2 to 60% and peep is at 10.  Might need to bronch the patient (therapeutic)  - FiO2 down to 50% and peep is at 8. Once FiO2 back to 40% we will resume cpap  - FiO2 down to 40%. Peep is at 8.

## 2022-09-14 NOTE — ASSESSMENT & PLAN NOTE
accuchecks and Sliding scale insulin   A1C 5.3%  Currently on sliding scale  Glucose<180  Continue with current care  accuchecks look good

## 2022-09-14 NOTE — ASSESSMENT & PLAN NOTE
Due to critical illness   9/9- Hgb is 6.4. Can transfuse 1 unit with next HD  9/11- VSS -- repeat CBC in AM   6.9/21.5 this am  BP on lower side. Will transfuse 1 unit with next dialysis session 9/14

## 2022-09-15 NOTE — ASSESSMENT & PLAN NOTE
Resumed home medications   BP low normal this am. Held carvedilol as HD is scheduled for this am  9/13 decreased carvedilol to 6.25 bid  9/15- Holding carvediolol. BP low this am. Will give a small bolus and monitor response

## 2022-09-15 NOTE — PLAN OF CARE
Problem: Adult Inpatient Plan of Care  Goal: Plan of Care Review  Outcome: Ongoing, Progressing  Goal: Patient-Specific Goal (Individualized)  Outcome: Ongoing, Progressing  Goal: Absence of Hospital-Acquired Illness or Injury  Outcome: Ongoing, Progressing  Goal: Optimal Comfort and Wellbeing  Outcome: Ongoing, Progressing  Goal: Readiness for Transition of Care  Outcome: Ongoing, Progressing     Problem: Fall Injury Risk  Goal: Absence of Fall and Fall-Related Injury  Outcome: Ongoing, Progressing     Problem: Skin Injury Risk Increased  Goal: Skin Health and Integrity  Outcome: Ongoing, Progressing     Problem: Infection  Goal: Absence of Infection Signs and Symptoms  Outcome: Ongoing, Progressing     Problem: Diabetes Comorbidity  Goal: Blood Glucose Level Within Targeted Range  Outcome: Ongoing, Progressing     Problem: Fluid and Electrolyte Imbalance (Acute Kidney Injury/Impairment)  Goal: Fluid and Electrolyte Balance  Outcome: Ongoing, Progressing     Problem: Oral Intake Inadequate (Acute Kidney Injury/Impairment)  Goal: Optimal Nutrition Intake  Outcome: Ongoing, Progressing     Problem: Renal Function Impairment (Acute Kidney Injury/Impairment)  Goal: Effective Renal Function  Outcome: Ongoing, Progressing     Problem: Impaired Wound Healing  Goal: Optimal Wound Healing  Outcome: Ongoing, Progressing     Problem: Communication Impairment (Mechanical Ventilation, Invasive)  Goal: Effective Communication  Outcome: Ongoing, Progressing     Problem: Device-Related Complication Risk (Mechanical Ventilation, Invasive)  Goal: Optimal Device Function  Outcome: Ongoing, Progressing     Problem: Inability to Wean (Mechanical Ventilation, Invasive)  Goal: Mechanical Ventilation Liberation  Outcome: Ongoing, Progressing     Problem: Nutrition Impairment (Mechanical Ventilation, Invasive)  Goal: Optimal Nutrition Delivery  Outcome: Ongoing, Progressing     Problem: Skin and Tissue Injury (Mechanical Ventilation,  Invasive)  Goal: Absence of Device-Related Skin and Tissue Injury  Outcome: Ongoing, Progressing     Problem: Ventilator-Induced Lung Injury (Mechanical Ventilation, Invasive)  Goal: Absence of Ventilator-Induced Lung Injury  Outcome: Ongoing, Progressing     Problem: Communication Impairment (Artificial Airway)  Goal: Effective Communication  Outcome: Ongoing, Progressing     Problem: Device-Related Complication Risk (Artificial Airway)  Goal: Optimal Device Function  Outcome: Ongoing, Progressing     Problem: Skin and Tissue Injury (Artificial Airway)  Goal: Absence of Device-Related Skin or Tissue Injury  Outcome: Ongoing, Progressing     Problem: Noninvasive Ventilation Acute  Goal: Effective Unassisted Ventilation and Oxygenation  Outcome: Ongoing, Progressing     Problem: Device-Related Complication Risk (Hemodialysis)  Goal: Safe, Effective Therapy Delivery  Outcome: Ongoing, Progressing     Problem: Hemodynamic Instability (Hemodialysis)  Goal: Effective Tissue Perfusion  Outcome: Ongoing, Progressing     Problem: Infection (Hemodialysis)  Goal: Absence of Infection Signs and Symptoms  Outcome: Ongoing, Progressing     Problem: Airway Clearance Ineffective  Goal: Effective Airway Clearance  Outcome: Ongoing, Progressing     Problem: Breathing Pattern Ineffective  Goal: Effective Breathing Pattern  Outcome: Ongoing, Progressing     Problem: Gas Exchange Impaired  Goal: Optimal Gas Exchange  Outcome: Ongoing, Progressing     Problem: Adult Inpatient Plan of Care  Goal: Plan of Care Review  Outcome: Ongoing, Progressing  Goal: Patient-Specific Goal (Individualized)  Outcome: Ongoing, Progressing  Goal: Absence of Hospital-Acquired Illness or Injury  Outcome: Ongoing, Progressing  Goal: Optimal Comfort and Wellbeing  Outcome: Ongoing, Progressing  Goal: Readiness for Transition of Care  Outcome: Ongoing, Progressing     Problem: Fall Injury Risk  Goal: Absence of Fall and Fall-Related Injury  Outcome: Ongoing,  Progressing     Problem: Skin Injury Risk Increased  Goal: Skin Health and Integrity  Outcome: Ongoing, Progressing     Problem: Infection  Goal: Absence of Infection Signs and Symptoms  Outcome: Ongoing, Progressing     Problem: Diabetes Comorbidity  Goal: Blood Glucose Level Within Targeted Range  Outcome: Ongoing, Progressing     Problem: Fluid and Electrolyte Imbalance (Acute Kidney Injury/Impairment)  Goal: Fluid and Electrolyte Balance  Outcome: Ongoing, Progressing     Problem: Oral Intake Inadequate (Acute Kidney Injury/Impairment)  Goal: Optimal Nutrition Intake  Outcome: Ongoing, Progressing     Problem: Renal Function Impairment (Acute Kidney Injury/Impairment)  Goal: Effective Renal Function  Outcome: Ongoing, Progressing     Problem: Impaired Wound Healing  Goal: Optimal Wound Healing  Outcome: Ongoing, Progressing     Problem: Communication Impairment (Mechanical Ventilation, Invasive)  Goal: Effective Communication  Outcome: Ongoing, Progressing     Problem: Device-Related Complication Risk (Mechanical Ventilation, Invasive)  Goal: Optimal Device Function  Outcome: Ongoing, Progressing     Problem: Inability to Wean (Mechanical Ventilation, Invasive)  Goal: Mechanical Ventilation Liberation  Outcome: Ongoing, Progressing     Problem: Nutrition Impairment (Mechanical Ventilation, Invasive)  Goal: Optimal Nutrition Delivery  Outcome: Ongoing, Progressing     Problem: Skin and Tissue Injury (Mechanical Ventilation, Invasive)  Goal: Absence of Device-Related Skin and Tissue Injury  Outcome: Ongoing, Progressing     Problem: Ventilator-Induced Lung Injury (Mechanical Ventilation, Invasive)  Goal: Absence of Ventilator-Induced Lung Injury  Outcome: Ongoing, Progressing     Problem: Communication Impairment (Artificial Airway)  Goal: Effective Communication  Outcome: Ongoing, Progressing     Problem: Device-Related Complication Risk (Artificial Airway)  Goal: Optimal Device Function  Outcome: Ongoing,  Progressing     Problem: Skin and Tissue Injury (Artificial Airway)  Goal: Absence of Device-Related Skin or Tissue Injury  Outcome: Ongoing, Progressing     Problem: Noninvasive Ventilation Acute  Goal: Effective Unassisted Ventilation and Oxygenation  Outcome: Ongoing, Progressing     Problem: Device-Related Complication Risk (Hemodialysis)  Goal: Safe, Effective Therapy Delivery  Outcome: Ongoing, Progressing     Problem: Hemodynamic Instability (Hemodialysis)  Goal: Effective Tissue Perfusion  Outcome: Ongoing, Progressing     Problem: Infection (Hemodialysis)  Goal: Absence of Infection Signs and Symptoms  Outcome: Ongoing, Progressing     Problem: Airway Clearance Ineffective  Goal: Effective Airway Clearance  Outcome: Ongoing, Progressing     Problem: Breathing Pattern Ineffective  Goal: Effective Breathing Pattern  Outcome: Ongoing, Progressing     Problem: Gas Exchange Impaired  Goal: Optimal Gas Exchange  Outcome: Ongoing, Progressing     Problem: Adult Inpatient Plan of Care  Goal: Plan of Care Review  Outcome: Ongoing, Progressing  Goal: Patient-Specific Goal (Individualized)  Outcome: Ongoing, Progressing  Goal: Absence of Hospital-Acquired Illness or Injury  Outcome: Ongoing, Progressing  Goal: Optimal Comfort and Wellbeing  Outcome: Ongoing, Progressing  Goal: Readiness for Transition of Care  Outcome: Ongoing, Progressing     Problem: Fall Injury Risk  Goal: Absence of Fall and Fall-Related Injury  Outcome: Ongoing, Progressing     Problem: Skin Injury Risk Increased  Goal: Skin Health and Integrity  Outcome: Ongoing, Progressing     Problem: Infection  Goal: Absence of Infection Signs and Symptoms  Outcome: Ongoing, Progressing     Problem: Diabetes Comorbidity  Goal: Blood Glucose Level Within Targeted Range  Outcome: Ongoing, Progressing     Problem: Fluid and Electrolyte Imbalance (Acute Kidney Injury/Impairment)  Goal: Fluid and Electrolyte Balance  Outcome: Ongoing, Progressing     Problem:  Oral Intake Inadequate (Acute Kidney Injury/Impairment)  Goal: Optimal Nutrition Intake  Outcome: Ongoing, Progressing     Problem: Renal Function Impairment (Acute Kidney Injury/Impairment)  Goal: Effective Renal Function  Outcome: Ongoing, Progressing     Problem: Impaired Wound Healing  Goal: Optimal Wound Healing  Outcome: Ongoing, Progressing     Problem: Communication Impairment (Mechanical Ventilation, Invasive)  Goal: Effective Communication  Outcome: Ongoing, Progressing     Problem: Device-Related Complication Risk (Mechanical Ventilation, Invasive)  Goal: Optimal Device Function  Outcome: Ongoing, Progressing     Problem: Inability to Wean (Mechanical Ventilation, Invasive)  Goal: Mechanical Ventilation Liberation  Outcome: Ongoing, Progressing     Problem: Nutrition Impairment (Mechanical Ventilation, Invasive)  Goal: Optimal Nutrition Delivery  Outcome: Ongoing, Progressing     Problem: Skin and Tissue Injury (Mechanical Ventilation, Invasive)  Goal: Absence of Device-Related Skin and Tissue Injury  Outcome: Ongoing, Progressing     Problem: Ventilator-Induced Lung Injury (Mechanical Ventilation, Invasive)  Goal: Absence of Ventilator-Induced Lung Injury  Outcome: Ongoing, Progressing     Problem: Communication Impairment (Artificial Airway)  Goal: Effective Communication  Outcome: Ongoing, Progressing     Problem: Device-Related Complication Risk (Artificial Airway)  Goal: Optimal Device Function  Outcome: Ongoing, Progressing     Problem: Skin and Tissue Injury (Artificial Airway)  Goal: Absence of Device-Related Skin or Tissue Injury  Outcome: Ongoing, Progressing     Problem: Noninvasive Ventilation Acute  Goal: Effective Unassisted Ventilation and Oxygenation  Outcome: Ongoing, Progressing     Problem: Device-Related Complication Risk (Hemodialysis)  Goal: Safe, Effective Therapy Delivery  Outcome: Ongoing, Progressing     Problem: Hemodynamic Instability (Hemodialysis)  Goal: Effective Tissue  Perfusion  Outcome: Ongoing, Progressing     Problem: Infection (Hemodialysis)  Goal: Absence of Infection Signs and Symptoms  Outcome: Ongoing, Progressing     Problem: Airway Clearance Ineffective  Goal: Effective Airway Clearance  Outcome: Ongoing, Progressing     Problem: Breathing Pattern Ineffective  Goal: Effective Breathing Pattern  Outcome: Ongoing, Progressing     Problem: Gas Exchange Impaired  Goal: Optimal Gas Exchange  Outcome: Ongoing, Progressing     Problem: Adult Inpatient Plan of Care  Goal: Plan of Care Review  Outcome: Ongoing, Progressing  Goal: Patient-Specific Goal (Individualized)  Outcome: Ongoing, Progressing  Goal: Absence of Hospital-Acquired Illness or Injury  Outcome: Ongoing, Progressing  Goal: Optimal Comfort and Wellbeing  Outcome: Ongoing, Progressing  Goal: Readiness for Transition of Care  Outcome: Ongoing, Progressing     Problem: Fall Injury Risk  Goal: Absence of Fall and Fall-Related Injury  Outcome: Ongoing, Progressing     Problem: Skin Injury Risk Increased  Goal: Skin Health and Integrity  Outcome: Ongoing, Progressing     Problem: Infection  Goal: Absence of Infection Signs and Symptoms  Outcome: Ongoing, Progressing     Problem: Diabetes Comorbidity  Goal: Blood Glucose Level Within Targeted Range  Outcome: Ongoing, Progressing     Problem: Fluid and Electrolyte Imbalance (Acute Kidney Injury/Impairment)  Goal: Fluid and Electrolyte Balance  Outcome: Ongoing, Progressing     Problem: Oral Intake Inadequate (Acute Kidney Injury/Impairment)  Goal: Optimal Nutrition Intake  Outcome: Ongoing, Progressing     Problem: Renal Function Impairment (Acute Kidney Injury/Impairment)  Goal: Effective Renal Function  Outcome: Ongoing, Progressing     Problem: Impaired Wound Healing  Goal: Optimal Wound Healing  Outcome: Ongoing, Progressing     Problem: Communication Impairment (Mechanical Ventilation, Invasive)  Goal: Effective Communication  Outcome: Ongoing, Progressing      Problem: Device-Related Complication Risk (Mechanical Ventilation, Invasive)  Goal: Optimal Device Function  Outcome: Ongoing, Progressing     Problem: Inability to Wean (Mechanical Ventilation, Invasive)  Goal: Mechanical Ventilation Liberation  Outcome: Ongoing, Progressing     Problem: Nutrition Impairment (Mechanical Ventilation, Invasive)  Goal: Optimal Nutrition Delivery  Outcome: Ongoing, Progressing     Problem: Skin and Tissue Injury (Mechanical Ventilation, Invasive)  Goal: Absence of Device-Related Skin and Tissue Injury  Outcome: Ongoing, Progressing     Problem: Ventilator-Induced Lung Injury (Mechanical Ventilation, Invasive)  Goal: Absence of Ventilator-Induced Lung Injury  Outcome: Ongoing, Progressing     Problem: Communication Impairment (Artificial Airway)  Goal: Effective Communication  Outcome: Ongoing, Progressing     Problem: Device-Related Complication Risk (Artificial Airway)  Goal: Optimal Device Function  Outcome: Ongoing, Progressing     Problem: Skin and Tissue Injury (Artificial Airway)  Goal: Absence of Device-Related Skin or Tissue Injury  Outcome: Ongoing, Progressing     Problem: Noninvasive Ventilation Acute  Goal: Effective Unassisted Ventilation and Oxygenation  Outcome: Ongoing, Progressing     Problem: Device-Related Complication Risk (Hemodialysis)  Goal: Safe, Effective Therapy Delivery  Outcome: Ongoing, Progressing     Problem: Hemodynamic Instability (Hemodialysis)  Goal: Effective Tissue Perfusion  Outcome: Ongoing, Progressing     Problem: Infection (Hemodialysis)  Goal: Absence of Infection Signs and Symptoms  Outcome: Ongoing, Progressing     Problem: Airway Clearance Ineffective  Goal: Effective Airway Clearance  Outcome: Ongoing, Progressing     Problem: Breathing Pattern Ineffective  Goal: Effective Breathing Pattern  Outcome: Ongoing, Progressing     Problem: Gas Exchange Impaired  Goal: Optimal Gas Exchange  Outcome: Ongoing, Progressing

## 2022-09-15 NOTE — ASSESSMENT & PLAN NOTE
Failed extubated post CABG ()  Now with trach and peg     - increase to PSV 12 hours -   - Will plan to change trach out in the next day or two--continue to increase weaning as tolerated  - continue with current weaning plan for now. Will increase tomorrow  - His strength is slowly improving which will also improve weaning. Will start to increase weaning again today  - Oxygenating adequately on 40%. Plan for 12 hours continuous of cpap during the day and can continue to rest overnight on assist control  - continue current tx   - increase CPAP to 16hrs and rest on vent   - Doing well with 16 hours of cpap and then resting for 8. Will continue with this for now. Next plan will be to increase to continuous  - continue with 16 hours today. Discussed with respiratory.Will continue with 16 hours for the next couple of days and then increase   plan to increase to continuous as tolerated  - did well with continuous cpap which was started yesterday. Plan to continue for now.   9-10 - CPAP as toelrated   -  Trach downsized to 6 XLT --  - Had an episode of emesis and is thought to have aspirated. Chest xray reviewed with worsening bilateral infiltrates. I have  FiO2 to 60% and peep is at 10.  Might need to bronch the patient (therapeutic)  - FiO2 down to 50% and peep is at 8. Once FiO2 back to 40% we will resume cpap  - FiO2 down to 40%. Peep is at 8.  9/15-  Sat is 92% this am. I am going to check a chest xray this am. Increasing secretions with odor

## 2022-09-15 NOTE — PLAN OF CARE
Problem: Infection  Goal: Absence of Infection Signs and Symptoms  Outcome: Ongoing, Progressing  Intervention: Prevent or Manage Infection  Flowsheets (Taken 9/15/2022 2467)  Fever Reduction/Comfort Measures:   cool cloth applied   lightweight bedding   lightweight clothing  Infection Management: aseptic technique maintained  Isolation Precautions:   contact   precautions maintained

## 2022-09-15 NOTE — ASSESSMENT & PLAN NOTE
9/12/2022 Dialysis  9/13/2022 Continue with dialysis as scheduled.  9/14/2022  Continue with dialysis.  9/15/2022  Continue with dialysis.

## 2022-09-15 NOTE — ASSESSMENT & PLAN NOTE
Due to critical illness   9/9- Hgb is 6.4. Can transfuse 1 unit with next HD  9/11- VSS -- repeat CBC in AM   6.9/21.5 this am  BP on lower side. Will transfuse 1 unit with next dialysis session 9/14  Repeating CBC today- patient is hypotensive. If needed we will transfuse another unit

## 2022-09-15 NOTE — PT/OT/SLP PROGRESS
Physical Therapy      Patient Name:  Gregory Stuart   MRN:  [id number]    Patient not seen today secondary to Nurse/ RADHA hold. per RN, pt remains with low BP/declined medical status. Will follow-up next treatment.

## 2022-09-15 NOTE — ASSESSMENT & PLAN NOTE
accuchecks and Sliding scale insulin   A1C 5.3%  Currently on sliding scale  Glucose<180  Continue with current care  accuchecks look 61-80 in last 24 hours

## 2022-09-15 NOTE — NURSING
DR. MCCAIN INFORMED VIA SECURE CHAT  ABOUT PATIENTS B/P 78/42......83/34  ORDERS RECEIVED  MEDICATION GIVEN AS ORDERED .. WILL CONTINUE TO MONITOR

## 2022-09-15 NOTE — PROGRESS NOTES
[facility] [place]  Pulmonology  Progress Note    Patient Name: Gregory Stuart  MRN: [id number]  Admission Date: 8/23/2022  Hospital Length of Stay: 23 days  Code Status: No Order  Attending Provider: Ham Sanchez DO  Primary Care Provider: Primary Doctor No   Principal Problem: Acute hypoxemic respiratory failure    Subjective:     Interval History: No acute events overnight. The patient is currently resting comfortably. His blood pressure is running low this am. Also with increasing secretions. Peg tube removed yesterday and NG tube placed. Currently afebrile.    Objective:     Vital Signs (Most Recent):  Temp: 97.1 °F (36.2 °C) (09/15/22 0701)  Pulse: 93 (09/15/22 1000)  Resp: (!) 23 (09/15/22 1000)  BP: (!) 67/34 (09/15/22 1000)  SpO2: (!) 89 % (09/15/22 1000)   Vital Signs (24h Range):  Temp:  [97.1 °F (36.2 °C)-98.5 °F (36.9 °C)] 97.1 °F (36.2 °C)  Pulse:  [73-98] 93  Resp:  [15-29] 23  SpO2:  [83 %-97 %] 89 %  BP: (67-91)/(29-47) 67/34     Weight: 94.4 kg (208 lb 1.8 oz)  Body mass index is 26.72 kg/m².      Intake/Output Summary (Last 24 hours) at 9/15/2022 1049  Last data filed at 9/15/2022 0600  Gross per 24 hour   Intake 992 ml   Output 1300 ml   Net -308 ml         Physical Exam  Vitals reviewed.   Constitutional:       General: He is not in acute distress.     Appearance: He is ill-appearing.   HENT:      Head: Normocephalic and atraumatic.      Right Ear: External ear normal.      Left Ear: External ear normal.      Mouth/Throat:      Mouth: Mucous membranes are moist.   Eyes:      Extraocular Movements: Extraocular movements intact.      Conjunctiva/sclera: Conjunctivae normal.   Neck:      Comments: Trach in place   Cardiovascular:      Rate and Rhythm: Normal rate. Rhythm irregular.      Pulses: Normal pulses.   Pulmonary:      Breath sounds: No wheezing, rhonchi or rales.   Abdominal:      General: Bowel sounds are normal. There is distension.      Palpations: Abdomen  is soft.      Comments: Peg tube removed   Musculoskeletal:         General: Swelling present.      Right lower leg: Edema present.      Left lower leg: Edema present.      Comments: Edema to upper and lower ext. Worse in left arm than right -Hx radial fx; family states cast was removed due to edema    Skin:     General: Skin is warm and dry.   Neurological:      Mental Status: He is alert. Mental status is at baseline.   Psychiatric:         Mood and Affect: Mood normal.       Vents:  Vent Mode: A/C (09/15/22 0607)  Ventilator Initiated: Yes (09/02/22 0047)  Set Rate: 12 BPM (09/15/22 0607)  Vt Set: 550 mL (09/15/22 0607)  Pressure Support: 12 cmH20 (09/12/22 0350)  PEEP/CPAP: 8 cmH20 (09/15/22 0607)  Oxygen Concentration (%): 40 (09/15/22 0840)  Peak Airway Pressure: 42 cmH2O (09/15/22 0607)  Total Ve: 12.1 mL (09/15/22 0607)  F/VT Ratio<105 (RSBI): (!) 35.49 (09/15/22 0607)    Lines/Drains/Airways       Central Venous Catheter Line  Duration                  Hemodialysis Catheter right subclavian -- days              Drain  Duration                  NG/OG Tube 09/14/22 1340 Left nostril <1 day              Airway  Duration                  Surgical Airway Shiley Cuffed -- days              Peripheral Intravenous Line  Duration                  Peripheral IV - Single Lumen 08/23/22 1422 20 G Anterior;Right Forearm 22 days                    Significant Labs:    CBC/Anemia Profile:  No results for input(s): WBC, HGB, HCT, PLT, MCV, RDW, IRON, FERRITIN, RETIC, FOLATE, [id number], OCCULTBLOOD in the last 48 hours.       Chemistries:  No results for input(s): NA, K, CL, CO2, BUN, CREATININE, CALCIUM, ALBUMIN, PROT, BILITOT, ALKPHOS, ALT, AST, GLUCOSE, MG, PHOS in the last 48 hours.      All pertinent labs within the past 24 hours have been reviewed.    Significant Imaging:  I have reviewed all pertinent imaging results/findings within the past 24 hours.    Assessment/Plan:     * Acute hypoxemic respiratory  failure  Failed extubated post CABG ()  Now with trach and peg     - increase to PSV 12 hours -   - Will plan to change trach out in the next day or two--continue to increase weaning as tolerated  - continue with current weaning plan for now. Will increase tomorrow  - His strength is slowly improving which will also improve weaning. Will start to increase weaning again today  - Oxygenating adequately on 40%. Plan for 12 hours continuous of cpap during the day and can continue to rest overnight on assist control  - continue current tx   - increase CPAP to 16hrs and rest on vent   - Doing well with 16 hours of cpap and then resting for 8. Will continue with this for now. Next plan will be to increase to continuous  - continue with 16 hours today. Discussed with respiratory.Will continue with 16 hours for the next couple of days and then increase   plan to increase to continuous as tolerated  - did well with continuous cpap which was started yesterday. Plan to continue for now.   9-10 - CPAP as toelrated   -  Trach downsized to 6 XLT --  - Had an episode of emesis and is thought to have aspirated. Chest xray reviewed with worsening bilateral infiltrates. I have  FiO2 to 60% and peep is at 10.  Might need to bronch the patient (therapeutic)  - FiO2 down to 50% and peep is at 8. Once FiO2 back to 40% we will resume cpap  - FiO2 down to 40%. Peep is at 8.  9/15-  Sat is 92% this am. I am going to check a chest xray this am. Increasing secretions with odor    HTN (hypertension)  Resumed home medications   BP low normal this am. Held carvedilol as HD is scheduled for this am   decreased carvedilol to 6.25 bid  9/15- Holding carvediolol. BP low this am. Will give a small bolus and monitor response    Alteration in nutrition  S/p peg tube placement   continue tube feeding  Tolerating without difficulty   now with some leaking around peg tube  Will ask  surgery to take a look  9/15- Peg tube removed and NG tube placed  Abdomen distended today. Will check KUB    Weakness acquired in ICU  Extreme generalized weakness - continue PT/OT as tolerated  He is showing some improvement      DM (diabetes mellitus)  accuchecks and Sliding scale insulin   A1C 5.3%  Currently on sliding scale  Glucose<180  Continue with current care  accuchecks look 61-80 in last 24 hours    Anemia  Due to critical illness   9/9- Hgb is 6.4. Can transfuse 1 unit with next HD  9/11- VSS -- repeat CBC in AM   6.9/21.5 this am  BP on lower side. Will transfuse 1 unit with next dialysis session 9/14  Repeating CBC today- patient is hypotensive. If needed we will transfuse another unit    JUVENTINO (acute kidney injury)  Cr 0.9 March 2022   - was on CCRT post CABG -- transitioned to HD M/W/F  Plan to continue MWF dialysis  Nephrology note reviewed and appreciated    CAD (coronary artery disease)  S/p CABG on 07/18  Currently no acute issues  He is on aspirin, statin, beta blocker      Chronic systolic heart failure  EF 30-35% per Echo 07/2022  Daily wts/ I/Os   On the hypotensive side- holding carvedilol  I am going to give a small bolus today    A-fib  No history documented prior to CABG - was on Lovenox at OSH   - he is on carvediolol, eliquis  - HR is controlled                  Ham Sanchez,   Pulmonology  [facility] [place]

## 2022-09-15 NOTE — SUBJECTIVE & OBJECTIVE
Interval History: No acute events overnight. The patient is currently resting comfortably. His blood pressure is running low this am. Also with increasing secretions. Peg tube removed yesterday and NG tube placed. Currently afebrile.    Objective:     Vital Signs (Most Recent):  Temp: 97.1 °F (36.2 °C) (09/15/22 0701)  Pulse: 93 (09/15/22 1000)  Resp: (!) 23 (09/15/22 1000)  BP: (!) 67/34 (09/15/22 1000)  SpO2: (!) 89 % (09/15/22 1000)   Vital Signs (24h Range):  Temp:  [97.1 °F (36.2 °C)-98.5 °F (36.9 °C)] 97.1 °F (36.2 °C)  Pulse:  [73-98] 93  Resp:  [15-29] 23  SpO2:  [83 %-97 %] 89 %  BP: (67-91)/(29-47) 67/34     Weight: 94.4 kg (208 lb 1.8 oz)  Body mass index is 26.72 kg/m².      Intake/Output Summary (Last 24 hours) at 9/15/2022 1049  Last data filed at 9/15/2022 0600  Gross per 24 hour   Intake 992 ml   Output 1300 ml   Net -308 ml         Physical Exam  Vitals reviewed.   Constitutional:       General: He is not in acute distress.     Appearance: He is ill-appearing.   HENT:      Head: Normocephalic and atraumatic.      Right Ear: External ear normal.      Left Ear: External ear normal.      Mouth/Throat:      Mouth: Mucous membranes are moist.   Eyes:      Extraocular Movements: Extraocular movements intact.      Conjunctiva/sclera: Conjunctivae normal.   Neck:      Comments: Trach in place   Cardiovascular:      Rate and Rhythm: Normal rate. Rhythm irregular.      Pulses: Normal pulses.   Pulmonary:      Breath sounds: No wheezing, rhonchi or rales.   Abdominal:      General: Bowel sounds are normal. There is distension.      Palpations: Abdomen is soft.      Comments: Peg tube removed   Musculoskeletal:         General: Swelling present.      Right lower leg: Edema present.      Left lower leg: Edema present.      Comments: Edema to upper and lower ext. Worse in left arm than right -Hx radial fx; family states cast was removed due to edema    Skin:     General: Skin is warm and dry.   Neurological:       Mental Status: He is alert. Mental status is at baseline.   Psychiatric:         Mood and Affect: Mood normal.       Vents:  Vent Mode: A/C (09/15/22 0607)  Ventilator Initiated: Yes (09/02/22 0047)  Set Rate: 12 BPM (09/15/22 0607)  Vt Set: 550 mL (09/15/22 0607)  Pressure Support: 12 cmH20 (09/12/22 0350)  PEEP/CPAP: 8 cmH20 (09/15/22 0607)  Oxygen Concentration (%): 40 (09/15/22 0840)  Peak Airway Pressure: 42 cmH2O (09/15/22 0607)  Total Ve: 12.1 mL (09/15/22 0607)  F/VT Ratio<105 (RSBI): (!) 35.49 (09/15/22 0607)    Lines/Drains/Airways       Central Venous Catheter Line  Duration                  Hemodialysis Catheter right subclavian -- days              Drain  Duration                  NG/OG Tube 09/14/22 1340 Left nostril <1 day              Airway  Duration                  Surgical Airway Shiley Cuffed -- days              Peripheral Intravenous Line  Duration                  Peripheral IV - Single Lumen 08/23/22 1422 20 G Anterior;Right Forearm 22 days                    Significant Labs:    CBC/Anemia Profile:  No results for input(s): WBC, HGB, HCT, PLT, MCV, RDW, IRON, FERRITIN, RETIC, FOLATE, [id number], OCCULTBLOOD in the last 48 hours.       Chemistries:  No results for input(s): NA, K, CL, CO2, BUN, CREATININE, CALCIUM, ALBUMIN, PROT, BILITOT, ALKPHOS, ALT, AST, GLUCOSE, MG, PHOS in the last 48 hours.      All pertinent labs within the past 24 hours have been reviewed.    Significant Imaging:  I have reviewed all pertinent imaging results/findings within the past 24 hours.

## 2022-09-15 NOTE — PROGRESS NOTES
Patient remained hypotensive after dialysis.  Will start patient on midodrine and decrease the dose of Coreg

## 2022-09-15 NOTE — ASSESSMENT & PLAN NOTE
S/p peg tube placement   continue tube feeding  Tolerating without difficulty  9/14 now with some leaking around peg tube  Will ask surgery to take a look  9/15- Peg tube removed and NG tube placed  Abdomen distended today. Will check KUB

## 2022-09-15 NOTE — ASSESSMENT & PLAN NOTE
EF 30-35% per Echo 07/2022  Daily wts/ I/Os   On the hypotensive side- holding carvedilol  I am going to give a small bolus today

## 2022-09-15 NOTE — SUBJECTIVE & OBJECTIVE
Interval History: The patient appears to be a little weaker today.  He is on pressor medications.    Review of patient's allergies indicates:  Not on File  Current Facility-Administered Medications   Medication Frequency    0.9%  NaCl infusion (for blood administration) Q24H PRN    0.9%  NaCl infusion PRN    acetaminophen tablet 650 mg Q6H PRN    albuterol-ipratropium 2.5 mg-0.5 mg/3 mL nebulizer solution 3 mL Q12H    apixaban tablet 2.5 mg BID    aspirin chewable tablet 81 mg Daily    atorvastatin tablet 40 mg QHS    carvediloL tablet 3.125 mg BID    clindamycin in D5W 600 mg/50 mL IVPB 600 mg Q8H    collagenase ointment Daily PRN    dextromethorphan-guaiFENesin  mg/5 ml liquid 10 mL Q6H PRN    dextrose 50% injection 12.5 g PRN    doxepin capsule 25 mg QHS    ferrous sulfate tablet 1 each Daily    glucagon (human recombinant) injection 1 mg PRN    heparin (porcine) injection 4,000 Units PRN    insulin aspart U-100 injection 1-10 Units PRN    miconazole NITRATE 2 % top powder BID    midodrine tablet 2.5 mg BID WM    NORepinephrine 4 mg in dextrose 5 % 250 mL infusion Continuous    ondansetron injection 8 mg Q6H PRN    pantoprazole suspension 40 mg Daily    polyethylene glycol packet 17 g Daily PRN    senna-docusate 8.6-50 mg per tablet 1 tablet Daily PRN    venlafaxine tablet 37.5 mg BID       Objective:     Vital Signs (Most Recent):  Temp: 97.7 °F (36.5 °C) (09/15/22 1500)  Pulse: 98 (09/15/22 1600)  Resp: (!) 25 (09/15/22 1600)  BP: (!) 86/40 (09/15/22 1600)  SpO2: 96 % (09/15/22 1600)  O2 Device (Oxygen Therapy): ventilator (09/15/22 0840)   Vital Signs (24h Range):  Temp:  [97.1 °F (36.2 °C)-98.5 °F (36.9 °C)] 97.7 °F (36.5 °C)  Pulse:  [73-99] 98  Resp:  [15-29] 25  SpO2:  [83 %-100 %] 96 %  BP: (67-90)/(32-47) 86/40     Weight: 94.4 kg (208 lb 1.8 oz) (09/15/22 0521)  Body mass index is 26.72 kg/m².  Body surface area is 2.22 meters squared.    I/O last 3 completed shifts:  In: 2144 [Blood:300;  NG/GT:1794; IV Piggyback:50]  Out: 1300 [Other:1300]    Physical Exam  Vitals reviewed.   Constitutional:       Appearance: He is ill-appearing.   HENT:      Head: Normocephalic and atraumatic.   Cardiovascular:      Rate and Rhythm: Regular rhythm.   Pulmonary:      Effort: Pulmonary effort is normal.      Comments: The patient is on trach  Abdominal:      Palpations: Abdomen is soft.   Musculoskeletal:      Right lower leg: Edema present.      Left lower leg: Edema present.       Significant Labs:  BMP:   Recent Labs   Lab 09/12/22  0304   GLU 54*   *   K 4.4   CL 96*   CO2 25   BUN 81*   CREATININE 3.45*   CALCIUM 8.2*     CBC:   Recent Labs   Lab 09/12/22  0304   WBC 14.93*   RBC 2.29*   HGB 6.9*   HCT 21.5*      MCV 93.9   MCH 30.1   MCHC 32.1        Significant Imaging:  Labs: Reviewed

## 2022-09-15 NOTE — PROGRESS NOTES
[facility] [place] [facility]""  Nephrology  Progress Note    Patient Name: Gregory Stuart  MRN: [id number]  Admission Date: 8/23/2022  Hospital Length of Stay: 23 days  Attending Provider: Ham Sanchez DO   Primary Care Physician: Primary Doctor No  Principal Problem:Acute hypoxemic respiratory failure    Subjective:     HPI: This gentleman with history of CAD, CVA and renal failure.  The patient transferred from the [facility] for ventilator management and debility.  He has a history of CKD which progressed to ESRD after a CABG.  The patient has been on dialysis for about 3 weeks.  He continues to have diffuse edema.  His last dialysis session was on yesterday.  His family is at the bedside to answer questions.  Nephrology has been consulted for renal issues.      Interval History: The patient appears to be a little weaker today.  He is on pressor medications.    Review of patient's allergies indicates:  Not on File  Current Facility-Administered Medications   Medication Frequency    0.9%  NaCl infusion (for blood administration) Q24H PRN    0.9%  NaCl infusion PRN    acetaminophen tablet 650 mg Q6H PRN    albuterol-ipratropium 2.5 mg-0.5 mg/3 mL nebulizer solution 3 mL Q12H    apixaban tablet 2.5 mg BID    aspirin chewable tablet 81 mg Daily    atorvastatin tablet 40 mg QHS    carvediloL tablet 3.125 mg BID    clindamycin in D5W 600 mg/50 mL IVPB 600 mg Q8H    collagenase ointment Daily PRN    dextromethorphan-guaiFENesin  mg/5 ml liquid 10 mL Q6H PRN    dextrose 50% injection 12.5 g PRN    doxepin capsule 25 mg QHS    ferrous sulfate tablet 1 each Daily    glucagon (human recombinant) injection 1 mg PRN    heparin (porcine) injection 4,000 Units PRN    insulin aspart U-100 injection 1-10 Units PRN    miconazole NITRATE 2 % top powder BID    midodrine tablet 2.5 mg BID WM    NORepinephrine 4 mg in dextrose 5 % 250 mL infusion Continuous    ondansetron injection 8  mg Q6H PRN    pantoprazole suspension 40 mg Daily    polyethylene glycol packet 17 g Daily PRN    senna-docusate 8.6-50 mg per tablet 1 tablet Daily PRN    venlafaxine tablet 37.5 mg BID       Objective:     Vital Signs (Most Recent):  Temp: 97.7 °F (36.5 °C) (09/15/22 1500)  Pulse: 98 (09/15/22 1600)  Resp: (!) 25 (09/15/22 1600)  BP: (!) 86/40 (09/15/22 1600)  SpO2: 96 % (09/15/22 1600)  O2 Device (Oxygen Therapy): ventilator (09/15/22 0840)   Vital Signs (24h Range):  Temp:  [97.1 °F (36.2 °C)-98.5 °F (36.9 °C)] 97.7 °F (36.5 °C)  Pulse:  [73-99] 98  Resp:  [15-29] 25  SpO2:  [83 %-100 %] 96 %  BP: (67-90)/(32-47) 86/40     Weight: 94.4 kg (208 lb 1.8 oz) (09/15/22 0521)  Body mass index is 26.72 kg/m².  Body surface area is 2.22 meters squared.    I/O last 3 completed shifts:  In: 2144 [Blood:300; NG/GT:1794; IV Piggyback:50]  Out: 1300 [Other:1300]    Physical Exam  Vitals reviewed.   Constitutional:       Appearance: He is ill-appearing.   HENT:      Head: Normocephalic and atraumatic.   Cardiovascular:      Rate and Rhythm: Regular rhythm.   Pulmonary:      Effort: Pulmonary effort is normal.      Comments: The patient is on trach  Abdominal:      Palpations: Abdomen is soft.   Musculoskeletal:      Right lower leg: Edema present.      Left lower leg: Edema present.       Significant Labs:  BMP:   Recent Labs   Lab 09/12/22  0304   GLU 54*   *   K 4.4   CL 96*   CO2 25   BUN 81*   CREATININE 3.45*   CALCIUM 8.2*     CBC:   Recent Labs   Lab 09/12/22  0304   WBC 14.93*   RBC 2.29*   HGB 6.9*   HCT 21.5*      MCV 93.9   MCH 30.1   MCHC 32.1        Significant Imaging:  Labs: Reviewed    Assessment/Plan:     JUVENTINO (acute kidney injury)    9/12/2022 Dialysis  9/13/2022 Continue with dialysis as scheduled.  9/14/2022  Continue with dialysis.  9/15/2022  Continue with dialysis.    CAD (coronary artery disease)  Chronic condition    Chronic systolic heart failure  Chronic condition    A-fib  Chronic  condition        Thank you for your consult. I will follow-up with patient. Please contact us if you have any additional questions.    Andres Lino Jr, MD  Nephrology  [facility]

## 2022-09-15 NOTE — PT/OT/SLP PROGRESS
Occupational Therapy      Patient Name:  Gregory Stuart   MRN:  [id number]    Patient not seen today secondary to medical status. Will follow-up on next treatment day.    9/15/2022

## 2022-09-16 NOTE — SUBJECTIVE & OBJECTIVE
Interval History: No acute events overnight. The patient is currently resting comfortably. He is now on pressor. Also with copious thick secretions.Currently afebrile.    Objective:     Vital Signs (Most Recent):  Temp: 99 °F (37.2 °C) (09/16/22 0800)  Pulse: 105 (09/16/22 0915)  Resp: (!) 26 (09/16/22 0915)  BP: (!) 97/50 (09/16/22 0915)  SpO2: (!) 94 % (09/16/22 0915)   Vital Signs (24h Range):  Temp:  [97.1 °F (36.2 °C)-99 °F (37.2 °C)] 99 °F (37.2 °C)  Pulse:  [] 105  Resp:  [13-30] 26  SpO2:  [85 %-100 %] 94 %  BP: ()/(33-58) 97/50     Weight: 97.5 kg (214 lb 15.2 oz)  Body mass index is 27.6 kg/m².      Intake/Output Summary (Last 24 hours) at 9/16/2022 1044  Last data filed at 9/15/2022 2336  Gross per 24 hour   Intake 2433 ml   Output --   Net 2433 ml         Physical Exam  Vitals reviewed.   Constitutional:       General: He is not in acute distress.     Appearance: He is ill-appearing.   HENT:      Head: Normocephalic and atraumatic.      Right Ear: External ear normal.      Left Ear: External ear normal.      Mouth/Throat:      Mouth: Mucous membranes are moist.   Eyes:      Extraocular Movements: Extraocular movements intact.      Conjunctiva/sclera: Conjunctivae normal.   Neck:      Comments: Trach in place   Cardiovascular:      Rate and Rhythm: Normal rate. Rhythm irregular.      Pulses: Normal pulses.   Pulmonary:      Breath sounds: No wheezing, rhonchi or rales.   Abdominal:      General: Bowel sounds are normal. There is distension.      Palpations: Abdomen is soft.      Comments: Peg tube removed   Musculoskeletal:         General: Swelling present.      Right lower leg: Edema present.      Left lower leg: Edema present.      Comments: Edema to upper and lower ext. Worse in left arm than right -Hx radial fx; family states cast was removed due to edema    Skin:     General: Skin is warm and dry.   Neurological:      Mental Status: He is alert. Mental status is at baseline.    Psychiatric:         Mood and Affect: Mood normal.       Vents:  Vent Mode: A/C (09/16/22 0740)  Ventilator Initiated: Yes (09/02/22 0047)  Set Rate: 12 BPM (09/16/22 0740)  Vt Set: 550 mL (09/16/22 0740)  Pressure Support: 12 cmH20 (09/12/22 0350)  PEEP/CPAP: 10 cmH20 (09/16/22 0740)  Oxygen Concentration (%): 80 (09/16/22 0740)  Peak Airway Pressure: 44 cmH2O (09/16/22 0740)  Total Ve: 10 mL (09/16/22 0740)  F/VT Ratio<105 (RSBI): (!) 87.88 (09/16/22 0450)    Lines/Drains/Airways       Central Venous Catheter Line  Duration                  Hemodialysis Catheter right subclavian -- days              Drain  Duration                  NG/OG Tube 09/14/22 1340 Left nostril 1 day              Airway  Duration                  Surgical Airway Shiley Cuffed -- days              Peripheral Intravenous Line  Duration                  Peripheral IV - Single Lumen 08/23/22 1422 20 G Anterior;Right Forearm 23 days                    Significant Labs:    CBC/Anemia Profile:  Recent Labs   Lab 09/15/22  1239 09/16/22  0414   WBC 6.88 9.15   HGB 7.0* 7.2*   HCT 21.6* 22.1*    210   MCV 93.1 90.9   RDW 15.9* 15.7*          Chemistries:  Recent Labs   Lab 09/16/22  0414   *   K 3.7   CL 94*   CO2 24   BUN 72*   CREATININE 3.05*   CALCIUM 8.8         All pertinent labs within the past 24 hours have been reviewed.    Significant Imaging:  I have reviewed all pertinent imaging results/findings within the past 24 hours.

## 2022-09-16 NOTE — ASSESSMENT & PLAN NOTE
Due to critical illness   9/9- Hgb is 6.4. Can transfuse 1 unit with next HD  9/11- VSS -- repeat CBC in AM   6.9/21.5 this am  BP on lower side. Will transfuse 1 unit with next dialysis session 9/14  Repeating CBC today- patient is hypotensive. If needed we will transfuse another unit  Hgb 7.2 today

## 2022-09-16 NOTE — ASSESSMENT & PLAN NOTE
Failed extubated post CABG ()  Now with trach and peg     - increase to PSV 12 hours -   - Will plan to change trach out in the next day or two--continue to increase weaning as tolerated  - continue with current weaning plan for now. Will increase tomorrow  - His strength is slowly improving which will also improve weaning. Will start to increase weaning again today  - Oxygenating adequately on 40%. Plan for 12 hours continuous of cpap during the day and can continue to rest overnight on assist control  - continue current tx   - increase CPAP to 16hrs and rest on vent   - Doing well with 16 hours of cpap and then resting for 8. Will continue with this for now. Next plan will be to increase to continuous  - continue with 16 hours today. Discussed with respiratory.Will continue with 16 hours for the next couple of days and then increase   plan to increase to continuous as tolerated  - did well with continuous cpap which was started yesterday. Plan to continue for now.   9-10 - CPAP as toelrated   -  Trach downsized to 6 XLT --  - Had an episode of emesis and is thought to have aspirated. Chest xray reviewed with worsening bilateral infiltrates. I have  FiO2 to 60% and peep is at 10.  Might need to bronch the patient (therapeutic)  - FiO2 down to 50% and peep is at 8. Once FiO2 back to 40% we will resume cpap  - FiO2 down to 40%. Peep is at 8.  9/15-  Sat is 92% this am. I am going to check a chest xray this am. Increasing secretions with odor  Plan for bronchoscopy today (therapeutic) due to copious thick secretions

## 2022-09-16 NOTE — PROGRESS NOTES
[facility]  Pulmonology  Progress Note    Patient Name: Gregory Stuart  MRN: [id number]  Admission Date: 8/23/2022  Hospital Length of Stay: 24 days  Code Status: No Order  Attending Provider: Ham Sanchez DO  Primary Care Provider: Primary Doctor No   Principal Problem: Acute hypoxemic respiratory failure    Subjective:     Interval History: No acute events overnight. The patient is currently resting comfortably. He is now on pressor. Also with copious thick secretions.Currently afebrile.    Objective:     Vital Signs (Most Recent):  Temp: 99 °F (37.2 °C) (09/16/22 0800)  Pulse: 105 (09/16/22 0915)  Resp: (!) 26 (09/16/22 0915)  BP: (!) 97/50 (09/16/22 0915)  SpO2: (!) 94 % (09/16/22 0915)   Vital Signs (24h Range):  Temp:  [97.1 °F (36.2 °C)-99 °F (37.2 °C)] 99 °F (37.2 °C)  Pulse:  [] 105  Resp:  [13-30] 26  SpO2:  [85 %-100 %] 94 %  BP: ()/(33-58) 97/50     Weight: 97.5 kg (214 lb 15.2 oz)  Body mass index is 27.6 kg/m².      Intake/Output Summary (Last 24 hours) at 9/16/2022 1044  Last data filed at 9/15/2022 2336  Gross per 24 hour   Intake 2433 ml   Output --   Net 2433 ml         Physical Exam  Vitals reviewed.   Constitutional:       General: He is not in acute distress.     Appearance: He is ill-appearing.   HENT:      Head: Normocephalic and atraumatic.      Right Ear: External ear normal.      Left Ear: External ear normal.      Mouth/Throat:      Mouth: Mucous membranes are moist.   Eyes:      Extraocular Movements: Extraocular movements intact.      Conjunctiva/sclera: Conjunctivae normal.   Neck:      Comments: Trach in place   Cardiovascular:      Rate and Rhythm: Normal rate. Rhythm irregular.      Pulses: Normal pulses.   Pulmonary:      Breath sounds: No wheezing, rhonchi or rales.   Abdominal:      General: Bowel sounds are normal. There is distension.      Palpations: Abdomen is soft.      Comments: Peg tube removed   Musculoskeletal:          General: Swelling present.      Right lower leg: Edema present.      Left lower leg: Edema present.      Comments: Edema to upper and lower ext. Worse in left arm than right -Hx radial fx; family states cast was removed due to edema    Skin:     General: Skin is warm and dry.   Neurological:      Mental Status: He is alert. Mental status is at baseline.   Psychiatric:         Mood and Affect: Mood normal.       Vents:  Vent Mode: A/C (09/16/22 0740)  Ventilator Initiated: Yes (09/02/22 0047)  Set Rate: 12 BPM (09/16/22 0740)  Vt Set: 550 mL (09/16/22 0740)  Pressure Support: 12 cmH20 (09/12/22 0350)  PEEP/CPAP: 10 cmH20 (09/16/22 0740)  Oxygen Concentration (%): 80 (09/16/22 0740)  Peak Airway Pressure: 44 cmH2O (09/16/22 0740)  Total Ve: 10 mL (09/16/22 0740)  F/VT Ratio<105 (RSBI): (!) 87.88 (09/16/22 0450)    Lines/Drains/Airways       Central Venous Catheter Line  Duration                  Hemodialysis Catheter right subclavian -- days              Drain  Duration                  NG/OG Tube 09/14/22 1340 Left nostril 1 day              Airway  Duration                  Surgical Airway Shiley Cuffed -- days              Peripheral Intravenous Line  Duration                  Peripheral IV - Single Lumen 08/23/22 1422 20 G Anterior;Right Forearm 23 days                    Significant Labs:    CBC/Anemia Profile:  Recent Labs   Lab 09/15/22  1239 09/16/22  0414   WBC 6.88 9.15   HGB 7.0* 7.2*   HCT 21.6* 22.1*    210   MCV 93.1 90.9   RDW 15.9* 15.7*          Chemistries:  Recent Labs   Lab 09/16/22  0414   *   K 3.7   CL 94*   CO2 24   BUN 72*   CREATININE 3.05*   CALCIUM 8.8         All pertinent labs within the past 24 hours have been reviewed.    Significant Imaging:  I have reviewed all pertinent imaging results/findings within the past 24 hours.    Assessment/Plan:     * Acute hypoxemic respiratory failure  Failed extubated post CABG (7/18)  Now with trach and peg     8/28- increase to PSV 12  hours -   - Will plan to change trach out in the next day or two--continue to increase weaning as tolerated  - continue with current weaning plan for now. Will increase tomorrow  - His strength is slowly improving which will also improve weaning. Will start to increase weaning again today  - Oxygenating adequately on 40%. Plan for 12 hours continuous of cpap during the day and can continue to rest overnight on assist control  - continue current tx   - increase CPAP to 16hrs and rest on vent   - Doing well with 16 hours of cpap and then resting for 8. Will continue with this for now. Next plan will be to increase to continuous  - continue with 16 hours today. Discussed with respiratory.Will continue with 16 hours for the next couple of days and then increase   plan to increase to continuous as tolerated  - did well with continuous cpap which was started yesterday. Plan to continue for now.   9-10 - CPAP as toelrated   -  Trach downsized to 6 XLT --  - Had an episode of emesis and is thought to have aspirated. Chest xray reviewed with worsening bilateral infiltrates. I have  FiO2 to 60% and peep is at 10.  Might need to bronch the patient (therapeutic)  - FiO2 down to 50% and peep is at 8. Once FiO2 back to 40% we will resume cpap  - FiO2 down to 40%. Peep is at 8.  9/15-  Sat is 92% this am. I am going to check a chest xray this am. Increasing secretions with odor  Plan for bronchoscopy today (therapeutic) due to copious thick secretions    HTN (hypertension)  Resumed home medications   BP low normal this am. Held carvedilol as HD is scheduled for this am   decreased carvedilol to 6.25 bid  9/15- Holding carvediolol. BP low this am. Will give a small bolus and monitor response  - now on pressor    Weakness acquired in ICU  Extreme generalized weakness - continue PT/OT as tolerated  Seems to have regressed      DM (diabetes mellitus)  accuchecks and  Sliding scale insulin   A1C 5.3%  Currently on sliding scale  Glucose<180  Continue with current care  accuchecks look ok in last 24 hours    Anemia  Due to critical illness   9/9- Hgb is 6.4. Can transfuse 1 unit with next HD  9/11- VSS -- repeat CBC in AM   6.9/21.5 this am  BP on lower side. Will transfuse 1 unit with next dialysis session 9/14  Repeating CBC today- patient is hypotensive. If needed we will transfuse another unit  Hgb 7.2 today    JUVENTINO (acute kidney injury)  Cr 0.9 March 2022   - was on CCRT post CABG -- transitioned to HD M/W/F  Plan to continue MWF dialysis  Nephrology note reviewed and appreciated    CAD (coronary artery disease)  S/p CABG on 07/18  Currently no acute issues  He is on aspirin, statin, beta blocker      Chronic systolic heart failure  EF 30-35% per Echo 07/2022  Daily wts/ I/Os   On the hypotensive side- holding carvedilol  I am going to give a small bolus     A-fib  No history documented prior to CABG - was on Lovenox at OSH   - he is on carvediolol, eliquis  - HR is controlled                  Ham Sanchez, DO  Pulmonology  [facility]

## 2022-09-16 NOTE — ASSESSMENT & PLAN NOTE
accuchecks and Sliding scale insulin   A1C 5.3%  Currently on sliding scale  Glucose<180  Continue with current care  accuchecks look ok in last 24 hours

## 2022-09-16 NOTE — PLAN OF CARE
Per IDTM continue with current plan of care. Pt Vent trach trials. Pt on dialysis. Wound care daily. PT and OT treatment. SS will follow for discharge needs.

## 2022-09-16 NOTE — ASSESSMENT & PLAN NOTE
EF 30-35% per Echo 07/2022  Daily wts/ I/Os   On the hypotensive side- holding carvedilol  I am going to give a small bolus

## 2022-09-16 NOTE — PLAN OF CARE
Problem: Communication Impairment (Mechanical Ventilation, Invasive)  Goal: Effective Communication  Outcome: Ongoing, Progressing     Problem: Device-Related Complication Risk (Mechanical Ventilation, Invasive)  Goal: Optimal Device Function  Outcome: Ongoing, Progressing     Problem: Inability to Wean (Mechanical Ventilation, Invasive)  Goal: Mechanical Ventilation Liberation  Outcome: Ongoing, Progressing     Problem: Skin and Tissue Injury (Mechanical Ventilation, Invasive)  Goal: Absence of Device-Related Skin and Tissue Injury  Outcome: Ongoing, Progressing     Problem: Ventilator-Induced Lung Injury (Mechanical Ventilation, Invasive)  Goal: Absence of Ventilator-Induced Lung Injury  Outcome: Ongoing, Progressing     Problem: Communication Impairment (Artificial Airway)  Goal: Effective Communication  Outcome: Ongoing, Progressing     Problem: Device-Related Complication Risk (Artificial Airway)  Goal: Optimal Device Function  Outcome: Ongoing, Progressing     Problem: Skin and Tissue Injury (Artificial Airway)  Goal: Absence of Device-Related Skin or Tissue Injury  Outcome: Ongoing, Progressing     Problem: Airway Clearance Ineffective  Goal: Effective Airway Clearance  Outcome: Ongoing, Progressing     Problem: Breathing Pattern Ineffective  Goal: Effective Breathing Pattern  Outcome: Ongoing, Progressing     Problem: Gas Exchange Impaired  Goal: Optimal Gas Exchange  Outcome: Ongoing, Progressing

## 2022-09-16 NOTE — PT/OT/SLP PROGRESS
Physical Therapy      Patient Name:  Gregory Stuart   MRN:  [id number]    Patient not seen today secondary to cont declined medical status. Will follow-up next treatment session as pt is able.

## 2022-09-16 NOTE — ASSESSMENT & PLAN NOTE
Resumed home medications   BP low normal this am. Held carvedilol as HD is scheduled for this am  9/13 decreased carvedilol to 6.25 bid  9/15- Holding carvediolol. BP low this am. Will give a small bolus and monitor response  9/16- now on pressor

## 2022-09-16 NOTE — DIALYSIS ROUNDING
"          Nephrology Department            NAME: Gregory Stuart   YOB: 1945  MRN: [id number]  NOTE DATE: 09/16/2022       The patient is seen on dialysis.  His blood pressure continues to run low.    Patient complains:  None.      REVIEW OF SYSTEMS:  he reports no shortness of breath, nausea or vomiting. No acute changes.    VITALS:  height is 6' 2" (1.88 m) and weight is 97.5 kg (214 lb 15.2 oz). His temperature is 97 °F (36.1 °C). His blood pressure is 88/44 (abnormal) and his pulse is 98. His respiration is 25 (abnormal) and oxygen saturation is 87% (abnormal).  Hemodialysis documentation flowsheet reviewed with dialysis nurse, including weight and vitals.    Patient is ill-appearing  Respiration unlabored. Lungs clear.  Heart regular, no rub.  Abdomen soft, nontender, positive bowl sounds  2 plus edema.    Recent Labs   Lab 09/12/22  0304 09/16/22  0414   * 129*   K 4.4 3.7   CL 96* 94*   CO2 25 24   BUN 81* 72*   CREATININE 3.45* 3.05*   CALCIUM 8.2* 8.8     Recent Labs   Lab 09/12/22  0304 09/15/22  1239 09/16/22  0414   WBC 14.93* 6.88 9.15   HGB 6.9* 7.0* 7.2*   HCT 21.5* 21.6* 22.1*    175 210       ASSESSMENT/PLAN:    Will give 25% albumin  Continue with scheduled hemodialysis for this patient.    Andres Lino Jr, MD   "

## 2022-09-16 NOTE — PT/OT/SLP PROGRESS
Occupational Therapy      Patient Name:  Gregory Stuart   MRN:  [id number]    Patient not seen today secondary to  (secondary to medical status). Will follow-up on next treatment day.    9/16/2022

## 2022-09-16 NOTE — NURSING
Dr Sanchez notified via secure chat of pt sat dropping to 85%, labored breathing and pt pupils fixed. Dr Sanchez replied he will be to see pt in a few.

## 2022-09-17 NOTE — NURSING
Dr. Sanchez on unit. Informed him of high residuals and tube feeding turned off. Orders given to connect pt to low intermittent suction. 1000cc maroon and tan drainage noted when pt connected to Low intermittent suction. Levophed adjusted to 0.25mcg/kg (91 cc) due to systolic remaining in the 70's and MAP below 60.

## 2022-09-17 NOTE — PLAN OF CARE
Problem: Communication Impairment (Mechanical Ventilation, Invasive)  Goal: Effective Communication  Outcome: Ongoing, Progressing     Problem: Device-Related Complication Risk (Mechanical Ventilation, Invasive)  Goal: Optimal Device Function  Outcome: Ongoing, Progressing     Problem: Inability to Wean (Mechanical Ventilation, Invasive)  Goal: Mechanical Ventilation Liberation  Outcome: Ongoing, Progressing     Problem: Nutrition Impairment (Mechanical Ventilation, Invasive)  Goal: Optimal Nutrition Delivery  Outcome: Ongoing, Progressing     Problem: Skin and Tissue Injury (Mechanical Ventilation, Invasive)  Goal: Absence of Device-Related Skin and Tissue Injury  Outcome: Ongoing, Progressing     Problem: Ventilator-Induced Lung Injury (Mechanical Ventilation, Invasive)  Goal: Absence of Ventilator-Induced Lung Injury  Outcome: Ongoing, Progressing     Problem: Communication Impairment (Artificial Airway)  Goal: Effective Communication  Outcome: Ongoing, Progressing     Problem: Device-Related Complication Risk (Artificial Airway)  Goal: Optimal Device Function  Outcome: Ongoing, Progressing     Problem: Skin and Tissue Injury (Artificial Airway)  Goal: Absence of Device-Related Skin or Tissue Injury  Outcome: Ongoing, Progressing     Problem: Airway Clearance Ineffective  Goal: Effective Airway Clearance  Outcome: Ongoing, Progressing     Problem: Gas Exchange Impaired  Goal: Optimal Gas Exchange  Outcome: Ongoing, Progressing

## 2022-09-17 NOTE — NURSING
Rec'd pt bedresting. Eyes closed. Did not open eyes when name called. Patient did not respond to sternal rub. Coarse bilateral lung sounds noted. #6 XL Shiley trach noted with thick tan/brown secretions noted. [place] sump noted to left nare. Tube feeding turned off. Residual 420cc noted with tan and bloody drainage noted. Dialysis catheter noted to right chest wall. Abd non distended and shiny. Dsg noted to abd  d/I. Bowel sounds sluggish in all four quadrants. Generalized edema noted. Int to left arm with Levophed infusing @0.01mcg/kg=36.6cc. Non skid socks noted. Will continue to monitor.

## 2022-09-17 NOTE — ASSESSMENT & PLAN NOTE
No history documented prior to CABG - was on Lovenox at OSH   - he is on carvediolol, eliquis  - HR is ok

## 2022-09-17 NOTE — ASSESSMENT & PLAN NOTE
S/p peg tube placement   continue tube feeding  Tolerating without difficulty  9/14 now with some leaking around peg tube  Will ask surgery to take a look  9/15- Peg tube removed and NG tube placed  Abdomen distended today. Will check KUB  No obstruction  Held tube feeds due to residuals

## 2022-09-17 NOTE — ASSESSMENT & PLAN NOTE
accuchecks and Sliding scale insulin   A1C 5.3%  Currently on sliding scale  Glucose<180  Continue with current care  accuchecks look ok in last 24 hours  I have started some steroids so sugars will likely increase some

## 2022-09-17 NOTE — ASSESSMENT & PLAN NOTE
Failed extubated post CABG ()  Now with trach and peg     - increase to PSV 12 hours -   - Will plan to change trach out in the next day or two--continue to increase weaning as tolerated  - continue with current weaning plan for now. Will increase tomorrow  - His strength is slowly improving which will also improve weaning. Will start to increase weaning again today  - Oxygenating adequately on 40%. Plan for 12 hours continuous of cpap during the day and can continue to rest overnight on assist control  - continue current tx   - increase CPAP to 16hrs and rest on vent   - Doing well with 16 hours of cpap and then resting for 8. Will continue with this for now. Next plan will be to increase to continuous  - continue with 16 hours today. Discussed with respiratory.Will continue with 16 hours for the next couple of days and then increase   plan to increase to continuous as tolerated  - did well with continuous cpap which was started yesterday. Plan to continue for now.   9-10 - CPAP as toelrated   -  Trach downsized to 6 XLT --  - Had an episode of emesis and is thought to have aspirated. Chest xray reviewed with worsening bilateral infiltrates. I have  FiO2 to 60% and peep is at 10.  Might need to bronch the patient (therapeutic)  - FiO2 down to 50% and peep is at 8. Once FiO2 back to 40% we will resume cpap  - FiO2 down to 40%. Peep is at 8.  9/15-  Sat is 92% this am. I am going to check a chest xray this am. Increasing secretions with odor  Plan for bronchoscopy today (therapeutic) due to copious thick secretions  - Unable to perform bronchoscopy yesterday. The patient currently is too ill. I am concerned that he might not tolerate the procedure. We have started the patient on cefepime renally dosed due to Klebsiella in respiratory culture. Continuing with clindamycin to cover anaerobes. Increased breathing treatments to q6 and added solumedrol 40  mg q12

## 2022-09-17 NOTE — PROGRESS NOTES
[facility]  Nephrology  Progress Note    Patient Name: Gregory Stuart  MRN: [id number]  Admission Date: 8/23/2022  Hospital Length of Stay: 25 days  Attending Provider: Ham Sanchez DO   Primary Care Physician: Primary Doctor No  Principal Problem:Acute hypoxemic respiratory failure    Consults  Subjective:     Interval History: F/U ESRD. Remains quite ill and declining.    Review of patient's allergies indicates:  Not on File  Current Facility-Administered Medications   Medication Frequency    0.9%  NaCl infusion (for blood administration) Q24H PRN    0.9%  NaCl infusion PRN    acetaminophen tablet 650 mg Q6H PRN    albuterol-ipratropium 2.5 mg-0.5 mg/3 mL nebulizer solution 3 mL Q6H    aspirin chewable tablet 81 mg Daily    atorvastatin tablet 40 mg QHS    carvediloL tablet 3.125 mg BID    ceFEPIme (MAXIPIME) 1 g in dextrose 5 % in water (D5W) 5 % 50 mL IVPB (MB+) Once    [START ON 9/18/2022] ceFEPIme (MAXIPIME) 1 g in dextrose 5 % in water (D5W) 5 % 50 mL IVPB (MB+) Q24H    clindamycin in D5W 600 mg/50 mL IVPB 600 mg Q8H    collagenase ointment Daily PRN    dextromethorphan-guaiFENesin  mg/5 ml liquid 10 mL Q6H PRN    dextrose 50% injection 12.5 g PRN    doxepin capsule 25 mg QHS    ferrous sulfate tablet 1 each Daily    glucagon (human recombinant) injection 1 mg PRN    heparin (porcine) injection 4,000 Units PRN    insulin aspart U-100 injection 1-10 Units PRN    methylPREDNISolone sodium succinate injection 40 mg Q12H    miconazole NITRATE 2 % top powder BID    midodrine tablet 5 mg BID WM    NORepinephrine 4 mg in dextrose 5 % 250 mL infusion Continuous    ondansetron injection 8 mg Q6H PRN    pantoprazole suspension 40 mg Daily    polyethylene glycol packet 17 g Daily PRN    senna-docusate 8.6-50 mg per tablet 1 tablet Daily PRN    venlafaxine tablet 37.5 mg BID       Objective:     Vital Signs (Most Recent):  Temp: 98.7 °F (37.1 °C) (09/17/22 0730)  Pulse: 108  (09/17/22 0750)  Resp: (!) 24 (09/17/22 0750)  BP: (!) 83/38 (09/17/22 0730)  SpO2: 95 % (09/17/22 0750)  O2 Device (Oxygen Therapy): ventilator (09/17/22 0341)   Vital Signs (24h Range):  Temp:  [98.2 °F (36.8 °C)-99 °F (37.2 °C)] 98.7 °F (37.1 °C)  Pulse:  [] 108  Resp:  [12-30] 24  SpO2:  [82 %-98 %] 95 %  BP: ()/(29-54) 83/38     Weight: 99.7 kg (219 lb 12.8 oz) (09/17/22 0609)  Body mass index is 28.22 kg/m².  Body surface area is 2.28 meters squared.    I/O last 3 completed shifts:  In: 3134.1 [I.V.:1230.1; NG/GT:1704; IV Piggyback:200]  Out: 888 [Other:888]    Physical Exam Does not respond during my exam. No edema    Significant Labs:sureBMP:   Recent Labs   Lab 09/16/22  0414      *   K 3.7   CL 94*   CO2 24   BUN 72*   CREATININE 3.05*   CALCIUM 8.8     CBC:   Recent Labs   Lab 09/17/22  1052   WBC 22.73*   RBC 2.40*   HGB 7.2*   HCT 22.8*   *   MCV 95.0   MCH 30.0   MCHC 31.6*     All labs within the past 24 hours have been reviewed.    Significant Imaging:  Labs: Reviewed    Assessment/Plan:     Active Diagnoses:    Diagnosis Date Noted POA    PRINCIPAL PROBLEM:  Acute hypoxemic respiratory failure [J96.01] 08/23/2022 Unknown    PEG tube malfunction [K94.23] 09/14/2022 Clinically Undetermined    Open wound of anterior abdominal wall without complication [S31.109A] 08/30/2022 Yes    Pressure injury of left leg, unstageable [L89.890] 08/30/2022 Yes    Wound dehiscence [T81.30XA] 08/30/2022 Yes    Pressure injury of sacral region, unstageable [L89.150] 08/29/2022 Yes    A-fib [I48.91] 08/23/2022 Unknown    Chronic systolic heart failure [I50.22] 08/23/2022 Unknown    CAD (coronary artery disease) [I25.10] 08/23/2022 Unknown    JUVENTINO (acute kidney injury) [N17.9] 08/23/2022 Unknown    Anemia [D64.9] 08/23/2022 Unknown    DM (diabetes mellitus) [E11.9] 08/23/2022 Unknown    Left radial fracture [S52.92XA] 08/23/2022 Unknown    Weakness acquired in ICU [R53.1] 08/23/2022 Unknown     Alteration in nutrition [R63.8] 08/23/2022 Unknown    HLD (hyperlipidemia) [E78.5] 08/23/2022 Unknown    HTN (hypertension) [I10] 08/23/2022 Unknown      Problems Resolved During this Admission:       [place]ronelle poor. Continuing support    Thank you for your consult. I will follow-up with patient. Please contact us if you have any additional questions.    Derek Hill MD  Nephrology  [facility] [place]

## 2022-09-17 NOTE — PROGRESS NOTES
[facility]  Pulmonology  Progress Note    Patient Name: Gregory Stuart  MRN: [id number]  Admission Date: 8/23/2022  Hospital Length of Stay: 25 days  Code Status: No Order  Attending Provider: Ham Sanchez DO  Primary Care Provider: Primary Doctor No   Principal Problem: Acute hypoxemic respiratory failure    Subjective:     Interval History: The patient remains critically ill this am. He remains on pressors and high FIO2. He is currently afebrile.    Objective:     Vital Signs (Most Recent):  Temp: 98.7 °F (37.1 °C) (09/17/22 0730)  Pulse: 108 (09/17/22 0750)  Resp: (!) 24 (09/17/22 0750)  BP: (!) 83/38 (09/17/22 0730)  SpO2: 95 % (09/17/22 0750)   Vital Signs (24h Range):  Temp:  [98.2 °F (36.8 °C)-99 °F (37.2 °C)] 98.7 °F (37.1 °C)  Pulse:  [] 108  Resp:  [12-30] 24  SpO2:  [82 %-98 %] 95 %  BP: ()/(29-54) 83/38     Weight: 99.7 kg (219 lb 12.8 oz)  Body mass index is 28.22 kg/m².      Intake/Output Summary (Last 24 hours) at 9/17/2022 1215  Last data filed at 9/17/2022 0417  Gross per 24 hour   Intake 2032.13 ml   Output 888 ml   Net 1144.13 ml         Physical Exam  Vitals reviewed.   Constitutional:       General: He is not in acute distress.     Appearance: He is ill-appearing.   HENT:      Head: Normocephalic and atraumatic.      Right Ear: External ear normal.      Left Ear: External ear normal.      Mouth/Throat:      Mouth: Mucous membranes are moist.   Eyes:      Extraocular Movements: Extraocular movements intact.      Conjunctiva/sclera: Conjunctivae normal.   Neck:      Comments: Trach in place   Cardiovascular:      Rate and Rhythm: Normal rate. Rhythm irregular.      Pulses: Normal pulses.   Pulmonary:      Breath sounds: No wheezing, rhonchi or rales.   Abdominal:      General: Bowel sounds are normal. There is distension.      Palpations: Abdomen is soft.      Comments: Peg tube removed   Musculoskeletal:         General: Swelling present.       Right lower leg: Edema present.      Left lower leg: Edema present.      Comments: Edema to upper and lower ext. Worse in left arm than right -Hx radial fx; family states cast was removed due to edema    Skin:     General: Skin is warm and dry.   Neurological:      Mental Status: He is alert. Mental status is at baseline.   Psychiatric:         Mood and Affect: Mood normal.       Vents:  Vent Mode: A/C (09/17/22 0341)  Ventilator Initiated: Yes (09/02/22 0047)  Set Rate: 12 BPM (09/17/22 0341)  Vt Set: 550 mL (09/17/22 0341)  Pressure Support: 12 cmH20 (09/12/22 0350)  PEEP/CPAP: 12 cmH20 (09/17/22 0341)  Oxygen Concentration (%): 80 (09/17/22 0341)  Peak Airway Pressure: 45 cmH2O (09/17/22 0341)  Total Ve: 7.9 mL (09/17/22 0341)  F/VT Ratio<105 (RSBI): (!) 96.43 (09/16/22 2311)    Lines/Drains/Airways       Central Venous Catheter Line  Duration                  Hemodialysis Catheter right subclavian -- days              Drain  Duration                  NG/OG Tube 09/14/22 1340 Left nostril 2 days              Airway  Duration                  Surgical Airway Shiley Cuffed -- days              Peripheral Intravenous Line  Duration                  Peripheral IV - Single Lumen 08/23/22 1422 20 G Anterior;Right Forearm 24 days                    Significant Labs:    CBC/Anemia Profile:  Recent Labs   Lab 09/15/22  1239 09/16/22  0414 09/17/22  1052   WBC 6.88 9.15 22.73*   HGB 7.0* 7.2* 7.2*   HCT 21.6* 22.1* 22.8*    210 148*   MCV 93.1 90.9 95.0   RDW 15.9* 15.7* 16.0*          Chemistries:  Recent Labs   Lab 09/16/22  0414   *   K 3.7   CL 94*   CO2 24   BUN 72*   CREATININE 3.05*   CALCIUM 8.8         All pertinent labs within the past 24 hours have been reviewed.    Significant Imaging:  I have reviewed all pertinent imaging results/findings within the past 24 hours.    Assessment/Plan:     * Acute hypoxemic respiratory failure  Failed extubated post CABG (7/18)  Now with trach and peg     8/28-  increase to PSV 12 hours -   - Will plan to change trach out in the next day or two--continue to increase weaning as tolerated  - continue with current weaning plan for now. Will increase tomorrow  - His strength is slowly improving which will also improve weaning. Will start to increase weaning again today  - Oxygenating adequately on 40%. Plan for 12 hours continuous of cpap during the day and can continue to rest overnight on assist control  - continue current tx   - increase CPAP to 16hrs and rest on vent   - Doing well with 16 hours of cpap and then resting for 8. Will continue with this for now. Next plan will be to increase to continuous  - continue with 16 hours today. Discussed with respiratory.Will continue with 16 hours for the next couple of days and then increase   plan to increase to continuous as tolerated  - did well with continuous cpap which was started yesterday. Plan to continue for now.   9-10 - CPAP as toelrated   -  Trach downsized to 6 XLT --  - Had an episode of emesis and is thought to have aspirated. Chest xray reviewed with worsening bilateral infiltrates. I have  FiO2 to 60% and peep is at 10.  Might need to bronch the patient (therapeutic)  - FiO2 down to 50% and peep is at 8. Once FiO2 back to 40% we will resume cpap  - FiO2 down to 40%. Peep is at 8.  9/15-  Sat is 92% this am. I am going to check a chest xray this am. Increasing secretions with odor  Plan for bronchoscopy today (therapeutic) due to copious thick secretions  - Unable to perform bronchoscopy yesterday. The patient currently is too ill. I am concerned that he might not tolerate the procedure. We have started the patient on cefepime renally dosed due to Klebsiella in respiratory culture. Continuing with clindamycin to cover anaerobes. Increased breathing treatments to q6 and added solumedrol 40 mg q12    HTN (hypertension)  Resumed home medications   BP low  normal this am. Held carvedilol as HD is scheduled for this am  9/13 decreased carvedilol to 6.25 bid  9/15- Holding carvediolol. BP low this am. Will give a small bolus and monitor response  9/16- now on pressor    Alteration in nutrition  S/p peg tube placement   continue tube feeding  Tolerating without difficulty  9/14 now with some leaking around peg tube  Will ask surgery to take a look  9/15- Peg tube removed and NG tube placed  Abdomen distended today. Will check KUB  No obstruction  Held tube feeds due to residuals    DM (diabetes mellitus)  accuchecks and Sliding scale insulin   A1C 5.3%  Currently on sliding scale  Glucose<180  Continue with current care  accuchecks look ok in last 24 hours  I have started some steroids so sugars will likely increase some    Anemia  Due to critical illness   9/9- Hgb is 6.4. Can transfuse 1 unit with next HD  9/11- VSS -- repeat CBC in AM   6.9/21.5 this am  BP on lower side. Will transfuse 1 unit with next dialysis session 9/14  Repeating CBC today- patient is hypotensive. If needed we will transfuse another unit  Hgb 7.2 today    JUVENTINO (acute kidney injury)  Cr 0.9 March 2022   - was on CCRT post CABG -- transitioned to HD M/W/F  Plan to continue MWF dialysis  Nephrology note reviewed and appreciated    CAD (coronary artery disease)  S/p CABG on 07/18  Currently no acute issues  He is on aspirin, statin, beta blocker      A-fib  No history documented prior to CABG - was on Lovenox at OSH   - he is on carvediolol, eliquis  - HR is ok      The patient remains critically ill. This note includes approximately 30 minutes of critical care time spent in the management of this patient. This includes review of labs, data, imaging, and vent management.           Ham Sanchez,   Pulmonology  [facility]

## 2022-09-17 NOTE — NURSING
Tan color secretions noted in intermitteent suction. Cannister changed and 100cc tan thick secretions in tubing.

## 2022-09-18 NOTE — NURSING
Bedresting. Remains on AC vent mode. Weeping noted to amparo arms with pad changes. Dsg changes completed. Levophed infusing @ 0.25mcg/kg. [place] sump noted to low intermittent suction. 3+ generalized pitting edema noted. Non skid socks noted. Will pass on report to 7p shift.

## 2022-09-18 NOTE — ASSESSMENT & PLAN NOTE
Due to critical illness   9/9- Hgb is 6.4. Can transfuse 1 unit with next HD  9/11- VSS -- repeat CBC in AM   6.9/21.5 this am  BP on lower side. Will transfuse 1 unit with next dialysis session 9/14  Repeating CBC today- patient is hypotensive. If needed we will transfuse another unit  Hgb 7.2 most recent

## 2022-09-18 NOTE — NURSING
Contacted MD Grey Quintero via secure chat/phone due to patients tele status vfib/vtach. No answer.

## 2022-09-18 NOTE — PROGRESS NOTES
[facility] [place] [facility]\""  Nephrology  Progress Note    Patient Name: Gregory Stuart  MRN: [id number]  Admission Date: 8/23/2022  Hospital Length of Stay: 26 days  Attending Provider: Ham Sanchez DO   Primary Care Physician: Primary Doctor No  Principal Problem:Acute hypoxemic respiratory failure    Consults  Subjective:     Interval History: F/U renal failure. Making little progress.    Review of patient's allergies indicates:  Not on File  Current Facility-Administered Medications   Medication Frequency    0.9%  NaCl infusion (for blood administration) Q24H PRN    0.9%  NaCl infusion PRN    acetaminophen tablet 650 mg Q6H PRN    albuterol-ipratropium 2.5 mg-0.5 mg/3 mL nebulizer solution 3 mL Q6H    aspirin chewable tablet 81 mg Daily    atorvastatin tablet 40 mg QHS    carvediloL tablet 3.125 mg BID    ceFEPIme (MAXIPIME) 1 g in dextrose 5 % in water (D5W) 5 % 50 mL IVPB (MB+) Q24H    clindamycin in D5W 600 mg/50 mL IVPB 600 mg Q8H    collagenase ointment Daily PRN    dextromethorphan-guaiFENesin  mg/5 ml liquid 10 mL Q6H PRN    dextrose 50% injection 12.5 g PRN    doxepin capsule 25 mg QHS    ferrous sulfate tablet 1 each Daily    glucagon (human recombinant) injection 1 mg PRN    heparin (porcine) injection 4,000 Units PRN    insulin aspart U-100 injection 1-10 Units PRN    methylPREDNISolone sodium succinate injection 40 mg Q12H    miconazole NITRATE 2 % top powder BID    midodrine tablet 5 mg BID WM    NORepinephrine 4 mg in dextrose 5 % 250 mL infusion Continuous    ondansetron injection 8 mg Q6H PRN    pantoprazole suspension 40 mg Daily    polyethylene glycol packet 17 g Daily PRN    senna-docusate 8.6-50 mg per tablet 1 tablet Daily PRN    venlafaxine tablet 37.5 mg BID       Objective:     Vital Signs (Most Recent):  Temp: 98.7 °F (37.1 °C) (09/18/22 0730)  Pulse: (!) 112 (09/18/22 0915)  Resp: 14 (09/18/22 0915)  BP: (!) 91/43 (09/18/22 0915)  SpO2: 99 % (09/18/22  0915)  O2 Device (Oxygen Therapy): ventilator (09/18/22 0735)   Vital Signs (24h Range):  Temp:  [98.7 °F (37.1 °C)-99.6 °F (37.6 °C)] 98.7 °F (37.1 °C)  Pulse:  [102-127] 112  Resp:  [13-28] 14  SpO2:  [97 %-100 %] 99 %  BP: ()/(36-55) 91/43     Weight: 98.6 kg (217 lb 6 oz) (09/18/22 0456)  Body mass index is 27.91 kg/m².  Body surface area is 2.27 meters squared.    I/O last 3 completed shifts:  In: 350 [I.V.:250; IV Piggyback:100]  Out: 200 [Drains:200]    Physical Exam Poorly responsive. Chest clear. Trached    Significant Labs:sureBMP:   Recent Labs   Lab 09/16/22  0414      *   K 3.7   CL 94*   CO2 24   BUN 72*   CREATININE 3.05*   CALCIUM 8.8     All labs within the past 24 hours have been reviewed.    Significant Imaging:  Labs: Reviewed    Assessment/Plan:     Active Diagnoses:    Diagnosis Date Noted POA    PRINCIPAL PROBLEM:  Acute hypoxemic respiratory failure [J96.01] 08/23/2022 Unknown    PEG tube malfunction [K94.23] 09/14/2022 Clinically Undetermined    Open wound of anterior abdominal wall without complication [S31.109A] 08/30/2022 Yes    Pressure injury of left leg, unstageable [L89.890] 08/30/2022 Yes    Wound dehiscence [T81.30XA] 08/30/2022 Yes    Pressure injury of sacral region, unstageable [L89.150] 08/29/2022 Yes    A-fib [I48.91] 08/23/2022 Unknown    Chronic systolic heart failure [I50.22] 08/23/2022 Unknown    CAD (coronary artery disease) [I25.10] 08/23/2022 Unknown    JUVENTINO (acute kidney injury) [N17.9] 08/23/2022 Unknown    Anemia [D64.9] 08/23/2022 Unknown    DM (diabetes mellitus) [E11.9] 08/23/2022 Unknown    Left radial fracture [S52.92XA] 08/23/2022 Unknown    Weakness acquired in ICU [R53.1] 08/23/2022 Unknown    Alteration in nutrition [R63.8] 08/23/2022 Unknown    HLD (hyperlipidemia) [E78.5] 08/23/2022 Unknown    HTN (hypertension) [I10] 08/23/2022 Unknown      Problems Resolved During this Admission:       Continue support but outlook poor  Thank you for  your consult. I will follow-up with patient. Please contact us if you have any additional questions.    Derek Hill MD  Nephrology  [facility]

## 2022-09-18 NOTE — PROGRESS NOTES
[facility]  Pulmonology  Progress Note    Patient Name: Gregory Stuart  MRN: [id number]  Admission Date: 8/23/2022  Hospital Length of Stay: 26 days  Code Status: No Order  Attending Provider: Ham Sanchez DO  Primary Care Provider: Primary Doctor No   Principal Problem: Acute hypoxemic respiratory failure    Subjective:     Interval History: The patient remains critically ill this am. He remains on pressors. He is oxygenating adequately on 60% FiO2. He is currently afebrile.    Objective:     Vital Signs (Most Recent):  Temp: 98.7 °F (37.1 °C) (09/18/22 0730)  Pulse: (!) 112 (09/18/22 0915)  Resp: 14 (09/18/22 0915)  BP: (!) 91/43 (09/18/22 0915)  SpO2: 99 % (09/18/22 0915)   Vital Signs (24h Range):  Temp:  [98.7 °F (37.1 °C)-99.6 °F (37.6 °C)] 98.7 °F (37.1 °C)  Pulse:  [102-127] 112  Resp:  [13-28] 14  SpO2:  [97 %-100 %] 99 %  BP: ()/(36-55) 91/43     Weight: 98.6 kg (217 lb 6 oz)  Body mass index is 27.91 kg/m².      Intake/Output Summary (Last 24 hours) at 9/18/2022 1222  Last data filed at 9/18/2022 0400  Gross per 24 hour   Intake 50 ml   Output 200 ml   Net -150 ml         Physical Exam  Vitals reviewed.   Constitutional:       General: He is not in acute distress.     Appearance: He is ill-appearing.   HENT:      Head: Normocephalic and atraumatic.      Right Ear: External ear normal.      Left Ear: External ear normal.      Mouth/Throat:      Mouth: Mucous membranes are moist.   Eyes:      Extraocular Movements: Extraocular movements intact.      Conjunctiva/sclera: Conjunctivae normal.   Neck:      Comments: Trach in place   Cardiovascular:      Rate and Rhythm: Normal rate. Rhythm irregular.      Pulses: Normal pulses.   Pulmonary:      Breath sounds: No wheezing, rhonchi or rales.   Abdominal:      General: Bowel sounds are normal. There is distension.      Palpations: Abdomen is soft.      Comments: Peg tube removed   Musculoskeletal:         General:  Swelling present.      Right lower leg: Edema present.      Left lower leg: Edema present.      Comments: Edema to upper and lower ext. Worse in left arm than right -Hx radial fx; family states cast was removed due to edema    Skin:     General: Skin is warm and dry.   Neurological:      Mental Status: He is alert. Mental status is at baseline.   Psychiatric:         Mood and Affect: Mood normal.       Vents:  Vent Mode: A/C (09/18/22 0735)  Ventilator Initiated: Yes (09/02/22 0047)  Set Rate: 12 BPM (09/18/22 0735)  Vt Set: 550 mL (09/18/22 0735)  Pressure Support: 12 cmH20 (09/12/22 0350)  PEEP/CPAP: 12 cmH20 (09/18/22 0735)  Oxygen Concentration (%): 60 (09/18/22 0735)  Peak Airway Pressure: 50 cmH2O (09/18/22 0735)  Total Ve: 7.1 mL (09/18/22 0735)  F/VT Ratio<105 (RSBI): (!) 57.01 (09/18/22 0735)    Lines/Drains/Airways       Central Venous Catheter Line  Duration                  Hemodialysis Catheter right subclavian -- days              Drain  Duration                  NG/OG Tube 09/14/22 1340 Left nostril 3 days              Airway  Duration                  Surgical Airway Shiley Cuffed -- days              Peripheral Intravenous Line  Duration                  Peripheral IV - Single Lumen 08/23/22 1422 20 G Anterior;Right Forearm 25 days                    Significant Labs:    CBC/Anemia Profile:  Recent Labs   Lab 09/17/22  1052   WBC 22.73*   HGB 7.2*   HCT 22.8*   *   MCV 95.0   RDW 16.0*          Chemistries:  No results for input(s): NA, K, CL, CO2, BUN, CREATININE, CALCIUM, ALBUMIN, PROT, BILITOT, ALKPHOS, ALT, AST, GLUCOSE, MG, PHOS in the last 48 hours.      All pertinent labs within the past 24 hours have been reviewed.    Significant Imaging:  I have reviewed all pertinent imaging results/findings within the past 24 hours.    Assessment/Plan:     * Acute hypoxemic respiratory failure  Failed extubated post CABG (7/18)  Now with trach and peg     8/28- increase to PSV 12 hours -   8/29-  Will plan to change trach out in the next day or two--continue to increase weaning as tolerated  - continue with current weaning plan for now. Will increase tomorrow  - His strength is slowly improving which will also improve weaning. Will start to increase weaning again today  - Oxygenating adequately on 40%. Plan for 12 hours continuous of cpap during the day and can continue to rest overnight on assist control  - continue current tx   - increase CPAP to 16hrs and rest on vent   - Doing well with 16 hours of cpap and then resting for 8. Will continue with this for now. Next plan will be to increase to continuous  - continue with 16 hours today. Discussed with respiratory.Will continue with 16 hours for the next couple of days and then increase   plan to increase to continuous as tolerated  - did well with continuous cpap which was started yesterday. Plan to continue for now.   9-10 - CPAP as toelrated   -  Trach downsized to 6 XLT --  - Had an episode of emesis and is thought to have aspirated. Chest xray reviewed with worsening bilateral infiltrates. I have  FiO2 to 60% and peep is at 10.  Might need to bronch the patient (therapeutic)  - FiO2 down to 50% and peep is at 8. Once FiO2 back to 40% we will resume cpap  - FiO2 down to 40%. Peep is at 8.  9/15-  Sat is 92% this am. I am going to check a chest xray this am. Increasing secretions with odor  Plan for bronchoscopy today (therapeutic) due to copious thick secretions  - Unable to perform bronchoscopy yesterday. The patient currently is too ill. I am concerned that he might not tolerate the procedure. We have started the patient on cefepime renally dosed due to Klebsiella in respiratory culture. Continuing with clindamycin to cover anaerobes. Increased breathing treatments to q6 and added solumedrol 40 mg q12  - FiO2 now at 60% and peep is 10    HTN (hypertension)  Resumed home medications   BP low  normal this am. Held carvedilol as HD is scheduled for this am  9/13 decreased carvedilol to 6.25 bid  9/15- Holding carvediolol. BP low this am. Will give a small bolus and monitor response  9/18- still on pressor    Alteration in nutrition  S/p peg tube placement   continue tube feeding  Tolerating without difficulty  9/14 now with some leaking around peg tube  Will ask surgery to take a look  9/15- Peg tube removed and NG tube placed  Abdomen distended today. Will check KUB  No obstruction  Held tube feeds due to residuals    DM (diabetes mellitus)  accuchecks and Sliding scale insulin   A1C 5.3%  Currently on sliding scale  Glucose<180  Continue with current care  accuchecks look ok in last 24 hours  I have started some steroids so sugars will likely increase some    Anemia  Due to critical illness   9/9- Hgb is 6.4. Can transfuse 1 unit with next HD  9/11- VSS -- repeat CBC in AM   6.9/21.5 this am  BP on lower side. Will transfuse 1 unit with next dialysis session 9/14  Repeating CBC today- patient is hypotensive. If needed we will transfuse another unit  Hgb 7.2 most recent    JUVENTINO (acute kidney injury)  Cr 0.9 March 2022   - was on CCRT post CABG -- transitioned to HD M/W/F  Plan to continue MWF dialysis  Nephrology note reviewed and appreciated    CAD (coronary artery disease)  S/p CABG on 07/18  Currently no acute issues  He is on aspirin, statin, beta blocker      A-fib  No history documented prior to CABG - was on Lovenox at OSH   - he is on carvediolol, eliquis  - HR is ok      The patient remains critically ill. This note includes approximately 30 minutes of critical care time spent in the management of this patient. This includes review of labs, data, imaging, and vent management.   This also includes discussion with decision maker.         Ham Sanchez,   Pulmonology  [facility]

## 2022-09-18 NOTE — NURSING
1149-No insulin given for BG of 179, Pt NPO and feeding on hold  1815-No insulin given for BG of 192, Pt feeding continous hold

## 2022-09-18 NOTE — NURSING
Made Dr Sanchez aware that whereas this pt had brown drainage coming from the side of his trach it is now green, the same as what's passing through his NGT, with a loud girgling sound noted as well. No new orders given

## 2022-09-18 NOTE — ASSESSMENT & PLAN NOTE
Failed extubated post CABG ()  Now with trach and peg     - increase to PSV 12 hours -   - Will plan to change trach out in the next day or two--continue to increase weaning as tolerated  - continue with current weaning plan for now. Will increase tomorrow  - His strength is slowly improving which will also improve weaning. Will start to increase weaning again today  - Oxygenating adequately on 40%. Plan for 12 hours continuous of cpap during the day and can continue to rest overnight on assist control  - continue current tx   - increase CPAP to 16hrs and rest on vent   - Doing well with 16 hours of cpap and then resting for 8. Will continue with this for now. Next plan will be to increase to continuous  - continue with 16 hours today. Discussed with respiratory.Will continue with 16 hours for the next couple of days and then increase   plan to increase to continuous as tolerated  - did well with continuous cpap which was started yesterday. Plan to continue for now.   9-10 - CPAP as toelrated   -  Trach downsized to 6 XLT --  - Had an episode of emesis and is thought to have aspirated. Chest xray reviewed with worsening bilateral infiltrates. I have  FiO2 to 60% and peep is at 10.  Might need to bronch the patient (therapeutic)  - FiO2 down to 50% and peep is at 8. Once FiO2 back to 40% we will resume cpap  - FiO2 down to 40%. Peep is at 8.  9/15-  Sat is 92% this am. I am going to check a chest xray this am. Increasing secretions with odor  Plan for bronchoscopy today (therapeutic) due to copious thick secretions  - Unable to perform bronchoscopy yesterday. The patient currently is too ill. I am concerned that he might not tolerate the procedure. We have started the patient on cefepime renally dosed due to Klebsiella in respiratory culture. Continuing with clindamycin to cover anaerobes. Increased breathing treatments to q6 and added solumedrol 40  mg q12  9/18- FiO2 now at 60% and peep is 10

## 2022-09-18 NOTE — PLAN OF CARE
Problem: Communication Impairment (Mechanical Ventilation, Invasive)  Goal: Effective Communication  Outcome: Ongoing, Progressing     Problem: Device-Related Complication Risk (Mechanical Ventilation, Invasive)  Goal: Optimal Device Function  Outcome: Ongoing, Progressing     Problem: Inability to Wean (Mechanical Ventilation, Invasive)  Goal: Mechanical Ventilation Liberation  Outcome: Ongoing, Progressing     Problem: Communication Impairment (Artificial Airway)  Goal: Effective Communication  Outcome: Ongoing, Progressing     Problem: Device-Related Complication Risk (Artificial Airway)  Goal: Optimal Device Function  Outcome: Ongoing, Progressing     Problem: Airway Clearance Ineffective  Goal: Effective Airway Clearance  Outcome: Ongoing, Progressing     Problem: Breathing Pattern Ineffective  Goal: Effective Breathing Pattern  Outcome: Ongoing, Progressing     Problem: Gas Exchange Impaired  Goal: Optimal Gas Exchange  Outcome: Ongoing, Progressing

## 2022-09-18 NOTE — PLAN OF CARE
Problem: Impaired Wound Healing  Goal: Optimal Wound Healing  Outcome: Ongoing, Progressing     Problem: Communication Impairment (Mechanical Ventilation, Invasive)  Goal: Effective Communication  Outcome: Ongoing, Progressing     Problem: Device-Related Complication Risk (Mechanical Ventilation, Invasive)  Goal: Optimal Device Function  Outcome: Ongoing, Progressing

## 2022-09-18 NOTE — ASSESSMENT & PLAN NOTE
Resumed home medications   BP low normal this am. Held carvedilol as HD is scheduled for this am  9/13 decreased carvedilol to 6.25 bid  9/15- Holding carvediolol. BP low this am. Will give a small bolus and monitor response  9/18- still on pressor

## 2022-09-18 NOTE — SUBJECTIVE & OBJECTIVE
Interval History: The patient remains critically ill this am. He remains on pressors. He is oxygenating adequately on 60% FiO2. He is currently afebrile.    Objective:     Vital Signs (Most Recent):  Temp: 98.7 °F (37.1 °C) (09/18/22 0730)  Pulse: (!) 112 (09/18/22 0915)  Resp: 14 (09/18/22 0915)  BP: (!) 91/43 (09/18/22 0915)  SpO2: 99 % (09/18/22 0915)   Vital Signs (24h Range):  Temp:  [98.7 °F (37.1 °C)-99.6 °F (37.6 °C)] 98.7 °F (37.1 °C)  Pulse:  [102-127] 112  Resp:  [13-28] 14  SpO2:  [97 %-100 %] 99 %  BP: ()/(36-55) 91/43     Weight: 98.6 kg (217 lb 6 oz)  Body mass index is 27.91 kg/m².      Intake/Output Summary (Last 24 hours) at 9/18/2022 1222  Last data filed at 9/18/2022 0400  Gross per 24 hour   Intake 50 ml   Output 200 ml   Net -150 ml         Physical Exam  Vitals reviewed.   Constitutional:       General: He is not in acute distress.     Appearance: He is ill-appearing.   HENT:      Head: Normocephalic and atraumatic.      Right Ear: External ear normal.      Left Ear: External ear normal.      Mouth/Throat:      Mouth: Mucous membranes are moist.   Eyes:      Extraocular Movements: Extraocular movements intact.      Conjunctiva/sclera: Conjunctivae normal.   Neck:      Comments: Trach in place   Cardiovascular:      Rate and Rhythm: Normal rate. Rhythm irregular.      Pulses: Normal pulses.   Pulmonary:      Breath sounds: No wheezing, rhonchi or rales.   Abdominal:      General: Bowel sounds are normal. There is distension.      Palpations: Abdomen is soft.      Comments: Peg tube removed   Musculoskeletal:         General: Swelling present.      Right lower leg: Edema present.      Left lower leg: Edema present.      Comments: Edema to upper and lower ext. Worse in left arm than right -Hx radial fx; family states cast was removed due to edema    Skin:     General: Skin is warm and dry.   Neurological:      Mental Status: He is alert. Mental status is at baseline.   Psychiatric:          Mood and Affect: Mood normal.       Vents:  Vent Mode: A/C (09/18/22 0735)  Ventilator Initiated: Yes (09/02/22 0047)  Set Rate: 12 BPM (09/18/22 0735)  Vt Set: 550 mL (09/18/22 0735)  Pressure Support: 12 cmH20 (09/12/22 0350)  PEEP/CPAP: 12 cmH20 (09/18/22 0735)  Oxygen Concentration (%): 60 (09/18/22 0735)  Peak Airway Pressure: 50 cmH2O (09/18/22 0735)  Total Ve: 7.1 mL (09/18/22 0735)  F/VT Ratio<105 (RSBI): (!) 57.01 (09/18/22 0735)    Lines/Drains/Airways       Central Venous Catheter Line  Duration                  Hemodialysis Catheter right subclavian -- days              Drain  Duration                  NG/OG Tube 09/14/22 1340 Left nostril 3 days              Airway  Duration                  Surgical Airway Shiley Cuffed -- days              Peripheral Intravenous Line  Duration                  Peripheral IV - Single Lumen 08/23/22 1422 20 G Anterior;Right Forearm 25 days                    Significant Labs:    CBC/Anemia Profile:  Recent Labs   Lab 09/17/22  1052   WBC 22.73*   HGB 7.2*   HCT 22.8*   *   MCV 95.0   RDW 16.0*          Chemistries:  No results for input(s): NA, K, CL, CO2, BUN, CREATININE, CALCIUM, ALBUMIN, PROT, BILITOT, ALKPHOS, ALT, AST, GLUCOSE, MG, PHOS in the last 48 hours.      All pertinent labs within the past 24 hours have been reviewed.    Significant Imaging:  I have reviewed all pertinent imaging results/findings within the past 24 hours.

## 2022-09-19 NOTE — PROGRESS NOTES
[facility]  General Surgery  Progress Note    Subjective:     Interval History:  Over the weekend patient started getting more lethargic and hypotensive requiring pressure support white count this morning was elevated.  Also with high residuals likely with small-bowel obstruction    Post-Op Info:  * No surgery found *          Medications:  Continuous Infusions:   NORepinephrine bitartrate-D5W 0.1 mcg/kg/min (09/19/22 0435)     Scheduled Meds:   albuterol-ipratropium  3 mL Nebulization Q6H    aspirin  81 mg Per G Tube Daily    atorvastatin  40 mg Per G Tube QHS    carvediloL  3.125 mg Per G Tube BID    ceFEPime (MAXIPIME) IVPB  1 g Intravenous Q24H    clindamycin (CLEOCIN) IVPB  600 mg Intravenous Q8H    doxepin  25 mg Per G Tube QHS    ferrous sulfate  1 tablet Per G Tube Daily    methylPREDNISolone sodium succinate injection  40 mg Intravenous Q12H    miconazole NITRATE 2 %   Topical (Top) BID    midodrine  5 mg Per G Tube BID WM    pantoprazole  40 mg Per G Tube Daily    venlafaxine  37.5 mg Per G Tube BID     PRN Meds:sodium chloride, sodium chloride, sodium chloride 0.9%, acetaminophen, collagenase, dextromethorphan-guaiFENesin  mg/5 ml, dextrose 50%, glucagon (human recombinant), heparin (porcine), insulin aspart U-100, ondansetron, polyethylene glycol, senna-docusate 8.6-50 mg     Objective:     Vital Signs (Most Recent):  Temp: 98.6 °F (37 °C) (09/19/22 0300)  Pulse: 103 (09/19/22 0700)  Resp: 10 (09/19/22 0700)  BP: (!) 107/51 (09/19/22 0700)  SpO2: 100 % (09/19/22 0700)   Vital Signs (24h Range):  Temp:  [37.4 °F (3 °C)-98.8 °F (37.1 °C)] 98.6 °F (37 °C)  Pulse:  [] 103  Resp:  [7-28] 10  SpO2:  [95 %-100 %] 100 %  BP: ()/(32-70) 107/51       Intake/Output Summary (Last 24 hours) at 9/19/2022 0753  Last data filed at 9/19/2022 0415  Gross per 24 hour   Intake 450 ml   Output 300 ml   Net 150 ml       Physical Exam  Constitutional:       Appearance: He is  ill-appearing.   Cardiovascular:      Rate and Rhythm: Normal rate.   Pulmonary:      Effort: Respiratory distress present.       Significant Labs:  CBC:   Recent Labs   Lab 09/19/22  0410   WBC 28.99*   RBC 2.23*   HGB 6.7*   HCT 19.8*   *   MCV 88.8   MCH 30.0   MCHC 33.8     CMP:   Recent Labs   Lab 09/19/22  0410   *   CALCIUM 8.0*   *   K 4.9   CO2 23   CL 90*   BUN 83*   CREATININE 3.25*       Significant Diagnostics:  None    Assessment/Plan:     Active Diagnoses:    Diagnosis Date Noted POA    PRINCIPAL PROBLEM:  Acute hypoxemic respiratory failure [J96.01] 08/23/2022 Unknown    PEG tube malfunction [K94.23] 09/14/2022 Clinically Undetermined    Open wound of anterior abdominal wall without complication [S31.109A] 08/30/2022 Yes    Pressure injury of left leg, unstageable [L89.890] 08/30/2022 Yes    Wound dehiscence [T81.30XA] 08/30/2022 Yes    Pressure injury of sacral region, unstageable [L89.150] 08/29/2022 Yes    A-fib [I48.91] 08/23/2022 Unknown    Chronic systolic heart failure [I50.22] 08/23/2022 Unknown    CAD (coronary artery disease) [I25.10] 08/23/2022 Unknown    JUVENTINO (acute kidney injury) [N17.9] 08/23/2022 Unknown    Anemia [D64.9] 08/23/2022 Unknown    DM (diabetes mellitus) [E11.9] 08/23/2022 Unknown    Left radial fracture [S52.92XA] 08/23/2022 Unknown    Weakness acquired in ICU [R53.1] 08/23/2022 Unknown    Alteration in nutrition [R63.8] 08/23/2022 Unknown    HLD (hyperlipidemia) [E78.5] 08/23/2022 Unknown    HTN (hypertension) [I10] 08/23/2022 Unknown      Problems Resolved During this Admission:     Will not be placing PEG today.  Will obtain a KUB possibly septic source in the abdomen.  Might require CT scan versus surgery.  Patient was also made a DNR over the weekend.  Will monitor closely.    Isiah Valadez MD  General Surgery  [facility]

## 2022-09-19 NOTE — PROGRESS NOTES
[facility]  Pulmonology  Progress Note    Patient Name: Gregory Stuart  MRN: [id number]  Admission Date: 8/23/2022  Hospital Length of Stay: 27 days  Code Status: DNR  Attending Provider: Ham Sanchez DO  Primary Care Provider: Primary Doctor No   Principal Problem: Acute hypoxemic respiratory failure    Subjective:     Interval History: The patient remains critically ill this am. He remains on pressors. He is oxygenating adequately on 50% FiO2. He is currently afebrile.    Objective:     Vital Signs (Most Recent):  Temp: 98.6 °F (37 °C) (09/19/22 0300)  Pulse: 103 (09/19/22 0700)  Resp: 10 (09/19/22 0700)  BP: (!) 107/51 (09/19/22 0700)  SpO2: 100 % (09/19/22 0700)   Vital Signs (24h Range):  Temp:  [37.4 °F (3 °C)-98.8 °F (37.1 °C)] 98.6 °F (37 °C)  Pulse:  [] 103  Resp:  [7-28] 10  SpO2:  [95 %-100 %] 100 %  BP: ()/(32-70) 107/51     Weight: 99.8 kg (220 lb)  Body mass index is 28.25 kg/m².      Intake/Output Summary (Last 24 hours) at 9/19/2022 0749  Last data filed at 9/19/2022 0415  Gross per 24 hour   Intake 450 ml   Output 300 ml   Net 150 ml         Physical Exam  Vitals reviewed.   Constitutional:       General: He is not in acute distress.     Appearance: He is ill-appearing.   HENT:      Head: Normocephalic and atraumatic.      Right Ear: External ear normal.      Left Ear: External ear normal.      Mouth/Throat:      Mouth: Mucous membranes are moist.   Eyes:      Extraocular Movements: Extraocular movements intact.      Conjunctiva/sclera: Conjunctivae normal.   Neck:      Comments: Trach in place   Cardiovascular:      Rate and Rhythm: Normal rate. Rhythm irregular.      Pulses: Normal pulses.   Pulmonary:      Breath sounds: No wheezing, rhonchi or rales.   Abdominal:      General: Bowel sounds are normal. There is distension.      Palpations: Abdomen is soft.      Comments: Peg tube removed   Musculoskeletal:         General: Swelling present.       Right lower leg: Edema present.      Left lower leg: Edema present.      Comments: Edema to upper and lower ext. Worse in left arm than right -Hx radial fx; family states cast was removed due to edema    Skin:     General: Skin is warm and dry.   Neurological:      Mental Status: He is alert. Mental status is at baseline.   Psychiatric:         Mood and Affect: Mood normal.       Vents:  Vent Mode: A/C (09/19/22 0443)  Ventilator Initiated: Yes (09/02/22 0047)  Set Rate: 12 BPM (09/19/22 0443)  Vt Set: 550 mL (09/19/22 0443)  Pressure Support: 12 cmH20 (09/12/22 0350)  PEEP/CPAP: 10 cmH20 (09/19/22 0443)  Oxygen Concentration (%): 60 (09/19/22 0443)  Peak Airway Pressure: 45 cmH2O (09/19/22 0443)  Total Ve: 4.3 mL (09/19/22 0443)  F/VT Ratio<105 (RSBI): (!) 30.99 (09/18/22 1948)    Lines/Drains/Airways       Central Venous Catheter Line  Duration                  Hemodialysis Catheter right subclavian -- days              Drain  Duration                  NG/OG Tube 09/14/22 1340 Left nostril 4 days              Airway  Duration                  Surgical Airway Shiley Cuffed -- days              Peripheral Intravenous Line  Duration                  Peripheral IV - Single Lumen 08/23/22 1422 20 G Anterior;Right Forearm 26 days                    Significant Labs:    CBC/Anemia Profile:  Recent Labs   Lab 09/17/22  1052 09/19/22  0410   WBC 22.73* 28.99*   HGB 7.2* 6.7*   HCT 22.8* 19.8*   * 103*   MCV 95.0 88.8   RDW 16.0* 16.2*          Chemistries:  Recent Labs   Lab 09/19/22  0410   *   K 4.9   CL 90*   CO2 23   BUN 83*   CREATININE 3.25*   CALCIUM 8.0*         All pertinent labs within the past 24 hours have been reviewed.    Significant Imaging:  I have reviewed all pertinent imaging results/findings within the past 24 hours.    Assessment/Plan:     * Acute hypoxemic respiratory failure  Failed extubated post CABG (7/18)  Now with trach and peg     8/28- increase to PSV 12 hours -   8/29- Will plan  to change trach out in the next day or two--continue to increase weaning as tolerated  - continue with current weaning plan for now. Will increase tomorrow  - His strength is slowly improving which will also improve weaning. Will start to increase weaning again today  - Oxygenating adequately on 40%. Plan for 12 hours continuous of cpap during the day and can continue to rest overnight on assist control  - continue current tx   - increase CPAP to 16hrs and rest on vent   - Doing well with 16 hours of cpap and then resting for 8. Will continue with this for now. Next plan will be to increase to continuous  - continue with 16 hours today. Discussed with respiratory.Will continue with 16 hours for the next couple of days and then increase   plan to increase to continuous as tolerated  - did well with continuous cpap which was started yesterday. Plan to continue for now.   9-10 - CPAP as toelrated   -  Trach downsized to 6 XLT --  - Had an episode of emesis and is thought to have aspirated. Chest xray reviewed with worsening bilateral infiltrates. I have  FiO2 to 60% and peep is at 10.  Might need to bronch the patient (therapeutic)  - FiO2 down to 50% and peep is at 8. Once FiO2 back to 40% we will resume cpap  - FiO2 down to 40%. Peep is at 8.  9/15-  Sat is 92% this am. I am going to check a chest xray this am. Increasing secretions with odor  Plan for bronchoscopy today (therapeutic) due to copious thick secretions  - Unable to perform bronchoscopy yesterday. The patient currently is too ill. I am concerned that he might not tolerate the procedure. We have started the patient on cefepime renally dosed due to Klebsiella in respiratory culture. Continuing with clindamycin to cover anaerobes. Increased breathing treatments to q6 and added solumedrol 40 mg q12  - FiO2 now at 60% and peep is 10  - FiO2 down to 50%    Alteration in nutrition  S/p peg tube  placement   continue tube feeding  Tolerating without difficulty  9/14 now with some leaking around peg tube  Will ask surgery to take a look  9/15- Peg tube removed and NG tube placed  Abdomen distended today. Will check KUB  No obstruction  Held tube feeds due to residuals  Back to OR today to replace peg tube    Weakness acquired in ICU  Extreme generalized weakness - continue PT/OT as tolerated  Seems to have regressed      DM (diabetes mellitus)  accuchecks and Sliding scale insulin   A1C 5.3%  Currently on sliding scale  Glucose<180  Continue with current care  accuchecks look ok in last 24 hours  I have started some steroids so sugars will likely increase some    Anemia  Due to critical illness   9/9- Hgb is 6.4. Can transfuse 1 unit with next HD  9/11- VSS -- repeat CBC in AM   6.9/21.5 this am  BP on lower side. Will transfuse 1 unit with next dialysis session 9/14  Repeating CBC today- patient is hypotensive. If needed we will transfuse another unit  Hgb 6.7 most recent  Will transfuse with HD    JUVENTINO (acute kidney injury)  Cr 0.9 March 2022   - was on CCRT post CABG -- transitioned to HD M/W/F  Plan to continue MWF dialysis  Nephrology note reviewed and appreciated    CAD (coronary artery disease)  S/p CABG on 07/18  Currently no acute issues  He is on aspirin, statin, beta blocker      A-fib  No history documented prior to CABG - was on Lovenox at OSH   - he is on carvediolol, eliquis  - HR is ok        Patient is now DNR         Ham Sanchez, DO  Pulmonology  [facility] [place]

## 2022-09-19 NOTE — PLAN OF CARE
Problem: Communication Impairment (Mechanical Ventilation, Invasive)  Goal: Effective Communication  Outcome: Ongoing, Progressing     Problem: Communication Impairment (Artificial Airway)  Goal: Effective Communication  Outcome: Ongoing, Progressing     Problem: Breathing Pattern Ineffective  Goal: Effective Breathing Pattern  Outcome: Ongoing, Progressing     Problem: Gas Exchange Impaired  Goal: Optimal Gas Exchange  Outcome: Ongoing, Progressing

## 2022-09-19 NOTE — ASSESSMENT & PLAN NOTE
Due to critical illness   9/9- Hgb is 6.4. Can transfuse 1 unit with next HD  9/11- VSS -- repeat CBC in AM   6.9/21.5 this am  BP on lower side. Will transfuse 1 unit with next dialysis session 9/14  Repeating CBC today- patient is hypotensive. If needed we will transfuse another unit  Hgb 6.7 most recent  Will transfuse with HD

## 2022-09-19 NOTE — SUBJECTIVE & OBJECTIVE
Interval History: The patient remains critically ill this am. He remains on pressors. He is oxygenating adequately on 50% FiO2. He is currently afebrile.    Objective:     Vital Signs (Most Recent):  Temp: 98.6 °F (37 °C) (09/19/22 0300)  Pulse: 103 (09/19/22 0700)  Resp: 10 (09/19/22 0700)  BP: (!) 107/51 (09/19/22 0700)  SpO2: 100 % (09/19/22 0700)   Vital Signs (24h Range):  Temp:  [37.4 °F (3 °C)-98.8 °F (37.1 °C)] 98.6 °F (37 °C)  Pulse:  [] 103  Resp:  [7-28] 10  SpO2:  [95 %-100 %] 100 %  BP: ()/(32-70) 107/51     Weight: 99.8 kg (220 lb)  Body mass index is 28.25 kg/m².      Intake/Output Summary (Last 24 hours) at 9/19/2022 0749  Last data filed at 9/19/2022 0415  Gross per 24 hour   Intake 450 ml   Output 300 ml   Net 150 ml         Physical Exam  Vitals reviewed.   Constitutional:       General: He is not in acute distress.     Appearance: He is ill-appearing.   HENT:      Head: Normocephalic and atraumatic.      Right Ear: External ear normal.      Left Ear: External ear normal.      Mouth/Throat:      Mouth: Mucous membranes are moist.   Eyes:      Extraocular Movements: Extraocular movements intact.      Conjunctiva/sclera: Conjunctivae normal.   Neck:      Comments: Trach in place   Cardiovascular:      Rate and Rhythm: Normal rate. Rhythm irregular.      Pulses: Normal pulses.   Pulmonary:      Breath sounds: No wheezing, rhonchi or rales.   Abdominal:      General: Bowel sounds are normal. There is distension.      Palpations: Abdomen is soft.      Comments: Peg tube removed   Musculoskeletal:         General: Swelling present.      Right lower leg: Edema present.      Left lower leg: Edema present.      Comments: Edema to upper and lower ext. Worse in left arm than right -Hx radial fx; family states cast was removed due to edema    Skin:     General: Skin is warm and dry.   Neurological:      Mental Status: He is alert. Mental status is at baseline.   Psychiatric:         Mood and  Affect: Mood normal.       Vents:  Vent Mode: A/C (09/19/22 0443)  Ventilator Initiated: Yes (09/02/22 0047)  Set Rate: 12 BPM (09/19/22 0443)  Vt Set: 550 mL (09/19/22 0443)  Pressure Support: 12 cmH20 (09/12/22 0350)  PEEP/CPAP: 10 cmH20 (09/19/22 0443)  Oxygen Concentration (%): 60 (09/19/22 0443)  Peak Airway Pressure: 45 cmH2O (09/19/22 0443)  Total Ve: 4.3 mL (09/19/22 0443)  F/VT Ratio<105 (RSBI): (!) 30.99 (09/18/22 1948)    Lines/Drains/Airways       Central Venous Catheter Line  Duration                  Hemodialysis Catheter right subclavian -- days              Drain  Duration                  NG/OG Tube 09/14/22 1340 Left nostril 4 days              Airway  Duration                  Surgical Airway Shiley Cuffed -- days              Peripheral Intravenous Line  Duration                  Peripheral IV - Single Lumen 08/23/22 1422 20 G Anterior;Right Forearm 26 days                    Significant Labs:    CBC/Anemia Profile:  Recent Labs   Lab 09/17/22  1052 09/19/22  0410   WBC 22.73* 28.99*   HGB 7.2* 6.7*   HCT 22.8* 19.8*   * 103*   MCV 95.0 88.8   RDW 16.0* 16.2*          Chemistries:  Recent Labs   Lab 09/19/22  0410   *   K 4.9   CL 90*   CO2 23   BUN 83*   CREATININE 3.25*   CALCIUM 8.0*         All pertinent labs within the past 24 hours have been reviewed.    Significant Imaging:  I have reviewed all pertinent imaging results/findings within the past 24 hours.

## 2022-09-19 NOTE — ASSESSMENT & PLAN NOTE
S/p peg tube placement   continue tube feeding  Tolerating without difficulty  9/14 now with some leaking around peg tube  Will ask surgery to take a look  9/15- Peg tube removed and NG tube placed  Abdomen distended today. Will check KUB  No obstruction  Held tube feeds due to residuals  Back to OR today to replace peg tube

## 2022-09-19 NOTE — ASSESSMENT & PLAN NOTE
Failed extubated post CABG ()  Now with trach and peg     - increase to PSV 12 hours -   - Will plan to change trach out in the next day or two--continue to increase weaning as tolerated  - continue with current weaning plan for now. Will increase tomorrow  - His strength is slowly improving which will also improve weaning. Will start to increase weaning again today  - Oxygenating adequately on 40%. Plan for 12 hours continuous of cpap during the day and can continue to rest overnight on assist control  - continue current tx   - increase CPAP to 16hrs and rest on vent   - Doing well with 16 hours of cpap and then resting for 8. Will continue with this for now. Next plan will be to increase to continuous  - continue with 16 hours today. Discussed with respiratory.Will continue with 16 hours for the next couple of days and then increase   plan to increase to continuous as tolerated  - did well with continuous cpap which was started yesterday. Plan to continue for now.   9-10 - CPAP as toelrated   -  Trach downsized to 6 XLT --  - Had an episode of emesis and is thought to have aspirated. Chest xray reviewed with worsening bilateral infiltrates. I have  FiO2 to 60% and peep is at 10.  Might need to bronch the patient (therapeutic)  - FiO2 down to 50% and peep is at 8. Once FiO2 back to 40% we will resume cpap  - FiO2 down to 40%. Peep is at 8.  9/15-  Sat is 92% this am. I am going to check a chest xray this am. Increasing secretions with odor  Plan for bronchoscopy today (therapeutic) due to copious thick secretions  - Unable to perform bronchoscopy yesterday. The patient currently is too ill. I am concerned that he might not tolerate the procedure. We have started the patient on cefepime renally dosed due to Klebsiella in respiratory culture. Continuing with clindamycin to cover anaerobes. Increased breathing treatments to q6 and added solumedrol 40  mg q12  9/18- FiO2 now at 60% and peep is 10  9/19- FiO2 down to 50%

## 2022-09-19 NOTE — PROGRESS NOTES
"[facility]  Adult Nutrition  Progress Note    SUMMARY       Recommendations    Per MD note 9/19/22  Over the weekend patient started getting more lethargic and hypotensive requiring pressure support white count this morning was elevated.  Also with high residuals likely with small-bowel obstruction  Will not be placing PEG today.  Will obtain a KUB possibly septic source in the abdomen.  Might require CT scan versus surgery.  Patient was also made a DNR over the weekend.  Will monitor closely.    Patients weight is 220# on 9/19/22. His weight fluctuates due to dialysis. Abnormal renal labs due to dialysis. Patient did receive dialysis today per MD notes.   Meds/labs reviewed. RD spoke with nursing regarding patient and current status. Currently not receiving tube feeds. He is requiring pressor support at this time. Do not recommend starting tube feeds due to requiring pressor support. If unable to restart tube feeds recommend alternate nutrition support of PPN/TPN.   RD following. Consult as needed.      Reason for Assessment    Reason For Assessment: RD follow-up  Diagnosis: cardiac disease, pulmonary disease, renal disease, diabetes diagnosis/complications  Relevant Medical History: PEG, trach, dialysis    Nutrition Risk Screen    Nutrition Risk Screen: tube feeding or parenteral nutrition    Nutrition/Diet History    Spiritual, Cultural Beliefs, Yazidism Practices, Values that Affect Care: no  Food Allergies: NKFA  Factors Affecting Nutritional Intake: None identified at this time    Anthropometrics    Temp: 98 °F (36.7 °C)  Height: 6' 2" (188 cm)  Height (inches): 74 in  Weight Method: Bed Scale  Weight: 99.8 kg (220 lb)  Weight (lb): 220 lb  Ideal Body Weight (IBW), Male: 190 lb  % Ideal Body Weight, Male (lb): 108.72 %  BMI (Calculated): 28.2  BMI Grade: 25 - 29.9 - overweight       Lab/Procedures/Meds   Latest Reference Range & Units 09/12/22 03:04   Sodium 136 - 145 mmol/L " 130 (L)   Potassium 3.5 - 5.1 mmol/L 4.4   Chloride 98 - 107 mmol/L 96 (L)   CO2 21 - 32 mmol/L 25   Anion Gap 7 - 16 mmol/L 13   BUN 7 - 18 mg/dL 81 (H)   Creatinine 0.70 - 1.30 mg/dL 3.45 (H)   BUN/CREAT RATIO 6 - 20  23 (H)   eGFR >=60 mL/min/1.73m² 17 (L)   Glucose 74 - 106 mg/dL 54 (L)   (L): Data is abnormally low  (H): Data is abnormally high      Estimated/Assessed Needs    Weight Used For Calorie Calculations: 93.7 kg (206 lb 9.1 oz)  Energy Calorie Requirements (kcal): 7816-2957 (25-30cals/kg)  Energy Need Method: Kcal/kg  Protein Requirements: 112-140g/d  Weight Used For Protein Calculations: 93.7 kg (206 lb 9.1 oz)     Estimated Fluid Requirement Method: RDA Method  RDA Method (mL): 2342         Nutrition Prescription Ordered    Current Diet Order: Nepro@46ml/h with 25ml free water flush every hour  Current Nutrition Support Formula Ordered: Nepro  Current Nutrition Support Rate Ordered: 46 (ml)  Current Nutrition Support Frequency Ordered: jourly  Oral Nutrition Supplement: sarthak    Evaluation of Received Nutrient/Fluid Intake    Enteral Calories (kcal): 1987  Enteral Protein (gm): 89  Enteral (Free Water) Fluid (mL): 803  Free Water Flush Fluid (mL): 600  % Kcal Needs: 100  % Protein Needs: 100  Total Fluid Intake (mL): 1403  Protein Required:  (94g pro with Sarthak)  % Intake of Estimated Energy Needs: 75 - 100 %  % Meal Intake: NPO    Nutrition Risk    Level of Risk/Frequency of Follow-up: moderate - high     Monitor and Evaluation    Food and Nutrient Intake: enteral nutrition intake  Food and Nutrient Adminstration: enteral and parenteral nutrition administration  Anthropometric Measurements: weight, weight change, body mass index  Biochemical Data, Medical Tests and Procedures: glucose/endocrine profile, gastrointestinal profile, lipid profile, inflammatory profile, electrolyte and renal panel     Nutrition Follow-Up    RD Follow-up?: Yes

## 2022-09-19 NOTE — PT/OT/SLP PROGRESS
Occupational Therapy      Patient Name:  Gregory Stuart   MRN:  [id number]    Patient not seen today secondary to  (secondary to medical status). Will follow-up on next treatment day. Pt may need to be placed on hold/d/c secondary to medical status.    9/19/2022

## 2022-09-19 NOTE — DIALYSIS ROUNDING
Mr. Stuart is seen during his hemodialysis. He is stable and tolerating dialysis fairly well today.

## 2022-09-19 NOTE — PT/OT/SLP PROGRESS
Physical Therapy      Patient Name:  Gregory Stuart   MRN:  [id number]    Patient not seen today secondary to Nurse/ RADHA hold, Therapist assessment - medically declined status. Will follow-up next treatment date as pt is able.

## 2022-09-20 NOTE — ASSESSMENT & PLAN NOTE
EF 30-35% per Echo 07/2022  Daily wts/ I/Os   On the hypotensive side- holding carvedilol  edemattous but likely due to low albumin

## 2022-09-20 NOTE — PLAN OF CARE
Problem: Adult Inpatient Plan of Care  Goal: Plan of Care Review  Outcome: Ongoing, Not Progressing  Goal: Patient-Specific Goal (Individualized)  Outcome: Ongoing, Not Progressing  Goal: Absence of Hospital-Acquired Illness or Injury  Outcome: Ongoing, Not Progressing  Goal: Optimal Comfort and Wellbeing  Outcome: Ongoing, Not Progressing  Goal: Readiness for Transition of Care  Outcome: Ongoing, Not Progressing     Problem: Fall Injury Risk  Goal: Absence of Fall and Fall-Related Injury  Outcome: Ongoing, Not Progressing     Problem: Skin Injury Risk Increased  Goal: Skin Health and Integrity  Outcome: Ongoing, Not Progressing     Problem: Infection  Goal: Absence of Infection Signs and Symptoms  Outcome: Ongoing, Not Progressing     Problem: Diabetes Comorbidity  Goal: Blood Glucose Level Within Targeted Range  Outcome: Ongoing, Not Progressing     Problem: Fluid and Electrolyte Imbalance (Acute Kidney Injury/Impairment)  Goal: Fluid and Electrolyte Balance  Outcome: Ongoing, Not Progressing     Problem: Oral Intake Inadequate (Acute Kidney Injury/Impairment)  Goal: Optimal Nutrition Intake  Outcome: Ongoing, Not Progressing     Problem: Renal Function Impairment (Acute Kidney Injury/Impairment)  Goal: Effective Renal Function  Outcome: Ongoing, Not Progressing     Problem: Impaired Wound Healing  Goal: Optimal Wound Healing  Outcome: Ongoing, Not Progressing     Problem: Communication Impairment (Mechanical Ventilation, Invasive)  Goal: Effective Communication  Outcome: Ongoing, Not Progressing     Problem: Device-Related Complication Risk (Mechanical Ventilation, Invasive)  Goal: Optimal Device Function  Outcome: Ongoing, Not Progressing     Problem: Inability to Wean (Mechanical Ventilation, Invasive)  Goal: Mechanical Ventilation Liberation  Outcome: Ongoing, Not Progressing     Problem: Nutrition Impairment (Mechanical Ventilation, Invasive)  Goal: Optimal Nutrition Delivery  Outcome: Ongoing, Not  Progressing     Problem: Skin and Tissue Injury (Mechanical Ventilation, Invasive)  Goal: Absence of Device-Related Skin and Tissue Injury  Outcome: Ongoing, Not Progressing     Problem: Ventilator-Induced Lung Injury (Mechanical Ventilation, Invasive)  Goal: Absence of Ventilator-Induced Lung Injury  Outcome: Ongoing, Not Progressing     Problem: Communication Impairment (Artificial Airway)  Goal: Effective Communication  Outcome: Ongoing, Not Progressing     Problem: Device-Related Complication Risk (Artificial Airway)  Goal: Optimal Device Function  Outcome: Ongoing, Not Progressing     Problem: Skin and Tissue Injury (Artificial Airway)  Goal: Absence of Device-Related Skin or Tissue Injury  Outcome: Ongoing, Not Progressing     Problem: Noninvasive Ventilation Acute  Goal: Effective Unassisted Ventilation and Oxygenation  Outcome: Ongoing, Not Progressing     Problem: Device-Related Complication Risk (Hemodialysis)  Goal: Safe, Effective Therapy Delivery  Outcome: Ongoing, Not Progressing     Problem: Hemodynamic Instability (Hemodialysis)  Goal: Effective Tissue Perfusion  Outcome: Ongoing, Not Progressing     Problem: Infection (Hemodialysis)  Goal: Absence of Infection Signs and Symptoms  Outcome: Ongoing, Not Progressing     Problem: Airway Clearance Ineffective  Goal: Effective Airway Clearance  Outcome: Ongoing, Not Progressing     Problem: Breathing Pattern Ineffective  Goal: Effective Breathing Pattern  Outcome: Ongoing, Not Progressing     Problem: Gas Exchange Impaired  Goal: Optimal Gas Exchange  Outcome: Ongoing, Not Progressing

## 2022-09-20 NOTE — PLAN OF CARE
Problem: Occupational Therapy  Goal: Occupational Therapy Goal  Description: ST. Pt will follow simple one step command  2.Pt will perform grooming with mod a  3. Pt will sit EOB x 10 min with mod a  4. Pt will andre gown with mod a  5. Pt will t/f bed/chair with mod a x 2  6. Pt will tolerate 15 min of tx      LTG: Restore to Max I with self care and mobility.    Outcome: Ongoing, Not Progressing   Pt placed on hold secondary to declined in medical status

## 2022-09-20 NOTE — ASSESSMENT & PLAN NOTE
No history documented prior to CABG - was on Lovenox at OSH   - he is on carvediolol, eliquis  - HR has been up at times  - wide complex appearing on telemetry but I do not think this is v tach  - We can get a 12 lead but looks like probably RBBB with tachycardia

## 2022-09-20 NOTE — PT/OT/SLP PROGRESS
Occupational Therapy      Patient Name:  Gregory Stuart   MRN:  [id number]    Patient is being placed on hold due to decline in medical status.Pt has been unable to participate with skilled therapy. Hold order confirmed with Dr. Sanchez. Will follow-up when pt is medically stable.    9/20/2022

## 2022-09-20 NOTE — NURSING
Wound care note: pt assessed by Dr Valadez. Continue santyl to sacral.  Dark necrotic tissue noted.

## 2022-09-20 NOTE — SUBJECTIVE & OBJECTIVE
Interval History: The patient remains critically ill this am. He remains on pressors. He is oxygenating adequately on 50% FiO2. He is currently afebrile.    Objective:     Vital Signs (Most Recent):  Temp: 98.3 °F (36.8 °C) (09/20/22 0400)  Pulse: 105 (09/20/22 0748)  Resp: 20 (09/20/22 0748)  BP: (!) 116/54 (09/20/22 0745)  SpO2: 96 % (09/20/22 0748)   Vital Signs (24h Range):  Temp:  [97.6 °F (36.4 °C)-99.1 °F (37.3 °C)] 98.3 °F (36.8 °C)  Pulse:  [] 105  Resp:  [10-23] 20  SpO2:  [76 %-100 %] 96 %  BP: ()/(38-73) 116/54     Weight: 99.8 kg (220 lb)  Body mass index is 28.25 kg/m².      Intake/Output Summary (Last 24 hours) at 9/20/2022 0800  Last data filed at 9/19/2022 1205  Gross per 24 hour   Intake 1023 ml   Output 1760 ml   Net -737 ml         Physical Exam  Vitals reviewed.   Constitutional:       General: He is not in acute distress.     Appearance: He is ill-appearing.   HENT:      Head: Normocephalic and atraumatic.      Right Ear: External ear normal.      Left Ear: External ear normal.      Mouth/Throat:      Mouth: Mucous membranes are moist.   Eyes:      Extraocular Movements: Extraocular movements intact.      Conjunctiva/sclera: Conjunctivae normal.   Neck:      Comments: Trach in place   Cardiovascular:      Rate and Rhythm: Normal rate. Rhythm irregular.      Pulses: Normal pulses.   Pulmonary:      Breath sounds: No wheezing, rhonchi or rales.   Abdominal:      General: Bowel sounds are normal. There is distension.      Palpations: Abdomen is soft.      Comments: Peg tube removed   Musculoskeletal:         General: Swelling present.      Right lower leg: Edema present.      Left lower leg: Edema present.      Comments: Edema to upper and lower ext. Worse in left arm than right -Hx radial fx; family states cast was removed due to edema    Skin:     General: Skin is warm and dry.   Neurological:      Mental Status: He is alert. Mental status is at baseline.   Psychiatric:         Mood  and Affect: Mood normal.       Vents:  Vent Mode: A/C (09/20/22 0315)  Ventilator Initiated: Yes (09/02/22 0047)  Set Rate: 12 BPM (09/20/22 0315)  Vt Set: 550 mL (09/20/22 0315)  Pressure Support: 12 cmH20 (09/12/22 0350)  PEEP/CPAP: 10 cmH20 (09/20/22 0315)  Oxygen Concentration (%): 50 (09/20/22 0108)  Peak Airway Pressure: 52 cmH2O (09/20/22 0315)  Total Ve: 6 mL (09/20/22 0315)  F/VT Ratio<105 (RSBI): (!) 53.27 (09/20/22 0108)    Lines/Drains/Airways       Central Venous Catheter Line  Duration                  Hemodialysis Catheter right subclavian -- days              Drain  Duration                  NG/OG Tube 09/14/22 1340 Left nostril 5 days              Airway  Duration                  Surgical Airway Shiley Cuffed -- days              Peripheral Intravenous Line  Duration                  Peripheral IV - Single Lumen 08/23/22 1422 20 G Anterior;Right Forearm 27 days         Peripheral IV - Single Lumen 09/19/22 1756 22 G Left;Posterior Hand <1 day                    Significant Labs:    CBC/Anemia Profile:  Recent Labs   Lab 09/19/22  0410   WBC 28.99*   HGB 6.7*   HCT 19.8*   *   MCV 88.8   RDW 16.2*          Chemistries:  Recent Labs   Lab 09/19/22  0410   *   K 4.9   CL 90*   CO2 23   BUN 83*   CREATININE 3.25*   CALCIUM 8.0*         All pertinent labs within the past 24 hours have been reviewed.    Significant Imaging:  I have reviewed all pertinent imaging results/findings within the past 24 hours.

## 2022-09-20 NOTE — ASSESSMENT & PLAN NOTE
S/p peg tube placement   continue tube feeding  Tolerating without difficulty  9/14 now with some leaking around peg tube  Will ask surgery to take a look  9/15- Peg tube removed and NG tube placed  Abdomen distended today. Will check KUB  No obstruction  Held tube feeds due to residuals  Back to OR today to replace peg tube--too sick to return to OR yesterday

## 2022-09-20 NOTE — PROGRESS NOTES
[facility]  Pulmonology  Progress Note    Patient Name: Gregory Stuart  MRN: [id number]  Admission Date: 8/23/2022  Hospital Length of Stay: 28 days  Code Status: DNR  Attending Provider: Ham Sanchez DO  Primary Care Provider: Primary Doctor No   Principal Problem: Acute hypoxemic respiratory failure    Subjective:     Interval History: The patient remains critically ill this am. He remains on pressors. He is oxygenating adequately on 50% FiO2. He is currently afebrile.    Objective:     Vital Signs (Most Recent):  Temp: 98.3 °F (36.8 °C) (09/20/22 0400)  Pulse: 105 (09/20/22 0748)  Resp: 20 (09/20/22 0748)  BP: (!) 116/54 (09/20/22 0745)  SpO2: 96 % (09/20/22 0748)   Vital Signs (24h Range):  Temp:  [97.6 °F (36.4 °C)-99.1 °F (37.3 °C)] 98.3 °F (36.8 °C)  Pulse:  [] 105  Resp:  [10-23] 20  SpO2:  [76 %-100 %] 96 %  BP: ()/(38-73) 116/54     Weight: 99.8 kg (220 lb)  Body mass index is 28.25 kg/m².      Intake/Output Summary (Last 24 hours) at 9/20/2022 0800  Last data filed at 9/19/2022 1205  Gross per 24 hour   Intake 1023 ml   Output 1760 ml   Net -737 ml         Physical Exam  Vitals reviewed.   Constitutional:       General: He is not in acute distress.     Appearance: He is ill-appearing.   HENT:      Head: Normocephalic and atraumatic.      Right Ear: External ear normal.      Left Ear: External ear normal.      Mouth/Throat:      Mouth: Mucous membranes are moist.   Eyes:      Extraocular Movements: Extraocular movements intact.      Conjunctiva/sclera: Conjunctivae normal.   Neck:      Comments: Trach in place   Cardiovascular:      Rate and Rhythm: Normal rate. Rhythm irregular.      Pulses: Normal pulses.   Pulmonary:      Breath sounds: No wheezing, rhonchi or rales.   Abdominal:      General: Bowel sounds are normal. There is distension.      Palpations: Abdomen is soft.      Comments: Peg tube removed   Musculoskeletal:         General: Swelling  present.      Right lower leg: Edema present.      Left lower leg: Edema present.      Comments: Edema to upper and lower ext. Worse in left arm than right -Hx radial fx; family states cast was removed due to edema    Skin:     General: Skin is warm and dry.   Neurological:      Mental Status: He is alert. Mental status is at baseline.   Psychiatric:         Mood and Affect: Mood normal.       Vents:  Vent Mode: A/C (09/20/22 0315)  Ventilator Initiated: Yes (09/02/22 0047)  Set Rate: 12 BPM (09/20/22 0315)  Vt Set: 550 mL (09/20/22 0315)  Pressure Support: 12 cmH20 (09/12/22 0350)  PEEP/CPAP: 10 cmH20 (09/20/22 0315)  Oxygen Concentration (%): 50 (09/20/22 0108)  Peak Airway Pressure: 52 cmH2O (09/20/22 0315)  Total Ve: 6 mL (09/20/22 0315)  F/VT Ratio<105 (RSBI): (!) 53.27 (09/20/22 0108)    Lines/Drains/Airways       Central Venous Catheter Line  Duration                  Hemodialysis Catheter right subclavian -- days              Drain  Duration                  NG/OG Tube 09/14/22 1340 Left nostril 5 days              Airway  Duration                  Surgical Airway Shiley Cuffed -- days              Peripheral Intravenous Line  Duration                  Peripheral IV - Single Lumen 08/23/22 1422 20 G Anterior;Right Forearm 27 days         Peripheral IV - Single Lumen 09/19/22 1756 22 G Left;Posterior Hand <1 day                    Significant Labs:    CBC/Anemia Profile:  Recent Labs   Lab 09/19/22  0410   WBC 28.99*   HGB 6.7*   HCT 19.8*   *   MCV 88.8   RDW 16.2*          Chemistries:  Recent Labs   Lab 09/19/22  0410   *   K 4.9   CL 90*   CO2 23   BUN 83*   CREATININE 3.25*   CALCIUM 8.0*         All pertinent labs within the past 24 hours have been reviewed.    Significant Imaging:  I have reviewed all pertinent imaging results/findings within the past 24 hours.    Assessment/Plan:     * Acute hypoxemic respiratory failure  Failed extubated post CABG (7/18)  Now with trach and peg     8/28-  increase to PSV 12 hours -   - Will plan to change trach out in the next day or two--continue to increase weaning as tolerated  - continue with current weaning plan for now. Will increase tomorrow  - His strength is slowly improving which will also improve weaning. Will start to increase weaning again today  - Oxygenating adequately on 40%. Plan for 12 hours continuous of cpap during the day and can continue to rest overnight on assist control  - continue current tx   - increase CPAP to 16hrs and rest on vent   - Doing well with 16 hours of cpap and then resting for 8. Will continue with this for now. Next plan will be to increase to continuous  - continue with 16 hours today. Discussed with respiratory.Will continue with 16 hours for the next couple of days and then increase   plan to increase to continuous as tolerated  - did well with continuous cpap which was started yesterday. Plan to continue for now.   9-10 - CPAP as toelrated   -  Trach downsized to 6 XLT --  - Had an episode of emesis and is thought to have aspirated. Chest xray reviewed with worsening bilateral infiltrates. I have  FiO2 to 60% and peep is at 10.  Might need to bronch the patient (therapeutic)  - FiO2 down to 50% and peep is at 8. Once FiO2 back to 40% we will resume cpap  - FiO2 down to 40%. Peep is at 8.  9/15-  Sat is 92% this am. I am going to check a chest xray this am. Increasing secretions with odor  Plan for bronchoscopy today (therapeutic) due to copious thick secretions  - Unable to perform bronchoscopy yesterday. The patient currently is too ill. I am concerned that he might not tolerate the procedure. We have started the patient on cefepime renally dosed due to Klebsiella in respiratory culture. Continuing with clindamycin to cover anaerobes. Increased breathing treatments to q6 and added solumedrol 40 mg q12  - FiO2 now at 60% and peep is 10  - FiO2 down to  50%    Alteration in nutrition  S/p peg tube placement   continue tube feeding  Tolerating without difficulty  9/14 now with some leaking around peg tube  Will ask surgery to take a look  9/15- Peg tube removed and NG tube placed  Abdomen distended today. Will check KUB  No obstruction  Held tube feeds due to residuals  Back to OR today to replace peg tube--too sick to return to OR yesterday    Weakness acquired in ICU  Extreme generalized weakness - continue PT/OT as tolerated  Seems to have regressed      DM (diabetes mellitus)  accuchecks and Sliding scale insulin   A1C 5.3%  Currently on sliding scale  Glucose<180  Continue with current care  accuchecks look ok in last 24 hours  I have started some steroids so sugars will likely increase some    Anemia  Due to critical illness   9/9- Hgb is 6.4. Can transfuse 1 unit with next HD  9/11- VSS -- repeat CBC in AM   6.9/21.5 this am  BP on lower side. Will transfuse 1 unit with next dialysis session 9/14  Repeating CBC today- patient is hypotensive. If needed we will transfuse another unit  Hgb 6.7 most recent  Will transfuse with HD    JUVENTINO (acute kidney injury)  Cr 0.9 March 2022   - was on CCRT post CABG -- transitioned to HD M/W/F  Plan to continue MWF dialysis  Nephrology note reviewed and appreciated    CAD (coronary artery disease)  S/p CABG on 07/18  Currently no acute issues  He is on aspirin, statin, beta blocker      Chronic systolic heart failure  EF 30-35% per Echo 07/2022  Daily wts/ I/Os   On the hypotensive side- holding carvedilol  edemattous but likely due to low albumin    A-fib  No history documented prior to CABG - was on Lovenox at OSH   - he is on carvediolol, eliquis  - HR has been up at times  - wide complex appearing on telemetry but I do not think this is v tach  - We can get a 12 lead but looks like probably RBBB with tachycardia                 Ham Sanchez, DO  Pulmonology  [facility]

## 2022-09-20 NOTE — PLAN OF CARE
Problem: Communication Impairment (Mechanical Ventilation, Invasive)  Goal: Effective Communication  Outcome: Ongoing, Progressing     Problem: Communication Impairment (Mechanical Ventilation, Invasive)  Goal: Effective Communication  Outcome: Ongoing, Progressing     Problem: Gas Exchange Impaired  Goal: Optimal Gas Exchange  Outcome: Ongoing, Progressing

## 2022-09-20 NOTE — PT/OT/SLP PROGRESS
Physical Therapy      Patient Name:  Gregory Stuart   MRN:  [id number]    Patient not seen secondary to being placed on HOLD due to declined medical status.  Dr. Sanchez made aware of pt's condition and consented to same .

## 2022-09-20 NOTE — NURSING
Noted pts skin to be red around iv site to right FA. IV removed and supervisor notified due to pt having levophed infusing through the iv.

## 2022-09-20 NOTE — PROGRESS NOTES
[facility]  General Surgery  Progress Note    Subjective:     Interval History:  Patient is still on pressors having tachycardia.    Post-Op Info:  * No surgery found *          Medications:  Continuous Infusions:   NORepinephrine bitartrate-D5W 0.2 mcg/kg/min (09/20/22 0849)     Scheduled Meds:   albuterol-ipratropium  3 mL Nebulization Q6H    aspirin  81 mg Per G Tube Daily    atorvastatin  40 mg Per G Tube QHS    carvediloL  3.125 mg Per G Tube BID    ceFEPime (MAXIPIME) IVPB  1 g Intravenous Q24H    clindamycin (CLEOCIN) IVPB  600 mg Intravenous Q8H    doxepin  25 mg Per G Tube QHS    ferrous sulfate  1 tablet Per G Tube Daily    methylPREDNISolone sodium succinate injection  40 mg Intravenous Q12H    miconazole NITRATE 2 %   Topical (Top) BID    midodrine  5 mg Per G Tube BID WM    pantoprazole  40 mg Per G Tube Daily    venlafaxine  37.5 mg Per G Tube BID     PRN Meds:sodium chloride, sodium chloride, sodium chloride 0.9%, acetaminophen, collagenase, dextromethorphan-guaiFENesin  mg/5 ml, dextrose 50%, glucagon (human recombinant), heparin (porcine), insulin aspart U-100, ondansetron, polyethylene glycol, senna-docusate 8.6-50 mg     Objective:     Vital Signs (Most Recent):  Temp: 99.3 °F (37.4 °C) (09/20/22 1200)  Pulse: (!) 112 (09/20/22 1245)  Resp: 14 (09/20/22 1245)  BP: (!) 123/54 (09/20/22 1245)  SpO2: 96 % (09/20/22 1245)   Vital Signs (24h Range):  Temp:  [98 °F (36.7 °C)-99.3 °F (37.4 °C)] 99.3 °F (37.4 °C)  Pulse:  [102-125] 112  Resp:  [10-25] 14  SpO2:  [90 %-97 %] 96 %  BP: ()/(38-66) 123/54     No intake or output data in the 24 hours ending 09/20/22 1409    Physical Exam  Constitutional:       General: He is in acute distress.      Appearance: He is ill-appearing.   Cardiovascular:      Rate and Rhythm: Tachycardia present.   Pulmonary:      Effort: No respiratory distress.   Abdominal:      General: Abdomen is flat.   Skin:     Findings: Lesion  (Sacral area has an approximately 6 x 4 cm area of unstageable black necrotic firm ulceration.  Toes also with some early signs of dusky discoloration) present.       Significant Labs:  CBC:   Recent Labs   Lab 09/19/22  0410   WBC 28.99*   RBC 2.23*   HGB 6.7*   HCT 19.8*   *   MCV 88.8   MCH 30.0   MCHC 33.8     CMP:   Recent Labs   Lab 09/19/22  0410   *   CALCIUM 8.0*   *   K 4.9   CO2 23   CL 90*   BUN 83*   CREATININE 3.25*       Significant Diagnostics:  CT: I have reviewed all pertinent results/findings within the past 24 hours.  No extravasation of the contrast.    Assessment/Plan:     Active Diagnoses:    Diagnosis Date Noted POA    PRINCIPAL PROBLEM:  Acute hypoxemic respiratory failure [J96.01] 08/23/2022 Unknown    PEG tube malfunction [K94.23] 09/14/2022 Clinically Undetermined    Open wound of anterior abdominal wall without complication [S31.109A] 08/30/2022 Yes    Pressure injury of left leg, unstageable [L89.890] 08/30/2022 Yes    Wound dehiscence [T81.30XA] 08/30/2022 Yes    Pressure injury of sacral region, unstageable [L89.150] 08/29/2022 Yes    A-fib [I48.91] 08/23/2022 Unknown    Chronic systolic heart failure [I50.22] 08/23/2022 Unknown    CAD (coronary artery disease) [I25.10] 08/23/2022 Unknown    JUVENTINO (acute kidney injury) [N17.9] 08/23/2022 Unknown    Anemia [D64.9] 08/23/2022 Unknown    DM (diabetes mellitus) [E11.9] 08/23/2022 Unknown    Left radial fracture [S52.92XA] 08/23/2022 Unknown    Weakness acquired in ICU [R53.1] 08/23/2022 Unknown    Alteration in nutrition [R63.8] 08/23/2022 Unknown    HLD (hyperlipidemia) [E78.5] 08/23/2022 Unknown    HTN (hypertension) [I10] 08/23/2022 Unknown      Problems Resolved During this Admission:     CT scan showed no extravasation of the oral contrast which is somewhat reassuring and from an abdominal standpoint.  Still on pressors with a stable noninfected sacral ulcer and signs of ischemia to the toes.  Overall not a good  surgical candidate.  Continue to hold off on the replacement PEG tube or debridement of the wound.  Poor prognosis on the patient.    Isiah Valadez MD  General Surgery  [facility]

## 2022-09-21 NOTE — ASSESSMENT & PLAN NOTE
accuchecks and Sliding scale insulin   A1C 5.3%  Currently on sliding scale  Glucose<180  Continue with current care  accuchecks look ok in last 24 hours  I have started some steroids so sugars will likely increase some  Sugars up a little. Reducing steroid today

## 2022-09-21 NOTE — ASSESSMENT & PLAN NOTE
Due to critical illness   9/9- Hgb is 6.4. Can transfuse 1 unit with next HD  9/11- VSS -- repeat CBC in AM   6.9/21.5 this am  BP on lower side. Will transfuse 1 unit with next dialysis session 9/14  Repeating CBC today- patient is hypotensive. If needed we will transfuse another unit  Hgb 6.7 most recent  We transfused with HD

## 2022-09-21 NOTE — SUBJECTIVE & OBJECTIVE
Interval History: The patient remains on pressors.  NG tube noted with continuous drainage.    Review of patient's allergies indicates:  No Known Allergies  Current Facility-Administered Medications   Medication Frequency    0.9%  NaCl infusion (for blood administration) Q24H PRN    0.9%  NaCl infusion (for blood administration) Q24H PRN    0.9%  NaCl infusion PRN    acetaminophen tablet 650 mg Q6H PRN    albuterol-ipratropium 2.5 mg-0.5 mg/3 mL nebulizer solution 3 mL Q6H    aspirin chewable tablet 81 mg Daily    atorvastatin tablet 40 mg QHS    carvediloL tablet 3.125 mg BID    ceFEPIme (MAXIPIME) 1 g in dextrose 5 % in water (D5W) 5 % 50 mL IVPB (MB+) Q24H    clindamycin in D5W 600 mg/50 mL IVPB 600 mg Q8H    collagenase ointment Daily PRN    dextromethorphan-guaiFENesin  mg/5 ml liquid 10 mL Q6H PRN    dextrose 50% injection 12.5 g PRN    doxepin capsule 25 mg QHS    ferrous sulfate tablet 1 each Daily    glucagon (human recombinant) injection 1 mg PRN    heparin (porcine) injection 4,000 Units PRN    insulin aspart U-100 injection 1-10 Units PRN    methylPREDNISolone sodium succinate injection 40 mg Q12H    miconazole NITRATE 2 % top powder BID    midodrine tablet 5 mg BID WM    NORepinephrine 8 mg in dextrose 5 % 250 mL infusion Continuous    ondansetron injection 8 mg Q6H PRN    pantoprazole suspension 40 mg Daily    polyethylene glycol packet 17 g Daily PRN    senna-docusate 8.6-50 mg per tablet 1 tablet Daily PRN    venlafaxine tablet 37.5 mg BID       Objective:     Vital Signs (Most Recent):  Temp: 98.9 °F (37.2 °C) (09/20/22 1600)  Pulse: (!) 114 (09/20/22 1600)  Resp: (!) 23 (09/20/22 1600)  BP: (!) 116/47 (09/20/22 1600)  SpO2: (!) 94 % (09/20/22 1600)  O2 Device (Oxygen Therapy): ventilator (09/20/22 1644)   Vital Signs (24h Range):  Temp:  [98.3 °F (36.8 °C)-99.3 °F (37.4 °C)] 98.9 °F (37.2 °C)  Pulse:  [102-125] 114  Resp:  [12-25] 23  SpO2:  [94 %-97 %] 94 %  BP: ()/(41-65) 116/47      Weight: 99.8 kg (220 lb) (09/19/22 0415)  Body mass index is 28.25 kg/m².  Body surface area is 2.28 meters squared.    I/O last 3 completed shifts:  In: 1023 [Blood:263; Other:760]  Out: 1760 [Other:1760]    Physical Exam  Vitals reviewed.   Constitutional:       Appearance: He is ill-appearing.   HENT:      Head: Normocephalic and atraumatic.   Cardiovascular:      Rate and Rhythm: Rhythm irregular.   Pulmonary:      Effort: Pulmonary effort is normal.      Comments: On trach collar  Abdominal:      Palpations: Abdomen is soft.      Comments: NG tube in place       Significant Labs:  BMP:   Recent Labs   Lab 09/19/22 0410   *   *   K 4.9   CL 90*   CO2 23   BUN 83*   CREATININE 3.25*   CALCIUM 8.0*     CBC:   Recent Labs   Lab 09/19/22 0410   WBC 28.99*   RBC 2.23*   HGB 6.7*   HCT 19.8*   *   MCV 88.8   MCH 30.0   MCHC 33.8        Significant Imaging:  Labs: Reviewed

## 2022-09-21 NOTE — SUBJECTIVE & OBJECTIVE
Interval History: The patient remains critically ill. He remains on pressors. He is oxygenating adequately on 40% FiO2. He is currently afebrile.    Objective:     Vital Signs (Most Recent):  Temp: 98 °F (36.7 °C) (09/20/22 2330)  Pulse: 106 (09/21/22 0715)  Resp: 19 (09/21/22 0715)  BP: (!) 103/46 (09/21/22 0715)  SpO2: 96 % (09/21/22 0715)   Vital Signs (24h Range):  Temp:  [98 °F (36.7 °C)-99.3 °F (37.4 °C)] 98 °F (36.7 °C)  Pulse:  [] 106  Resp:  [11-25] 19  SpO2:  [65 %-98 %] 96 %  BP: ()/(35-59) 103/46     Weight: 99.8 kg (220 lb)  Body mass index is 28.25 kg/m².      Intake/Output Summary (Last 24 hours) at 9/21/2022 0756  Last data filed at 9/21/2022 0700  Gross per 24 hour   Intake --   Output 100 ml   Net -100 ml         Physical Exam  Vitals reviewed.   Constitutional:       General: He is not in acute distress.     Appearance: He is ill-appearing.   HENT:      Head: Normocephalic and atraumatic.      Right Ear: External ear normal.      Left Ear: External ear normal.      Mouth/Throat:      Mouth: Mucous membranes are moist.   Eyes:      Extraocular Movements: Extraocular movements intact.      Conjunctiva/sclera: Conjunctivae normal.   Neck:      Comments: Trach in place   Cardiovascular:      Rate and Rhythm: Normal rate. Rhythm irregular.      Pulses: Normal pulses.   Pulmonary:      Breath sounds: No wheezing, rhonchi or rales.   Abdominal:      General: Bowel sounds are normal. There is distension.      Palpations: Abdomen is soft.      Comments: Peg tube removed   Musculoskeletal:         General: Swelling present.      Right lower leg: Edema present.      Left lower leg: Edema present.      Comments: Edema to upper and lower ext. Worse in left arm than right -Hx radial fx; family states cast was removed due to edema    Skin:     General: Skin is warm and dry.   Neurological:      Mental Status: He is alert. Mental status is at baseline.   Psychiatric:         Mood and Affect: Mood  normal.       Vents:  Vent Mode: A/C (09/21/22 0400)  Ventilator Initiated: Yes (09/02/22 0047)  Set Rate: 12 BPM (09/21/22 0400)  Vt Set: 550 mL (09/21/22 0400)  Pressure Support: 12 cmH20 (09/12/22 0350)  PEEP/CPAP: 10 cmH20 (09/21/22 0400)  Oxygen Concentration (%): 50 (09/20/22 2047)  Peak Airway Pressure: 44 cmH2O (09/21/22 0400)  Total Ve: 6.9 mL (09/21/22 0400)  F/VT Ratio<105 (RSBI): (!) 31.46 (09/21/22 0400)    Lines/Drains/Airways       Central Venous Catheter Line  Duration                  Hemodialysis Catheter right subclavian -- days              Drain  Duration                  NG/OG Tube 09/14/22 1340 Left nostril 6 days              Airway  Duration                  Surgical Airway Shiley Cuffed -- days              Peripheral Intravenous Line  Duration                  Peripheral IV - Single Lumen 09/19/22 1756 22 G Left;Posterior Hand 1 day         Peripheral IV - Single Lumen 09/20/22 1432 20 G Left Antecubital <1 day                    Significant Labs:    CBC/Anemia Profile:  No results for input(s): WBC, HGB, HCT, PLT, MCV, RDW, IRON, FERRITIN, RETIC, FOLATE, JOEFYPDG97, OCCULTBLOOD in the last 48 hours.       Chemistries:  No results for input(s): NA, K, CL, CO2, BUN, CREATININE, CALCIUM, ALBUMIN, PROT, BILITOT, ALKPHOS, ALT, AST, GLUCOSE, MG, PHOS in the last 48 hours.      All pertinent labs within the past 24 hours have been reviewed.    Significant Imaging:  I have reviewed all pertinent imaging results/findings within the past 24 hours.

## 2022-09-21 NOTE — ASSESSMENT & PLAN NOTE
9/12/2022 Dialysis  9/13/2022 Continue with dialysis as scheduled.  9/14/2022  Continue with dialysis.  9/15/2022  Continue with dialysis.  9/20/2022  Low blood pressure makes it difficult to dialyze this patient.  He is on pressor medications.

## 2022-09-21 NOTE — ASSESSMENT & PLAN NOTE
Failed extubated post CABG ()  Now with trach and peg     - increase to PSV 12 hours -   - Will plan to change trach out in the next day or two--continue to increase weaning as tolerated  - continue with current weaning plan for now. Will increase tomorrow  - His strength is slowly improving which will also improve weaning. Will start to increase weaning again today  - Oxygenating adequately on 40%. Plan for 12 hours continuous of cpap during the day and can continue to rest overnight on assist control  - continue current tx   - increase CPAP to 16hrs and rest on vent   - Doing well with 16 hours of cpap and then resting for 8. Will continue with this for now. Next plan will be to increase to continuous  - continue with 16 hours today. Discussed with respiratory.Will continue with 16 hours for the next couple of days and then increase   plan to increase to continuous as tolerated  - did well with continuous cpap which was started yesterday. Plan to continue for now.   9-10 - CPAP as toelrated   -  Trach downsized to 6 XLT --  - Had an episode of emesis and is thought to have aspirated. Chest xray reviewed with worsening bilateral infiltrates. I have  FiO2 to 60% and peep is at 10.  Might need to bronch the patient (therapeutic)  - FiO2 down to 50% and peep is at 8. Once FiO2 back to 40% we will resume cpap  - FiO2 down to 40%. Peep is at 8.  9/15-  Sat is 92% this am. I am going to check a chest xray this am. Increasing secretions with odor  Plan for bronchoscopy today (therapeutic) due to copious thick secretions  - Unable to perform bronchoscopy yesterday. The patient currently is too ill. I am concerned that he might not tolerate the procedure. We have started the patient on cefepime renally dosed due to Klebsiella in respiratory culture. Continuing with clindamycin to cover anaerobes. Increased breathing treatments to q6 and added solumedrol 40  mg q12  9/18- FiO2 now at 60% and peep is 10  9/19- FiO2 down to 50%  9/21- FiO2 down to 40%

## 2022-09-21 NOTE — SUBJECTIVE & OBJECTIVE
Interval History: The patient's condition is the same.  He remains on pressors.  NG tube is in place.    Review of patient's allergies indicates:  No Known Allergies  Current Facility-Administered Medications   Medication Frequency    0.9%  NaCl infusion (for blood administration) Q24H PRN    0.9%  NaCl infusion (for blood administration) Q24H PRN    0.9%  NaCl infusion PRN    acetaminophen tablet 650 mg Q6H PRN    albuterol-ipratropium 2.5 mg-0.5 mg/3 mL nebulizer solution 3 mL Q6H    aspirin chewable tablet 81 mg Daily    atorvastatin tablet 40 mg QHS    carvediloL tablet 3.125 mg BID    ceFEPIme (MAXIPIME) 1 g in dextrose 5 % in water (D5W) 5 % 50 mL IVPB (MB+) Q24H    clindamycin in D5W 600 mg/50 mL IVPB 600 mg Q8H    collagenase ointment Daily PRN    dextromethorphan-guaiFENesin  mg/5 ml liquid 10 mL Q6H PRN    dextrose 50% injection 12.5 g PRN    doxepin capsule 25 mg QHS    ferrous sulfate tablet 1 each Daily    glucagon (human recombinant) injection 1 mg PRN    heparin (porcine) injection 4,000 Units PRN    insulin aspart U-100 injection 1-10 Units PRN    methylPREDNISolone sodium succinate injection 20 mg Q12H    miconazole NITRATE 2 % top powder BID    midodrine tablet 5 mg BID WM    NORepinephrine 8 mg in dextrose 5 % 250 mL infusion Continuous    ondansetron injection 8 mg Q6H PRN    pantoprazole suspension 40 mg Daily    polyethylene glycol packet 17 g Daily PRN    senna-docusate 8.6-50 mg per tablet 1 tablet Daily PRN    venlafaxine tablet 37.5 mg BID       Objective:     Vital Signs (Most Recent):  Temp: 96.8 °F (36 °C) (09/21/22 1100)  Pulse: (!) 112 (09/21/22 1400)  Resp: 18 (09/21/22 1400)  BP: (!) 111/51 (09/21/22 1400)  SpO2: 95 % (09/21/22 1400)  O2 Device (Oxygen Therapy): ventilator (09/21/22 1317)   Vital Signs (24h Range):  Temp:  [96.6 °F (35.9 °C)-98.6 °F (37 °C)] 96.8 °F (36 °C)  Pulse:  [] 112  Resp:  [11-23] 18  SpO2:  [65 %-99 %] 95 %  BP: ()/(35-59) 111/51      Weight: 99.8 kg (220 lb) (09/19/22 0415)  Body mass index is 28.25 kg/m².  Body surface area is 2.28 meters squared.    I/O last 3 completed shifts:  In: -   Out: 100 [Drains:100]    Physical Exam  Vitals reviewed.   Constitutional:       Appearance: He is ill-appearing.   HENT:      Head: Normocephalic and atraumatic.      Mouth/Throat:      Mouth: Mucous membranes are dry.   Cardiovascular:      Rate and Rhythm: Rhythm irregular.   Pulmonary:      Comments: On the ventilator  Abdominal:      Palpations: Abdomen is soft.       Significant Labs:  BMP:   Recent Labs   Lab 09/19/22 0410   *   *   K 4.9   CL 90*   CO2 23   BUN 83*   CREATININE 3.25*   CALCIUM 8.0*     CBC:   Recent Labs   Lab 09/19/22 0410   WBC 28.99*   RBC 2.23*   HGB 6.7*   HCT 19.8*   *   MCV 88.8   MCH 30.0   MCHC 33.8        Significant Imaging:  Labs: Reviewed

## 2022-09-21 NOTE — PROGRESS NOTES
[facility]  Pulmonology  Progress Note    Patient Name: Gregory Stuart  MRN: [id number]  Admission Date: 8/23/2022  Hospital Length of Stay: 29 days  Code Status: DNR  Attending Provider: Ham Sanchez DO  Primary Care Provider: Primary Doctor No   Principal Problem: Acute hypoxemic respiratory failure    Subjective:     Interval History: The patient remains critically ill. He remains on pressors. He is oxygenating adequately on 40% FiO2. He is currently afebrile.    Objective:     Vital Signs (Most Recent):  Temp: 98 °F (36.7 °C) (09/20/22 2330)  Pulse: 106 (09/21/22 0715)  Resp: 19 (09/21/22 0715)  BP: (!) 103/46 (09/21/22 0715)  SpO2: 96 % (09/21/22 0715)   Vital Signs (24h Range):  Temp:  [98 °F (36.7 °C)-99.3 °F (37.4 °C)] 98 °F (36.7 °C)  Pulse:  [] 106  Resp:  [11-25] 19  SpO2:  [65 %-98 %] 96 %  BP: ()/(35-59) 103/46     Weight: 99.8 kg (220 lb)  Body mass index is 28.25 kg/m².      Intake/Output Summary (Last 24 hours) at 9/21/2022 0756  Last data filed at 9/21/2022 0700  Gross per 24 hour   Intake --   Output 100 ml   Net -100 ml         Physical Exam  Vitals reviewed.   Constitutional:       General: He is not in acute distress.     Appearance: He is ill-appearing.   HENT:      Head: Normocephalic and atraumatic.      Right Ear: External ear normal.      Left Ear: External ear normal.      Mouth/Throat:      Mouth: Mucous membranes are moist.   Eyes:      Extraocular Movements: Extraocular movements intact.      Conjunctiva/sclera: Conjunctivae normal.   Neck:      Comments: Trach in place   Cardiovascular:      Rate and Rhythm: Normal rate. Rhythm irregular.      Pulses: Normal pulses.   Pulmonary:      Breath sounds: No wheezing, rhonchi or rales.   Abdominal:      General: Bowel sounds are normal. There is distension.      Palpations: Abdomen is soft.      Comments: Peg tube removed   Musculoskeletal:         General: Swelling present.      Right lower  leg: Edema present.      Left lower leg: Edema present.      Comments: Edema to upper and lower ext. Worse in left arm than right -Hx radial fx; family states cast was removed due to edema    Skin:     General: Skin is warm and dry.   Neurological:      Mental Status: He is alert. Mental status is at baseline.   Psychiatric:         Mood and Affect: Mood normal.       Vents:  Vent Mode: A/C (09/21/22 0400)  Ventilator Initiated: Yes (09/02/22 0047)  Set Rate: 12 BPM (09/21/22 0400)  Vt Set: 550 mL (09/21/22 0400)  Pressure Support: 12 cmH20 (09/12/22 0350)  PEEP/CPAP: 10 cmH20 (09/21/22 0400)  Oxygen Concentration (%): 50 (09/20/22 2047)  Peak Airway Pressure: 44 cmH2O (09/21/22 0400)  Total Ve: 6.9 mL (09/21/22 0400)  F/VT Ratio<105 (RSBI): (!) 31.46 (09/21/22 0400)    Lines/Drains/Airways       Central Venous Catheter Line  Duration                  Hemodialysis Catheter right subclavian -- days              Drain  Duration                  NG/OG Tube 09/14/22 1340 Left nostril 6 days              Airway  Duration                  Surgical Airway Shiley Cuffed -- days              Peripheral Intravenous Line  Duration                  Peripheral IV - Single Lumen 09/19/22 1756 22 G Left;Posterior Hand 1 day         Peripheral IV - Single Lumen 09/20/22 1432 20 G Left Antecubital <1 day                    Significant Labs:    CBC/Anemia Profile:  No results for input(s): WBC, HGB, HCT, PLT, MCV, RDW, IRON, FERRITIN, RETIC, FOLATE, OITUGETV31, OCCULTBLOOD in the last 48 hours.       Chemistries:  No results for input(s): NA, K, CL, CO2, BUN, CREATININE, CALCIUM, ALBUMIN, PROT, BILITOT, ALKPHOS, ALT, AST, GLUCOSE, MG, PHOS in the last 48 hours.      All pertinent labs within the past 24 hours have been reviewed.    Significant Imaging:  I have reviewed all pertinent imaging results/findings within the past 24 hours.    Assessment/Plan:     * Acute hypoxemic respiratory failure  Failed extubated post CABG (7/18)  Now  with trach and peg     - increase to PSV 12 hours -   - Will plan to change trach out in the next day or two--continue to increase weaning as tolerated  - continue with current weaning plan for now. Will increase tomorrow  - His strength is slowly improving which will also improve weaning. Will start to increase weaning again today  - Oxygenating adequately on 40%. Plan for 12 hours continuous of cpap during the day and can continue to rest overnight on assist control  - continue current tx   - increase CPAP to 16hrs and rest on vent   - Doing well with 16 hours of cpap and then resting for 8. Will continue with this for now. Next plan will be to increase to continuous  - continue with 16 hours today. Discussed with respiratory.Will continue with 16 hours for the next couple of days and then increase   plan to increase to continuous as tolerated  - did well with continuous cpap which was started yesterday. Plan to continue for now.   9-10 - CPAP as toelrated   -  Trach downsized to 6 XLT --  - Had an episode of emesis and is thought to have aspirated. Chest xray reviewed with worsening bilateral infiltrates. I have  FiO2 to 60% and peep is at 10.  Might need to bronch the patient (therapeutic)  - FiO2 down to 50% and peep is at 8. Once FiO2 back to 40% we will resume cpap  - FiO2 down to 40%. Peep is at 8.  9/15-  Sat is 92% this am. I am going to check a chest xray this am. Increasing secretions with odor  Plan for bronchoscopy today (therapeutic) due to copious thick secretions  - Unable to perform bronchoscopy yesterday. The patient currently is too ill. I am concerned that he might not tolerate the procedure. We have started the patient on cefepime renally dosed due to Klebsiella in respiratory culture. Continuing with clindamycin to cover anaerobes. Increased breathing treatments to q6 and added solumedrol 40 mg q12  - FiO2 now at 60% and peep  is 10  9/19- FiO2 down to 50%  9/21- FiO2 down to 40%    Alteration in nutrition  S/p peg tube placement   continue tube feeding  Tolerating without difficulty  9/14 now with some leaking around peg tube  Will ask surgery to take a look  9/15- Peg tube removed and NG tube placed  Abdomen distended today. Will check KUB  No obstruction  Held tube feeds due to residuals  Back to OR today to replace peg tube--too sick to return to OR yesterday    Weakness acquired in ICU  Extreme generalized weakness - continue PT/OT as tolerated  Seems to have regressed      DM (diabetes mellitus)  accuchecks and Sliding scale insulin   A1C 5.3%  Currently on sliding scale  Glucose<180  Continue with current care  accuchecks look ok in last 24 hours  I have started some steroids so sugars will likely increase some  Sugars up a little. Reducing steroid today    Anemia  Due to critical illness   9/9- Hgb is 6.4. Can transfuse 1 unit with next HD  9/11- VSS -- repeat CBC in AM   6.9/21.5 this am  BP on lower side. Will transfuse 1 unit with next dialysis session 9/14  Repeating CBC today- patient is hypotensive. If needed we will transfuse another unit  Hgb 6.7 most recent  We transfused with HD    JUVENTINO (acute kidney injury)  ESRD    Cr 0.9 March 2022   - was on CCRT post CABG -- transitioned to HD M/W/F  Plan to continue MWF dialysis  Nephrology note reviewed and appreciated    CAD (coronary artery disease)  S/p CABG on 07/18  Currently no acute issues  He is on aspirin, statin, beta blocker      A-fib  No history documented prior to CABG - was on Lovenox at OSH   - he is on carvediolol, eliquis  - HR has been up at times  - wide complex appearing on telemetry but I do not think this is v tach  - We can get a 12 lead but looks like probably RBBB with tachycardia                 Ham Sanchez, DO  Pulmonology  [facility]

## 2022-09-21 NOTE — ASSESSMENT & PLAN NOTE
ESRD    Cr 0.9 March 2022   - was on CCRT post CABG -- transitioned to HD M/W/F  Plan to continue MWF dialysis  Nephrology note reviewed and appreciated   : Yes

## 2022-09-21 NOTE — ASSESSMENT & PLAN NOTE
9/12/2022 Dialysis  9/13/2022 Continue with dialysis as scheduled.  9/14/2022  Continue with dialysis.  9/15/2022  Continue with dialysis.  9/20/2022  Low blood pressure makes it difficult to dialyze this patient.  He is on pressor medications.  9/21/2022  At this point, continue with dialysis as the patient is able to tolerate it.  However, situation is critical.

## 2022-09-21 NOTE — PROGRESS NOTES
[facility]  Nephrology  Progress Note    Patient Name: Gregory Stuart  MRN: [id number]  Admission Date: 8/23/2022  Hospital Length of Stay: 29 days  Attending Provider: Ham Sanchez DO   Primary Care Physician: Primary Doctor No  Principal Problem:Acute hypoxemic respiratory failure    Subjective:     HPI: This gentleman with history of CAD, CVA and renal failure.  The patient transferred from the [facility] for ventilator management and debility.  He has a history of CKD which progressed to ESRD after a CABG.  The patient has been on dialysis for about 3 weeks.  He continues to have diffuse edema.  His last dialysis session was on yesterday.  His family is at the bedside to answer questions.  Nephrology has been consulted for renal issues.      Interval History: The patient's condition is the same.  He remains on pressors.  NG tube is in place.    Review of patient's allergies indicates:  No Known Allergies  Current Facility-Administered Medications   Medication Frequency    0.9%  NaCl infusion (for blood administration) Q24H PRN    0.9%  NaCl infusion (for blood administration) Q24H PRN    0.9%  NaCl infusion PRN    acetaminophen tablet 650 mg Q6H PRN    albuterol-ipratropium 2.5 mg-0.5 mg/3 mL nebulizer solution 3 mL Q6H    aspirin chewable tablet 81 mg Daily    atorvastatin tablet 40 mg QHS    carvediloL tablet 3.125 mg BID    ceFEPIme (MAXIPIME) 1 g in dextrose 5 % in water (D5W) 5 % 50 mL IVPB (MB+) Q24H    clindamycin in D5W 600 mg/50 mL IVPB 600 mg Q8H    collagenase ointment Daily PRN    dextromethorphan-guaiFENesin  mg/5 ml liquid 10 mL Q6H PRN    dextrose 50% injection 12.5 g PRN    doxepin capsule 25 mg QHS    ferrous sulfate tablet 1 each Daily    glucagon (human recombinant) injection 1 mg PRN    heparin (porcine) injection 4,000 Units PRN    insulin aspart U-100 injection 1-10 Units PRN    methylPREDNISolone sodium succinate  injection 20 mg Q12H    miconazole NITRATE 2 % top powder BID    midodrine tablet 5 mg BID WM    NORepinephrine 8 mg in dextrose 5 % 250 mL infusion Continuous    ondansetron injection 8 mg Q6H PRN    pantoprazole suspension 40 mg Daily    polyethylene glycol packet 17 g Daily PRN    senna-docusate 8.6-50 mg per tablet 1 tablet Daily PRN    venlafaxine tablet 37.5 mg BID       Objective:     Vital Signs (Most Recent):  Temp: 96.8 °F (36 °C) (09/21/22 1100)  Pulse: (!) 112 (09/21/22 1400)  Resp: 18 (09/21/22 1400)  BP: (!) 111/51 (09/21/22 1400)  SpO2: 95 % (09/21/22 1400)  O2 Device (Oxygen Therapy): ventilator (09/21/22 1317)   Vital Signs (24h Range):  Temp:  [96.6 °F (35.9 °C)-98.6 °F (37 °C)] 96.8 °F (36 °C)  Pulse:  [] 112  Resp:  [11-23] 18  SpO2:  [65 %-99 %] 95 %  BP: ()/(35-59) 111/51     Weight: 99.8 kg (220 lb) (09/19/22 0415)  Body mass index is 28.25 kg/m².  Body surface area is 2.28 meters squared.    I/O last 3 completed shifts:  In: -   Out: 100 [Drains:100]    Physical Exam  Vitals reviewed.   Constitutional:       Appearance: He is ill-appearing.   HENT:      Head: Normocephalic and atraumatic.      Mouth/Throat:      Mouth: Mucous membranes are dry.   Cardiovascular:      Rate and Rhythm: Rhythm irregular.   Pulmonary:      Comments: On the ventilator  Abdominal:      Palpations: Abdomen is soft.       Significant Labs:  BMP:   Recent Labs   Lab 09/19/22  0410   *   *   K 4.9   CL 90*   CO2 23   BUN 83*   CREATININE 3.25*   CALCIUM 8.0*     CBC:   Recent Labs   Lab 09/19/22 0410   WBC 28.99*   RBC 2.23*   HGB 6.7*   HCT 19.8*   *   MCV 88.8   MCH 30.0   MCHC 33.8        Significant Imaging:  Labs: Reviewed    Assessment/Plan:     JUVENTINO (acute kidney injury)    9/12/2022 Dialysis  9/13/2022 Continue with dialysis as scheduled.  9/14/2022  Continue with dialysis.  9/15/2022  Continue with dialysis.  9/20/2022  Low blood pressure makes it difficult to dialyze  this patient.  He is on pressor medications.  9/21/2022  At this point, continue with dialysis as the patient is able to tolerate it.  However, situation is critical.    CAD (coronary artery disease)  Chronic condition    A-fib  Chronic condition        Thank you for your consult. I will follow-up with patient. Please contact us if you have any additional questions.    Andres Lino Jr, MD  Nephrology  [facility]

## 2022-09-21 NOTE — PROGRESS NOTES
[facility]  Adult Nutrition  Follow Up Note    SUMMARY       Recommendations    Recommendation/Intervention: NG tube to suction with high volume of output per RN and pt remains on pressors. Tube feedings not recommended at this time. Pt is a DNR, if alternate route of nutrition desired, recommend considering initiation of PPN or TPN as pt with increased nutritional needs due to ESRD on HD.  Goals: initiate alternate route of nutrition, weight maintenance  Nutrition Goal Status: new  Communication of RD Recs: reviewed with physician (recommendations sent to MD via secure chat)    Assessment and Plan  Pt seen for follow up. Current weight is 220 lbs, weight continues to fluctuate, likely due to Dialysis. Edema noted to be present per MD. Will continue to monitor weight trends.     Per MD note, pt with abdominal distention and tube feedings held due to residuals. RN reports NG tube to suction, high volume of output as well. Pt remains on pressors.    Tube feedings not recommended at this time. Pt is a DNR, if alternate route of nutrition desired, recommend considering initiation of PPN or TPN as pt with increased nutritional needs due to ESRD on HD.    Last BM 9/15 per flowsheets. Labs/meds reviewed. Will continue to monitor. RD following.     Contributing Nutrition Diagnosis  Increase nutrient needs (kcal/pro)    Related to (etiology):   Increased demand    Signs and Symptoms (as evidenced by):   ESRD on HD    Interventions/Recommendations (treatment strategy):  Consider initiation of PPN or TPN as tube feedings not recommended at this time as listed above.     Nutrition Diagnosis Status:   New    Reason for Assessment    Reason For Assessment: RD follow-up  Diagnosis: cardiac disease, pulmonary disease, renal disease, diabetes diagnosis/complications  Relevant Medical History: PEG, trach, dialysis    Nutrition Risk Screen    Nutrition Risk Screen: tube feeding or parenteral  "nutrition    Nutrition/Diet History    Spiritual, Cultural Beliefs, [employer] Practices, Values that Affect Care: no  Food Allergies: NKFA  Factors Affecting Nutritional Intake: None identified at this time    Anthropometrics    Temp: 96.8 °F (36 °C)  Height: 6' 2" (188 cm)  Height (inches): 74 in  Weight Method: Bed Scale  Weight: 99.8 kg (220 lb)  Weight (lb): 220 lb  Ideal Body Weight (IBW), Male: 190 lb  % Ideal Body Weight, Male (lb): 108.72 %  BMI (Calculated): 28.2  BMI Grade: 25 - 29.9 - overweight       Lab/Procedures/Meds   Latest Reference Range & Units 09/19/22 04:10   Sodium 136 - 145 mmol/L 124 (L)   Potassium 3.5 - 5.1 mmol/L 4.9   Chloride 98 - 107 mmol/L 90 (L)   CO2 21 - 32 mmol/L 23   Anion Gap 7 - 16 mmol/L 16   BUN 7 - 18 mg/dL 83 (H)   Creatinine 0.70 - 1.30 mg/dL 3.25 (H)   BUN/CREAT RATIO 6 - 20  26 (H)   eGFR >=60 mL/min/1.73m² 19 (L)   Glucose 74 - 106 mg/dL 168 (H)   Calcium 8.5 - 10.1 mg/dL 8.0 (L)   (L): Data is abnormally low  (H): Data is abnormally high    Note: Elevated BUN, Cr and low GFR as expected as pt on Dialysis. Elevated glucose, PMH of DM. Low Na, replete to WNL as needed.     Pertinent Labs Reviewed: reviewed  Pertinent Medications Reviewed: reviewed  Pertinent Medications Comments: aspirin, atorvastatin, carvedilol, maxipime, clindamycin, doxepin, ferrous sulfate, methylprednisolone, midodrine, pantoprazole, venlafaxine    Estimated/Assessed Needs    Weight Used For Calorie Calculations: 93.7 kg (206 lb 9.1 oz)  Energy Calorie Requirements (kcal): 1035-5742 (25-30cals/kg)  Energy Need Method: Kcal/kg  Protein Requirements: 112-122 g/day (1.2-1.3 g/kg)  Weight Used For Protein Calculations: 93.7 kg (206 lb 9.1 oz)     Estimated Fluid Requirement Method: RDA Method  RDA Method (mL): 2342       Evaluation of Received Nutrient/Fluid Intake  % Intake of Estimated Energy Needs: 0 - 25 %  % Meal Intake: NPO    Nutrition Risk    Level of Risk/Frequency of Follow-up: high "     Monitor and Evaluation    1. Monitor for initiation of alternate route of nutrition   2. Monitor weight changes  3. Monitor labs/meds  4. Monitor POC    Nutrition Follow-Up    RD Follow-up?: Yes

## 2022-09-21 NOTE — PROGRESS NOTES
[facility] [place]  Nephrology  Progress Note    Patient Name: Gregory Stuart  MRN: [id number]  Admission Date: 8/23/2022  Hospital Length of Stay: 28 days  Attending Provider: Ham Sanchez DO   Primary Care Physician: Primary Doctor No  Principal Problem:Acute hypoxemic respiratory failure    Subjective:     HPI: This gentleman with history of CAD, CVA and renal failure.  The patient transferred from the [facility] for ventilator management and debility.  He has a history of CKD which progressed to ESRD after a CABG.  The patient has been on dialysis for about 3 weeks.  He continues to have diffuse edema.  His last dialysis session was on yesterday.  His family is at the bedside to answer questions.  Nephrology has been consulted for renal issues.      Interval History: The patient remains on pressors.  NG tube noted with continuous drainage.    Review of patient's allergies indicates:  No Known Allergies  Current Facility-Administered Medications   Medication Frequency    0.9%  NaCl infusion (for blood administration) Q24H PRN    0.9%  NaCl infusion (for blood administration) Q24H PRN    0.9%  NaCl infusion PRN    acetaminophen tablet 650 mg Q6H PRN    albuterol-ipratropium 2.5 mg-0.5 mg/3 mL nebulizer solution 3 mL Q6H    aspirin chewable tablet 81 mg Daily    atorvastatin tablet 40 mg QHS    carvediloL tablet 3.125 mg BID    ceFEPIme (MAXIPIME) 1 g in dextrose 5 % in water (D5W) 5 % 50 mL IVPB (MB+) Q24H    clindamycin in D5W 600 mg/50 mL IVPB 600 mg Q8H    collagenase ointment Daily PRN    dextromethorphan-guaiFENesin  mg/5 ml liquid 10 mL Q6H PRN    dextrose 50% injection 12.5 g PRN    doxepin capsule 25 mg QHS    ferrous sulfate tablet 1 each Daily    glucagon (human recombinant) injection 1 mg PRN    heparin (porcine) injection 4,000 Units PRN    insulin aspart U-100 injection 1-10 Units PRN    methylPREDNISolone sodium succinate injection 40 mg  Q12H    miconazole NITRATE 2 % top powder BID    midodrine tablet 5 mg BID WM    NORepinephrine 8 mg in dextrose 5 % 250 mL infusion Continuous    ondansetron injection 8 mg Q6H PRN    pantoprazole suspension 40 mg Daily    polyethylene glycol packet 17 g Daily PRN    senna-docusate 8.6-50 mg per tablet 1 tablet Daily PRN    venlafaxine tablet 37.5 mg BID       Objective:     Vital Signs (Most Recent):  Temp: 98.9 °F (37.2 °C) (09/20/22 1600)  Pulse: (!) 114 (09/20/22 1600)  Resp: (!) 23 (09/20/22 1600)  BP: (!) 116/47 (09/20/22 1600)  SpO2: (!) 94 % (09/20/22 1600)  O2 Device (Oxygen Therapy): ventilator (09/20/22 1644)   Vital Signs (24h Range):  Temp:  [98.3 °F (36.8 °C)-99.3 °F (37.4 °C)] 98.9 °F (37.2 °C)  Pulse:  [102-125] 114  Resp:  [12-25] 23  SpO2:  [94 %-97 %] 94 %  BP: ()/(41-65) 116/47     Weight: 99.8 kg (220 lb) (09/19/22 0415)  Body mass index is 28.25 kg/m².  Body surface area is 2.28 meters squared.    I/O last 3 completed shifts:  In: 1023 [Blood:263; Other:760]  Out: 1760 [Other:1760]    Physical Exam  Vitals reviewed.   Constitutional:       Appearance: He is ill-appearing.   HENT:      Head: Normocephalic and atraumatic.   Cardiovascular:      Rate and Rhythm: Rhythm irregular.   Pulmonary:      Effort: Pulmonary effort is normal.      Comments: On trach collar  Abdominal:      Palpations: Abdomen is soft.      Comments: NG tube in place       Significant Labs:  BMP:   Recent Labs   Lab 09/19/22 0410   *   *   K 4.9   CL 90*   CO2 23   BUN 83*   CREATININE 3.25*   CALCIUM 8.0*     CBC:   Recent Labs   Lab 09/19/22 0410   WBC 28.99*   RBC 2.23*   HGB 6.7*   HCT 19.8*   *   MCV 88.8   MCH 30.0   MCHC 33.8        Significant Imaging:  Labs: Reviewed    Assessment/Plan:     JUVENTINO (acute kidney injury)    9/12/2022 Dialysis  9/13/2022 Continue with dialysis as scheduled.  9/14/2022  Continue with dialysis.  9/15/2022  Continue with dialysis.  9/20/2022  Low blood  pressure makes it difficult to dialyze this patient.  He is on pressor medications.    CAD (coronary artery disease)  Chronic condition    A-fib  Chronic condition    The situation is very guarded.      Thank you for your consult. I will follow-up with patient. Please contact us if you have any additional questions.    Andres Lino Jr, MD  Nephrology  [facility] \[facility]\""

## 2022-09-22 PROBLEM — I95.9 HYPOTENSION: Status: ACTIVE | Noted: 2022-01-01

## 2022-09-22 PROBLEM — T81.30XA WOUND DEHISCENCE: Status: RESOLVED | Noted: 2022-01-01 | Resolved: 2022-01-01

## 2022-09-22 NOTE — ASSESSMENT & PLAN NOTE
Failed extubated post CABG ()  Now with trach and peg     - increase to PSV 12 hours -   - Will plan to change trach out in the next day or two--continue to increase weaning as tolerated  - continue with current weaning plan for now. Will increase tomorrow  - His strength is slowly improving which will also improve weaning. Will start to increase weaning again today  - Oxygenating adequately on 40%. Plan for 12 hours continuous of cpap during the day and can continue to rest overnight on assist control  - continue current tx   - increase CPAP to 16hrs and rest on vent   - Doing well with 16 hours of cpap and then resting for 8. Will continue with this for now. Next plan will be to increase to continuous  - continue with 16 hours today. Discussed with respiratory.Will continue with 16 hours for the next couple of days and then increase   plan to increase to continuous as tolerated  - did well with continuous cpap which was started yesterday. Plan to continue for now.   9-10 - CPAP as toelrated   -  Trach downsized to 6 XLT --  - Had an episode of emesis and is thought to have aspirated. Chest xray reviewed with worsening bilateral infiltrates. I have  FiO2 to 60% and peep is at 10.  Might need to bronch the patient (therapeutic)  - FiO2 down to 50% and peep is at 8. Once FiO2 back to 40% we will resume cpap  - FiO2 down to 40%. Peep is at 8.  9/15-  Sat is 92% this am. I am going to check a chest xray this am. Increasing secretions with odor  Plan for bronchoscopy today (therapeutic) due to copious thick secretions  - Unable to perform bronchoscopy yesterday. The patient currently is too ill. I am concerned that he might not tolerate the procedure. We have started the patient on cefepime renally dosed due to Klebsiella in respiratory culture. Continuing with clindamycin to cover anaerobes. Increased breathing treatments to q6 and added solumedrol 40  mg q12  9/18- FiO2 now at 60% and peep is 10  9/19- FiO2 down to 50%  9/21- FiO2 down to 40%  9/22- less responsive, still on levo; will continue to rest on AC -

## 2022-09-22 NOTE — ASSESSMENT & PLAN NOTE
Resumed home medications   BP low normal this am. Held carvedilol as HD is scheduled for this am  9/13 decreased carvedilol to 6.25 bid  9/15- Holding carvediolol. BP low this am. Will give a small bolus and monitor response  9/22- has been on Levo almost a week now. Given he has not been receiving any tube feedings for last 5 days will give an IVF bolus and start some IVFs at a low dose

## 2022-09-22 NOTE — ASSESSMENT & PLAN NOTE
Peg tube removed by surgery - currently has NGT to LWIS - will ask surgery when or if we can start tube feedings per NGT... CT without obstruction

## 2022-09-22 NOTE — SUBJECTIVE & OBJECTIVE
Interval History: on pressors, less responsive     Objective:     Vital Signs (Most Recent):  Temp: 97.6 °F (36.4 °C) (09/21/22 2000)  Pulse: 104 (09/22/22 1313)  Resp: (!) 22 (09/22/22 1313)  BP: (!) 112/56 (09/22/22 1015)  SpO2: (!) 94 % (09/22/22 1313)   Vital Signs (24h Range):  Temp:  [97.6 °F (36.4 °C)] 97.6 °F (36.4 °C)  Pulse:  [] 104  Resp:  [8-23] 22  SpO2:  [70 %-100 %] 94 %  BP: ()/(43-58) 112/56     Weight: 99.8 kg (220 lb)  Body mass index is 28.25 kg/m².      Intake/Output Summary (Last 24 hours) at 9/22/2022 1322  Last data filed at 9/22/2022 0600  Gross per 24 hour   Intake 170 ml   Output 200 ml   Net -30 ml       Physical Exam  Vitals reviewed.   Constitutional:       General: He is not in acute distress.     Appearance: He is ill-appearing.   HENT:      Head: Normocephalic and atraumatic.      Right Ear: External ear normal.      Left Ear: External ear normal.      Mouth/Throat:      Mouth: Mucous membranes are moist.   Eyes:      Extraocular Movements: Extraocular movements intact.      Conjunctiva/sclera: Conjunctivae normal.   Neck:      Comments: Trach in place   Cardiovascular:      Rate and Rhythm: Normal rate. Rhythm irregular.      Pulses: Normal pulses.   Pulmonary:      Breath sounds: No wheezing, rhonchi or rales.   Abdominal:      General: Bowel sounds are normal. There is distension.      Palpations: Abdomen is soft.      Comments: Peg tube removed   Musculoskeletal:         General: Swelling present.      Right lower leg: Edema present.      Left lower leg: Edema present.      Comments: Edema to upper and lower ext. Worse in left arm than right -Hx radial fx; family states cast was removed due to edema    Skin:     General: Skin is warm and dry.   Neurological:      Mental Status: He is alert. Mental status is at baseline.   Psychiatric:         Mood and Affect: Mood normal.       Vents:  Vent Mode: A/C (09/22/22 1313)  Ventilator Initiated: Yes (09/02/22 0047)  Set  Rate: 12 BPM (09/22/22 1313)  Vt Set: 550 mL (09/22/22 1313)  Pressure Support: 12 cmH20 (09/12/22 0350)  PEEP/CPAP: 10 cmH20 (09/22/22 1313)  Oxygen Concentration (%): 40 (09/22/22 1313)  Peak Airway Pressure: 41 cmH2O (09/22/22 1313)  Total Ve: 6 mL (09/22/22 1313)  F/VT Ratio<105 (RSBI): (!) 60.61 (09/22/22 1313)    Lines/Drains/Airways       Central Venous Catheter Line  Duration                  Hemodialysis Catheter right subclavian -- days              Drain  Duration                  NG/OG Tube 09/14/22 1340 Left nostril 7 days              Airway  Duration                  Surgical Airway Shiley Cuffed -- days              Peripheral Intravenous Line  Duration                  Peripheral IV - Single Lumen 09/19/22 1756 22 G Left;Posterior Hand 2 days         Peripheral IV - Single Lumen 09/20/22 1432 20 G Left Antecubital 1 day                    Significant Labs:    CBC/Anemia Profile:  No results for input(s): WBC, HGB, HCT, PLT, MCV, RDW, IRON, FERRITIN, RETIC, FOLATE, [id number], OCCULTBLOOD in the last 48 hours.     Chemistries:  No results for input(s): NA, K, CL, CO2, BUN, CREATININE, CALCIUM, ALBUMIN, PROT, BILITOT, ALKPHOS, ALT, AST, GLUCOSE, MG, PHOS in the last 48 hours.    All pertinent labs within the past 24 hours have been reviewed.    Significant Imaging:  I have reviewed all pertinent imaging results/findings within the past 24 hours.

## 2022-09-22 NOTE — ASSESSMENT & PLAN NOTE
ESRD  Cr 0.9 March 2022   - was on CCRT post CABG -- transitioned to HD M/W/F  Plan to continue MWF dialysis  Nephrology following -- they have not been pulling off any fluid per staff

## 2022-09-22 NOTE — ASSESSMENT & PLAN NOTE
On levophed - will treat with small bolus and start IVFs since he has been NPO for last 4--5 days  - wean levo as tolerated

## 2022-09-22 NOTE — ASSESSMENT & PLAN NOTE
EF 30-35% per Echo 07/2022  Daily wts/ I/Os   ---starting IVFs for dehydration and no PO intake over last 5 days - will monitor closely for FVO

## 2022-09-22 NOTE — PROGRESS NOTES
[facility]  Pulmonology  Progress Note    Patient Name: Gregory Stuart  MRN: [id number]  Admission Date: 8/23/2022  Hospital Length of Stay: 30 days  Code Status: DNR  Attending Provider: Ham Sanchez DO  Primary Care Provider: Primary Doctor No   Principal Problem: Acute hypoxemic respiratory failure    Subjective:     Interval History: on pressors, less responsive     Objective:     Vital Signs (Most Recent):  Temp: 97.6 °F (36.4 °C) (09/21/22 2000)  Pulse: 104 (09/22/22 1313)  Resp: (!) 22 (09/22/22 1313)  BP: (!) 112/56 (09/22/22 1015)  SpO2: (!) 94 % (09/22/22 1313)   Vital Signs (24h Range):  Temp:  [97.6 °F (36.4 °C)] 97.6 °F (36.4 °C)  Pulse:  [] 104  Resp:  [8-23] 22  SpO2:  [70 %-100 %] 94 %  BP: ()/(43-58) 112/56     Weight: 99.8 kg (220 lb)  Body mass index is 28.25 kg/m².      Intake/Output Summary (Last 24 hours) at 9/22/2022 1322  Last data filed at 9/22/2022 0600  Gross per 24 hour   Intake 170 ml   Output 200 ml   Net -30 ml       Physical Exam  Vitals reviewed.   Constitutional:       General: He is not in acute distress.     Appearance: He is ill-appearing.   HENT:      Head: Normocephalic and atraumatic.      Right Ear: External ear normal.      Left Ear: External ear normal.      Mouth/Throat:      Mouth: Mucous membranes are moist.   Eyes:      Extraocular Movements: Extraocular movements intact.      Conjunctiva/sclera: Conjunctivae normal.   Neck:      Comments: Trach in place   Cardiovascular:      Rate and Rhythm: Normal rate. Rhythm irregular.      Pulses: Normal pulses.   Pulmonary:      Breath sounds: No wheezing, rhonchi or rales.   Abdominal:      General: Bowel sounds are normal. There is distension.      Palpations: Abdomen is soft.      Comments: Peg tube removed   Musculoskeletal:         General: Swelling present.      Right lower leg: Edema present.      Left lower leg: Edema present.      Comments: Edema to upper and lower ext.  Worse in left arm than right -Hx radial fx; family states cast was removed due to edema    Skin:     General: Skin is warm and dry.   Neurological:      Mental Status: He is alert. Mental status is at baseline.   Psychiatric:         Mood and Affect: Mood normal.       Vents:  Vent Mode: A/C (09/22/22 1313)  Ventilator Initiated: Yes (09/02/22 0047)  Set Rate: 12 BPM (09/22/22 1313)  Vt Set: 550 mL (09/22/22 1313)  Pressure Support: 12 cmH20 (09/12/22 0350)  PEEP/CPAP: 10 cmH20 (09/22/22 1313)  Oxygen Concentration (%): 40 (09/22/22 1313)  Peak Airway Pressure: 41 cmH2O (09/22/22 1313)  Total Ve: 6 mL (09/22/22 1313)  F/VT Ratio<105 (RSBI): (!) 60.61 (09/22/22 1313)    Lines/Drains/Airways       Central Venous Catheter Line  Duration                  Hemodialysis Catheter right subclavian -- days              Drain  Duration                  NG/OG Tube 09/14/22 1340 Left nostril 7 days              Airway  Duration                  Surgical Airway Shiley Cuffed -- days              Peripheral Intravenous Line  Duration                  Peripheral IV - Single Lumen 09/19/22 1756 22 G Left;Posterior Hand 2 days         Peripheral IV - Single Lumen 09/20/22 1432 20 G Left Antecubital 1 day                    Significant Labs:    CBC/Anemia Profile:  No results for input(s): WBC, HGB, HCT, PLT, MCV, RDW, IRON, FERRITIN, RETIC, FOLATE, [id number], OCCULTBLOOD in the last 48 hours.     Chemistries:  No results for input(s): NA, K, CL, CO2, BUN, CREATININE, CALCIUM, ALBUMIN, PROT, BILITOT, ALKPHOS, ALT, AST, GLUCOSE, MG, PHOS in the last 48 hours.    All pertinent labs within the past 24 hours have been reviewed.    Significant Imaging:  I have reviewed all pertinent imaging results/findings within the past 24 hours.    Assessment/Plan:     * Acute hypoxemic respiratory failure  Failed extubated post CABG (7/18)  Now with trach and peg     8/28- increase to PSV 12 hours -   8/29- Will plan to change trach out in the next  day or two--continue to increase weaning as tolerated  - continue with current weaning plan for now. Will increase tomorrow  - His strength is slowly improving which will also improve weaning. Will start to increase weaning again today  - Oxygenating adequately on 40%. Plan for 12 hours continuous of cpap during the day and can continue to rest overnight on assist control  - continue current tx   - increase CPAP to 16hrs and rest on vent   - Doing well with 16 hours of cpap and then resting for 8. Will continue with this for now. Next plan will be to increase to continuous  - continue with 16 hours today. Discussed with respiratory.Will continue with 16 hours for the next couple of days and then increase   plan to increase to continuous as tolerated  - did well with continuous cpap which was started yesterday. Plan to continue for now.   9-10 - CPAP as toelrated   -  Trach downsized to 6 XLT --  - Had an episode of emesis and is thought to have aspirated. Chest xray reviewed with worsening bilateral infiltrates. I have  FiO2 to 60% and peep is at 10.  Might need to bronch the patient (therapeutic)  - FiO2 down to 50% and peep is at 8. Once FiO2 back to 40% we will resume cpap  - FiO2 down to 40%. Peep is at 8.  9/15-  Sat is 92% this am. I am going to check a chest xray this am. Increasing secretions with odor  Plan for bronchoscopy today (therapeutic) due to copious thick secretions  - Unable to perform bronchoscopy yesterday. The patient currently is too ill. I am concerned that he might not tolerate the procedure. We have started the patient on cefepime renally dosed due to Klebsiella in respiratory culture. Continuing with clindamycin to cover anaerobes. Increased breathing treatments to q6 and added solumedrol 40 mg q12  - FiO2 now at 60% and peep is 10  - FiO2 down to 50%  - FiO2 down to 40%  - less responsive, still on levo; will  continue to rest on AC -     Hypotension  On levophed - will treat with small bolus and start IVFs since he has been NPO for last 4--5 days  - wean levo as tolerated     PEG tube malfunction  Peg tube removed by surgery - currently has NGT to LWIS - will ask surgery when or if we can start tube feedings per NGT... CT without obstruction     HTN (hypertension)  Resumed home medications   BP low normal this am. Held carvedilol as HD is scheduled for this am  9/13 decreased carvedilol to 6.25 bid  9/15- Holding carvediolol. BP low this am. Will give a small bolus and monitor response  9/22- has been on Levo almost a week now. Given he has not been receiving any tube feedings for last 5 days will give an IVF bolus and start some IVFs at a low dose     Weakness acquired in ICU  Extreme generalized weakness - continue PT/OT as tolerated  He has now regressed      DM (diabetes mellitus)  accuchecks and Sliding scale insulin   A1C 5.3%  Currently on sliding scale  Glucose<180    Anemia  Due to critical illness     Last Hgb 6.7-- will repeat in AM     JUVENTINO (acute kidney injury)  ESRD  Cr 0.9 March 2022   - was on CCRT post CABG -- transitioned to HD M/W/F  Plan to continue MWF dialysis  Nephrology following -- they have not been pulling off any fluid per staff     CAD (coronary artery disease)  S/p CABG on 07/18  Currently no acute issues  He is on aspirin, statin, beta blocker      Chronic systolic heart failure  EF 30-35% per Echo 07/2022  Daily wts/ I/Os   ---starting IVFs for dehydration and no PO intake over last 5 days - will monitor closely for FVO         Overall he has continued to decline. Will try to speak with wife today of tomorrow  about his continued decline and next steps in his plan of care. This includes 35 minutes of critical care time spent evaluating and managing patient. This includes time spent at bedside, reviewing labs, data, xrays and monitoring for acute decompensation.             Meg Dalton,  AG-ACNP  Pulmonology  [facility]

## 2022-09-23 NOTE — ASSESSMENT & PLAN NOTE
9/12/2022 Dialysis  9/13/2022 Continue with dialysis as scheduled.  9/14/2022  Continue with dialysis.  9/15/2022  Continue with dialysis.  9/20/2022  Low blood pressure makes it difficult to dialyze this patient.  He is on pressor medications.  9/21/2022  At this point, continue with dialysis as the patient is able to tolerate it.  However, situation is critical.  9/23/2022  The situation is the same.  Continue with dialysis as tolerated.

## 2022-09-23 NOTE — ASSESSMENT & PLAN NOTE
S/p peg tube placement   Tolerating without difficulty  9/14 now with some leaking around peg tube  9/15- Peg tube removed and NG tube placed  Abdomen distended today. Will check KUB  No obstruction  Held tube feeds due to residuals  -too sick to return to OR

## 2022-09-23 NOTE — PLAN OF CARE
Per IDTM continue with current plan of care. Pt has NGT to suction. Vent at 40% o2 dressing changes. SS following for discharge needs.

## 2022-09-23 NOTE — PROGRESS NOTES
[facility]  Adult Nutrition  Follow Up Note    SUMMARY       Recommendations    Recommendation/Intervention: NG tube to suction with high volume of output per RN and pt remains on pressors. Tube feedings not recommended at this time. Pt is a DNR, if alternate route of nutrition desired, recommend considering initiation of PPN or TPN as pt with increased nutritional needs due to ESRD on HD.  Goals: initiate alternate route of nutrition, weight maintenance  Nutrition Goal Status: new  Communication of RD Recs: reviewed with physician (recommendations sent to MD via secure chat)    Assessment and Plan  Pt seen for follow up. Current weight is 204 lbs, weight continues to fluctuate, likely due to Dialysis and unable to feed patient. Edema noted to be present per MD. Will continue to monitor weight trends.     Per MD note,   9/22- has been on Levo almost a week now. Given he has not been receiving any tube feedings for last 5 days will give an IVF bolus and start some IVFs at a low dose     Tube feedings not recommended at this time. Pt is a DNR, if alternate route of nutrition desired, recommend considering initiation of PPN or TPN as pt with increased nutritional needs due to ESRD on HD.     Labs/meds reviewed. Will continue to monitor. RD following.     Contributing Nutrition Diagnosis  Increase nutrient needs (kcal/pro)    Related to (etiology):   Increased demand    Signs and Symptoms (as evidenced by):   ESRD on HD    Interventions/Recommendations (treatment strategy):  Consider initiation of PPN or TPN as tube feedings not recommended at this time as listed above.     Nutrition Diagnosis Status:   New    Reason for Assessment    Reason For Assessment: RD follow-up  Diagnosis: cardiac disease, pulmonary disease, renal disease, diabetes diagnosis/complications  Relevant Medical History: PEG, trach, dialysis    Nutrition Risk Screen    Nutrition Risk Screen: tube feeding or  "parenteral nutrition    Nutrition/Diet History    Spiritual, Cultural Beliefs, Zoroastrianism Practices, Values that Affect Care: no  Food Allergies: NKFA  Factors Affecting Nutritional Intake: None identified at this time    Anthropometrics    Temp: 97.5 °F (36.4 °C)  Height: 6' 2" (188 cm)  Height (inches): 74 in  Weight Method: Bed Scale  Weight: 94.6 kg (208 lb 8.9 oz)  Weight (lb): 208.56 lb  Ideal Body Weight (IBW), Male: 190 lb  % Ideal Body Weight, Male (lb): 108.72 %  BMI (Calculated): 28.2  BMI Grade: 25 - 29.9 - overweight       Lab/Procedures/Meds   Latest Reference Range & Units 09/19/22 04:10   Sodium 136 - 145 mmol/L 124 (L)   Potassium 3.5 - 5.1 mmol/L 4.9   Chloride 98 - 107 mmol/L 90 (L)   CO2 21 - 32 mmol/L 23   Anion Gap 7 - 16 mmol/L 16   BUN 7 - 18 mg/dL 83 (H)   Creatinine 0.70 - 1.30 mg/dL 3.25 (H)   BUN/CREAT RATIO 6 - 20  26 (H)   eGFR >=60 mL/min/1.73m² 19 (L)   Glucose 74 - 106 mg/dL 168 (H)   Calcium 8.5 - 10.1 mg/dL 8.0 (L)   (L): Data is abnormally low  (H): Data is abnormally high    Note: Elevated BUN, Cr and low GFR as expected as pt on Dialysis. Elevated glucose, PMH of DM. Low Na, replete to WNL as needed.     Pertinent Labs Reviewed: reviewed  Pertinent Medications Reviewed: reviewed  Pertinent Medications Comments: aspirin, atorvastatin, carvedilol, maxipime, clindamycin, doxepin, ferrous sulfate, methylprednisolone, midodrine, pantoprazole, venlafaxine    Estimated/Assessed Needs    Weight Used For Calorie Calculations: 93.7 kg (206 lb 9.1 oz)  Energy Calorie Requirements (kcal): 2432-1895 (25-30cals/kg)  Energy Need Method: Kcal/kg  Protein Requirements: 112-122 g/day (1.2-1.3 g/kg)  Weight Used For Protein Calculations: 93.7 kg (206 lb 9.1 oz)     Estimated Fluid Requirement Method: RDA Method  RDA Method (mL): 2342       Evaluation of Received Nutrient/Fluid Intake  % Intake of Estimated Energy Needs: 0 - 25 %  % Meal Intake: NPO    Nutrition Risk    Level of Risk/Frequency of " Follow-up: high     Monitor and Evaluation    1. Monitor for initiation of alternate route of nutrition   2. Monitor weight changes  3. Monitor labs/meds  4. Monitor POC    Nutrition Follow-Up    RD Follow-up?: Yes

## 2022-09-23 NOTE — ASSESSMENT & PLAN NOTE
On levophed - will treat with small bolus and start IVFs since he has been NPO for last 4--5 days  - wean levo as tolerated   9*23- treated with small IVF bolus yesterday -- able to wean levo down slightly

## 2022-09-23 NOTE — SUBJECTIVE & OBJECTIVE
Interval History: The patient's condition is about the same.  He remains on pressor medications.    Review of patient's allergies indicates:  No Known Allergies  Current Facility-Administered Medications   Medication Frequency    0.9%  NaCl infusion (for blood administration) Q24H PRN    0.9%  NaCl infusion PRN    acetaminophen tablet 650 mg Q6H PRN    albuterol-ipratropium 2.5 mg-0.5 mg/3 mL nebulizer solution 3 mL Q6H    aspirin chewable tablet 81 mg Daily    carvediloL tablet 3.125 mg BID    ceFEPIme (MAXIPIME) 1 g in dextrose 5 % in water (D5W) 5 % 50 mL IVPB (MB+) Q24H    collagenase ointment Daily PRN    dextromethorphan-guaiFENesin  mg/5 ml liquid 10 mL Q6H PRN    dextrose 50% injection 12.5 g PRN    doxepin capsule 25 mg QHS    ferrous sulfate tablet 1 each Daily    glucagon (human recombinant) injection 1 mg PRN    heparin (porcine) injection 4,000 Units PRN    insulin aspart U-100 injection 1-10 Units PRN    lactated ringers infusion Continuous    methylPREDNISolone sodium succinate injection 20 mg Q12H    miconazole NITRATE 2 % top powder BID    midodrine tablet 10 mg TID WM    NORepinephrine 8 mg in dextrose 5 % 250 mL infusion Continuous    ondansetron injection 8 mg Q6H PRN    pantoprazole injection 40 mg Daily    polyethylene glycol packet 17 g Daily PRN    senna-docusate 8.6-50 mg per tablet 1 tablet Daily PRN       Objective:     Vital Signs (Most Recent):  Temp: 97.7 °F (36.5 °C) (09/22/22 2000)  Pulse: 102 (09/22/22 2007)  Resp: 17 (09/22/22 2007)  BP: (!) 111/52 (09/22/22 2044)  SpO2: 95 % (09/22/22 2007)  O2 Device (Oxygen Therapy): ventilator (09/22/22 2007)   Vital Signs (24h Range):  Temp:  [97.2 °F (36.2 °C)-97.7 °F (36.5 °C)] 97.7 °F (36.5 °C)  Pulse:  [] 102  Resp:  [9-26] 17  SpO2:  [70 %-100 %] 95 %  BP: ()/(42-63) 111/52     Weight: 99.8 kg (220 lb) (09/19/22 3597)  Body mass index is 28.25 kg/m².  Body surface area is 2.28 meters squared.    I/O last 3 completed  shifts:  In: 170 [I.V.:120; IV Piggyback:50]  Out: 700 [Drains:200; Other:500]    Physical Exam  Vitals reviewed.   Constitutional:       Appearance: He is ill-appearing.   HENT:      Head: Normocephalic and atraumatic.   Cardiovascular:      Rate and Rhythm: Rhythm irregular.   Pulmonary:      Effort: Pulmonary effort is normal.      Comments: The patient is ventilated  Abdominal:      Palpations: Abdomen is soft.       Significant Labs:  BMP:   Recent Labs   Lab 09/19/22 0410   *   *   K 4.9   CL 90*   CO2 23   BUN 83*   CREATININE 3.25*   CALCIUM 8.0*     CBC:   Recent Labs   Lab 09/19/22 0410   WBC 28.99*   RBC 2.23*   HGB 6.7*   HCT 19.8*   *   MCV 88.8   MCH 30.0   MCHC 33.8        Significant Imaging:  Labs: Reviewed

## 2022-09-23 NOTE — ASSESSMENT & PLAN NOTE
Failed extubated post CABG ()  Now with trach and peg     - increase to PSV 12 hours -   - Will plan to change trach out in the next day or two--continue to increase weaning as tolerated  - continue with current weaning plan for now. Will increase tomorrow  - His strength is slowly improving which will also improve weaning. Will start to increase weaning again today  - Oxygenating adequately on 40%. Plan for 12 hours continuous of cpap during the day and can continue to rest overnight on assist control  - continue current tx   - increase CPAP to 16hrs and rest on vent   - Doing well with 16 hours of cpap and then resting for 8. Will continue with this for now. Next plan will be to increase to continuous  - continue with 16 hours today. Discussed with respiratory.Will continue with 16 hours for the next couple of days and then increase   plan to increase to continuous as tolerated  - did well with continuous cpap which was started yesterday. Plan to continue for now.   9-10 - CPAP as toelrated   -  Trach downsized to 6 XLT --  - Had an episode of emesis and is thought to have aspirated. Chest xray reviewed with worsening bilateral infiltrates. I have  FiO2 to 60% and peep is at 10.  Might need to bronch the patient (therapeutic)  - FiO2 down to 50% and peep is at 8. Once FiO2 back to 40% we will resume cpap  - FiO2 down to 40%. Peep is at 8.  9/15-  Sat is 92% this am. I am going to check a chest xray this am. Increasing secretions with odor  Plan for bronchoscopy today (therapeutic) due to copious thick secretions  - Unable to perform bronchoscopy yesterday. The patient currently is too ill. I am concerned that he might not tolerate the procedure. We have started the patient on cefepime renally dosed due to Klebsiella in respiratory culture. Continuing with clindamycin to cover anaerobes. Increased breathing treatments to q6 and added solumedrol 40  mg q12  9/18- FiO2 now at 60% and peep is 10  9/19- FiO2 down to 50%  9/21- FiO2 down to 40%  9/22- less responsive, still on levo; will continue to rest on AC -   9/23- not much change neurologically continue to rest on AM

## 2022-09-23 NOTE — PROGRESS NOTES
[facility]  Nephrology  Progress Note    Patient Name: Gregory Stuart  MRN: [id number]  Admission Date: 8/23/2022  Hospital Length of Stay: 31 days  Attending Provider: Ham Sanchez DO   Primary Care Physician: Primary Doctor No  Principal Problem:Acute hypoxemic respiratory failure    Subjective:     HPI: This gentleman with history of CAD, CVA and renal failure.  The patient transferred from the [facility] for ventilator management and debility.  He has a history of CKD which progressed to ESRD after a CABG.  The patient has been on dialysis for about 3 weeks.  He continues to have diffuse edema.  His last dialysis session was on yesterday.  His family is at the bedside to answer questions.  Nephrology has been consulted for renal issues.      Interval History: The patient's condition is the same.  He remains ventilated.  He is very weak.    Review of patient's allergies indicates:  No Known Allergies  Current Facility-Administered Medications   Medication Frequency    0.9%  NaCl infusion (for blood administration) Q24H PRN    0.9%  NaCl infusion PRN    acetaminophen tablet 650 mg Q6H PRN    albuterol-ipratropium 2.5 mg-0.5 mg/3 mL nebulizer solution 3 mL Q6H    aspirin chewable tablet 81 mg Daily    carvediloL tablet 3.125 mg BID    ceFEPIme (MAXIPIME) 1 g in dextrose 5 % in water (D5W) 5 % 50 mL IVPB (MB+) Q24H    collagenase ointment Daily PRN    dextromethorphan-guaiFENesin  mg/5 ml liquid 10 mL Q6H PRN    dextrose 50% injection 12.5 g PRN    doxepin capsule 25 mg QHS    ferrous sulfate tablet 1 each Daily    glucagon (human recombinant) injection 1 mg PRN    heparin (porcine) injection 4,000 Units PRN    insulin aspart U-100 injection 1-10 Units PRN    lactated ringers infusion Continuous    methylPREDNISolone sodium succinate injection 20 mg Q12H    miconazole NITRATE 2 % top powder BID    midodrine tablet 10 mg TID WM    NORepinephrine 8  mg in dextrose 5 % 250 mL infusion Continuous    ondansetron injection 8 mg Q6H PRN    pantoprazole injection 40 mg Daily    polyethylene glycol packet 17 g Daily PRN    senna-docusate 8.6-50 mg per tablet 1 tablet Daily PRN       Objective:     Vital Signs (Most Recent):  Temp: 97.5 °F (36.4 °C) (09/23/22 0400)  Pulse: 99 (09/23/22 0945)  Resp: 13 (09/23/22 0945)  BP: (!) 109/47 (09/23/22 0945)  SpO2: 95 % (09/23/22 0945)  O2 Device (Oxygen Therapy): ventilator (09/23/22 0004)   Vital Signs (24h Range):  Temp:  [97.2 °F (36.2 °C)-97.7 °F (36.5 °C)] 97.5 °F (36.4 °C)  Pulse:  [] 99  Resp:  [11-26] 13  SpO2:  [91 %-97 %] 95 %  BP: ()/(32-57) 109/47     Weight: 94.6 kg (208 lb 8.9 oz) (09/23/22 0545)  Body mass index is 26.78 kg/m².  Body surface area is 2.22 meters squared.    I/O last 3 completed shifts:  In: 2074.7 [I.V.:602.7; NG/GT:822; IV Piggyback:650]  Out: 100 [Drains:100]    Physical Exam  Vitals reviewed.   Constitutional:       Appearance: He is ill-appearing.   HENT:      Head: Normocephalic and atraumatic.      Mouth/Throat:      Mouth: Mucous membranes are moist.   Cardiovascular:      Rate and Rhythm: Rhythm irregular.   Pulmonary:      Comments: The patient is ventilated.  Abdominal:      Palpations: Abdomen is soft.   Musculoskeletal:      Right lower leg: Edema present.      Left lower leg: Edema present.       Significant Labs:  BMP:   Recent Labs   Lab 09/23/22  0309   *   *   K 4.6   CL 98   CO2 24   BUN 79*   CREATININE 2.67*   CALCIUM 7.6*     CBC:   Recent Labs   Lab 09/23/22  0309   WBC 17.61*   RBC 2.53*   HGB 7.5*   HCT 22.9*   PLT 69*   MCV 90.5   MCH 29.6   MCHC 32.8        Significant Imaging:  Labs: Reviewed    Assessment/Plan:     DM (diabetes mellitus)  Underlying condition    JUVENTINO (acute kidney injury)    9/12/2022 Dialysis  9/13/2022 Continue with dialysis as scheduled.  9/14/2022  Continue with dialysis.  9/15/2022  Continue with dialysis.  9/20/2022   Low blood pressure makes it difficult to dialyze this patient.  He is on pressor medications.  9/21/2022  At this point, continue with dialysis as the patient is able to tolerate it.  However, situation is critical.  9/23/2022  The situation is the same.  Continue with dialysis as tolerated.    A-fib  Chronic condition        Thank you for your consult. I will follow-up with patient. Please contact us if you have any additional questions.    Andres Lino Jr, MD  Nephrology  [facility]

## 2022-09-23 NOTE — ASSESSMENT & PLAN NOTE
Peg tube removed by surgery - currently has NGT to LWIS - will ask surgery when or if we can start tube feedings per NGT... CT without obstruction   9*23-- started tube feeding back yesterday afternoon

## 2022-09-23 NOTE — PROGRESS NOTES
[facility]  Nephrology  Progress Note    Patient Name: Gregory Stuart  MRN: [id number]  Admission Date: 8/23/2022  Hospital Length of Stay: 30 days  Attending Provider: Ham Sanchez DO   Primary Care Physician: Primary Doctor No  Principal Problem:Acute hypoxemic respiratory failure    Subjective:     HPI: This gentleman with history of CAD, CVA and renal failure.  The patient transferred from the [facility] for ventilator management and debility.  He has a history of CKD which progressed to ESRD after a CABG.  The patient has been on dialysis for about 3 weeks.  He continues to have diffuse edema.  His last dialysis session was on yesterday.  His family is at the bedside to answer questions.  Nephrology has been consulted for renal issues.      Interval History: The patient's condition is about the same.  He remains on pressor medications.    Review of patient's allergies indicates:  No Known Allergies  Current Facility-Administered Medications   Medication Frequency    0.9%  NaCl infusion (for blood administration) Q24H PRN    0.9%  NaCl infusion PRN    acetaminophen tablet 650 mg Q6H PRN    albuterol-ipratropium 2.5 mg-0.5 mg/3 mL nebulizer solution 3 mL Q6H    aspirin chewable tablet 81 mg Daily    carvediloL tablet 3.125 mg BID    ceFEPIme (MAXIPIME) 1 g in dextrose 5 % in water (D5W) 5 % 50 mL IVPB (MB+) Q24H    collagenase ointment Daily PRN    dextromethorphan-guaiFENesin  mg/5 ml liquid 10 mL Q6H PRN    dextrose 50% injection 12.5 g PRN    doxepin capsule 25 mg QHS    ferrous sulfate tablet 1 each Daily    glucagon (human recombinant) injection 1 mg PRN    heparin (porcine) injection 4,000 Units PRN    insulin aspart U-100 injection 1-10 Units PRN    lactated ringers infusion Continuous    methylPREDNISolone sodium succinate injection 20 mg Q12H    miconazole NITRATE 2 % top powder BID    midodrine tablet 10 mg TID WM    NORepinephrine 8  mg in dextrose 5 % 250 mL infusion Continuous    ondansetron injection 8 mg Q6H PRN    pantoprazole injection 40 mg Daily    polyethylene glycol packet 17 g Daily PRN    senna-docusate 8.6-50 mg per tablet 1 tablet Daily PRN       Objective:     Vital Signs (Most Recent):  Temp: 97.7 °F (36.5 °C) (09/22/22 2000)  Pulse: 102 (09/22/22 2007)  Resp: 17 (09/22/22 2007)  BP: (!) 111/52 (09/22/22 2044)  SpO2: 95 % (09/22/22 2007)  O2 Device (Oxygen Therapy): ventilator (09/22/22 2007)   Vital Signs (24h Range):  Temp:  [97.2 °F (36.2 °C)-97.7 °F (36.5 °C)] 97.7 °F (36.5 °C)  Pulse:  [] 102  Resp:  [9-26] 17  SpO2:  [70 %-100 %] 95 %  BP: ()/(42-63) 111/52     Weight: 99.8 kg (220 lb) (09/19/22 0415)  Body mass index is 28.25 kg/m².  Body surface area is 2.28 meters squared.    I/O last 3 completed shifts:  In: 170 [I.V.:120; IV Piggyback:50]  Out: 700 [Drains:200; Other:500]    Physical Exam  Vitals reviewed.   Constitutional:       Appearance: He is ill-appearing.   HENT:      Head: Normocephalic and atraumatic.   Cardiovascular:      Rate and Rhythm: Rhythm irregular.   Pulmonary:      Effort: Pulmonary effort is normal.      Comments: The patient is ventilated  Abdominal:      Palpations: Abdomen is soft.       Significant Labs:  BMP:   Recent Labs   Lab 09/19/22 0410   *   *   K 4.9   CL 90*   CO2 23   BUN 83*   CREATININE 3.25*   CALCIUM 8.0*     CBC:   Recent Labs   Lab 09/19/22 0410   WBC 28.99*   RBC 2.23*   HGB 6.7*   HCT 19.8*   *   MCV 88.8   MCH 30.0   MCHC 33.8        Significant Imaging:  Labs: Reviewed    Assessment/Plan:     JUVENTINO (acute kidney injury)    9/12/2022 Dialysis  9/13/2022 Continue with dialysis as scheduled.  9/14/2022  Continue with dialysis.  9/15/2022  Continue with dialysis.  9/20/2022  Low blood pressure makes it difficult to dialyze this patient.  He is on pressor medications.  9/21/2022  At this point, continue with dialysis as the patient is able to  tolerate it.  However, situation is critical.    A-fib  Chronic condition      Respiratory failure  CAD  Debility  Thank you for your consult. I will follow-up with patient. Please contact us if you have any additional questions.    Andres Lino Jr, MD  Nephrology  [facility]

## 2022-09-23 NOTE — ASSESSMENT & PLAN NOTE
ESRD  Cr 0.9 March 2022   - was on CCRT post CABG -- transitioned to HD M/W/F  Plan to continue MWF dialysis  Nephrology following -- they have not been pulling off any fluid per staff   Scheduled for HD today

## 2022-09-23 NOTE — PROGRESS NOTES
[facility]  Pulmonology  Progress Note    Patient Name: Gregory Stuart  MRN: [id number]  Admission Date: 8/23/2022  Hospital Length of Stay: 31 days  Code Status: DNR  Attending Provider: Ham Sanchez DO  Primary Care Provider: Primary Doctor No   Principal Problem: Acute hypoxemic respiratory failure    Subjective:     Interval History: remains ill appearing, tolerating tube feedings that were started yesterday. Remains on pressors     Objective:     Vital Signs (Most Recent):  Temp: 97.5 °F (36.4 °C) (09/23/22 0400)  Pulse: 99 (09/23/22 0945)  Resp: 13 (09/23/22 0945)  BP: (!) 109/47 (09/23/22 0945)  SpO2: 95 % (09/23/22 0945)   Vital Signs (24h Range):  Temp:  [97.2 °F (36.2 °C)-97.7 °F (36.5 °C)] 97.5 °F (36.4 °C)  Pulse:  [] 99  Resp:  [11-26] 13  SpO2:  [91 %-98 %] 95 %  BP: ()/(32-63) 109/47     Weight: 94.6 kg (208 lb 8.9 oz)  Body mass index is 26.78 kg/m².      Intake/Output Summary (Last 24 hours) at 9/23/2022 1024  Last data filed at 9/23/2022 0551  Gross per 24 hour   Intake 2074.69 ml   Output --   Net 2074.69 ml       Physical Exam  Vitals reviewed.   Constitutional:       General: He is not in acute distress.     Appearance: He is ill-appearing.   HENT:      Head: Normocephalic and atraumatic.      Right Ear: External ear normal.      Left Ear: External ear normal.      Mouth/Throat:      Mouth: Mucous membranes are moist.   Eyes:      Extraocular Movements: Extraocular movements intact.      Conjunctiva/sclera: Conjunctivae normal.   Neck:      Comments: Trach in place   Cardiovascular:      Rate and Rhythm: Normal rate. Rhythm irregular.      Pulses: Normal pulses.   Pulmonary:      Breath sounds: No wheezing, rhonchi or rales.   Abdominal:      General: Bowel sounds are normal. There is distension.      Palpations: Abdomen is soft.      Comments: Peg tube removed   Musculoskeletal:         General: Swelling present.      Right lower leg: Edema  present.      Left lower leg: Edema present.      Comments: Edema to upper and lower ext. Worse in left arm than right -Hx radial fx; family states cast was removed due to edema    Skin:     General: Skin is warm and dry.   Neurological:      Mental Status: Mental status is at baseline. He is lethargic.   Psychiatric:         Mood and Affect: Mood normal.       Vents:  Vent Mode: A/C (09/23/22 0430)  Ventilator Initiated: Yes (09/02/22 0047)  Set Rate: 12 BPM (09/23/22 0430)  Vt Set: 550 mL (09/23/22 0430)  Pressure Support: 12 cmH20 (09/12/22 0350)  PEEP/CPAP: 10 cmH20 (09/23/22 0430)  Oxygen Concentration (%): 40 (09/23/22 0004)  Peak Airway Pressure: 44 cmH2O (09/23/22 0430)  Total Ve: 5.9 mL (09/23/22 0430)  F/VT Ratio<105 (RSBI): (!) 46.68 (09/23/22 0430)    Lines/Drains/Airways       Central Venous Catheter Line  Duration                  Hemodialysis Catheter right subclavian -- days              Drain  Duration                  NG/OG Tube 09/14/22 1340 Left nostril 8 days              Airway  Duration                  Surgical Airway Shiley Cuffed -- days              Peripheral Intravenous Line  Duration                  Peripheral IV - Single Lumen 09/19/22 1756 22 G Left;Posterior Hand 3 days         Peripheral IV - Single Lumen 09/20/22 1432 20 G Left Antecubital 2 days                    Significant Labs:    CBC/Anemia Profile:  Recent Labs   Lab 09/23/22  0309   WBC 17.61*   HGB 7.5*   HCT 22.9*   PLT 69*   MCV 90.5   RDW 16.7*        Chemistries:  Recent Labs   Lab 09/23/22  0309   *   K 4.6   CL 98   CO2 24   BUN 79*   CREATININE 2.67*   CALCIUM 7.6*       All pertinent labs within the past 24 hours have been reviewed.    Significant Imaging:  I have reviewed all pertinent imaging results/findings within the past 24 hours.    Assessment/Plan:     * Acute hypoxemic respiratory failure  Failed extubated post CABG (7/18)  Now with trach and peg     8/28- increase to PSV 12 hours -   8/29- Will  plan to change trach out in the next day or two--continue to increase weaning as tolerated  - continue with current weaning plan for now. Will increase tomorrow  - His strength is slowly improving which will also improve weaning. Will start to increase weaning again today  - Oxygenating adequately on 40%. Plan for 12 hours continuous of cpap during the day and can continue to rest overnight on assist control  - continue current tx   - increase CPAP to 16hrs and rest on vent   - Doing well with 16 hours of cpap and then resting for 8. Will continue with this for now. Next plan will be to increase to continuous  - continue with 16 hours today. Discussed with respiratory.Will continue with 16 hours for the next couple of days and then increase   plan to increase to continuous as tolerated  - did well with continuous cpap which was started yesterday. Plan to continue for now.   9-10 - CPAP as toelrated   -  Trach downsized to 6 XLT --  - Had an episode of emesis and is thought to have aspirated. Chest xray reviewed with worsening bilateral infiltrates. I have  FiO2 to 60% and peep is at 10.  Might need to bronch the patient (therapeutic)  - FiO2 down to 50% and peep is at 8. Once FiO2 back to 40% we will resume cpap  - FiO2 down to 40%. Peep is at 8.  9/15-  Sat is 92% this am. I am going to check a chest xray this am. Increasing secretions with odor  Plan for bronchoscopy today (therapeutic) due to copious thick secretions  - Unable to perform bronchoscopy yesterday. The patient currently is too ill. I am concerned that he might not tolerate the procedure. We have started the patient on cefepime renally dosed due to Klebsiella in respiratory culture. Continuing with clindamycin to cover anaerobes. Increased breathing treatments to q6 and added solumedrol 40 mg q12  - FiO2 now at 60% and peep is 10  - FiO2 down to 50%  - FiO2 down to 40%  - less  responsive, still on levo; will continue to rest on AC -   9/23- not much change neurologically continue to rest on AM     Hypotension  On levophed - will treat with small bolus and start IVFs since he has been NPO for last 4--5 days  - wean levo as tolerated   9*23- treated with small IVF bolus yesterday -- able to wean levo down slightly     PEG tube malfunction  Peg tube removed by surgery - currently has NGT to LWIS - will ask surgery when or if we can start tube feedings per NGT... CT without obstruction   9*23-- started tube feeding back yesterday afternoon       Alteration in nutrition  S/p peg tube placement   Tolerating without difficulty  9/14 now with some leaking around peg tube  9/15- Peg tube removed and NG tube placed  Abdomen distended today. Will check KUB  No obstruction  Held tube feeds due to residuals  -too sick to return to OR    Weakness acquired in ICU  Extreme generalized weakness - continue PT/OT as tolerated  Continued to  regressed      DM (diabetes mellitus)  accuchecks and Sliding scale insulin   A1C 5.3%  Currently on sliding scale  Glucose<180    Anemia  Due to critical illness     Hgb 7.5 today     JUVENTINO (acute kidney injury)  ESRD  Cr 0.9 March 2022   - was on CCRT post CABG -- transitioned to HD M/W/F  Plan to continue MWF dialysis  Nephrology following -- they have not been pulling off any fluid per staff   Scheduled for HD today     CAD (coronary artery disease)  S/p CABG on 07/18  Currently no acute issues  He is on aspirin, statin, beta blocker      Chronic systolic heart failure  EF 30-35% per Echo 07/2022  Daily wts/ I/Os   ---starting IVFs for dehydration and no PO intake over last 5 days - will monitor closely for FVO         This includes 32 minutes of critical care time spent evaluating and managing patient. This includes time spent at bedside, reviewing labs, data, xrays and monitoring for acute decompensation.            Meg Dalton, AG-ACNP  Pulmonology  [facility]

## 2022-09-23 NOTE — SUBJECTIVE & OBJECTIVE
Interval History: The patient's condition is the same.  He remains ventilated.  He is very weak.    Review of patient's allergies indicates:  No Known Allergies  Current Facility-Administered Medications   Medication Frequency    0.9%  NaCl infusion (for blood administration) Q24H PRN    0.9%  NaCl infusion PRN    acetaminophen tablet 650 mg Q6H PRN    albuterol-ipratropium 2.5 mg-0.5 mg/3 mL nebulizer solution 3 mL Q6H    aspirin chewable tablet 81 mg Daily    carvediloL tablet 3.125 mg BID    ceFEPIme (MAXIPIME) 1 g in dextrose 5 % in water (D5W) 5 % 50 mL IVPB (MB+) Q24H    collagenase ointment Daily PRN    dextromethorphan-guaiFENesin  mg/5 ml liquid 10 mL Q6H PRN    dextrose 50% injection 12.5 g PRN    doxepin capsule 25 mg QHS    ferrous sulfate tablet 1 each Daily    glucagon (human recombinant) injection 1 mg PRN    heparin (porcine) injection 4,000 Units PRN    insulin aspart U-100 injection 1-10 Units PRN    lactated ringers infusion Continuous    methylPREDNISolone sodium succinate injection 20 mg Q12H    miconazole NITRATE 2 % top powder BID    midodrine tablet 10 mg TID WM    NORepinephrine 8 mg in dextrose 5 % 250 mL infusion Continuous    ondansetron injection 8 mg Q6H PRN    pantoprazole injection 40 mg Daily    polyethylene glycol packet 17 g Daily PRN    senna-docusate 8.6-50 mg per tablet 1 tablet Daily PRN       Objective:     Vital Signs (Most Recent):  Temp: 97.5 °F (36.4 °C) (09/23/22 0400)  Pulse: 99 (09/23/22 0945)  Resp: 13 (09/23/22 0945)  BP: (!) 109/47 (09/23/22 0945)  SpO2: 95 % (09/23/22 0945)  O2 Device (Oxygen Therapy): ventilator (09/23/22 0004)   Vital Signs (24h Range):  Temp:  [97.2 °F (36.2 °C)-97.7 °F (36.5 °C)] 97.5 °F (36.4 °C)  Pulse:  [] 99  Resp:  [11-26] 13  SpO2:  [91 %-97 %] 95 %  BP: ()/(32-57) 109/47     Weight: 94.6 kg (208 lb 8.9 oz) (09/23/22 0545)  Body mass index is 26.78 kg/m².  Body surface area is 2.22 meters squared.    I/O last 3 completed  shifts:  In: 2074.7 [I.V.:602.7; NG/GT:822; IV Piggyback:650]  Out: 100 [Drains:100]    Physical Exam  Vitals reviewed.   Constitutional:       Appearance: He is ill-appearing.   HENT:      Head: Normocephalic and atraumatic.      Mouth/Throat:      Mouth: Mucous membranes are moist.   Cardiovascular:      Rate and Rhythm: Rhythm irregular.   Pulmonary:      Comments: The patient is ventilated.  Abdominal:      Palpations: Abdomen is soft.   Musculoskeletal:      Right lower leg: Edema present.      Left lower leg: Edema present.       Significant Labs:  BMP:   Recent Labs   Lab 09/23/22  0309   *   *   K 4.6   CL 98   CO2 24   BUN 79*   CREATININE 2.67*   CALCIUM 7.6*     CBC:   Recent Labs   Lab 09/23/22  0309   WBC 17.61*   RBC 2.53*   HGB 7.5*   HCT 22.9*   PLT 69*   MCV 90.5   MCH 29.6   MCHC 32.8        Significant Imaging:  Labs: Reviewed

## 2022-09-23 NOTE — CARE UPDATE
09/23/22 0430   Patient Assessment/Suction   All Lung Fields Breath Sounds coarse;diminished   $ Suction Charges Inline Suction Procedure Stat Charge   Secretions Amount small   Secretions Color red-streaked;tan   Secretions Characteristics thick   Skin Integrity   $ Wound Care Tech Time 30 min   Area Observed Neck under tracheostomy        Surgical Airway Shiley Cuffed   No placement date or time found.   Present Prior to Hospital Arrival?: Yes  Inserted by: MD  Type: Tracheostomy  Brand: (c) Shiley  Airway Device Size: 8.5  Style: Cuffed   Cuff Pressure 30 cm H2O   Cuff Inflation? Inflated   Status Secured   Site Assessment Oozing secretions;Drainage;Bleeding  (site is oozing stomach content)   Site Care Cleansed;Dried;Dressing applied   Inner Cannula Care Changed/new      2

## 2022-09-23 NOTE — SUBJECTIVE & OBJECTIVE
Interval History: remains ill appearing, tolerating tube feedings that were started yesterday. Remains on pressors     Objective:     Vital Signs (Most Recent):  Temp: 97.5 °F (36.4 °C) (09/23/22 0400)  Pulse: 99 (09/23/22 0945)  Resp: 13 (09/23/22 0945)  BP: (!) 109/47 (09/23/22 0945)  SpO2: 95 % (09/23/22 0945)   Vital Signs (24h Range):  Temp:  [97.2 °F (36.2 °C)-97.7 °F (36.5 °C)] 97.5 °F (36.4 °C)  Pulse:  [] 99  Resp:  [11-26] 13  SpO2:  [91 %-98 %] 95 %  BP: ()/(32-63) 109/47     Weight: 94.6 kg (208 lb 8.9 oz)  Body mass index is 26.78 kg/m².      Intake/Output Summary (Last 24 hours) at 9/23/2022 1024  Last data filed at 9/23/2022 0551  Gross per 24 hour   Intake 2074.69 ml   Output --   Net 2074.69 ml       Physical Exam  Vitals reviewed.   Constitutional:       General: He is not in acute distress.     Appearance: He is ill-appearing.   HENT:      Head: Normocephalic and atraumatic.      Right Ear: External ear normal.      Left Ear: External ear normal.      Mouth/Throat:      Mouth: Mucous membranes are moist.   Eyes:      Extraocular Movements: Extraocular movements intact.      Conjunctiva/sclera: Conjunctivae normal.   Neck:      Comments: Trach in place   Cardiovascular:      Rate and Rhythm: Normal rate. Rhythm irregular.      Pulses: Normal pulses.   Pulmonary:      Breath sounds: No wheezing, rhonchi or rales.   Abdominal:      General: Bowel sounds are normal. There is distension.      Palpations: Abdomen is soft.      Comments: Peg tube removed   Musculoskeletal:         General: Swelling present.      Right lower leg: Edema present.      Left lower leg: Edema present.      Comments: Edema to upper and lower ext. Worse in left arm than right -Hx radial fx; family states cast was removed due to edema    Skin:     General: Skin is warm and dry.   Neurological:      Mental Status: Mental status is at baseline. He is lethargic.   Psychiatric:         Mood and Affect: Mood normal.        Vents:  Vent Mode: A/C (09/23/22 0430)  Ventilator Initiated: Yes (09/02/22 0047)  Set Rate: 12 BPM (09/23/22 0430)  Vt Set: 550 mL (09/23/22 0430)  Pressure Support: 12 cmH20 (09/12/22 0350)  PEEP/CPAP: 10 cmH20 (09/23/22 0430)  Oxygen Concentration (%): 40 (09/23/22 0004)  Peak Airway Pressure: 44 cmH2O (09/23/22 0430)  Total Ve: 5.9 mL (09/23/22 0430)  F/VT Ratio<105 (RSBI): (!) 46.68 (09/23/22 0430)    Lines/Drains/Airways       Central Venous Catheter Line  Duration                  Hemodialysis Catheter right subclavian -- days              Drain  Duration                  NG/OG Tube 09/14/22 1340 Left nostril 8 days              Airway  Duration                  Surgical Airway Shiley Cuffed -- days              Peripheral Intravenous Line  Duration                  Peripheral IV - Single Lumen 09/19/22 1756 22 G Left;Posterior Hand 3 days         Peripheral IV - Single Lumen 09/20/22 1432 20 G Left Antecubital 2 days                    Significant Labs:    CBC/Anemia Profile:  Recent Labs   Lab 09/23/22  0309   WBC 17.61*   HGB 7.5*   HCT 22.9*   PLT 69*   MCV 90.5   RDW 16.7*        Chemistries:  Recent Labs   Lab 09/23/22  0309   *   K 4.6   CL 98   CO2 24   BUN 79*   CREATININE 2.67*   CALCIUM 7.6*       All pertinent labs within the past 24 hours have been reviewed.    Significant Imaging:  I have reviewed all pertinent imaging results/findings within the past 24 hours.

## 2022-09-24 NOTE — ASSESSMENT & PLAN NOTE
Failed extubated post CABG ()  Now with trach and peg     - increase to PSV 12 hours -   - Will plan to change trach out in the next day or two--continue to increase weaning as tolerated  - continue with current weaning plan for now. Will increase tomorrow  - His strength is slowly improving which will also improve weaning. Will start to increase weaning again today  - Oxygenating adequately on 40%. Plan for 12 hours continuous of cpap during the day and can continue to rest overnight on assist control  - continue current tx   - increase CPAP to 16hrs and rest on vent   - Doing well with 16 hours of cpap and then resting for 8. Will continue with this for now. Next plan will be to increase to continuous  - continue with 16 hours today. Discussed with respiratory.Will continue with 16 hours for the next couple of days and then increase   plan to increase to continuous as tolerated  - did well with continuous cpap which was started yesterday. Plan to continue for now.   9-10 - CPAP as toelrated   -  Trach downsized to 6 XLT --  - Had an episode of emesis and is thought to have aspirated. Chest xray reviewed with worsening bilateral infiltrates. I have  FiO2 to 60% and peep is at 10.  Might need to bronch the patient (therapeutic)  - FiO2 down to 50% and peep is at 8. Once FiO2 back to 40% we will resume cpap  - FiO2 down to 40%. Peep is at 8.  9/15-  Sat is 92% this am. I am going to check a chest xray this am. Increasing secretions with odor  Plan for bronchoscopy today (therapeutic) due to copious thick secretions  - Unable to perform bronchoscopy yesterday. The patient currently is too ill. I am concerned that he might not tolerate the procedure. We have started the patient on cefepime renally dosed due to Klebsiella in respiratory culture. Continuing with clindamycin to cover anaerobes. Increased breathing treatments to q6 and added solumedrol 40  mg q12  9/18- FiO2 now at 60% and peep is 10  9/19- FiO2 down to 50%  9/21- FiO2 down to 40%  9/22- less responsive, still on levo; will continue to rest on AC -   9/23- not much change neurologically continue to rest on AM   9/24- continue current care

## 2022-09-24 NOTE — NURSING
MINOO Dalton NP notified of large amount of drainage from old peg site. Instructed to notify Dr. Valadez.    1643 Dr. Valadez notified also of large amount drainage from old peg site. Instructed to apply pressure dressing. Also if continue to drain large amount, hold feeding.     1900 Reported off to QUANG Pratt RN of Dr. Valadez instruction.

## 2022-09-24 NOTE — ASSESSMENT & PLAN NOTE
ESRD  Cr 0.9 March 2022   - was on CCRT post CABG -- transitioned to HD M/W/F  Plan to continue MWF dialysis  Nephrology following -- they have not been pulling off any fluid per staff   Continue HD

## 2022-09-24 NOTE — PROGRESS NOTES
Patient is intubated and sedate, he had no response to my interaction     Physical exam general patient chronically ill-appearing, heart is regular rate and rhythm, he has no pitting edema, lungs are clear to auscultation anteriorly, abdomen reveals decreased bowel sounds, his abdomen is soft no masses     Assessment/plan 1.  ESRD-continue Monday Wednesday Friday dialysis support   2.  Coronary artery disease   3. Respiratory failure-continue vent support  4.  Failure to thrive   5.  Anemia-patient's hematocrit 23%

## 2022-09-24 NOTE — PROGRESS NOTES
[facility]  Pulmonology  Progress Note    Patient Name: Gregory Stuart  MRN: [id number]  Admission Date: 8/23/2022  Hospital Length of Stay: 32 days  Code Status: DNR  Attending Provider: Ham Sanchez DO  Primary Care Provider: Primary Doctor No   Principal Problem: Acute hypoxemic respiratory failure    Subjective:     Interval History: remains on levo, severe weakness, drainage noted from old peg site, NGTs placed on hold  Objective:     Vital Signs (Most Recent):  Temp: 97.7 °F (36.5 °C) (09/24/22 1615)  Pulse: 109 (09/24/22 1615)  Resp: 17 (09/24/22 1615)  BP: (!) 103/49 (09/24/22 1615)  SpO2: 98 % (09/24/22 1615)   Vital Signs (24h Range):  Temp:  [97 °F (36.1 °C)-98.2 °F (36.8 °C)] 97.7 °F (36.5 °C)  Pulse:  [] 109  Resp:  [10-24] 17  SpO2:  [76 %-100 %] 98 %  BP: ()/(40-61) 103/49     Weight: 100.3 kg (221 lb 1.6 oz)  Body mass index is 28.39 kg/m².      Intake/Output Summary (Last 24 hours) at 9/24/2022 1642  Last data filed at 9/24/2022 0634  Gross per 24 hour   Intake 5648.94 ml   Output --   Net 5648.94 ml       Physical Exam  Vitals reviewed.   Constitutional:       General: He is not in acute distress.     Appearance: He is ill-appearing.   HENT:      Head: Normocephalic and atraumatic.      Right Ear: External ear normal.      Left Ear: External ear normal.      Mouth/Throat:      Mouth: Mucous membranes are moist.   Eyes:      Extraocular Movements: Extraocular movements intact.      Conjunctiva/sclera: Conjunctivae normal.   Neck:      Comments: Trach in place   Cardiovascular:      Rate and Rhythm: Normal rate. Rhythm irregular.      Pulses: Normal pulses.   Pulmonary:      Breath sounds: No wheezing, rhonchi or rales.   Abdominal:      General: Bowel sounds are normal. There is distension.      Palpations: Abdomen is soft.      Comments: Peg tube removed- drainage noted from old peg site    Musculoskeletal:         General: Swelling present.       Right lower leg: Edema present.      Left lower leg: Edema present.      Comments: Edema to upper and lower ext. Worse in left arm than right -Hx radial fx; family states cast was removed due to edema    Skin:     General: Skin is warm and dry.   Neurological:      Mental Status: Mental status is at baseline. He is lethargic.      Motor: Weakness present.      Comments: Severe weakness        Vents:  Vent Mode: A/C (09/24/22 1200)  Ventilator Initiated: Yes (09/02/22 0047)  Set Rate: 12 BPM (09/24/22 1200)  Vt Set: 550 mL (09/24/22 1200)  Pressure Support: 12 cmH20 (09/12/22 0350)  PEEP/CPAP: 8 cmH20 (09/24/22 0840)  Oxygen Concentration (%): 40 (09/24/22 1318)  Peak Airway Pressure: 44 cmH2O (09/24/22 1200)  Total Ve: 8.5 mL (09/24/22 1200)  F/VT Ratio<105 (RSBI): (!) 38.67 (09/24/22 1200)    Lines/Drains/Airways       Central Venous Catheter Line  Duration                  Hemodialysis Catheter right subclavian -- days              Drain  Duration                  NG/OG Tube 09/14/22 1340 Left nostril 10 days              Airway  Duration                  Surgical Airway Shiley Cuffed -- days              Peripheral Intravenous Line  Duration                  Peripheral IV - Single Lumen 09/19/22 1756 22 G Left;Posterior Hand 4 days         Peripheral IV - Single Lumen 09/20/22 1432 20 G Left Antecubital 4 days         Peripheral IV - Single Lumen 09/23/22 1117 20 G Anterior;Right Forearm 1 day                    Significant Labs:    CBC/Anemia Profile:  Recent Labs   Lab 09/23/22  0309   WBC 17.61*   HGB 7.5*   HCT 22.9*   PLT 69*   MCV 90.5   RDW 16.7*        Chemistries:  Recent Labs   Lab 09/23/22  0309   *   K 4.6   CL 98   CO2 24   BUN 79*   CREATININE 2.67*   CALCIUM 7.6*       All pertinent labs within the past 24 hours have been reviewed.  None    Significant Imaging:  I have reviewed all pertinent imaging results/findings within the past 24 hours.    Assessment/Plan:     * Acute hypoxemic  respiratory failure  Failed extubated post CABG ()  Now with trach and peg     - increase to PSV 12 hours -   - Will plan to change trach out in the next day or two--continue to increase weaning as tolerated  - continue with current weaning plan for now. Will increase tomorrow  - His strength is slowly improving which will also improve weaning. Will start to increase weaning again today  - Oxygenating adequately on 40%. Plan for 12 hours continuous of cpap during the day and can continue to rest overnight on assist control  - continue current tx   - increase CPAP to 16hrs and rest on vent   - Doing well with 16 hours of cpap and then resting for 8. Will continue with this for now. Next plan will be to increase to continuous  - continue with 16 hours today. Discussed with respiratory.Will continue with 16 hours for the next couple of days and then increase   plan to increase to continuous as tolerated  - did well with continuous cpap which was started yesterday. Plan to continue for now.   9-10 - CPAP as toelrated   -  Trach downsized to 6 XLT --  - Had an episode of emesis and is thought to have aspirated. Chest xray reviewed with worsening bilateral infiltrates. I have  FiO2 to 60% and peep is at 10.  Might need to bronch the patient (therapeutic)  - FiO2 down to 50% and peep is at 8. Once FiO2 back to 40% we will resume cpap  - FiO2 down to 40%. Peep is at 8.  9/15-  Sat is 92% this am. I am going to check a chest xray this am. Increasing secretions with odor  Plan for bronchoscopy today (therapeutic) due to copious thick secretions  - Unable to perform bronchoscopy yesterday. The patient currently is too ill. I am concerned that he might not tolerate the procedure. We have started the patient on cefepime renally dosed due to Klebsiella in respiratory culture. Continuing with clindamycin to cover anaerobes. Increased breathing treatments to q6  and added solumedrol 40 mg q12  9/18- FiO2 now at 60% and peep is 10  9/19- FiO2 down to 50%  9/21- FiO2 down to 40%  9/22- less responsive, still on levo; will continue to rest on AC -   9/23- not much change neurologically continue to rest on AM   9/24- continue current care     Hypotension  On levophed - will treat with small bolus and start IVFs since he has been NPO for last 4--5 days  - wean levo as tolerated   9/23 treated with small IVF bolus yesterday -- able to wean levo down slightly   9/24- still on levo     PEG tube malfunction  Peg tube removed by surgery - currently has NGT to LWIS - will ask surgery when or if we can start tube feedings per NGT... CT without obstruction   9*23-- started tube feeding back yesterday afternoon   9/24-- too much leaking from old peg site- TFs stop per surgery recommendations- continue IVFs - anticipate replacement of PEG on Monday - will go ahead and stop Eliquis this evening      Weakness acquired in ICU  Extreme generalized weakness - continue PT/OT as tolerated  Continued to  regressed      DM (diabetes mellitus)  accuchecks and Sliding scale insulin   A1C 5.3%  Currently on sliding scale  Glucose<180       Anemia  Due to critical illness     Hgb 7.5     JUVENTINO (acute kidney injury)  ESRD  Cr 0.9 March 2022   - was on CCRT post CABG -- transitioned to HD M/W/F  Plan to continue MWF dialysis  Nephrology following -- they have not been pulling off any fluid per staff   Continue HD     Chronic systolic heart failure  EF 30-35% per Echo 07/2022  Daily wts/ I/Os   ---starting IVFs for dehydration and no PO intake over last 5 days - will monitor closely for FVO     A-fib  No history documented prior to CABG - was on Lovenox at OSH   - he is on carvediolol, eliquis  - HR has been up at times  - wide complex appearing on telemetry but I do not think this is v tach  - We can get a 12 lead but looks like probably RBBB with tachycardia      This includes 32 minutes of critical care  time spent evaluating and managing patient. This includes time spent at bedside, reviewing labs, data, xrays and monitoring for acute decompensation.              ADRI Azul-ACNP  Pulmonology  [facility] \[facility]\""

## 2022-09-24 NOTE — ASSESSMENT & PLAN NOTE
Peg tube removed by surgery - currently has NGT to LWIS - will ask surgery when or if we can start tube feedings per NGT... CT without obstruction   9*23-- started tube feeding back yesterday afternoon   9/24-- too much leaking from old peg site- TFs stop per surgery recommendations- continue IVFs - anticipate replacement of PEG on Monday - will go ahead and stop Eliquis this evening

## 2022-09-24 NOTE — NURSING
As per MD instructions relayed by CHARLES Hill RN, peg tube feeding held d/t large amount of drainage from old peg site

## 2022-09-24 NOTE — SUBJECTIVE & OBJECTIVE
Interval History: remains on levo, severe weakness, drainage noted from old peg site, NGTs placed on hold  Objective:     Vital Signs (Most Recent):  Temp: 97.7 °F (36.5 °C) (09/24/22 1615)  Pulse: 109 (09/24/22 1615)  Resp: 17 (09/24/22 1615)  BP: (!) 103/49 (09/24/22 1615)  SpO2: 98 % (09/24/22 1615)   Vital Signs (24h Range):  Temp:  [97 °F (36.1 °C)-98.2 °F (36.8 °C)] 97.7 °F (36.5 °C)  Pulse:  [] 109  Resp:  [10-24] 17  SpO2:  [76 %-100 %] 98 %  BP: ()/(40-61) 103/49     Weight: 100.3 kg (221 lb 1.6 oz)  Body mass index is 28.39 kg/m².      Intake/Output Summary (Last 24 hours) at 9/24/2022 1642  Last data filed at 9/24/2022 0634  Gross per 24 hour   Intake 5648.94 ml   Output --   Net 5648.94 ml       Physical Exam  Vitals reviewed.   Constitutional:       General: He is not in acute distress.     Appearance: He is ill-appearing.   HENT:      Head: Normocephalic and atraumatic.      Right Ear: External ear normal.      Left Ear: External ear normal.      Mouth/Throat:      Mouth: Mucous membranes are moist.   Eyes:      Extraocular Movements: Extraocular movements intact.      Conjunctiva/sclera: Conjunctivae normal.   Neck:      Comments: Trach in place   Cardiovascular:      Rate and Rhythm: Normal rate. Rhythm irregular.      Pulses: Normal pulses.   Pulmonary:      Breath sounds: No wheezing, rhonchi or rales.   Abdominal:      General: Bowel sounds are normal. There is distension.      Palpations: Abdomen is soft.      Comments: Peg tube removed- drainage noted from old peg site    Musculoskeletal:         General: Swelling present.      Right lower leg: Edema present.      Left lower leg: Edema present.      Comments: Edema to upper and lower ext. Worse in left arm than right -Hx radial fx; family states cast was removed due to edema    Skin:     General: Skin is warm and dry.   Neurological:      Mental Status: Mental status is at baseline. He is lethargic.      Motor: Weakness present.       Comments: Severe weakness        Vents:  Vent Mode: A/C (09/24/22 1200)  Ventilator Initiated: Yes (09/02/22 0047)  Set Rate: 12 BPM (09/24/22 1200)  Vt Set: 550 mL (09/24/22 1200)  Pressure Support: 12 cmH20 (09/12/22 0350)  PEEP/CPAP: 8 cmH20 (09/24/22 0840)  Oxygen Concentration (%): 40 (09/24/22 1318)  Peak Airway Pressure: 44 cmH2O (09/24/22 1200)  Total Ve: 8.5 mL (09/24/22 1200)  F/VT Ratio<105 (RSBI): (!) 38.67 (09/24/22 1200)    Lines/Drains/Airways       Central Venous Catheter Line  Duration                  Hemodialysis Catheter right subclavian -- days              Drain  Duration                  NG/OG Tube 09/14/22 1340 Left nostril 10 days              Airway  Duration                  Surgical Airway Shiley Cuffed -- days              Peripheral Intravenous Line  Duration                  Peripheral IV - Single Lumen 09/19/22 1756 22 G Left;Posterior Hand 4 days         Peripheral IV - Single Lumen 09/20/22 1432 20 G Left Antecubital 4 days         Peripheral IV - Single Lumen 09/23/22 1117 20 G Anterior;Right Forearm 1 day                    Significant Labs:    CBC/Anemia Profile:  Recent Labs   Lab 09/23/22  0309   WBC 17.61*   HGB 7.5*   HCT 22.9*   PLT 69*   MCV 90.5   RDW 16.7*        Chemistries:  Recent Labs   Lab 09/23/22  0309   *   K 4.6   CL 98   CO2 24   BUN 79*   CREATININE 2.67*   CALCIUM 7.6*       All pertinent labs within the past 24 hours have been reviewed.  None    Significant Imaging:  I have reviewed all pertinent imaging results/findings within the past 24 hours.

## 2022-09-24 NOTE — ASSESSMENT & PLAN NOTE
On levophed - will treat with small bolus and start IVFs since he has been NPO for last 4--5 days  - wean levo as tolerated   9/23 treated with small IVF bolus yesterday -- able to wean levo down slightly   9/24- still on levo

## 2022-09-25 NOTE — PROGRESS NOTES
Patient remains trached ventilated and sedate.      Physical exam general patient chronically ill-appearing, heart is regular rate and rhythm, he has no pitting edema, lungs are clear to auscultation anteriorly, abdomen is soft with positive bowel sounds     Assessment/plan 1.  ESRD-continue HD support  2.  Coronary artery disease   3. Respiratory failure  4.  Failure to thrive   5.  Anemia  6.  Diabetes mellitus-patient's glucose is 101 today

## 2022-09-25 NOTE — ASSESSMENT & PLAN NOTE
On levophed - will treat with small bolus and start IVFs since he has been NPO for last 4--5 days  - wean levo as tolerated   9/23 treated with small IVF bolus yesterday -- able to wean levo down slightly   9/24- still on levo   09/25--increase in pressure requirement overnight.  He has been going without tube feeding and free water flushes.  He has been on a low-dose maintenance fluids however will treated with a small IV fluid bolus today to see if this improves his hypotension.

## 2022-09-25 NOTE — SUBJECTIVE & OBJECTIVE
Interval History:  Clinically appears a little worse today.  His Levophed had to be titrated up overnight.  Given how long he has gone without tube feedings will treat him with another small bolus today and see how he responds.  Neurologically hole open his eyes but does not follow any commands.  Continues to have output from his old PEG tube site.    Objective:     Vital Signs (Most Recent):  Temp: 96.7 °F (35.9 °C) (09/25/22 0700)  Pulse: 102 (09/25/22 0800)  Resp: 11 (09/25/22 0800)  BP: (!) 97/46 (09/25/22 0904)  SpO2: 95 % (09/25/22 0800)   Vital Signs (24h Range):  Temp:  [96.7 °F (35.9 °C)-98.6 °F (37 °C)] 96.7 °F (35.9 °C)  Pulse:  [] 102  Resp:  [11-28] 11  SpO2:  [93 %-100 %] 95 %  BP: ()/(32-54) 97/46     Weight: 101.4 kg (223 lb 8.7 oz)  Body mass index is 28.7 kg/m².      Intake/Output Summary (Last 24 hours) at 9/25/2022 1016  Last data filed at 9/25/2022 0621  Gross per 24 hour   Intake 2429.91 ml   Output --   Net 2429.91 ml       Physical Exam  Vitals reviewed.   Constitutional:       General: He is not in acute distress.     Appearance: He is ill-appearing.   HENT:      Head: Normocephalic and atraumatic.      Right Ear: External ear normal.      Left Ear: External ear normal.      Mouth/Throat:      Mouth: Mucous membranes are moist.   Eyes:      Extraocular Movements: Extraocular movements intact.      Conjunctiva/sclera: Conjunctivae normal.   Neck:      Comments: Trach in place   Cardiovascular:      Rate and Rhythm: Normal rate. Rhythm irregular.      Pulses: Normal pulses.   Pulmonary:      Breath sounds: No wheezing, rhonchi or rales.   Abdominal:      General: Bowel sounds are normal. There is distension.      Palpations: Abdomen is soft.      Comments: Peg tube removed- drainage noted from old peg site    Musculoskeletal:         General: Swelling present.      Right lower leg: Edema present.      Left lower leg: Edema present.      Comments: Edema to upper and lower ext. Worse  in left arm than right -Hx radial fx; family states cast was removed due to edema    Skin:     General: Skin is warm and dry.   Neurological:      Mental Status: He is lethargic.      Motor: Weakness present.      Comments: Severe weakness        Vents:  Vent Mode: A/C (09/25/22 0500)  Ventilator Initiated: Yes (09/02/22 0047)  Set Rate: 12 BPM (09/25/22 0500)  Vt Set: 550 mL (09/25/22 0500)  Pressure Support: 12 cmH20 (09/12/22 0350)  PEEP/CPAP: 8 cmH20 (09/25/22 0500)  Oxygen Concentration (%): 40 (09/24/22 1318)  Peak Airway Pressure: 44 cmH2O (09/25/22 0500)  Total Ve: 9.7 mL (09/25/22 0500)  F/VT Ratio<105 (RSBI): (!) 53.93 (09/25/22 0500)    Lines/Drains/Airways       Central Venous Catheter Line  Duration                  Hemodialysis Catheter right subclavian -- days              Drain  Duration                  NG/OG Tube 09/14/22 1340 Left nostril 10 days              Airway  Duration                  Surgical Airway Shiley Cuffed -- days              Peripheral Intravenous Line  Duration                  Peripheral IV - Single Lumen 09/19/22 1756 22 G Left;Posterior Hand 5 days         Peripheral IV - Single Lumen 09/20/22 1432 20 G Left Antecubital 4 days                    Significant Labs:    CBC/Anemia Profile:  No results for input(s): WBC, HGB, HCT, PLT, MCV, RDW, IRON, FERRITIN, RETIC, FOLATE, [id number], OCCULTBLOOD in the last 48 hours.     Chemistries:  No results for input(s): NA, K, CL, CO2, BUN, CREATININE, CALCIUM, ALBUMIN, PROT, BILITOT, ALKPHOS, ALT, AST, GLUCOSE, MG, PHOS in the last 48 hours.    All pertinent labs within the past 24 hours have been reviewed.    Significant Imaging:  I have reviewed all pertinent imaging results/findings within the past 24 hours.

## 2022-09-25 NOTE — PROGRESS NOTES
[facility]  General Surgery  Progress Note    Subjective:     Interval History:  Patient still with some leakage from his old G-tube site.    Post-Op Info:  * No surgery found *          Medications:  Continuous Infusions:   lactated ringers 75 mL/hr at 09/25/22 0621    NORepinephrine bitartrate-D5W 0.105 mcg/kg/min (09/25/22 0841)     Scheduled Meds:   albuterol-ipratropium  3 mL Nebulization Q6H    aspirin  81 mg Per G Tube Daily    carvediloL  3.125 mg Per G Tube BID    doxepin  25 mg Per G Tube QHS    ferrous sulfate  1 tablet Per G Tube Daily    lactated ringers  500 mL Intravenous Once    miconazole NITRATE 2 %   Topical (Top) BID    midodrine  10 mg Per G Tube TID WM    pantoprazole  40 mg Intravenous Daily     PRN Meds:sodium chloride, sodium chloride 0.9%, acetaminophen, collagenase, dextromethorphan-guaiFENesin  mg/5 ml, dextrose 50%, glucagon (human recombinant), heparin (porcine), insulin aspart U-100, ondansetron, polyethylene glycol, senna-docusate 8.6-50 mg     Objective:     Vital Signs (Most Recent):  Temp: 96.7 °F (35.9 °C) (09/25/22 0700)  Pulse: 102 (09/25/22 0800)  Resp: 11 (09/25/22 0800)  BP: (!) 97/46 (09/25/22 0904)  SpO2: 95 % (09/25/22 0800)   Vital Signs (24h Range):  Temp:  [96.7 °F (35.9 °C)-98.6 °F (37 °C)] 96.7 °F (35.9 °C)  Pulse:  [] 102  Resp:  [11-28] 11  SpO2:  [93 %-100 %] 95 %  BP: ()/(32-51) 97/46       Intake/Output Summary (Last 24 hours) at 9/25/2022 1130  Last data filed at 9/25/2022 0621  Gross per 24 hour   Intake 2429.91 ml   Output --   Net 2429.91 ml       Physical Exam  Constitutional:       General: He is not in acute distress.     Appearance: He is ill-appearing.   HENT:      Head: Normocephalic.   Cardiovascular:      Rate and Rhythm: Regular rhythm. Tachycardia present.      Pulses: Normal pulses.   Pulmonary:      Effort: Pulmonary effort is normal. No respiratory distress.      Breath sounds: Normal breath  sounds.   Abdominal:      General: Abdomen is flat. There is no distension.      Palpations: Abdomen is soft.      Tenderness: There is no abdominal tenderness.   Musculoskeletal:         General: Normal range of motion.   Skin:     General: Skin is warm.   Neurological:      Mental Status: He is disoriented.       Significant Labs:  CBC: No results for input(s): WBC, RBC, HGB, HCT, PLT, MCV, MCH, MCHC in the last 48 hours.  CMP: No results for input(s): GLU, CALCIUM, ALBUMIN, PROT, NA, K, CO2, CL, BUN, CREATININE, ALKPHOS, ALT, AST, BILITOT in the last 48 hours.  Coagulation: No results for input(s): PT, INR, APTT in the last 48 hours.    Significant Diagnostics:  None    Assessment/Plan:     Active Diagnoses:    Diagnosis Date Noted POA    PRINCIPAL PROBLEM:  Acute hypoxemic respiratory failure [J96.01] 08/23/2022 Unknown    Hypotension [I95.9] 09/22/2022 Unknown    PEG tube malfunction [K94.23] 09/14/2022 Clinically Undetermined    Open wound of anterior abdominal wall without complication [S31.109A] 08/30/2022 Yes    Pressure injury of left leg, unstageable [L89.890] 08/30/2022 Yes    Pressure injury of sacral region, unstageable [L89.150] 08/29/2022 Yes    A-fib [I48.91] 08/23/2022 Unknown    Chronic systolic heart failure [I50.22] 08/23/2022 Unknown    CAD (coronary artery disease) [I25.10] 08/23/2022 Unknown    JUVENTINO (acute kidney injury) [N17.9] 08/23/2022 Unknown    Anemia [D64.9] 08/23/2022 Unknown    DM (diabetes mellitus) [E11.9] 08/23/2022 Unknown    Left radial fracture [S52.92XA] 08/23/2022 Unknown    Weakness acquired in ICU [R53.1] 08/23/2022 Unknown    Alteration in nutrition [R63.8] 08/23/2022 Unknown    HLD (hyperlipidemia) [E78.5] 08/23/2022 Unknown    HTN (hypertension) [I10] 08/23/2022 Unknown      Problems Resolved During this Admission:    Diagnosis Date Noted Date Resolved POA    Wound dehiscence [T81.30XA] 08/30/2022 09/22/2022 Yes     Was actually able to get a 16 Croatian kangaroo  gastrostomy within the old tube site.  There is ulceration around it and is necrotic slightly necrotic area.  Not the ideal location but given the patient has been NPO and he is overall not the healthiest candidate it will continue to watch this area.  Might require GC fistula closure and a new tube in the future once we start tube feeds any does well with time.  Will make sure it is in a good spot with a KUB fluoro and then start feeds the area.  Again consider fixing it if he gets better.    Isiah Valadez MD  General Surgery  [facility]

## 2022-09-25 NOTE — NURSING
1300 Removed old dressing to abdomen and L leg. Cleaned with vashe and water. Applied gauze dressings to abdomen and L leg. Secured with paper tape. Tolerated well. Removed old dressing to sacral area. Cleaned with vashe and water. Aquacel dressing applied to sacral. Tolerated well.

## 2022-09-25 NOTE — PLAN OF CARE
Problem: Adult Inpatient Plan of Care  Goal: Plan of Care Review  Outcome: Ongoing, Progressing  Goal: Patient-Specific Goal (Individualized)  Outcome: Ongoing, Progressing  Goal: Absence of Hospital-Acquired Illness or Injury  Outcome: Ongoing, Progressing  Goal: Optimal Comfort and Wellbeing  Outcome: Ongoing, Progressing  Goal: Readiness for Transition of Care  Outcome: Ongoing, Progressing     Problem: Fall Injury Risk  Goal: Absence of Fall and Fall-Related Injury  Outcome: Ongoing, Progressing     Problem: Skin Injury Risk Increased  Goal: Skin Health and Integrity  Outcome: Ongoing, Progressing     Problem: Infection  Goal: Absence of Infection Signs and Symptoms  Outcome: Ongoing, Progressing     Problem: Diabetes Comorbidity  Goal: Blood Glucose Level Within Targeted Range  Outcome: Ongoing, Progressing     Problem: Fluid and Electrolyte Imbalance (Acute Kidney Injury/Impairment)  Goal: Fluid and Electrolyte Balance  Outcome: Ongoing, Progressing     Problem: Oral Intake Inadequate (Acute Kidney Injury/Impairment)  Goal: Optimal Nutrition Intake  Outcome: Ongoing, Progressing     Problem: Renal Function Impairment (Acute Kidney Injury/Impairment)  Goal: Effective Renal Function  Outcome: Ongoing, Progressing     Problem: Impaired Wound Healing  Goal: Optimal Wound Healing  Outcome: Ongoing, Progressing     Problem: Device-Related Complication Risk (Mechanical Ventilation, Invasive)  Goal: Optimal Device Function  Outcome: Ongoing, Progressing     Problem: Inability to Wean (Mechanical Ventilation, Invasive)  Goal: Mechanical Ventilation Liberation  Outcome: Ongoing, Progressing     Problem: Skin and Tissue Injury (Artificial Airway)  Goal: Absence of Device-Related Skin or Tissue Injury  Outcome: Ongoing, Progressing     Problem: Device-Related Complication Risk (Artificial Airway)  Goal: Optimal Device Function  Outcome: Ongoing, Progressing     Problem: Communication Impairment (Artificial  Airway)  Goal: Effective Communication  Outcome: Ongoing, Progressing     Problem: Ventilator-Induced Lung Injury (Mechanical Ventilation, Invasive)  Goal: Absence of Ventilator-Induced Lung Injury  Outcome: Ongoing, Progressing     Problem: Skin and Tissue Injury (Mechanical Ventilation, Invasive)  Goal: Absence of Device-Related Skin and Tissue Injury  Outcome: Ongoing, Progressing     Problem: Noninvasive Ventilation Acute  Goal: Effective Unassisted Ventilation and Oxygenation  Outcome: Ongoing, Progressing     Problem: Device-Related Complication Risk (Hemodialysis)  Goal: Safe, Effective Therapy Delivery  Outcome: Ongoing, Progressing     Problem: Hemodynamic Instability (Hemodialysis)  Goal: Effective Tissue Perfusion  Outcome: Ongoing, Progressing     Problem: Infection (Hemodialysis)  Goal: Absence of Infection Signs and Symptoms  Outcome: Ongoing, Progressing     Problem: Airway Clearance Ineffective  Goal: Effective Airway Clearance  Outcome: Ongoing, Progressing     Problem: Breathing Pattern Ineffective  Goal: Effective Breathing Pattern  Outcome: Ongoing, Progressing     Problem: Gas Exchange Impaired  Goal: Optimal Gas Exchange  Outcome: Ongoing, Progressing

## 2022-09-25 NOTE — PROGRESS NOTES
[facility]  Pulmonology  Progress Note    Patient Name: Gregory Stuart  MRN: [id number]  Admission Date: 8/23/2022  Hospital Length of Stay: 33 days  Code Status: DNR  Attending Provider: Ham Sanchez DO  Primary Care Provider: Primary Doctor No   Principal Problem: Acute hypoxemic respiratory failure    Subjective:     Interval History:  Clinically appears a little worse today.  His Levophed had to be titrated up overnight.  Given how long he has gone without tube feedings will treat him with another small bolus today and see how he responds.  Neurologically hole open his eyes but does not follow any commands.  Continues to have output from his old PEG tube site.    Objective:     Vital Signs (Most Recent):  Temp: 96.7 °F (35.9 °C) (09/25/22 0700)  Pulse: 102 (09/25/22 0800)  Resp: 11 (09/25/22 0800)  BP: (!) 97/46 (09/25/22 0904)  SpO2: 95 % (09/25/22 0800)   Vital Signs (24h Range):  Temp:  [96.7 °F (35.9 °C)-98.6 °F (37 °C)] 96.7 °F (35.9 °C)  Pulse:  [] 102  Resp:  [11-28] 11  SpO2:  [93 %-100 %] 95 %  BP: ()/(32-54) 97/46     Weight: 101.4 kg (223 lb 8.7 oz)  Body mass index is 28.7 kg/m².      Intake/Output Summary (Last 24 hours) at 9/25/2022 1016  Last data filed at 9/25/2022 0621  Gross per 24 hour   Intake 2429.91 ml   Output --   Net 2429.91 ml       Physical Exam  Vitals reviewed.   Constitutional:       General: He is not in acute distress.     Appearance: He is ill-appearing.   HENT:      Head: Normocephalic and atraumatic.      Right Ear: External ear normal.      Left Ear: External ear normal.      Mouth/Throat:      Mouth: Mucous membranes are moist.   Eyes:      Extraocular Movements: Extraocular movements intact.      Conjunctiva/sclera: Conjunctivae normal.   Neck:      Comments: Trach in place   Cardiovascular:      Rate and Rhythm: Normal rate. Rhythm irregular.      Pulses: Normal pulses.   Pulmonary:      Breath sounds: No wheezing, rhonchi  or rales.   Abdominal:      General: Bowel sounds are normal. There is distension.      Palpations: Abdomen is soft.      Comments: Peg tube removed- drainage noted from old peg site    Musculoskeletal:         General: Swelling present.      Right lower leg: Edema present.      Left lower leg: Edema present.      Comments: Edema to upper and lower ext. Worse in left arm than right -Hx radial fx; family states cast was removed due to edema    Skin:     General: Skin is warm and dry.   Neurological:      Mental Status: He is lethargic.      Motor: Weakness present.      Comments: Severe weakness        Vents:  Vent Mode: A/C (09/25/22 0500)  Ventilator Initiated: Yes (09/02/22 0047)  Set Rate: 12 BPM (09/25/22 0500)  Vt Set: 550 mL (09/25/22 0500)  Pressure Support: 12 cmH20 (09/12/22 0350)  PEEP/CPAP: 8 cmH20 (09/25/22 0500)  Oxygen Concentration (%): 40 (09/24/22 1318)  Peak Airway Pressure: 44 cmH2O (09/25/22 0500)  Total Ve: 9.7 mL (09/25/22 0500)  F/VT Ratio<105 (RSBI): (!) 53.93 (09/25/22 0500)    Lines/Drains/Airways       Central Venous Catheter Line  Duration                  Hemodialysis Catheter right subclavian -- days              Drain  Duration                  NG/OG Tube 09/14/22 1340 Left nostril 10 days              Airway  Duration                  Surgical Airway Shiley Cuffed -- days              Peripheral Intravenous Line  Duration                  Peripheral IV - Single Lumen 09/19/22 1756 22 G Left;Posterior Hand 5 days         Peripheral IV - Single Lumen 09/20/22 1432 20 G Left Antecubital 4 days                    Significant Labs:    CBC/Anemia Profile:  No results for input(s): WBC, HGB, HCT, PLT, MCV, RDW, IRON, FERRITIN, RETIC, FOLATE, [id number], OCCULTBLOOD in the last 48 hours.     Chemistries:  No results for input(s): NA, K, CL, CO2, BUN, CREATININE, CALCIUM, ALBUMIN, PROT, BILITOT, ALKPHOS, ALT, AST, GLUCOSE, MG, PHOS in the last 48 hours.    All pertinent labs within the past 24  hours have been reviewed.    Significant Imaging:  I have reviewed all pertinent imaging results/findings within the past 24 hours.    Assessment/Plan:     * Acute hypoxemic respiratory failure  Failed extubated post CABG ()  Now with trach and peg     - increase to PSV 12 hours -   - Will plan to change trach out in the next day or two--continue to increase weaning as tolerated  - continue with current weaning plan for now. Will increase tomorrow  - His strength is slowly improving which will also improve weaning. Will start to increase weaning again today  - Oxygenating adequately on 40%. Plan for 12 hours continuous of cpap during the day and can continue to rest overnight on assist control  - continue current tx   - increase CPAP to 16hrs and rest on vent   - Doing well with 16 hours of cpap and then resting for 8. Will continue with this for now. Next plan will be to increase to continuous  - continue with 16 hours today. Discussed with respiratory.Will continue with 16 hours for the next couple of days and then increase   plan to increase to continuous as tolerated  - did well with continuous cpap which was started yesterday. Plan to continue for now.   -10 - CPAP as toelrated   -  Trach downsized to 6 XLT --  - Had an episode of emesis and is thought to have aspirated. Chest xray reviewed with worsening bilateral infiltrates. I have  FiO2 to 60% and peep is at 10.  Might need to bronch the patient (therapeutic)  - FiO2 down to 50% and peep is at 8. Once FiO2 back to 40% we will resume cpap  - FiO2 down to 40%. Peep is at 8.  9/15-  Sat is 92% this am. I am going to check a chest xray this am. Increasing secretions with odor  Plan for bronchoscopy today (therapeutic) due to copious thick secretions  - Unable to perform bronchoscopy yesterday. The patient currently is too ill. I am concerned that he might not tolerate the procedure. We have  started the patient on cefepime renally dosed due to Klebsiella in respiratory culture. Continuing with clindamycin to cover anaerobes. Increased breathing treatments to q6 and added solumedrol 40 mg q12  9/18- FiO2 now at 60% and peep is 10  9/19- FiO2 down to 50%  9/21- FiO2 down to 40%  9/22- less responsive, still on levo; will continue to rest on AC -   9/23- not much change neurologically continue to rest on AM   9/25-given his decline will continue to rest on assist control.  No plans to wean at this time     Hypotension  On levophed - will treat with small bolus and start IVFs since he has been NPO for last 4--5 days  - wean levo as tolerated   9/23 treated with small IVF bolus yesterday -- able to wean levo down slightly   9/24- still on levo   09/25--increase in pressure requirement overnight.  He has been going without tube feeding and free water flushes.  He has been on a low-dose maintenance fluids however will treated with a small IV fluid bolus today to see if this improves his hypotension.    PEG tube malfunction  Peg tube removed by surgery - currently has NGT to [facility] - will ask surgery when or if we can start tube feedings per NGT... CT without obstruction   9*23-- started tube feeding back yesterday afternoon   9/24-- too much leaking from old peg site- TFs stop per surgery recommendations- continue IVFs - anticipate replacement of PEG on Monday - will go ahead and stop Eliquis   09/25--given his regression and increase in pressor requirement overnight.  I am unsure if he will be stable for surgery tomorrow.  My plan is to speak with his wife today on her goals of care.      Weakness acquired in ICU  Extreme generalized weakness - continue PT/OT as tolerated  Continued to  regressed      DM (diabetes mellitus)  accuchecks and Sliding scale insulin   A1C 5.3%  Currently on sliding scale  Glucose<180       JUVENTINO (acute kidney injury)  ESRD  Cr 0.9 March 2022   - was on CCRT post CABG -- transitioned  to HD M/W/F  Plan to continue MWF dialysis  Nephrology following -- they have not been pulling off any fluid per staff   Continue HD     CAD (coronary artery disease)  S/p CABG on 07/18  Currently no acute issues  He is on aspirin, statin, beta blocker      Chronic systolic heart failure  EF 30-35% per Echo 07/2022  Daily wts/ I/Os   ---starting IVFs for dehydration and no PO intake over last 5 days - will monitor closely for FVO       Overall patient continues to decline.  He is having continued output from his old PEG tube site.  Increase and IV pressor requirement.  No will try to treat this with a fluid bolus.  He needs a new PEG tube placed from unsure at this time if he is actually stable for surgery.  The plan is to talk to his wife today.This includes 34 minutes of critical care time spent evaluating and managing patient. This includes time spent at bedside, reviewing labs, data, xrays and monitoring for acute decompensation.              ADRI Azul-ACNP  Pulmonology  [facility]

## 2022-09-25 NOTE — ASSESSMENT & PLAN NOTE
Failed extubated post CABG ()  Now with trach and peg     - increase to PSV 12 hours -   - Will plan to change trach out in the next day or two--continue to increase weaning as tolerated  - continue with current weaning plan for now. Will increase tomorrow  - His strength is slowly improving which will also improve weaning. Will start to increase weaning again today  - Oxygenating adequately on 40%. Plan for 12 hours continuous of cpap during the day and can continue to rest overnight on assist control  - continue current tx   - increase CPAP to 16hrs and rest on vent   - Doing well with 16 hours of cpap and then resting for 8. Will continue with this for now. Next plan will be to increase to continuous  - continue with 16 hours today. Discussed with respiratory.Will continue with 16 hours for the next couple of days and then increase   plan to increase to continuous as tolerated  - did well with continuous cpap which was started yesterday. Plan to continue for now.   9-10 - CPAP as toelrated   -  Trach downsized to 6 XLT --  - Had an episode of emesis and is thought to have aspirated. Chest xray reviewed with worsening bilateral infiltrates. I have  FiO2 to 60% and peep is at 10.  Might need to bronch the patient (therapeutic)  - FiO2 down to 50% and peep is at 8. Once FiO2 back to 40% we will resume cpap  - FiO2 down to 40%. Peep is at 8.  9/15-  Sat is 92% this am. I am going to check a chest xray this am. Increasing secretions with odor  Plan for bronchoscopy today (therapeutic) due to copious thick secretions  - Unable to perform bronchoscopy yesterday. The patient currently is too ill. I am concerned that he might not tolerate the procedure. We have started the patient on cefepime renally dosed due to Klebsiella in respiratory culture. Continuing with clindamycin to cover anaerobes. Increased breathing treatments to q6 and added solumedrol 40  mg q12  9/18- FiO2 now at 60% and peep is 10  9/19- FiO2 down to 50%  9/21- FiO2 down to 40%  9/22- less responsive, still on levo; will continue to rest on AC -   9/23- not much change neurologically continue to rest on AM   9/25-given his decline will continue to rest on assist control.  No plans to wean at this time

## 2022-09-25 NOTE — ASSESSMENT & PLAN NOTE
Peg tube removed by surgery - currently has NGT to [facility] - will ask surgery when or if we can start tube feedings per NGT... CT without obstruction   9*23-- started tube feeding back yesterday afternoon   9/24-- too much leaking from old peg site- TFs stop per surgery recommendations- continue IVFs - anticipate replacement of PEG on Monday - will go ahead and stop Eliquis   09/25--given his regression and increase in pressor requirement overnight.  I am unsure if he will be stable for surgery tomorrow.  My plan is to speak with his wife today on her goals of care.

## 2022-09-26 NOTE — PLAN OF CARE
[facility]  Discharge Final Note    Primary Care Provider: Primary Doctor No    Expected Discharge Date:     Final Discharge Note (most recent)       Final Note - 22 0817          Final Note    Assessment Type Final Discharge Note     Anticipated Discharge Disposition                      Important Message from Medicare               Pt

## 2022-09-26 NOTE — NURSING
Body released to  Home.  Release sheet signed by  Home Representative. Face sheet given to representative from  [place] and [facility]

## 2022-09-27 NOTE — DISCHARGE SUMMARY
"[facility]  Pulmonology  Discharge Summary      Patient Name: Gregory Stuart  MRN: [id number]  Admission Date: 8/23/2022  Hospital Length of Stay: 33 days  Discharge Date and Time:      Attending Physician: No att. providers found   Discharging Provider: Ham Sanchez DO  Primary Care Provider: Primary Doctor Susan    HPI:  77-year-old male who was transferred from [facility] in [place], [place] for continued ventilator weaning.  History obtained from medical records and family.  Patient was admitted on 7/12/22 for syncopal spell that resulted in a left radial fracture.  Workup revealed three-vessel CAD and patient underwent a CABG on 07/18.   Patient failed extubation and underwent a trach and PEG placement.  During this admission patient also developed acute kidney injury it was started on hemodialysis..  Baseline creatinine was  0.9 in March of this year.  He also developed atrial fibrillation and was placed on weight based Lovenox.  Past medical history of CVA "years ago" with no residual deficits according to family, hypertension, systolic heart failure, diabetes mellitus, anemia, hyperlipidemia and bladder cancer (2011).  He has required a thoracentesis in the past due to his heart failure.   On exam patient is very edematous.  He will open his eyes and denies having any pain.  He has not really following commands with his upper and lower extremities.  This may be secondary to his edema and the fact that he has been in the bed for over a month.   Family denies him having any history of smoking. States he was able to care for himself prior to this hospitalization.       * No surgery found *    Indwelling Lines/Drains at Time of Discharge:   Lines/Drains/Airways     Central Venous Catheter Line  Duration                Hemodialysis Catheter right subclavian -- days          Airway  Duration                Surgical Airway Shiley Cuffed -- days          "       Hospital Course:  8/24 The patient is resting comfortably this am. He is afebrile this am and oxygenating adequately on 40%. I placed him on CPAP during my exam and he is doing ok. Will plan for 1 hour tid today.  8/25- Did well with PSV trials yesterday. Will plan to increase to 3 hours tid  8/26- increased to 4 hr TID today   8/29- Patient is doing ok with trials. He remains extremely weak.   8/31- He is improving some with his ability to assist with therapy. He continues to do well with his breathing trials.  9/1- Continues to do well with 12 hours continuous with his CPAP/PSV  9/2- continue with 12hrs CPAP during the day and rest on vent overnight; plans for HD today   9/3- HD yesterday with 3L removed; rec'd 1u PRBCS- will receive another unit today; continue current vent settings   9/4- resting on CPAP; will increase to 16hrs daily; labs in AM; scheduled dialysis tomorrow   9/5- CPAP 16hrs and rest on vent overnight; H/H stable; plans for HD today; continue current tx- PT/OT to  tomorrow after holiday   9/7- continues to do well with 16 hours of cpap. He remains weak but strength is improving.  9/8- Plan to increase cpap to continuous as tolerated today  9/9- Doing well with continuous cpap.   9/12- Trach changed out Saturday to XLT size 6. Likely had some aspiration. Increased FiO2 requirements.  Chest xray has been reviewed. FiO2 down to 60% and peep is at 10.  9/13- Fio2 down to 50% and peep is now at 8.   9/14- HD today. FiO2 down to 40%. He is having some leaking from around peg tube site  9/15- BP has been on lower side. Also with increasing secretions with odor. He is currently afebrile. Oxygenating adequately.  9/16 Now on pressors and oxygenation requirements have increased. Copious amounts of thick secretions.  9/17- Patient remains on pressors. Continues to require high FiO2. Respiratory culture growing Klebsiella. Patient started on cefepime renally dosed.  9/18- Patient remains  critically ill. He remains on pressor. He is oxygenating adequately this am. He is on 60% and peep is down to 10  - FiO2 down to 50%. Remains on pressor. He is now DNR. Plans are to go to OR to replace peg tube  - Too sick to go to OR yesterday. He remains critically ill. Still on pressors. Currently afebrile and oxygenating adequately  - FiO2 down to 40% and peep remains at 10. Remains on a pressor.   - The patient continued to decline and family decided to withdraw care. This was done and the patient  at 1720.      Goals of Care Treatment Preferences:  Code Status: DNR      Consults (From admission, onward)        Status Ordering Provider     Inpatient consult to General Surgery  Once        Provider:  Isiah Valadez MD    Completed RANULFO WILKERSON     Inpatient consult to Registered Dietitian/Nutritionist  Once        Provider:  (Not yet assigned)    Completed BENJA BROWN     Inpatient consult to Nephrology  Once        Provider:  Andres Lino Jr., MD    Completed BENJA BROWN          Significant Labs:  All pertinent labs within the past 24 hours have been reviewed.    Significant Imaging:  I have reviewed all pertinent imaging results/findings within the past 24 hours.    Pending Diagnostic Studies:     Procedure Component Value Units Date/Time    EKG 12-lead [[id number]] Collected: 22    Order Status: Sent Lab Status: In process Updated: 22    Narrative:      Test Reason : I48.91,    Vent. Rate : 088 BPM     Atrial Rate : 085 BPM     P-R Int : 000 ms          QRS Dur : 164 ms      QT Int : 392 ms       P-R-T Axes : 000 127 019 degrees     QTc Int : 474 ms    Atrial fibrillation with premature ventricular or aberrantly conducted  complexes  Right bundle branch block  Abnormal ECG  No previous ECGs available    Referred By: LEIGH WILKERSON           Confirmed By:     EXTRA TUBES [[id number]] Collected: 22    Order Status: Sent Lab Status: In  process Updated: 22    Specimen: Blood, Venous     Narrative:      The following orders were created for panel order EXTRA TUBES.  Procedure                               Abnormality         Status                     ---------                               -----------         ------                     Lavender Top Hold[[id number]]                                In process                   Please view results for these tests on the individual orders.        Final Active Diagnoses:    Diagnosis Date Noted POA    PRINCIPAL PROBLEM:  Acute hypoxemic respiratory failure [J96.01] 2022 Unknown    Hypotension [I95.9] 2022 Unknown    PEG tube malfunction [K94.23] 2022 Clinically Undetermined    Open wound of anterior abdominal wall without complication [S31.109A] 2022 Yes    Pressure injury of left leg, unstageable [L89.890] 2022 Yes    Pressure injury of sacral region, unstageable [L89.150] 2022 Yes    A-fib [I48.91] 2022 Unknown    Chronic systolic heart failure [I50.22] 2022 Unknown    CAD (coronary artery disease) [I25.10] 2022 Unknown    JUVENTINO (acute kidney injury) [N17.9] 2022 Unknown    Anemia [D64.9] 2022 Unknown    DM (diabetes mellitus) [E11.9] 2022 Unknown    Left radial fracture [S52.92XA] 2022 Unknown    Weakness acquired in ICU [R53.1] 2022 Unknown    Alteration in nutrition [R63.8] 2022 Unknown    HLD (hyperlipidemia) [E78.5] 2022 Unknown    HTN (hypertension) [I10] 2022 Unknown      Problems Resolved During this Admission:    Diagnosis Date Noted Date Resolved POA    Wound dehiscence [T81.30XA] 2022 Yes       Discharged Condition:     Disposition:     Follow Up:    Patient Instructions:   No discharge procedures on file.  Medications:  None    Ham Sanchez DO  Pulmonology  [facility]